# Patient Record
Sex: MALE | Race: WHITE | NOT HISPANIC OR LATINO | Employment: OTHER | URBAN - METROPOLITAN AREA
[De-identification: names, ages, dates, MRNs, and addresses within clinical notes are randomized per-mention and may not be internally consistent; named-entity substitution may affect disease eponyms.]

---

## 2017-01-16 RX ORDER — WARFARIN SODIUM 5 MG/1
TABLET ORAL
Qty: 90 TABLET | Refills: 0 | Status: SHIPPED | OUTPATIENT
Start: 2017-01-16 | End: 2017-04-15 | Stop reason: SDUPTHER

## 2017-03-23 RX ORDER — FINASTERIDE 5 MG/1
TABLET, FILM COATED ORAL
Qty: 90 TABLET | Refills: 1 | Status: SHIPPED | OUTPATIENT
Start: 2017-03-23 | End: 2017-09-14 | Stop reason: SDUPTHER

## 2017-04-15 RX ORDER — WARFARIN SODIUM 5 MG/1
TABLET ORAL
Qty: 90 TABLET | Refills: 0 | Status: SHIPPED | OUTPATIENT
Start: 2017-04-15 | End: 2017-08-07 | Stop reason: SDUPTHER

## 2017-05-30 RX ORDER — PROPAFENONE HYDROCHLORIDE 150 MG/1
TABLET, COATED ORAL
Qty: 60 TABLET | Refills: 1 | Status: SHIPPED | OUTPATIENT
Start: 2017-05-30 | End: 2017-08-07 | Stop reason: SDUPTHER

## 2017-06-01 RX ORDER — PROPAFENONE HYDROCHLORIDE 150 MG/1
TABLET, COATED ORAL
Qty: 60 TABLET | Refills: 1 | OUTPATIENT
Start: 2017-06-01

## 2017-06-13 ENCOUNTER — OFFICE VISIT (OUTPATIENT)
Dept: INTERNAL MEDICINE | Facility: CLINIC | Age: 72
End: 2017-06-13

## 2017-06-13 VITALS
WEIGHT: 215 LBS | HEIGHT: 74 IN | SYSTOLIC BLOOD PRESSURE: 162 MMHG | DIASTOLIC BLOOD PRESSURE: 74 MMHG | BODY MASS INDEX: 27.59 KG/M2

## 2017-06-13 DIAGNOSIS — G45.9 TRANSIENT CEREBRAL ISCHEMIA, UNSPECIFIED TYPE: ICD-10-CM

## 2017-06-13 DIAGNOSIS — G89.29 CHRONIC LOW BACK PAIN WITH LEFT-SIDED SCIATICA, UNSPECIFIED BACK PAIN LATERALITY: ICD-10-CM

## 2017-06-13 DIAGNOSIS — Z82.49 FAMILY HISTORY OF PREMATURE CORONARY ARTERY DISEASE: ICD-10-CM

## 2017-06-13 DIAGNOSIS — I48.0 PAROXYSMAL ATRIAL FIBRILLATION (HCC): ICD-10-CM

## 2017-06-13 DIAGNOSIS — G25.81 RESTLESS LEGS SYNDROME: ICD-10-CM

## 2017-06-13 DIAGNOSIS — Z00.00 HEALTH CARE MAINTENANCE: Primary | ICD-10-CM

## 2017-06-13 DIAGNOSIS — M54.42 CHRONIC LOW BACK PAIN WITH LEFT-SIDED SCIATICA, UNSPECIFIED BACK PAIN LATERALITY: ICD-10-CM

## 2017-06-13 PROBLEM — Z86.73 HISTORY OF TIA (TRANSIENT ISCHEMIC ATTACK): Status: ACTIVE | Noted: 2017-06-13

## 2017-06-13 PROBLEM — J37.0 CHRONIC LARYNGITIS: Status: ACTIVE | Noted: 2017-06-13

## 2017-06-13 PROBLEM — I67.82 TEMPORARY CEREBRAL VASCULAR DYSFUNCTION: Status: ACTIVE | Noted: 2017-06-13

## 2017-06-13 PROBLEM — M51.36 DDD (DEGENERATIVE DISC DISEASE), LUMBAR: Status: ACTIVE | Noted: 2017-06-13

## 2017-06-13 PROBLEM — J30.2 SEASONAL ALLERGIC RHINITIS: Status: ACTIVE | Noted: 2017-06-13

## 2017-06-13 PROCEDURE — G0438 PPPS, INITIAL VISIT: HCPCS | Performed by: INTERNAL MEDICINE

## 2017-06-13 PROCEDURE — 99214 OFFICE O/P EST MOD 30 MIN: CPT | Performed by: INTERNAL MEDICINE

## 2017-06-13 RX ORDER — HYDROCODONE BITARTRATE AND ACETAMINOPHEN 5; 325 MG/1; MG/1
1 TABLET ORAL AS NEEDED
Qty: 30 TABLET | Refills: 0 | Status: SHIPPED | OUTPATIENT
Start: 2017-06-13 | End: 2017-12-05 | Stop reason: SDUPTHER

## 2017-06-13 RX ORDER — CYCLOBENZAPRINE HCL 10 MG
10 TABLET ORAL NIGHTLY
Qty: 30 TABLET | Refills: 11 | Status: SHIPPED | OUTPATIENT
Start: 2017-06-13 | End: 2017-07-12

## 2017-06-13 RX ORDER — ROPINIROLE 0.5 MG/1
0.5 TABLET, FILM COATED ORAL NIGHTLY
Qty: 30 TABLET | Refills: 11 | Status: SHIPPED | OUTPATIENT
Start: 2017-06-13 | End: 2017-07-12

## 2017-06-13 NOTE — PROGRESS NOTES
"Subjective   Demond Menchaca is a 72 y.o. male. Here for AWV, to establish as a new patient.     History of Present Illness   /72  Ht 73.5\" (186.7 cm)  Wt 215 lb (97.5 kg)  BMI 27.98 kg/m2  Patient is being seen for initial wellness visit. Demond Menchaca 72 y.o. male who is here to establish as a new patient. In a past had been to see  and lately by .  Past medical and surgical history, family and social history was obtained by me in the interview and recorded in the EHR.    /72  Ht 73.5\" (186.7 cm)  Wt 215 lb (97.5 kg)  BMI 27.98 kg/m2      Current Outpatient Prescriptions:   •  Cetirizine HCl 10 MG capsule, Take 1 capsule by mouth daily., Disp: , Rfl:   •  finasteride (PROSCAR) 5 MG tablet, TAKE 1 TABLET EVERY DAY, Disp: 90 tablet, Rfl: 1  •  fluticasone (FLONASE) 50 MCG/ACT nasal spray, into each nostril daily., Disp: , Rfl:   •  HYDROcodone-acetaminophen (NORCO) 5-325 MG per tablet, Take 1 tablet by mouth As Needed for Severe Pain (7-10)., Disp: 30 tablet, Rfl: 0  •  metoprolol tartrate (LOPRESSOR) 50 MG tablet, Take 1 tablet by mouth Daily., Disp: 30 tablet, Rfl: 5  •  propafenone (RHTHYMOL) 150 MG tablet, TAKE 1 TABLET BY MOUTH 2 (TWO) TIMES A DAY., Disp: 60 tablet, Rfl: 1  •  warfarin (COUMADIN) 5 MG tablet, TAKE ONE-HALF OF A TAB BY MOUTH ON MONDAY, WEDNESDAY, & FRIDAY & 1 TAB ALL OTHER DAYS AS DIRECTED, Disp: 90 tablet, Rfl: 0  •  rOPINIRole (REQUIP) 0.5 MG tablet, Take 1 tablet by mouth Every Night. Take 1 hour before bedtime., Disp: 30 tablet, Rfl: 11FH, SH, PMH and surgical history had been collected by me in the interview and reflected in  EHR.  Hospitalizations in a past: 8  Once per lifetime Medicare screening tests: ECG - under the care of cardiologist    AAA risk assessment: 1. FH: none. 2.H/o tobacco smoking: in remote past, as per SH. 3. CV or PAD: none.    Tobacco use: in remote past, as per SH   Alcohol use: once a week  Illicit drugs use: none  Diet: low " carb  Exercise: 2 times a week, stationary bike and aerobic exercise    Anxiety screening: How many days in the last 2 weeks you were  1. Feeling anxious, nervous, on edge: none  2. Unable to stop worrying: none  3. Worrying too much about different things: none  4. Having problems relaxing:none  5. Feeling restless or unable to sit still:none  6. Feeling irritable or easely annoyed: none  7. Being afraid that something awful might happen: none    Depression screening PHQ9:  Over the past 2 weeks how often have you been bothered by  1. Lack of interest or pleasuer in usual activities: none  2. Feeling down, depressed, hopeless:none  3. Troubles falling asleep/sleeping too long:none  4. Feeling tired, having little energy: none  5. Poor appetite or overeating: none  6. Feeling bad about yourself:none.  7. Trouble concentrating: at the baseline.  8. Moving or speaking too slow or much faster than usual: none  9. Thoughts about harming yourself:none.    Cognitive impairment screening:   Do you have difficulties with:  1. Language: no  2. Behavior: no  3. Learning/retaining new information: no  4. Handling complex tasks: no  5. Reasoning: no  6. Spacial ability and orientation: no    Hearing: normal.  Driving: unrestricted  ADL: independent  ADL details: Does patient needs help with:  telephone use: no  Transportation: no  Housework: no  Shopping: no  meal preparation: no  laundry: no  managing medication:no  Participate in the social activities: Y    Fall risk factors:   1.Use of alcohol: no    2.Polypharmacy: no  3.H/o of previous fall:  yes  4. Mobility impairment:  yes  5. Visual impairment:  no  6. Deconditioning: no  7. Use of antihypertensive medication:  yes  8. Use of sedatives: no  9. Use of antidepressant: no    Home safety:   1.loose rugs: no   2.unfamiliar environment: no   3. Uneven floors: no   4. No grab bars in the bathroom:  no  5. No banister on stairs: no      Advanced directives: completed and  Living Will is on file in the hospital. Discussed. I agree with patients decision..    Co-managers: ophthalmology , dermatology , cardiology , gastroenterologist , allergist that he forgot the name.    Patient had chronic lower back pain and stiffness. He had lumbar laminectomy several decades ago. He reports that every morning he has significant stiffness, but he is better in 3-4 hours. Play golf and goes to the gym (bike and treadmill), but only in the afternoons. On chronic anticoagulation for PAF. Takes Hydrocodone/APAP 5/325 mg as needed, only if his back hurts or before he has significant exercise. No side effects. No h/o substance abuse.                                      The following portions of the patient's history were reviewed and updated as appropriate: allergies, current medications, past family history, past medical history, past social history, past surgical history and problem list.    Review of Systems   Constitutional: Negative for chills and fever.   HENT: Negative for postnasal drip, sinus pressure and sore throat.    Eyes: Negative for pain and itching.   Respiratory: Negative for cough and chest tightness.    Cardiovascular: Negative for chest pain and leg swelling.   Gastrointestinal: Negative for abdominal pain and blood in stool.   Endocrine: Negative for cold intolerance and heat intolerance.   Genitourinary: Negative for difficulty urinating and flank pain.   Musculoskeletal: Positive for back pain. Negative for neck pain.   Skin: Negative for color change and rash.   Neurological: Negative for dizziness, weakness and headaches.   Hematological: Negative for adenopathy. Does not bruise/bleed easily.   Psychiatric/Behavioral: Negative for sleep disturbance. The patient is not nervous/anxious.        Objective   Physical Exam   Constitutional: He is oriented to person, place, and time. Vital signs are normal. He appears well-developed and  well-nourished. No distress.   HENT:   Head: Normocephalic and atraumatic.   Right Ear: External ear normal.   Left Ear: External ear normal.   Nose: Nose normal. No mucosal edema. Right sinus exhibits no maxillary sinus tenderness and no frontal sinus tenderness. Left sinus exhibits no maxillary sinus tenderness and no frontal sinus tenderness.   Mouth/Throat: Oropharynx is clear and moist. No oropharyngeal exudate.   Eyes: Conjunctivae, EOM and lids are normal. Pupils are equal, round, and reactive to light. Right eye exhibits no discharge. Left eye exhibits no discharge. Right conjunctiva is not injected. Left conjunctiva is not injected. No scleral icterus. Right eye exhibits normal extraocular motion. Left eye exhibits normal extraocular motion.   Neck: Normal range of motion and full passive range of motion without pain. Neck supple. No JVD present. Carotid bruit is not present. No thyromegaly present.   Cardiovascular: Regular rhythm, S1 normal, S2 normal, normal heart sounds and intact distal pulses.  Bradycardia present.  PMI is not displaced.  Exam reveals no gallop and no friction rub.    No murmur heard.  Pulmonary/Chest: Effort normal and breath sounds normal. No accessory muscle usage. No respiratory distress. He has no decreased breath sounds. He has no wheezes. He has no rhonchi. He has no rales.   Abdominal: Soft. Bowel sounds are normal. He exhibits no distension, no pulsatile liver, no fluid wave, no abdominal bruit, no ascites and no mass. There is no tenderness. There is no rebound and no guarding.   Well healed RLQ scar, umbilical hernia   Musculoskeletal: He exhibits deformity (bow legs). He exhibits no edema.   Lymphadenopathy:        Head (right side): No submental, no submandibular, no preauricular, no posterior auricular and no occipital adenopathy present.        Head (left side): No submental, no submandibular, no tonsillar, no preauricular, no posterior auricular and no occipital  adenopathy present.     He has no cervical adenopathy.        Right cervical: No superficial cervical, no deep cervical and no posterior cervical adenopathy present.       Left cervical: No superficial cervical, no deep cervical and no posterior cervical adenopathy present.        Right: No supraclavicular adenopathy present.        Left: No supraclavicular adenopathy present.   Neurological: He is alert and oriented to person, place, and time. He has normal strength and normal reflexes. He displays normal reflexes. No cranial nerve deficit. He exhibits normal muscle tone. Coordination normal.   Reflex Scores:       Bicep reflexes are 2+ on the right side and 2+ on the left side.       Patellar reflexes are 2+ on the right side and 2+ on the left side.  Skin: Skin is warm and dry. No rash noted. He is not diaphoretic. No erythema.   Few cherry hemangiomas, livedo reticularis lower legs   Psychiatric: He has a normal mood and affect. His speech is normal and behavior is normal. Thought content normal.   Vitals reviewed.      Assessment/Plan   Diagnoses and all orders for this visit:    Health care maintenance  -     Comprehensive Metabolic Panel; Future  -     Lipid Panel; Future  -     TSH; Future    Chronic low back pain with left-sided sciatica, unspecified back pain laterality  -     HYDROcodone-acetaminophen (NORCO) 5-325 MG per tablet; Take 1 tablet by mouth As Needed for Severe Pain (7-10).  -     cyclobenzaprine (FLEXERIL) 10 MG tablet; Take 1 tablet by mouth Every Night.    Restless legs syndrome  -     rOPINIRole (REQUIP) 0.5 MG tablet; Take 1 tablet by mouth Every Night. Take 1 hour before bedtime.  -     CBC & Differential; Future    Paroxysmal atrial fibrillation  -     Comprehensive Metabolic Panel; Future  -     Lipid Panel; Future  -     TSH; Future    Transient cerebral ischemia, unspecified type  -     Lipid Panel; Future    Family history of premature coronary artery disease  -     Lipid Panel;  Future    Annual wellness visit with preventive exam as well as age and risk appropriate councelling completed.    Cardiovascular disease councelling: will check cholesterol. Continue low fat diet and regular exrcise.   Diabetes screening and councelling: will check blood sugar.  Glaucoma screening: up to date.  AAA screening: not needed..  Hep C screening (born between 0[birth year]) not needed, patient is not in the age group, and has no risk factors..  Colorectal cancer screening: up to date. Recommended every 5 years. Next screening is recommended in 2021..  Prostate cancer screening: Recent changes in guidelines, available evidence and controvercies discussed with patient. Current position and recommendations from USPTF, ACP and Urological association explained. Patient requests PSA . Will check PSA.    Immunizations:   1. Influenza vaccine  is up to date and recommended yearly.   2. Shingles vaccines: completed.   3. Pneumonia vaccine: had Pneumovaccine 23, needs Prevnar 13, but declines vaccine today as he has scheduled golf game..      Advanced directives planning:completed and Living Will is on file in the hospital. Discussed. I agree with patients decision..    Medications reviewed and medication list updated.   Potential harmful drug-disease interactions in the elderly: none.  High risk medication in elderly: opiate.  ASA use: anticoagulated.    Restless leg s-m - will check CBC to r/o iron deficiency and try Ropinirole 0.5 mg at bedtime, if needed mat take 2 pills at a time.  Chronic lower back pain - due to L spine DDD documented on MRI, bulging disks, s/p laminectomy. Will continue Hydrocodone as needed and try muscle relaxant at bedtime. Potential side effects of medication, including risk of habit formation and tolerance, as well as alternative treatments, discussed with patient. Patient voices understanding and wishes to try medication. Timmy report reviewed. [facility] controlled substance  agreement signed. I will continue to monitor clinical progress with follow up visits, random pill counts and urine tox screens. Urine drug screen ordered. Given script. Patient notified that maximum prescribed amount of medication will not exceed 30 days supply, that medication will not be ordered from the mail order company, and that misuse of medication or providing false information will result in dismissal from the practice. Lost pills or lost scripts will not be replaced.

## 2017-06-13 NOTE — PATIENT INSTRUCTIONS
Annual wellness visit with preventive exam as well as age and risk appropriate councelling completed.    Cardiovascular disease councelling: will check cholesterol. Continue low fat diet and regular exrcise.   Diabetes screening and councelling: will check blood sugar.  Glaucoma screening: up to date.  AAA screening: not needed..  Hep C screening (born between [birth year]) not needed, patient is not in the age group, and has no risk factors..  Colorectal cancer screening: up to date. Recommended every 5 years. Next screening is recommended in 2021..  Prostate cancer screening: Recent changes in guidelines, available evidence and controvercies discussed with patient. Current position and recommendations from USPTF, ACP and Urological association explained. Patient requests PSA . Will check PSA.    Immunizations:   1. Influenza vaccine  is up to date and recommended yearly.   2. Shingles vaccines: completed.   3. Pneumonia vaccine: had Pneumovaccine 23, needs Prevnar 13, but declines vaccine today as he has scheduled golf game..      Advanced directives planning:completed and Living Will is on file in the hospital. Discussed. I agree with patients decision..    Medications reviewed and medication list updated.   Potential harmful drug-disease interactions in the elderly: none.  High risk medication in elderly: opiate.  ASA use: anticoagulated.    Restless leg s-m - will check CBC to r/o iron deficiency and try Ropinirole 0.5 mg at bedtime, if needed mat take 2 pills at a time.  Chronic lower back pain - due to L spine DDD documented on MRI, bulging disks, s/p laminectomy. Will continue Hydrocodone as needed and try muscle relaxant at bedtime. Potential side effects of medication, including risk of habit formation and tolerance, as well as alternative treatments, discussed with patient. Patient voices understanding and wishes to try medication. Timmy report reviewed. [facility] controlled substance agreement signed.  I will continue to monitor clinical progress with follow up visits, random pill counts and urine tox screens. Urine drug screen ordered. Given script. Patient notified that maximum prescribed amount of medication will not exceed 30 days supply, that medication will not be ordered from the mail order company, and that misuse of medication or providing false information will result in dismissal from the practice. Lost pills or lost scripts will not be replaced.     Return in about 4 weeks (around 7/11/2017) for RLS.

## 2017-06-27 ENCOUNTER — LAB (OUTPATIENT)
Dept: INTERNAL MEDICINE | Facility: CLINIC | Age: 72
End: 2017-06-27

## 2017-06-27 ENCOUNTER — CLINICAL SUPPORT (OUTPATIENT)
Dept: INTERNAL MEDICINE | Facility: CLINIC | Age: 72
End: 2017-06-27

## 2017-06-27 DIAGNOSIS — G89.29 CHRONIC LOW BACK PAIN WITH LEFT-SIDED SCIATICA, UNSPECIFIED BACK PAIN LATERALITY: ICD-10-CM

## 2017-06-27 DIAGNOSIS — M54.42 CHRONIC LOW BACK PAIN WITH LEFT-SIDED SCIATICA, UNSPECIFIED BACK PAIN LATERALITY: ICD-10-CM

## 2017-06-27 DIAGNOSIS — G45.9 TRANSIENT CEREBRAL ISCHEMIA, UNSPECIFIED TYPE: ICD-10-CM

## 2017-06-27 DIAGNOSIS — Z82.49 FAMILY HISTORY OF PREMATURE CORONARY ARTERY DISEASE: ICD-10-CM

## 2017-06-27 DIAGNOSIS — Z23 NEED FOR PNEUMOCOCCAL VACCINATION: Primary | ICD-10-CM

## 2017-06-27 DIAGNOSIS — Z00.00 HEALTH CARE MAINTENANCE: ICD-10-CM

## 2017-06-27 DIAGNOSIS — G25.81 RESTLESS LEGS SYNDROME: ICD-10-CM

## 2017-06-27 DIAGNOSIS — I48.0 PAROXYSMAL ATRIAL FIBRILLATION (HCC): ICD-10-CM

## 2017-06-27 DIAGNOSIS — Z79.899 ENCOUNTER FOR LONG-TERM (CURRENT) USE OF MEDICATIONS: Primary | ICD-10-CM

## 2017-06-27 LAB
ALBUMIN SERPL-MCNC: 4.17 G/DL (ref 3.4–4.6)
ALBUMIN/GLOB SERPL: 1.6 G/DL
ALP SERPL-CCNC: 69 U/L (ref 46–116)
ALT SERPL W P-5'-P-CCNC: 24 U/L (ref 16–63)
ANION GAP SERPL CALCULATED.3IONS-SCNC: 10 MMOL/L
AST SERPL-CCNC: 22 U/L (ref 7–37)
BILIRUB SERPL-MCNC: 0.8 MG/DL (ref 0.2–1)
BUN BLD-MCNC: 16 MG/DL (ref 6–22)
BUN/CREAT SERPL: 16.2 (ref 7–25)
CALCIUM SPEC-SCNC: 8.7 MG/DL (ref 8.6–10.5)
CHLORIDE SERPL-SCNC: 104 MMOL/L (ref 95–107)
CHOLEST SERPL-MCNC: 115 MG/DL (ref 0–200)
CO2 SERPL-SCNC: 27 MMOL/L (ref 23–32)
CREAT BLD-MCNC: 0.99 MG/DL (ref 0.7–1.3)
GFR SERPL CREATININE-BSD FRML MDRD: 74 ML/MIN/1.73
GLOBULIN UR ELPH-MCNC: 2.6 GM/DL
GLUCOSE BLD-MCNC: 92 MG/DL (ref 70–100)
HDLC SERPL-MCNC: 56 MG/DL (ref 40–81)
LDLC SERPL CALC-MCNC: 51 MG/DL (ref 0–100)
LDLC/HDLC SERPL: 0.92 {RATIO}
POTASSIUM BLD-SCNC: 4.6 MMOL/L (ref 3.3–5.3)
PROT SERPL-MCNC: 6.8 G/DL (ref 6.3–8.4)
SODIUM BLD-SCNC: 141 MMOL/L (ref 136–145)
TRIGL SERPL-MCNC: 38 MG/DL (ref 0–150)
TSH SERPL DL<=0.05 MIU/L-ACNC: 0.95 MIU/ML (ref 0.4–4.2)
VLDLC SERPL-MCNC: 7.6 MG/DL

## 2017-06-27 PROCEDURE — 85025 COMPLETE CBC W/AUTO DIFF WBC: CPT | Performed by: INTERNAL MEDICINE

## 2017-06-27 PROCEDURE — 90670 PCV13 VACCINE IM: CPT | Performed by: INTERNAL MEDICINE

## 2017-06-27 PROCEDURE — 80053 COMPREHEN METABOLIC PANEL: CPT | Performed by: INTERNAL MEDICINE

## 2017-06-27 PROCEDURE — 84443 ASSAY THYROID STIM HORMONE: CPT | Performed by: INTERNAL MEDICINE

## 2017-06-27 PROCEDURE — 80061 LIPID PANEL: CPT | Performed by: INTERNAL MEDICINE

## 2017-06-27 PROCEDURE — 36415 COLL VENOUS BLD VENIPUNCTURE: CPT | Performed by: INTERNAL MEDICINE

## 2017-06-27 PROCEDURE — G0009 ADMIN PNEUMOCOCCAL VACCINE: HCPCS | Performed by: INTERNAL MEDICINE

## 2017-06-27 NOTE — PROGRESS NOTES
prevnar vaccine was given in right delt. Patient was unsure what pneumo vac was needed but it has been 5 years since regular dose, I informed patient prevnar then in 6 months to a yr he would get pneumo 23 vaccine.

## 2017-06-28 LAB
BASOPHILS # BLD AUTO: 0.01 10*3/MM3 (ref 0–0.2)
BASOPHILS NFR BLD AUTO: 0.4 % (ref 0–2)
DEPRECATED RDW RBC AUTO: 45 FL (ref 37–54)
EOSINOPHIL # BLD AUTO: 0.13 10*3/MM3 (ref 0–0.7)
EOSINOPHIL NFR BLD AUTO: 4.8 % (ref 0–5)
ERYTHROCYTE [DISTWIDTH] IN BLOOD BY AUTOMATED COUNT: 12.6 % (ref 11.5–15)
HCT VFR BLD AUTO: 40.2 % (ref 40.1–51)
HGB BLD-MCNC: 13.4 G/DL (ref 13.7–17.5)
LYMPHOCYTES # BLD AUTO: 0.74 10*3/MM3 (ref 0.8–7)
LYMPHOCYTES NFR BLD AUTO: 27.4 % (ref 10–60)
MCH RBC QN AUTO: 33.3 PG (ref 26–34)
MCHC RBC AUTO-ENTMCNC: 33.3 G/DL (ref 31–37)
MCV RBC AUTO: 100 FL (ref 80–100)
MONOCYTES # BLD AUTO: 0.23 10*3/MM3 (ref 0–1)
MONOCYTES NFR BLD AUTO: 8.5 % (ref 0–13)
NEUTROPHILS # BLD AUTO: 1.59 10*3/MM3 (ref 1–11)
NEUTROPHILS NFR BLD AUTO: 58.9 % (ref 30–85)
PLATELET # BLD AUTO: 94 10*3/MM3 (ref 150–450)
PMV BLD AUTO: 10 FL (ref 6–12)
RBC # BLD AUTO: 4.02 10*6/MM3 (ref 4.63–6.08)
WBC NRBC COR # BLD: 2.7 10*3/MM3 (ref 5–10)

## 2017-07-07 LAB — CONV REPORT SUMMARY: NORMAL

## 2017-07-12 ENCOUNTER — OFFICE VISIT (OUTPATIENT)
Dept: INTERNAL MEDICINE | Facility: CLINIC | Age: 72
End: 2017-07-12

## 2017-07-12 VITALS
WEIGHT: 217 LBS | BODY MASS INDEX: 27.85 KG/M2 | SYSTOLIC BLOOD PRESSURE: 142 MMHG | DIASTOLIC BLOOD PRESSURE: 72 MMHG | RESPIRATION RATE: 14 BRPM | HEIGHT: 74 IN

## 2017-07-12 DIAGNOSIS — D61.818 PANCYTOPENIA (HCC): Primary | ICD-10-CM

## 2017-07-12 DIAGNOSIS — G25.81 RESTLESS LEGS SYNDROME: ICD-10-CM

## 2017-07-12 PROCEDURE — 99213 OFFICE O/P EST LOW 20 MIN: CPT | Performed by: INTERNAL MEDICINE

## 2017-07-12 NOTE — PROGRESS NOTES
Subjective   Demond Menchaca is a 72 y.o. male. Here for RLS f/u    History of Present Illness   Demond Menchaca 72 y.o. male presents today for RLS f/u. Last was seen on 06/13/2017 and on that visit medication was changed due to inadequate control.We had started Ropinirole 0.5 mg at bedtime..  Patient is not compliant with treatment and he had chosen not to take medication after reading ab out potential side effgects side effects.  The following portions of the patient's history were reviewed and updated as appropriate: allergies, current medications, past family history, past medical history, past social history, past surgical history and problem list.    Review of Systems   Constitutional: Negative for chills and fever.   Eyes: Negative for pain and redness.   Respiratory: Negative for cough and shortness of breath.    Cardiovascular: Negative for chest pain and leg swelling.   Neurological: Negative for dizziness and headaches.       Objective   Physical Exam   Constitutional: He is oriented to person, place, and time. He appears well-developed and well-nourished.   HENT:   Head: Normocephalic and atraumatic.   Right Ear: Tympanic membrane, external ear and ear canal normal.   Left Ear: Tympanic membrane, external ear and ear canal normal.   Nose: Nose normal. Right sinus exhibits no maxillary sinus tenderness and no frontal sinus tenderness. Left sinus exhibits no maxillary sinus tenderness and no frontal sinus tenderness.   Mouth/Throat: Uvula is midline, oropharynx is clear and moist and mucous membranes are normal.   Eyes: Conjunctivae and EOM are normal. Pupils are equal, round, and reactive to light. Right eye exhibits no discharge. Left eye exhibits no discharge. No scleral icterus.   Neck: Neck supple. No JVD present.   Cardiovascular: Normal rate and normal heart sounds.  An irregularly irregular rhythm present. Exam reveals no gallop and no friction rub.    No murmur heard.  Pulmonary/Chest: Effort  normal and breath sounds normal. He has no wheezes. He has no rales.   Musculoskeletal: He exhibits no edema.   Lymphadenopathy:     He has no cervical adenopathy.   Neurological: He is alert and oriented to person, place, and time. No cranial nerve deficit.   Skin: Skin is warm and dry. No rash noted.   Senile purpura   Psychiatric: He has a normal mood and affect. His behavior is normal.   Vitals reviewed.      Assessment/Plan   Demond was seen today for restless legs syndrome and back pain.    Diagnoses and all orders for this visit:    Pancytopenia    Restless legs syndrome    Pancytopenia - there is progressive lowering og the all 3 lines of blood cells. Patient recalls that he had been to see hematologist and had unremarcable bone marrow biopsy 20+ years ago. Will refer to CBC group.  RLS - low hemoglobin possibly is contributing. Diet discussed. Patient had decided against taking medication. Will monitor.

## 2017-08-02 ENCOUNTER — OFFICE VISIT (OUTPATIENT)
Dept: CARDIOLOGY | Facility: CLINIC | Age: 72
End: 2017-08-02

## 2017-08-02 VITALS
DIASTOLIC BLOOD PRESSURE: 78 MMHG | BODY MASS INDEX: 27.67 KG/M2 | WEIGHT: 215.6 LBS | HEART RATE: 44 BPM | HEIGHT: 74 IN | SYSTOLIC BLOOD PRESSURE: 144 MMHG

## 2017-08-02 DIAGNOSIS — I48.0 PAROXYSMAL ATRIAL FIBRILLATION (HCC): Primary | ICD-10-CM

## 2017-08-02 PROCEDURE — 93000 ELECTROCARDIOGRAM COMPLETE: CPT | Performed by: NURSE PRACTITIONER

## 2017-08-02 PROCEDURE — 99213 OFFICE O/P EST LOW 20 MIN: CPT | Performed by: NURSE PRACTITIONER

## 2017-08-02 NOTE — PROGRESS NOTES
Date of Office Visit: 2017  Encounter Provider: ERIC Giles  Place of Service: [facility] CARDIOLOGY  Patient Name: Demond Menchaca  :1945    Chief Complaint   Patient presents with   • Atrial Fibrillation     PAF   :     HPI: Demond Menchaca is a 72 y.o. male who is a patient of Dr. Hernandez, new to me today. Old records have been reviewed.  He has a past medical history of paroxysmal atrial fib, status post pulmonary vein isolation ×2.  The first one in 2013 (cryoablation of all 4 pulmonary veins) and a redo in 2014 (redo of RUPV and CTI ablation).  He also has a past medical history of BPH, RLS, thrombocytopenia, TIA and chronic back pain. He has been maintained on propafenone and warfarin for stroke prophylaxis. He was last seen by Dr. Hernandez in 2016. He was noted to have a low heart rate but was asymptomatic. He presents today for routine follow up. He has recently been noted to have pancytopenia and has appointment with Dr. Acosta (hematology) in September. He denies any problems with active bleeding.     He still has episodes of PAF about every couple weeks and they last about 1-2 hours. The frequency and duration are unchanged from his last visit with Dr. Hernandez. He is not really symptomatic except that he does feel the irregular heartbeat. He did stop his metoprolol because he just felt like it was not doing anything for him. He denies chest pain, shortness of breath, PND, orthopnea, edema or syncope.       Past Medical History:   Diagnosis Date   • Atrial fibrillation    • COPD (chronic obstructive pulmonary disease)    • Diarrhea    • Diverticulosis    • ED (erectile dysfunction)    • Elbow fracture, left    • History of blood transfusion    • Hypogonadism in male    • Kidney cysts    • Left femoral shaft fracture    • Lower back pain    • Palpitations    • Prostatic hypertrophy, benign    • Skin cancer    • Transient cerebral  ischemia        Past Surgical History:   Procedure Laterality Date   • APPENDECTOMY     • BACK SURGERY     • COLONOSCOPY  11/21/2014       • FEMUR FRACTURE SURGERY Left 2007    post fall from the ladder   • LUMBAR LAMINECTOMY  1974    Dr.Lester Lino   • NECK SURGERY     • OTHER SURGICAL HISTORY      elbow surg.   • THYROID SURGERY  1986    benign tumor removal,    • TOTAL ELBOW ARTHROPLASTY Left 2007    L, post fall and fracture, hardware in       Social History     Social History   • Marital status:      Spouse name: N/A   • Number of children: N/A   • Years of education: N/A     Occupational History   • Not on file.     Social History Main Topics   • Smoking status: Former Smoker     Packs/day: 0.50     Years: 20.00     Quit date: 1974   • Smokeless tobacco: Not on file   • Alcohol use No   • Drug use: Not on file   • Sexual activity: Not on file     Other Topics Concern   • Not on file     Social History Narrative       Family History   Problem Relation Age of Onset   • Hypertension Mother    • Osteoporosis Mother    • Thyroid disease Sister    • Diabetes Sister    • Hypertension Sister    • Colon cancer Maternal Grandmother 60   • Hypertension Father        Review of Systems   Constitution: Negative for chills, fever, malaise/fatigue, weight gain and weight loss.   HENT: Negative for ear pain, headaches, hearing loss, nosebleeds and sore throat.    Eyes: Negative for double vision, pain and visual disturbance.   Cardiovascular: Positive for irregular heartbeat and palpitations. Negative for chest pain, dyspnea on exertion, leg swelling, near-syncope, orthopnea, paroxysmal nocturnal dyspnea and syncope.   Respiratory: Negative for cough, shortness of breath, sleep disturbances due to breathing, snoring and wheezing.    Endocrine: Negative for cold intolerance, heat intolerance and polyuria.   Hematologic/Lymphatic: Bruises/bleeds easily.   Skin: Negative for itching and rash.  "  Musculoskeletal: Positive for back pain (chronic). Negative for joint pain, joint swelling and myalgias.   Gastrointestinal: Negative for abdominal pain, diarrhea, melena, nausea and vomiting.   Genitourinary: Negative for frequency, hematuria and hesitancy.   Neurological: Negative for excessive daytime sleepiness, light-headedness, numbness, paresthesias and seizures.   Psychiatric/Behavioral: Negative for altered mental status and depression.   Allergic/Immunologic: Negative.    All other systems reviewed and are negative.      Allergies   Allergen Reactions   • Cardizem [Diltiazem Hcl] Itching   • Diltiazem          Current Outpatient Prescriptions:   •  Cetirizine HCl 10 MG capsule, Take 1 capsule by mouth daily., Disp: , Rfl:   •  finasteride (PROSCAR) 5 MG tablet, TAKE 1 TABLET EVERY DAY, Disp: 90 tablet, Rfl: 1  •  fluticasone (FLONASE) 50 MCG/ACT nasal spray, into each nostril daily., Disp: , Rfl:   •  HYDROcodone-acetaminophen (NORCO) 5-325 MG per tablet, Take 1 tablet by mouth As Needed for Severe Pain (7-10)., Disp: 30 tablet, Rfl: 0  •  propafenone (RHTHYMOL) 150 MG tablet, TAKE 1 TABLET BY MOUTH 2 (TWO) TIMES A DAY., Disp: 60 tablet, Rfl: 1  •  warfarin (COUMADIN) 5 MG tablet, TAKE ONE-HALF OF A TAB BY MOUTH ON MONDAY, WEDNESDAY, & FRIDAY & 1 TAB ALL OTHER DAYS AS DIRECTED, Disp: 90 tablet, Rfl: 0     Objective:     Vitals:    08/02/17 1005   BP: 144/78   Pulse: (!) 44   Weight: 215 lb 9.6 oz (97.8 kg)   Height: 73.5\" (186.7 cm)     Body mass index is 28.06 kg/(m^2).    PHYSICAL EXAM:    Vitals Reviewed.   General Appearance: No acute distress, well developed and well nourished.   Eyes: Conjunctiva and lids: No erythema, swelling, or discharge. Sclera non-icteric.   HENT: Atraumatic, normocephalic. External eyes, ears, and nose normal. No hearing loss noted. Mucous membranes normal. Lips not cyanotic. Neck supple with no tenderness.  Respiratory: No signs of respiratory distress. Respiration rhythm " and depth normal.   Clear to auscultation. No rales, crackles, rhonchi, or wheezing auscultated.   Cardiovascular:  Jugular Venous Pressure: Normal  Heart Rate and Rhythm: Normal but bradycardiac. Heart Sounds: Normal S1 and S2. No S3 or S4 noted.  Murmurs: No murmurs noted. No rubs, thrills, or gallops.   Arterial Pulses:  Posterior tibialis and dorsalis pedis pulses normal.   Lower Extremities: No edema noted.  Gastrointestinal:  Abdomen soft, non-distended, non-tender.   Musculoskeletal: Normal movement of extremities  Skin and Nails: General appearance normal. No pallor, cyanosis, diaphoresis. Skin temperature normal. No clubbing of fingernails.   Psychiatric: Patient alert and oriented to person, place, and time. Speech and behavior appropriate. Normal mood and affect.       ECG 12 Lead  Date/Time: 8/2/2017 10:09 AM  Performed by: ELIZ LUCAS  Authorized by: ELIZ LUCAS   Comparison: compared with previous ECG from 6/24/2016  Similar to previous ECG  Rhythm: sinus bradycardia  BPM: 44  Conduction: 1st degree  Clinical impression: normal ECG  Comments: Clinical indication: PAF              Assessment:       Diagnosis Plan   1. Paroxysmal atrial fibrillation            Plan:       1. Atrial Fibrillation and Atrial Flutter  Assessment  • The patient has paroxysmal atrial fibrillation  • The patient's CHADS2-VASc score is 3  • A [id number] score of 2 or more is considered a high risk for a thromboembolic event  • Warfarin prescribed  • PAF, s/p PVI x 2 (2013 & 2014). SB today. Still has episodes of PAF but unchanged in frequency or duration since last visit and minimally symptomatic (palpitations). He is bradycardiac but denies any dizziness or near syncope. He has stopped his metoprolol since his last office visit. Continue propafenone and warfarin. He has pancytopenia or his most recent CBC and has been referred to hematology. No active bleeding at this time. Return to see Dr. Hernandez in 1 year or sooner  should the need arise.     Plan  • Attempt to maintain sinus rhythm  • Continue warfarin for antithrombotic therapy, bleeding issues discussed  • Continue propafenone for rhythm control    Subjective - Objective  • The patient had atrial fibrillation ablation on 4/15/2014           As always, it has been a pleasure to participate in your patient's care.      Sincerely,         ERIC Delvalle

## 2017-08-07 RX ORDER — WARFARIN SODIUM 5 MG/1
TABLET ORAL
Qty: 90 TABLET | Refills: 0 | Status: SHIPPED | OUTPATIENT
Start: 2017-08-07 | End: 2017-12-26 | Stop reason: SDUPTHER

## 2017-08-07 RX ORDER — PROPAFENONE HYDROCHLORIDE 150 MG/1
TABLET, COATED ORAL
Qty: 60 TABLET | Refills: 1 | Status: SHIPPED | OUTPATIENT
Start: 2017-08-07 | End: 2017-10-10 | Stop reason: SDUPTHER

## 2017-09-11 RX ORDER — FINASTERIDE 5 MG/1
TABLET, FILM COATED ORAL
Qty: 90 TABLET | Refills: 1 | OUTPATIENT
Start: 2017-09-11

## 2017-09-14 RX ORDER — FINASTERIDE 5 MG/1
TABLET, FILM COATED ORAL
Qty: 90 TABLET | Refills: 1 | Status: SHIPPED | OUTPATIENT
Start: 2017-09-14 | End: 2018-03-05 | Stop reason: SDUPTHER

## 2017-09-19 ENCOUNTER — TELEPHONE (OUTPATIENT)
Dept: ONCOLOGY | Facility: CLINIC | Age: 72
End: 2017-09-19

## 2017-09-21 ENCOUNTER — APPOINTMENT (OUTPATIENT)
Dept: LAB | Facility: HOSPITAL | Age: 72
End: 2017-09-21

## 2017-09-21 ENCOUNTER — CONSULT (OUTPATIENT)
Dept: ONCOLOGY | Facility: CLINIC | Age: 72
End: 2017-09-21

## 2017-09-21 ENCOUNTER — LAB (OUTPATIENT)
Dept: LAB | Facility: HOSPITAL | Age: 72
End: 2017-09-21

## 2017-09-21 VITALS
WEIGHT: 212.6 LBS | RESPIRATION RATE: 16 BRPM | SYSTOLIC BLOOD PRESSURE: 152 MMHG | OXYGEN SATURATION: 98 % | HEART RATE: 43 BPM | HEIGHT: 75 IN | BODY MASS INDEX: 26.43 KG/M2 | DIASTOLIC BLOOD PRESSURE: 84 MMHG | TEMPERATURE: 98.2 F

## 2017-09-21 DIAGNOSIS — D61.818 PANCYTOPENIA (HCC): Primary | ICD-10-CM

## 2017-09-21 LAB
BASOPHILS # BLD AUTO: 0.02 10*3/MM3 (ref 0–0.1)
BASOPHILS NFR BLD AUTO: 0.5 % (ref 0–1.1)
DEPRECATED RDW RBC AUTO: 46.5 FL (ref 37–49)
EOSINOPHIL # BLD AUTO: 0.11 10*3/MM3 (ref 0–0.36)
EOSINOPHIL NFR BLD AUTO: 2.5 % (ref 1–5)
ERYTHROCYTE [DISTWIDTH] IN BLOOD BY AUTOMATED COUNT: 13 % (ref 11.7–14.5)
HCT VFR BLD AUTO: 41.8 % (ref 40–49)
HGB BLD-MCNC: 14.4 G/DL (ref 13.5–16.5)
IMM GRANULOCYTES # BLD: 0.02 10*3/MM3 (ref 0–0.03)
IMM GRANULOCYTES NFR BLD: 0.5 % (ref 0–0.5)
LYMPHOCYTES # BLD AUTO: 0.82 10*3/MM3 (ref 1–3.5)
LYMPHOCYTES NFR BLD AUTO: 18.8 % (ref 20–49)
MCH RBC QN AUTO: 33.6 PG (ref 27–33)
MCHC RBC AUTO-ENTMCNC: 34.4 G/DL (ref 32–35)
MCV RBC AUTO: 97.7 FL (ref 83–97)
MONOCYTES # BLD AUTO: 0.32 10*3/MM3 (ref 0.25–0.8)
MONOCYTES NFR BLD AUTO: 7.3 % (ref 4–12)
NEUTROPHILS # BLD AUTO: 3.07 10*3/MM3 (ref 1.5–7)
NEUTROPHILS NFR BLD AUTO: 70.4 % (ref 39–75)
NRBC BLD MANUAL-RTO: 0 /100 WBC (ref 0–0)
PLATELET # BLD AUTO: 116 10*3/MM3 (ref 150–375)
PLATELETS.RETICULATED NFR BLD AUTO: 1.6 % (ref 1.1–6.1)
PMV BLD AUTO: 9.5 FL (ref 8.9–12.1)
RBC # BLD AUTO: 4.28 10*6/MM3 (ref 4.3–5.5)
VIT B12 BLD-MCNC: 482 PG/ML (ref 211–946)
WBC NRBC COR # BLD: 4.36 10*3/MM3 (ref 4–10)

## 2017-09-21 PROCEDURE — 99204 OFFICE O/P NEW MOD 45 MIN: CPT | Performed by: INTERNAL MEDICINE

## 2017-09-21 PROCEDURE — 82607 VITAMIN B-12: CPT | Performed by: INTERNAL MEDICINE

## 2017-09-21 PROCEDURE — 36415 COLL VENOUS BLD VENIPUNCTURE: CPT | Performed by: INTERNAL MEDICINE

## 2017-09-21 PROCEDURE — 85025 COMPLETE CBC W/AUTO DIFF WBC: CPT | Performed by: INTERNAL MEDICINE

## 2017-09-21 PROCEDURE — 36416 COLLJ CAPILLARY BLOOD SPEC: CPT | Performed by: INTERNAL MEDICINE

## 2017-09-21 PROCEDURE — 85055 RETICULATED PLATELET ASSAY: CPT | Performed by: INTERNAL MEDICINE

## 2017-09-21 NOTE — PROGRESS NOTES
Subjective     REASON FOR CONSULTATION:  Pancytopenia  Provide an opinion on any further workup or treatment                             REQUESTING PHYSICIAN:  Katharine Stephenson M.D.  RECORDS OBTAINED:  Records of the patients history including those obtained from the referring provider were reviewed and summarized in detail.    HISTORY OF PRESENT ILLNESS:  The patient is a 72 y.o. year old male who is here for an opinion about the above issue.    History of Present Illness patient is a 72-year-old male with a history of atrial fibrillation degenerative arthritis who is referred to us because of mild chronic thrombocytopenia and leukopenia and new onset of anemia when seen in Dr. Curry's office this June.  At that time his hemoglobin was 13.4 white count was 2700 and platelets 95,000.  In the past his white count has been in the 3000 range with a platelet count of about 120,000.  He was referred to us for evaluation of these changes.    On reviewing his history he was seen in our office in 1998 with the same findings that at that time his hemoglobin was normal and his white count was low and his platelet count was 130,000.  A bone marrow was done which was morphologically normal except for mild increase in plasma cells ,with normal flow cytometry but I do not see any chromosome analysis.  A mass was felt in the left upper abdomen which led to an ultrasound which showed multiple cysts in both kidneys.  At the time he was drinking a fair amount of alcohol and he was asked to abstain from alcohol for 1 month and there is no change in the blood counts and this was not felt to be an important contributed to this finding.  There is no family history of leukopenia and thrombocytopenia    He currently drinks 6 glasses of wine some beer every weekend but not on a daily basis.  His had no fever sweats weight loss or systemic complaints and is feeling fairly well overall.  He is on Coumadin for atrial fibrillation is had 2  ablations done his had no overt bleeding and is on no new medications except for Rythmol which does not cause thrombocytopenia or anemia but can leukopenia.    His hemoglobin today is back to normal platelet count is up to 116,000 and his white count is in the normal range and this may have just been an aberration in June as he tells me he was not ill at the time  He is up-to-date with colonoscopies and this had a normal PSA    Past Medical History:   Diagnosis Date   • Arthritis    • Atrial fibrillation    • COPD (chronic obstructive pulmonary disease)    • Diarrhea    • Diverticulosis    • ED (erectile dysfunction)    • Elbow fracture, left    • History of blood transfusion    • Hypogonadism in male    • Kidney cysts    • Left femoral shaft fracture    • Lower back pain    • Palpitations    • Prostatic hypertrophy, benign    • Skin cancer    • Transient cerebral ischemia     H/O TIAs        Past Surgical History:   Procedure Laterality Date   • ADENOIDECTOMY  1973   • APPENDECTOMY     • BACK SURGERY     • COLONOSCOPY  11/21/2014       • FEMUR FRACTURE SURGERY Left 2007    post fall from the ladder   • LUMBAR LAMINECTOMY  1974    Dr.Lester Lino   • NECK SURGERY     • OTHER SURGICAL HISTORY      elbow surgery   • THYROID SURGERY  1986    benign tumor removal,    • TOTAL ELBOW ARTHROPLASTY Left 2007    L, post fall and fracture, hardware in        Current Outpatient Prescriptions on File Prior to Visit   Medication Sig Dispense Refill   • Cetirizine HCl 10 MG capsule Take 1 capsule by mouth daily.     • finasteride (PROSCAR) 5 MG tablet TAKE 1 TABLET EVERY DAY 90 tablet 1   • fluticasone (FLONASE) 50 MCG/ACT nasal spray into each nostril daily.     • HYDROcodone-acetaminophen (NORCO) 5-325 MG per tablet Take 1 tablet by mouth As Needed for Severe Pain (7-10). 30 tablet 0   • propafenone (RHTHYMOL) 150 MG tablet TAKE 1 TABLET BY MOUTH 2 (TWO) TIMES A DAY. 60 tablet 1   • warfarin (COUMADIN) 5 MG  "tablet TAKE ONE-HALF OF A TAB BY MOUTH ON MONDAY, WEDNESDAY, & FRIDAY & 1 TAB ALL OTHER DAYS AS DIRECTED 90 tablet 0     No current facility-administered medications on file prior to visit.         ALLERGIES:    Allergies   Allergen Reactions   • Cardizem [Diltiazem Hcl] Itching        Social History     Social History   • Marital status:      Spouse name: Latisha   • Number of children: N/A   • Years of education: N/A     Occupational History   •  Retired     Social History Main Topics   • Smoking status: Former Smoker     Packs/day: 0.50     Years: 20.00     Quit date: 1974   • Smokeless tobacco: Never Used   • Alcohol use 0.6 oz/week     1 Glasses of wine per week   • Drug use: None   • Sexual activity: Not Asked     Other Topics Concern   • None     Social History Narrative        Family History   Problem Relation Age of Onset   • Hypertension Mother    • Osteoporosis Mother    • Thyroid disease Sister    • Diabetes Sister    • Hypertension Sister    • Colon cancer Maternal Grandmother 60   • Hypertension Father    • Heart disease Other    • Atrial fibrillation Other    • Thyroid disease Other         Review of Systems   Genitourinary: Positive for difficulty urinating and frequency.   Musculoskeletal: Positive for gait problem (Left foot drop after spine surgery).   Hematological: Bruises/bleeds easily.        Objective     Vitals:    09/21/17 1347   BP: 152/84   Pulse: (!) 43   Resp: 16   Temp: 98.2 °F (36.8 °C)   TempSrc: Oral   SpO2: 98%   Weight: 212 lb 9.6 oz (96.4 kg)   Height: 74.53\" (189.3 cm)  Comment: new ht w/shoes.   PainSc: 0-No pain     Current Status 9/21/2017   ECOG score 0       Physical Exam    GENERAL:  Well-developed, well-nourished in no acute distress.   SKIN:  Warm, dry without rashes, purpura or petechiae.  EYES:  Pupils equal, round and reactive to light.  EOMs intact.  Conjunctivae normal.  EARS:  Hearing intact.  NOSE:  Septum midline.  No excoriations or nasal discharge.  MOUTH:  " Tongue is well-papillated; no stomatitis or ulcers.  Lips normal.  THROAT:  Oropharynx without lesions or exudates.  NECK:  Supple with good range of motion; no thyromegaly or masses, no JVD.  LYMPHATICS:  No cervical, supraclavicular, axillary or inguinal adenopathy.  CHEST:  Lungs clear to auscultation. Good airflow.  CARDIAC:  Regular rate and rhythm without murmurs, rubs or gallops. Normal S1,S2.  ABDOMEN:  Soft, nontender with no hepatosplenomegaly or masses.  Mildly prominent  EXTREMITIES:  No clubbing, cyanosis or edema.  Chronic venous stasis changes with varicosities bilaterally  NEUROLOGICAL:  Cranial Nerves II-XII grossly intact.  No focal neurological deficits.  PSYCHIATRIC:  Normal affect and mood.          RECENT LABS:  Hematology WBC   Date Value Ref Range Status   09/21/2017 4.36 4.00 - 10.00 10*3/mm3 Final     RBC   Date Value Ref Range Status   09/21/2017 4.28 (L) 4.30 - 5.50 10*6/mm3 Final     Hemoglobin   Date Value Ref Range Status   09/21/2017 14.4 13.5 - 16.5 g/dL Final     Hematocrit   Date Value Ref Range Status   09/21/2017 41.8 40.0 - 49.0 % Final     Platelets   Date Value Ref Range Status   09/21/2017 116 (L) 150 - 375 10*3/mm3 Final      Peripheral blood smear shows normochromic normocytic red cells without polychromasia white cells show no immaturity or hypersegmentation and platelets are slightly decreased but normal morphologically'    Assessment/Plan   1, mild chronic thrombocytopenia with no significant change in almost 20 years and likely constitutional  2.  Intermittent leukopenia probably constitutional  3.  Anemia resolved  4.  History of multiple cysts in both kidneys    Plan  1.  Check B12 and methylmalonic acid immunofixation LASHONDA and ultrasound of the abdomen and IPF    We will review these results in a month and no specific treatment is needed at this time.I did tell however that alcohol especially over 20 year time may be an issue at this point especially for some  underlying liver disease or splenomegaly but the ultrasound will give us more information regarding this

## 2017-09-22 LAB
ALBUMIN SERPL-MCNC: 4.2 G/DL (ref 2.9–4.4)
ALBUMIN/GLOB SERPL: 1.7 {RATIO} (ref 0.7–1.7)
ALPHA1 GLOB FLD ELPH-MCNC: 0.2 G/DL (ref 0–0.4)
ALPHA2 GLOB SERPL ELPH-MCNC: 0.6 G/DL (ref 0.4–1)
ANA SER QL: POSITIVE
B-GLOBULIN SERPL ELPH-MCNC: 0.9 G/DL (ref 0.7–1.3)
GAMMA GLOB SERPL ELPH-MCNC: 0.8 G/DL (ref 0.4–1.8)
GLOBULIN SER CALC-MCNC: 2.6 G/DL (ref 2.2–3.9)
IGA SERPL-MCNC: 276 MG/DL (ref 61–437)
IGG SERPL-MCNC: 787 MG/DL (ref 700–1600)
IGM SERPL-MCNC: 61 MG/DL (ref 15–143)
INTERPRETATION SERPL IEP-IMP: ABNORMAL
KAPPA LC SERPL-MCNC: 24.3 MG/L (ref 3.3–19.4)
KAPPA LC/LAMBDA SER: 1.53 {RATIO} (ref 0.26–1.65)
LAMBDA LC FREE SERPL-MCNC: 15.9 MG/L (ref 5.7–26.3)
Lab: ABNORMAL
M-SPIKE: ABNORMAL G/DL
PROT SERPL-MCNC: 6.8 G/DL (ref 6–8.5)

## 2017-09-26 LAB — METHYLMALONATE SERPL-SCNC: 230 NMOL/L (ref 0–378)

## 2017-10-02 ENCOUNTER — HOSPITAL ENCOUNTER (OUTPATIENT)
Dept: ULTRASOUND IMAGING | Facility: HOSPITAL | Age: 72
Discharge: HOME | End: 2017-10-02
Attending: INTERNAL MEDICINE | Admitting: INTERNAL MEDICINE

## 2017-10-02 DIAGNOSIS — D61.818 PANCYTOPENIA (HCC): ICD-10-CM

## 2017-10-02 PROCEDURE — 76700 US EXAM ABDOM COMPLETE: CPT

## 2017-10-05 ENCOUNTER — TELEPHONE (OUTPATIENT)
Dept: CARDIOLOGY | Facility: HOSPITAL | Age: 72
End: 2017-10-05

## 2017-10-10 RX ORDER — PROPAFENONE HYDROCHLORIDE 150 MG/1
TABLET, COATED ORAL
Qty: 60 TABLET | Refills: 1 | Status: SHIPPED | OUTPATIENT
Start: 2017-10-10 | End: 2017-12-01 | Stop reason: SDUPTHER

## 2017-10-20 ENCOUNTER — LAB (OUTPATIENT)
Dept: LAB | Facility: HOSPITAL | Age: 72
End: 2017-10-20

## 2017-10-20 ENCOUNTER — OFFICE VISIT (OUTPATIENT)
Dept: ONCOLOGY | Facility: CLINIC | Age: 72
End: 2017-10-20

## 2017-10-20 VITALS
DIASTOLIC BLOOD PRESSURE: 74 MMHG | WEIGHT: 210 LBS | HEIGHT: 75 IN | OXYGEN SATURATION: 98 % | SYSTOLIC BLOOD PRESSURE: 136 MMHG | TEMPERATURE: 98.7 F | RESPIRATION RATE: 14 BRPM | BODY MASS INDEX: 26.11 KG/M2 | HEART RATE: 48 BPM

## 2017-10-20 DIAGNOSIS — D69.6 THROMBOCYTOPENIA (HCC): Primary | ICD-10-CM

## 2017-10-20 DIAGNOSIS — D61.818 PANCYTOPENIA (HCC): Primary | ICD-10-CM

## 2017-10-20 LAB
BASOPHILS # BLD AUTO: 0.02 10*3/MM3 (ref 0–0.1)
BASOPHILS NFR BLD AUTO: 0.5 % (ref 0–1.1)
DEPRECATED RDW RBC AUTO: 45.5 FL (ref 37–49)
EOSINOPHIL # BLD AUTO: 0.27 10*3/MM3 (ref 0–0.36)
EOSINOPHIL NFR BLD AUTO: 6.2 % (ref 1–5)
ERYTHROCYTE [DISTWIDTH] IN BLOOD BY AUTOMATED COUNT: 12.7 % (ref 11.7–14.5)
HCT VFR BLD AUTO: 42.5 % (ref 40–49)
HGB BLD-MCNC: 14.7 G/DL (ref 13.5–16.5)
IMM GRANULOCYTES # BLD: 0.01 10*3/MM3 (ref 0–0.03)
IMM GRANULOCYTES NFR BLD: 0.2 % (ref 0–0.5)
LYMPHOCYTES # BLD AUTO: 0.87 10*3/MM3 (ref 1–3.5)
LYMPHOCYTES NFR BLD AUTO: 19.9 % (ref 20–49)
MCH RBC QN AUTO: 33.9 PG (ref 27–33)
MCHC RBC AUTO-ENTMCNC: 34.6 G/DL (ref 32–35)
MCV RBC AUTO: 97.9 FL (ref 83–97)
MONOCYTES # BLD AUTO: 0.4 10*3/MM3 (ref 0.25–0.8)
MONOCYTES NFR BLD AUTO: 9.1 % (ref 4–12)
NEUTROPHILS # BLD AUTO: 2.81 10*3/MM3 (ref 1.5–7)
NEUTROPHILS NFR BLD AUTO: 64.1 % (ref 39–75)
NRBC BLD MANUAL-RTO: 0 /100 WBC (ref 0–0)
PLATELET # BLD AUTO: 116 10*3/MM3 (ref 150–375)
PMV BLD AUTO: 9.7 FL (ref 8.9–12.1)
RBC # BLD AUTO: 4.34 10*6/MM3 (ref 4.3–5.5)
WBC NRBC COR # BLD: 4.38 10*3/MM3 (ref 4–10)

## 2017-10-20 PROCEDURE — 99214 OFFICE O/P EST MOD 30 MIN: CPT | Performed by: INTERNAL MEDICINE

## 2017-10-20 PROCEDURE — 85025 COMPLETE CBC W/AUTO DIFF WBC: CPT | Performed by: INTERNAL MEDICINE

## 2017-10-20 PROCEDURE — 36416 COLLJ CAPILLARY BLOOD SPEC: CPT | Performed by: INTERNAL MEDICINE

## 2017-10-20 PROCEDURE — 36415 COLL VENOUS BLD VENIPUNCTURE: CPT | Performed by: INTERNAL MEDICINE

## 2017-10-20 NOTE — PROGRESS NOTES
Subjective     REASON FOR CONSULTATION:   1. Pancytopenia of 20 years in duration with predominant thrombocytopenia  2.  Positive LASHONDA?  Significance   3.  Large bilateral kidney cysts                            REQUESTING PHYSICIAN:  Katharine Stephenson M.D.    History of Present Illness patient is a 72-year-old male with chronic thrombocytopenia and intermittent leukopenia was seen on office in 1998 and had a bone marrow and CAT scan done to evaluate this.  He is back today and overall doing well.  His platelet count stable blood work including immunofixation B12 folate were normal.  LASHONDA was positive and a repeat LASHONDA with titer was less than anti-cardio lipid antibodies were drawn and pending.  His IPF is low suggesting this is not consumptive thrombocytopenia.  Ultrasound of the abdomen shows mild splenomegaly which was not noted in 1998 when he had a CAT scan of the abdomen done.    At this point I have asked for repeat LASHONDA and anticardiolipin antibody but I don't think there is been any significant change in his blood counts over 20 years and this suggests that this is not a primary bone marrow problem .  The etiology of the mild splenomegaly is not clear and is not drinking anywhere near as much as he used to.      Past Medical History:   Diagnosis Date   • Anemia    • Arthritis    • Atrial fibrillation    • COPD (chronic obstructive pulmonary disease)    • Diarrhea    • Diverticulosis    • ED (erectile dysfunction)    • Elbow fracture, left    • History of blood transfusion    • Hypogonadism in male    • Kidney cysts    • Left femoral shaft fracture    • Lower back pain    • Palpitations    • Peptic ulceration    • Prostatic hypertrophy, benign    • Skin cancer    • Transient cerebral ischemia     H/O TIAs        Past Surgical History:   Procedure Laterality Date   • ADENOIDECTOMY  1973   • APPENDECTOMY  1973   • BACK SURGERY     • CARDIAC ABLATION  2013, 2014   • COLONOSCOPY  11/21/2014       • FEMUR  FRACTURE SURGERY Left 2007    post fall from the ladder   • LUMBAR LAMINECTOMY  1974    Dr.Lester Lino   • NECK SURGERY     • OTHER SURGICAL HISTORY      elbow surgery   • THYROID SURGERY  1986    benign tumor removal,    • TOTAL ELBOW ARTHROPLASTY Left 2007    L, post fall and fracture, hardware in      HEME HISTORY:   patient is a 72-year-old male with a history of atrial fibrillation degenerative arthritis who is referred to us because of mild chronic thrombocytopenia and leukopenia and new onset of anemia when seen in Dr. Curry's office this June.  At that time his hemoglobin was 13.4 white count was 2700 and platelets 95,000.  In the past his white count has been in the 3000 range with a platelet count of about 120,000.  He was referred to us for evaluation of these changes.    On reviewing his history he was seen in our office in 1998 with the same findings that at that time his hemoglobin was normal and his white count was low and his platelet count was 130,000.  A bone marrow was done which was morphologically normal except for mild increase in plasma cells ,with normal flow cytometry but I do not see any chromosome analysis.  A mass was felt in the left upper abdomen which led to a CT scan which showed multiple cysts in both kidneys.  No splenomegaly.  At the time he was drinking a fair amount of alcohol and he was asked to abstain from alcohol for 1 month and there is no change in the blood counts and this was not felt to be an important contributed to this finding.  There is no family history of leukopenia and thrombocytopenia    He currently drinks 6 glasses of wine some beer every weekend but not on a daily basis.  His had no fever sweats weight loss or systemic complaints and is feeling fairly well overall.  He is on Coumadin for atrial fibrillation is had 2 ablations done his had no overt bleeding and is on no new medications except for Rythmol which does not cause thrombocytopenia or  anemia but can leukopenia.    His hemoglobin today is back to normal platelet count is up to 116,000 and his white count is in the normal range and this may have just been an aberration in June as he tells me he was not ill at the time  He is up-to-date with colonoscopies and this had a normal PSA      Current Outpatient Prescriptions on File Prior to Visit   Medication Sig Dispense Refill   • Cetirizine HCl 10 MG capsule Take 1 capsule by mouth daily.     • finasteride (PROSCAR) 5 MG tablet TAKE 1 TABLET EVERY DAY 90 tablet 1   • fluticasone (FLONASE) 50 MCG/ACT nasal spray into each nostril daily.     • HYDROcodone-acetaminophen (NORCO) 5-325 MG per tablet Take 1 tablet by mouth As Needed for Severe Pain (7-10). 30 tablet 0   • propafenone (RYTHMOL) 150 MG tablet TAKE 1 TABLET BY MOUTH 2 (TWO) TIMES A DAY. 60 tablet 1   • Psyllium (METAMUCIL PO) Take  by mouth Daily.     • warfarin (COUMADIN) 5 MG tablet TAKE ONE-HALF OF A TAB BY MOUTH ON MONDAY, WEDNESDAY, & FRIDAY & 1 TAB ALL OTHER DAYS AS DIRECTED 90 tablet 0     No current facility-administered medications on file prior to visit.         ALLERGIES:    Allergies   Allergen Reactions   • Cardizem [Diltiazem Hcl] Itching        Social History     Social History   • Marital status:      Spouse name: Latisha   • Number of children: N/A   • Years of education: College     Occupational History   • [employer] manager Retired     Social History Main Topics   • Smoking status: Former Smoker     Packs/day: 0.50     Years: 20.00     Types: Cigarettes     Quit date: 1974   • Smokeless tobacco: Never Used   • Alcohol use 0.6 oz/week     1 Glasses of wine per week   • Drug use: No   • Sexual activity: Not Asked     Other Topics Concern   • None     Social History Narrative        Family History   Problem Relation Age of Onset   • Hypertension Mother    • Osteoporosis Mother    • Thyroid disease Sister    • Diabetes Sister    • Hypertension Sister    • Colon cancer Maternal  "Grandmother 60   • Hypertension Father    • Heart disease Other    • Atrial fibrillation Other    • Thyroid disease Other    • Pulmonary fibrosis Sister    • Diabetes Other    • Heart disease Other    • Hypertension Other         Review of Systems   Genitourinary: Positive for difficulty urinating and frequency.   Musculoskeletal: Positive for gait problem (Left foot drop after spine surgery).   Hematological: Bruises/bleeds easily.        Objective     Vitals:    10/20/17 1019   BP: 136/74   Pulse: (!) 48   Resp: 14   Temp: 98.7 °F (37.1 °C)   TempSrc: Oral   SpO2: 98%   Weight: 210 lb (95.3 kg)   Height: 74.53\" (189.3 cm)   PainSc: 2  Comment: left side discomfort     Current Status 10/20/2017   ECOG score 0       Physical Exam    GENERAL:  Well-developed, well-nourished in no acute distress.   SKIN:  Warm, dry without rashes, purpura or petechiae.  EYES:  Pupils equal, round and reactive to light.  EOMs intact.  Conjunctivae normal.  EARS:  Hearing intact.  NOSE:  Septum midline.  No excoriations or nasal discharge.  MOUTH:  Tongue is well-papillated; no stomatitis or ulcers.  Lips normal.  THROAT:  Oropharynx without lesions or exudates.  NECK:  Supple with good range of motion; no thyromegaly or masses, no JVD.  LYMPHATICS:  No cervical, supraclavicular, axillary or inguinal adenopathy.  CHEST:  Lungs clear to auscultation. Good airflow.  CARDIAC:  Regular rate and rhythm without murmurs, rubs or gallops. Normal S1,S2.  ABDOMEN:  Soft, nontender with no hepatosplenomegaly or masses.  Mildly prominent  EXTREMITIES:  No clubbing, cyanosis or edema.  Chronic venous stasis changes with varicosities bilaterally  NEUROLOGICAL:  Cranial Nerves II-XII grossly intact.  No focal neurological deficits.  PSYCHIATRIC:  Normal affect and mood.          RECENT LABS:  Hematology WBC   Date Value Ref Range Status   10/20/2017 4.38 4.00 - 10.00 10*3/mm3 Final     RBC   Date Value Ref Range Status   10/20/2017 4.34 4.30 - 5.50 " 10*6/mm3 Final     Hemoglobin   Date Value Ref Range Status   10/20/2017 14.7 13.5 - 16.5 g/dL Final     Hematocrit   Date Value Ref Range Status   10/20/2017 42.5 40.0 - 49.0 % Final     Platelets   Date Value Ref Range Status   10/20/2017 116 (L) 150 - 375 10*3/mm3 Final      Peripheral blood smear shows normochromic normocytic red cells without polychromasia white cells show no immaturity or hypersegmentation and platelets are slightly decreased but normal morphologically'    Ultrasound abdomen   abdominal aorta and IVC appear normal.  IMPRESSION:  1.  Splenomegaly.  2.  Multiple bilateral renal cysts as described.      This report was finalized on 10/3/2017   Assessment/Plan   1, mild chronic thrombocytopenia with no significant change in almost 20 years and likely constitutional  2.  Intermittent leukopenia probably constitutional  3.  Anemia resolved  4.  History of multiple cysts in both kidneys    Plan  1. Repeat LASHONDA and anticardiolipin antibody   2.possible referral to rheumatology if the LASHONDA is high positive   3.  Return in 6 months for follow-up with CBC

## 2017-10-21 LAB
CARDIOLIPIN IGG SER IA-ACNC: <9 GPL U/ML (ref 0–14)
CARDIOLIPIN IGM SER IA-ACNC: <9 MPL U/ML (ref 0–12)

## 2017-10-23 LAB
ANA SER QL: POSITIVE
CENTROMERE B AB SER-ACNC: <0.2 AI (ref 0–0.9)
CHROMATIN AB SERPL-ACNC: <0.2 AI (ref 0–0.9)
DSDNA AB SER-ACNC: 20 IU/ML (ref 0–9)
ENA JO1 AB SER-ACNC: <0.2 AI (ref 0–0.9)
ENA RNP AB SER-ACNC: <0.2 AI (ref 0–0.9)
ENA SCL70 AB SER-ACNC: <0.2 AI (ref 0–0.9)
ENA SM AB SER-ACNC: <0.2 AI (ref 0–0.9)
ENA SS-A AB SER-ACNC: <0.2 AI (ref 0–0.9)
ENA SS-B AB SER-ACNC: <0.2 AI (ref 0–0.9)
Lab: ABNORMAL

## 2017-10-25 NOTE — PROGRESS NOTES
Please call patient: please bring him for OV with me -  is concerned regarding his labs, I needs to see and examine him and decide whom to refer if needed

## 2017-11-02 ENCOUNTER — OFFICE VISIT (OUTPATIENT)
Dept: INTERNAL MEDICINE | Facility: CLINIC | Age: 72
End: 2017-11-02

## 2017-11-02 VITALS
HEART RATE: 45 BPM | SYSTOLIC BLOOD PRESSURE: 140 MMHG | HEIGHT: 75 IN | OXYGEN SATURATION: 97 % | WEIGHT: 212 LBS | DIASTOLIC BLOOD PRESSURE: 82 MMHG | BODY MASS INDEX: 26.36 KG/M2

## 2017-11-02 DIAGNOSIS — R10.32 LEFT LOWER QUADRANT PAIN: Primary | ICD-10-CM

## 2017-11-02 PROCEDURE — 99213 OFFICE O/P EST LOW 20 MIN: CPT | Performed by: INTERNAL MEDICINE

## 2017-11-02 RX ORDER — WARFARIN SODIUM 5 MG/1
5 TABLET ORAL
COMMUNITY
End: 2017-11-02 | Stop reason: DRUGHIGH

## 2017-11-02 NOTE — PROGRESS NOTES
Subjective   Demond Menchaca is a 72 y.o. male.     Abdominal Pain   This is a new problem. The current episode started more than 1 month ago. Pertinent negatives include no fever, frequency or hematuria.        The following portions of the patient's history were reviewed and updated as appropriate: allergies, current medications, past family history, past medical history, past social history, past surgical history and problem list.    Review of Systems   Constitutional: Negative for chills and fever.   HENT: Negative.    Eyes: Negative.    Respiratory: Negative.  Negative for cough and shortness of breath.    Cardiovascular: Negative for chest pain and palpitations.   Gastrointestinal: Positive for abdominal pain ( Has been having pain in L sidfe of abdomen for several weeks but seems to getting worse  Also dull ache I n L low back).        No change in BMs   Genitourinary: Positive for flank pain. Negative for frequency and hematuria.       Objective   Physical Exam   Constitutional: He is oriented to person, place, and time. He appears well-developed.   HENT:   Head: Normocephalic.   Eyes: EOM are normal.   Neck: Neck supple.   Cardiovascular: Normal rate.    Repeat 130/70   Pulmonary/Chest: Effort normal and breath sounds normal.   Abdominal: Soft. Bowel sounds are normal. There is tenderness ( Minimal tendernees L side of abdomen).   Musculoskeletal: Normal range of motion.   Neurological: He is alert and oriented to person, place, and time.       Assessment/Plan   Demond was seen today for abdominal pain.    Diagnoses and all orders for this visit:    Left lower quadrant pain  -     CT Abdomen Pelvis With & Without Contrast; Future

## 2017-11-08 ENCOUNTER — HOSPITAL ENCOUNTER (OUTPATIENT)
Dept: CT IMAGING | Facility: HOSPITAL | Age: 72
Discharge: HOME | End: 2017-11-08
Attending: INTERNAL MEDICINE | Admitting: INTERNAL MEDICINE

## 2017-11-08 DIAGNOSIS — R10.32 LEFT LOWER QUADRANT PAIN: ICD-10-CM

## 2017-11-08 LAB — CREAT BLDA-MCNC: 1.2 MG/DL (ref 0.6–1.3)

## 2017-11-08 PROCEDURE — 82565 ASSAY OF CREATININE: CPT

## 2017-11-08 PROCEDURE — 0 IOPAMIDOL 61 % SOLUTION: Performed by: INTERNAL MEDICINE

## 2017-11-08 PROCEDURE — 74177 CT ABD & PELVIS W/CONTRAST: CPT

## 2017-11-08 RX ADMIN — IOPAMIDOL 85 ML: 612 INJECTION, SOLUTION INTRAVENOUS at 16:45

## 2017-11-17 ENCOUNTER — OFFICE VISIT (OUTPATIENT)
Dept: INTERNAL MEDICINE | Facility: CLINIC | Age: 72
End: 2017-11-17

## 2017-11-17 VITALS
DIASTOLIC BLOOD PRESSURE: 64 MMHG | WEIGHT: 209 LBS | RESPIRATION RATE: 14 BRPM | BODY MASS INDEX: 26.82 KG/M2 | SYSTOLIC BLOOD PRESSURE: 138 MMHG | HEIGHT: 74 IN

## 2017-11-17 DIAGNOSIS — K57.32 DIVERTICULITIS OF LARGE INTESTINE WITHOUT PERFORATION OR ABSCESS WITHOUT BLEEDING: Primary | ICD-10-CM

## 2017-11-17 PROBLEM — D61.818 PANCYTOPENIA: Status: RESOLVED | Noted: 2017-09-21 | Resolved: 2017-11-17

## 2017-11-17 PROBLEM — G45.9 TIA (TRANSIENT ISCHEMIC ATTACK): Status: RESOLVED | Noted: 2017-06-13 | Resolved: 2017-11-17

## 2017-11-17 PROBLEM — D61.818 PANCYTOPENIA: Status: RESOLVED | Noted: 2017-07-12 | Resolved: 2017-11-17

## 2017-11-17 PROCEDURE — 99213 OFFICE O/P EST LOW 20 MIN: CPT | Performed by: INTERNAL MEDICINE

## 2017-11-17 RX ORDER — CIPROFLOXACIN 250 MG/1
250 TABLET, FILM COATED ORAL 2 TIMES DAILY
Qty: 14 TABLET | Refills: 0 | Status: SHIPPED | OUTPATIENT
Start: 2017-11-17 | End: 2017-11-24

## 2017-11-17 NOTE — PROGRESS NOTES
"Roni Menchaca is a 72 y.o. male.     History of Present Illness   Patient had been seen by  on 11/02/2017 c/o left -sided abdominal pain. He was referred for CT scan, that was unremarkable with exception of the renal cysts. Now his pain is dull and \"not bad\", usually worse after prolonged sitting, and better when he walks of stretches his back. No back pain. No constipation or diarrhea. No nausea. CT reviewed and discussed with patient. Last C-scope was in 2014. Known to have diverticulosis.  The following portions of the patient's history were reviewed and updated as appropriate: allergies, current medications, past family history, past medical history, past social history, past surgical history and problem list.    Review of Systems   Constitutional: Negative for chills and fever.   Eyes: Negative for pain and redness.   Respiratory: Negative for cough and shortness of breath.    Cardiovascular: Negative for chest pain and leg swelling.   Gastrointestinal: Positive for abdominal pain. Negative for constipation, diarrhea, nausea and vomiting.   Genitourinary: Positive for frequency. Negative for dysuria and hematuria.   Musculoskeletal: Negative for arthralgias and myalgias.   Neurological: Negative for dizziness and headaches.       Objective   Physical Exam   Constitutional: He is oriented to person, place, and time. He appears well-developed and well-nourished.   HENT:   Head: Normocephalic and atraumatic.   Right Ear: Tympanic membrane, external ear and ear canal normal.   Left Ear: Tympanic membrane, external ear and ear canal normal.   Nose: Nose normal. Right sinus exhibits no maxillary sinus tenderness and no frontal sinus tenderness. Left sinus exhibits no maxillary sinus tenderness and no frontal sinus tenderness.   Mouth/Throat: Uvula is midline, oropharynx is clear and moist and mucous membranes are normal.   Eyes: Conjunctivae and EOM are normal. Pupils are equal, round, and " reactive to light. Right eye exhibits no discharge. Left eye exhibits no discharge. No scleral icterus.   Neck: Neck supple. No JVD present.   Cardiovascular: Normal rate, regular rhythm and normal heart sounds.  Exam reveals no gallop and no friction rub.    No murmur heard.  Pulmonary/Chest: Effort normal and breath sounds normal. He has no wheezes. He has no rales.   Musculoskeletal: He exhibits tenderness (left l;ower quadrant mils tenderness, rather loacalized). He exhibits no edema.   Lymphadenopathy:     He has no cervical adenopathy.   Neurological: He is alert and oriented to person, place, and time. No cranial nerve deficit.   Skin: Skin is warm and dry. No rash noted.   Psychiatric: He has a normal mood and affect. His behavior is normal.   Vitals reviewed.      Assessment/Plan   Demond was seen today for abdominal pain.    Diagnoses and all orders for this visit:    Diverticulitis of large intestine without perforation or abscess without bleeding  -     ciprofloxacin (CIPRO) 250 MG tablet; Take 1 tablet by mouth 2 (Two) Times a Day for 7 days.    Left sided dull abdominal pain - most likely due to diverticulitis. Will treat with 7 days of low dose Cipro.If any concerns - call.us. May affect PT/INR.

## 2017-11-17 NOTE — PATIENT INSTRUCTIONS
Left sided dull abdominal pain - most likely due to diverticulitis. Will treat with 7 days of low dose Cipro.If any concerns - call.us. May affect PT/INR.

## 2017-11-21 DIAGNOSIS — K57.32 DIVERTICULITIS OF LARGE INTESTINE WITHOUT PERFORATION OR ABSCESS WITHOUT BLEEDING: Primary | ICD-10-CM

## 2017-11-22 ENCOUNTER — TELEPHONE (OUTPATIENT)
Dept: INTERNAL MEDICINE | Facility: CLINIC | Age: 72
End: 2017-11-22

## 2017-11-22 NOTE — TELEPHONE ENCOUNTER
----- Message from Kia Snowden MA sent at 11/22/2017 10:11 AM EST -----  Pharmacist needs a returned call authorizing Cipro as it has a drug interaction to patients [facility] [phone]

## 2017-11-22 NOTE — TELEPHONE ENCOUNTER
Notified pharmacy to please hold cipro script in patients profile, patient has received alternative therapy but if he requires cipro at a later time he will check with cardiologist before taking. Pharmacy verbalized understanding

## 2017-12-01 RX ORDER — PROPAFENONE HYDROCHLORIDE 150 MG/1
150 TABLET, COATED ORAL 2 TIMES DAILY
Qty: 60 TABLET | Refills: 1 | Status: SHIPPED | OUTPATIENT
Start: 2017-12-01 | End: 2017-12-27 | Stop reason: SDUPTHER

## 2017-12-04 RX ORDER — PROPAFENONE HYDROCHLORIDE 150 MG/1
TABLET, COATED ORAL
Qty: 60 TABLET | Refills: 1 | Status: SHIPPED | OUTPATIENT
Start: 2017-12-04 | End: 2018-06-18 | Stop reason: SDUPTHER

## 2017-12-05 ENCOUNTER — OFFICE VISIT (OUTPATIENT)
Dept: INTERNAL MEDICINE | Facility: CLINIC | Age: 72
End: 2017-12-05

## 2017-12-05 VITALS
DIASTOLIC BLOOD PRESSURE: 52 MMHG | WEIGHT: 207.23 LBS | BODY MASS INDEX: 26.6 KG/M2 | HEIGHT: 74 IN | RESPIRATION RATE: 14 BRPM | SYSTOLIC BLOOD PRESSURE: 98 MMHG

## 2017-12-05 DIAGNOSIS — G89.29 CHRONIC LOW BACK PAIN WITH LEFT-SIDED SCIATICA, UNSPECIFIED BACK PAIN LATERALITY: ICD-10-CM

## 2017-12-05 DIAGNOSIS — M72.2 PLANTAR FASCIITIS: Primary | ICD-10-CM

## 2017-12-05 DIAGNOSIS — M54.42 CHRONIC LOW BACK PAIN WITH LEFT-SIDED SCIATICA, UNSPECIFIED BACK PAIN LATERALITY: ICD-10-CM

## 2017-12-05 PROCEDURE — 99213 OFFICE O/P EST LOW 20 MIN: CPT | Performed by: INTERNAL MEDICINE

## 2017-12-05 RX ORDER — HYDROCODONE BITARTRATE AND ACETAMINOPHEN 5; 325 MG/1; MG/1
1 TABLET ORAL AS NEEDED
Qty: 30 TABLET | Refills: 0 | Status: SHIPPED | OUTPATIENT
Start: 2017-12-05 | End: 2018-09-06 | Stop reason: SDUPTHER

## 2017-12-05 NOTE — PROGRESS NOTES
"Subjective   Demond Menchaca is a 72 y.o. male.     History of Present Illness   He complains of localized right heel pain for 3 days. The pain started abruptly when he got up at night to get to the restroom.  \"Feels like stepping on a stone\". This hurts first thing in the morning immediately upon weight bearing, less so throughout the day.  It may hurt again after prolonged resting and then weight bearing. Pain Scale is 3 and rest and 10 with attempt to walk..  Patient states: can hardly walk.  Treatments tried: ice and use of crutches.   The following portions of the patient's history were reviewed and updated as appropriate: allergies, current medications, past family history, past medical history, past social history, past surgical history and problem list.    Review of Systems   Constitutional: Negative for chills and fever.   Eyes: Negative for pain and redness.   Respiratory: Negative for cough and shortness of breath.    Cardiovascular: Negative for chest pain and leg swelling.   Neurological: Negative for dizziness and headaches.       Objective   Physical Exam   Constitutional: He is oriented to person, place, and time. He appears well-developed.   HENT:   Head: Normocephalic and atraumatic.   Right Ear: Tympanic membrane, external ear and ear canal normal.   Left Ear: Tympanic membrane, external ear and ear canal normal.   Nose: Nose normal. Right sinus exhibits no maxillary sinus tenderness and no frontal sinus tenderness. Left sinus exhibits no maxillary sinus tenderness and no frontal sinus tenderness.   Mouth/Throat: Uvula is midline, oropharynx is clear and moist and mucous membranes are normal.   Eyes: Conjunctivae and EOM are normal. Pupils are equal, round, and reactive to light. Right eye exhibits no discharge. Left eye exhibits no discharge. No scleral icterus.   Neck: Neck supple. No JVD present.   Cardiovascular: Normal rate, regular rhythm and normal heart sounds.  Exam reveals no gallop and " no friction rub.    No murmur heard.  Pulmonary/Chest: Effort normal and breath sounds normal. He has no wheezes. He has no rales.   Musculoskeletal: He exhibits tenderness (on palpation miod sole of the right foot). He exhibits no edema.   Lymphadenopathy:     He has no cervical adenopathy.   Neurological: He is alert and oriented to person, place, and time. No cranial nerve deficit.   Skin: Skin is warm and dry. No rash noted.   Psychiatric: He has a normal mood and affect. His behavior is normal.   Vitals reviewed.      Assessment/Plan   Demond was seen today for foot pain and abdominal pain.    Diagnoses and all orders for this visit:    Plantar fasciitis    1. R foot plantar fasciitis - ice, stretching exercise and PT referral for dry needling. As patient is leaving for [place], will give Hydrocodone prescription. Timmy report reviewed. [facility] Controlled substance agreement was signed and is in EHR. Patient is compliant with medication and takes it according to the directions. Timmy reports are being reviewed at least every 3 months., I will continue to monitor clinical progress with regualar office visits, random pill counts and urine drug screens..

## 2017-12-05 NOTE — PATIENT INSTRUCTIONS
. R foot plantar fasciitis - ice, stretching exercise and PT referral for dry needling.As patient is leaving for [place], will give Hydrocodone prescription. Timmy report reviewed. [facility] Controlled substance agreement was signed and is in EHR. Patient is compliant with medication and takes it according to the directions. Timmy reports are being reviewed at least every 3 months., I will continue to monitor clinical progress with regualar office visits, random pill counts and urine drug screens..

## 2017-12-26 RX ORDER — WARFARIN SODIUM 5 MG/1
TABLET ORAL
Qty: 90 TABLET | Refills: 0 | Status: SHIPPED | OUTPATIENT
Start: 2017-12-26 | End: 2018-04-22 | Stop reason: SDUPTHER

## 2017-12-27 RX ORDER — PROPAFENONE HYDROCHLORIDE 150 MG/1
150 TABLET, COATED ORAL 2 TIMES DAILY
Qty: 60 TABLET | Refills: 1 | Status: SHIPPED | OUTPATIENT
Start: 2017-12-27 | End: 2017-12-28 | Stop reason: SDUPTHER

## 2017-12-28 RX ORDER — PROPAFENONE HYDROCHLORIDE 150 MG/1
150 TABLET, COATED ORAL 2 TIMES DAILY
Qty: 60 TABLET | Refills: 1 | Status: SHIPPED | OUTPATIENT
Start: 2017-12-28 | End: 2017-12-29 | Stop reason: SDUPTHER

## 2017-12-29 RX ORDER — PROPAFENONE HYDROCHLORIDE 150 MG/1
150 TABLET, COATED ORAL 2 TIMES DAILY
Qty: 60 TABLET | Refills: 1 | Status: SHIPPED | OUTPATIENT
Start: 2017-12-29 | End: 2018-03-09 | Stop reason: SDUPTHER

## 2018-03-05 RX ORDER — FINASTERIDE 5 MG/1
TABLET, FILM COATED ORAL
Qty: 90 TABLET | Refills: 1 | Status: SHIPPED | OUTPATIENT
Start: 2018-03-05 | End: 2018-08-20 | Stop reason: SDUPTHER

## 2018-03-09 ENCOUNTER — OFFICE VISIT (OUTPATIENT)
Dept: INTERNAL MEDICINE | Facility: CLINIC | Age: 73
End: 2018-03-09

## 2018-03-09 VITALS
HEART RATE: 40 BPM | DIASTOLIC BLOOD PRESSURE: 72 MMHG | OXYGEN SATURATION: 98 % | BODY MASS INDEX: 27.72 KG/M2 | WEIGHT: 216 LBS | SYSTOLIC BLOOD PRESSURE: 110 MMHG | HEIGHT: 74 IN

## 2018-03-09 DIAGNOSIS — G25.81 RESTLESS LEGS SYNDROME: Primary | ICD-10-CM

## 2018-03-09 DIAGNOSIS — M72.2 PLANTAR FASCIITIS: ICD-10-CM

## 2018-03-09 PROCEDURE — 99213 OFFICE O/P EST LOW 20 MIN: CPT | Performed by: INTERNAL MEDICINE

## 2018-03-09 NOTE — PROGRESS NOTES
"Subjective   Demond Menchaca is a 73 y.o. male.     History of Present Illness   Demond Menchaca 73 y.o. male presents today for plantar fasciitis f/u. Last was seen on 12/05/2017 and on that visit we had referred patient to [facility]. He had 3 sessions of dry needling, that had resolved his pains..  Patient Is here also for RLS - he had never started medication, that I had presctibed due to the fear of side effects from Requip. States it is not that bad.    /72 (BP Location: Left arm, Patient Position: Sitting, Cuff Size: Adult)  Pulse (!) 40  Ht 188 cm (74.02\")  Wt 98 kg (216 lb)  SpO2 98%  BMI 27.72 kg/m2    Current Outpatient Prescriptions:   •  Cetirizine HCl 10 MG capsule, Take 1 capsule by mouth daily., Disp: , Rfl:   •  finasteride (PROSCAR) 5 MG tablet, TAKE 1 TABLET EVERY DAY, Disp: 90 tablet, Rfl: 1  •  fluticasone (FLONASE) 50 MCG/ACT nasal spray, into each nostril daily., Disp: , Rfl:   •  HYDROcodone-acetaminophen (NORCO) 5-325 MG per tablet, Take 1 tablet by mouth As Needed for Severe Pain ., Disp: 30 tablet, Rfl: 0  •  propafenone (RYTHMOL) 150 MG tablet, TAKE 1 TABLET BY MOUTH 2 (TWO) TIMES A DAY., Disp: 60 tablet, Rfl: 1  •  Psyllium (METAMUCIL PO), Take  by mouth Daily., Disp: , Rfl:   •  warfarin (COUMADIN) 5 MG tablet, TAKE ONE-HALF OF A TAB BY MOUTH ON MONDAY, WEDNESDAY, & FRIDAY & 1 TAB ALL OTHER DAYS AS DIRECTED, Disp: 90 tablet, Rfl: 0  The following portions of the patient's history were reviewed and updated as appropriate: allergies, current medications, past family history, past medical history, past social history, past surgical history and problem list.    Review of Systems   Constitutional: Negative for chills and fever.   Respiratory: Negative for cough and shortness of breath.    Cardiovascular: Negative for chest pain and leg swelling.   Neurological: Negative for dizziness and headaches.       Objective   Physical Exam   Constitutional: He is oriented to person, place, and " time. He appears well-developed and well-nourished.   HENT:   Head: Normocephalic and atraumatic.   Right Ear: Tympanic membrane, external ear and ear canal normal.   Left Ear: Tympanic membrane, external ear and ear canal normal.   Nose: Nose normal. Right sinus exhibits no maxillary sinus tenderness and no frontal sinus tenderness. Left sinus exhibits no maxillary sinus tenderness and no frontal sinus tenderness.   Mouth/Throat: Uvula is midline, oropharynx is clear and moist and mucous membranes are normal.   Eyes: Conjunctivae and EOM are normal. Pupils are equal, round, and reactive to light. Right eye exhibits no discharge. Left eye exhibits no discharge. No scleral icterus.   Neck: Neck supple. No JVD present.   Cardiovascular: Normal rate, regular rhythm and normal heart sounds.  Exam reveals no gallop and no friction rub.    No murmur heard.  Pulmonary/Chest: Effort normal and breath sounds normal. He has no wheezes. He has no rales.   Musculoskeletal: He exhibits no edema.   Lymphadenopathy:     He has no cervical adenopathy.   Neurological: He is alert and oriented to person, place, and time. No cranial nerve deficit.   Skin: Skin is warm and dry. No rash noted.   Psychiatric: He has a normal mood and affect. His behavior is normal.   Vitals reviewed.      Assessment/Plan   Demond was seen today for restless legs syndrome and plantar fasciitis.    Diagnoses and all orders for this visit:    Restless legs syndrome    Plantar fasciitis      1. RLS - per patient no need for medical treatment.  2. Plantar fasciitis - resolved with PT.

## 2018-04-04 RX ORDER — PROPAFENONE HYDROCHLORIDE 150 MG/1
150 TABLET, COATED ORAL 2 TIMES DAILY
Qty: 60 TABLET | Refills: 0 | Status: SHIPPED | OUTPATIENT
Start: 2018-04-04 | End: 2018-10-02 | Stop reason: SDUPTHER

## 2018-04-06 ENCOUNTER — LAB (OUTPATIENT)
Dept: LAB | Facility: HOSPITAL | Age: 73
End: 2018-04-06

## 2018-04-06 ENCOUNTER — OFFICE VISIT (OUTPATIENT)
Dept: ONCOLOGY | Facility: CLINIC | Age: 73
End: 2018-04-06

## 2018-04-06 VITALS
OXYGEN SATURATION: 100 % | HEART RATE: 40 BPM | BODY MASS INDEX: 27.62 KG/M2 | HEIGHT: 74 IN | SYSTOLIC BLOOD PRESSURE: 138 MMHG | WEIGHT: 215.2 LBS | DIASTOLIC BLOOD PRESSURE: 76 MMHG | TEMPERATURE: 98.2 F | RESPIRATION RATE: 12 BRPM

## 2018-04-06 DIAGNOSIS — D69.6 THROMBOCYTOPENIA (HCC): ICD-10-CM

## 2018-04-06 DIAGNOSIS — D69.6 THROMBOCYTOPENIA (HCC): Primary | ICD-10-CM

## 2018-04-06 LAB
BASOPHILS # BLD AUTO: 0.01 10*3/MM3 (ref 0–0.1)
BASOPHILS NFR BLD AUTO: 0.3 % (ref 0–1.1)
DEPRECATED RDW RBC AUTO: 46.4 FL (ref 37–49)
EOSINOPHIL # BLD AUTO: 0.15 10*3/MM3 (ref 0–0.36)
EOSINOPHIL NFR BLD AUTO: 4.2 % (ref 1–5)
ERYTHROCYTE [DISTWIDTH] IN BLOOD BY AUTOMATED COUNT: 13 % (ref 11.7–14.5)
HCT VFR BLD AUTO: 43 % (ref 40–49)
HGB BLD-MCNC: 14.7 G/DL (ref 13.5–16.5)
IMM GRANULOCYTES # BLD: 0.01 10*3/MM3 (ref 0–0.03)
IMM GRANULOCYTES NFR BLD: 0.3 % (ref 0–0.5)
LYMPHOCYTES # BLD AUTO: 0.88 10*3/MM3 (ref 1–3.5)
LYMPHOCYTES NFR BLD AUTO: 24.6 % (ref 20–49)
MCH RBC QN AUTO: 33.4 PG (ref 27–33)
MCHC RBC AUTO-ENTMCNC: 34.2 G/DL (ref 32–35)
MCV RBC AUTO: 97.7 FL (ref 83–97)
MONOCYTES # BLD AUTO: 0.38 10*3/MM3 (ref 0.25–0.8)
MONOCYTES NFR BLD AUTO: 10.6 % (ref 4–12)
NEUTROPHILS # BLD AUTO: 2.14 10*3/MM3 (ref 1.5–7)
NEUTROPHILS NFR BLD AUTO: 60 % (ref 39–75)
NRBC BLD MANUAL-RTO: 0 /100 WBC (ref 0–0)
PLATELET # BLD AUTO: 102 10*3/MM3 (ref 150–375)
PMV BLD AUTO: 9.4 FL (ref 8.9–12.1)
RBC # BLD AUTO: 4.4 10*6/MM3 (ref 4.3–5.5)
WBC NRBC COR # BLD: 3.57 10*3/MM3 (ref 4–10)

## 2018-04-06 PROCEDURE — 85025 COMPLETE CBC W/AUTO DIFF WBC: CPT | Performed by: INTERNAL MEDICINE

## 2018-04-06 PROCEDURE — 99213 OFFICE O/P EST LOW 20 MIN: CPT | Performed by: INTERNAL MEDICINE

## 2018-04-06 PROCEDURE — 36415 COLL VENOUS BLD VENIPUNCTURE: CPT | Performed by: INTERNAL MEDICINE

## 2018-04-06 NOTE — PROGRESS NOTES
Subjective     REASON FOR CONSULTATION:   1. Pancytopenia of 20 years in duration with predominant thrombocytopenia  2.  Positive LASHONDA?  Significance-persistent positive anti-double stranded DNA   3.  Large bilateral kidney cysts                            REQUESTING PHYSICIAN:  Katharine Stephenson M.D.    History of Present Illness patient is a 72-year-old male with chronic thrombocytopenia and intermittent leukopenia was seen on office in 1998 and had a bone marrow and CAT scan done to evaluate this.  He is back today and overall doing well.  His platelet count stable blood work including immunofixation B12 folate were normal.  LASHONDA was positive and a repeat LASHONDA with anti-double-stranded DNA was also positive.  His IPF is low suggesting this is not consumptive thrombocytopenia.  Ultrasound of the abdomen shows mild splenomegaly which was not noted in 1998 when he had a CAT scan of the abdomen done.  Repeat CAT scan shows no splenomegaly or adenopathy    At this point I have asked for rheumatology evaluation especially since he has trouble with his fingers and has steroid injections periodically for trigger fingers and swelling    Remains on Coumadin for his atrial fibrillation    Past Medical History:   Diagnosis Date   • Anemia    • Arthritis    • Atrial fibrillation    • COPD (chronic obstructive pulmonary disease)    • Diarrhea    • Diverticulosis    • ED (erectile dysfunction)    • Elbow fracture, left    • History of blood transfusion    • Hypogonadism in male    • Kidney cysts    • Left femoral shaft fracture    • Lower back pain    • Palpitations    • Peptic ulceration    • Prostatic hypertrophy, benign    • Skin cancer    • Transient cerebral ischemia     H/O TIAs        Past Surgical History:   Procedure Laterality Date   • ADENOIDECTOMY  1973   • APPENDECTOMY  1973   • BACK SURGERY     • CARDIAC ABLATION  2013, 2014   • COLONOSCOPY  11/21/2014       • FEMUR FRACTURE SURGERY Left 2007    post fall  from the ladder   • LUMBAR LAMINECTOMY  1974    Dr.Lester Lino   • NECK SURGERY     • OTHER SURGICAL HISTORY      elbow surgery   • THYROID SURGERY  1986    benign tumor removal,    • TOTAL ELBOW ARTHROPLASTY Left 2007    L, post fall and fracture, hardware in      HEME HISTORY:   patient is a 72-year-old male with a history of atrial fibrillation degenerative arthritis who is referred to us because of mild chronic thrombocytopenia and leukopenia and new onset of anemia when seen in Dr. Curry's office this June.  At that time his hemoglobin was 13.4 white count was 2700 and platelets 95,000.  In the past his white count has been in the 3000 range with a platelet count of about 120,000.  He was referred to us for evaluation of these changes.    On reviewing his history he was seen in our office in 1998 with the same findings that at that time his hemoglobin was normal and his white count was low and his platelet count was 130,000.  A bone marrow was done which was morphologically normal except for mild increase in plasma cells ,with normal flow cytometry but I do not see any chromosome analysis.  A mass was felt in the left upper abdomen which led to a CT scan which showed multiple cysts in both kidneys.  No splenomegaly.  At the time he was drinking a fair amount of alcohol and he was asked to abstain from alcohol for 1 month and there is no change in the blood counts and this was not felt to be an important contributed to this finding.  There is no family history of leukopenia and thrombocytopenia    He currently drinks 6 glasses of wine some beer every weekend but not on a daily basis.  His had no fever sweats weight loss or systemic complaints and is feeling fairly well overall.  He is on Coumadin for atrial fibrillation is had 2 ablations done his had no overt bleeding and is on no new medications except for Rythmol which does not cause thrombocytopenia or anemia but can leukopenia.    His hemoglobin  today is back to normal platelet count is up to 116,000 and his white count is in the normal range and this may have just been an aberration in June as he tells me he was not ill at the time  He is up-to-date with colonoscopies and this had a normal PSA      Current Outpatient Prescriptions on File Prior to Visit   Medication Sig Dispense Refill   • Cetirizine HCl 10 MG capsule Take 1 capsule by mouth daily.     • finasteride (PROSCAR) 5 MG tablet TAKE 1 TABLET EVERY DAY 90 tablet 1   • fluticasone (FLONASE) 50 MCG/ACT nasal spray into each nostril daily.     • HYDROcodone-acetaminophen (NORCO) 5-325 MG per tablet Take 1 tablet by mouth As Needed for Severe Pain . 30 tablet 0   • propafenone (RYTHMOL) 150 MG tablet TAKE 1 TABLET BY MOUTH 2 (TWO) TIMES A DAY. 60 tablet 1   • propafenone (RYTHMOL) 150 MG tablet TAKE 1 TABLET BY MOUTH 2 (TWO) TIMES A DAY. 60 tablet 0   • Psyllium (METAMUCIL PO) Take  by mouth Daily.     • warfarin (COUMADIN) 5 MG tablet TAKE ONE-HALF OF A TAB BY MOUTH ON MONDAY, WEDNESDAY, & FRIDAY & 1 TAB ALL OTHER DAYS AS DIRECTED 90 tablet 0     No current facility-administered medications on file prior to visit.         ALLERGIES:    Allergies   Allergen Reactions   • Cardizem [Diltiazem Hcl] Itching        Social History     Social History   • Marital status:      Spouse name: Latisha   • Years of education: College     Occupational History   • [employer] manager Retired     Social History Main Topics   • Smoking status: Former Smoker     Packs/day: 0.50     Years: 20.00     Types: Cigarettes     Quit date: 1974   • Smokeless tobacco: Never Used   • Alcohol use 0.6 oz/week     1 Glasses of wine per week   • Drug use: No     Other Topics Concern   • Not on file        Family History   Problem Relation Age of Onset   • Hypertension Mother    • Osteoporosis Mother    • Thyroid disease Sister    • Diabetes Sister    • Hypertension Sister    • Colon cancer Maternal Grandmother 60   • Hypertension Father   "  • Heart disease Other    • Atrial fibrillation Other    • Thyroid disease Other    • Pulmonary fibrosis Sister    • Diabetes Other    • Heart disease Other    • Hypertension Other         Review of Systems   Genitourinary: Positive for difficulty urinating and frequency.   Musculoskeletal: Positive for gait problem (Left foot drop after spine surgery).   Hematological: Bruises/bleeds easily.        Objective     Vitals:    04/06/18 0932   BP: 138/76   Pulse: (!) 40   Resp: 12   Temp: 98.2 °F (36.8 °C)   TempSrc: Oral   SpO2: 100%   Weight: 97.6 kg (215 lb 3.2 oz)   Height: 187.5 cm (73.82\")   PainSc: 0-No pain     Current Status 4/6/2018   ECOG score 0       Physical Exam    GENERAL:  Well-developed, well-nourished in no acute distress.   SKIN:  Warm, dry without rashes, purpura or petechiae.  EYES:  Pupils equal, round and reactive to light.  EOMs intact.  Conjunctivae normal.  EARS:  Hearing intact.  NOSE:  Septum midline.  No excoriations or nasal discharge.  MOUTH:  Tongue is well-papillated; no stomatitis or ulcers.  Lips normal.  THROAT:  Oropharynx without lesions or exudates.  NECK:  Supple with good range of motion; no thyromegaly or masses, no JVD.  LYMPHATICS:  No cervical, supraclavicular, axillary or inguinal adenopathy.  CHEST:  Lungs clear to auscultation. Good airflow.  CARDIAC:  Regular rate and rhythm without murmurs, rubs or gallops. Normal S1,S2.  ABDOMEN:  Soft, nontender with no hepatosplenomegaly or masses.  Mildly prominent  EXTREMITIES:  No clubbing, cyanosis or edema.  Chronic venous stasis changes with varicosities bilaterally  NEUROLOGICAL:  Cranial Nerves II-XII grossly intact.  No focal neurological deficits.  PSYCHIATRIC:  Normal affect and mood.          RECENT LABS:  Hematology WBC   Date Value Ref Range Status   04/06/2018 3.57 (L) 4.00 - 10.00 10*3/mm3 Final     RBC   Date Value Ref Range Status   04/06/2018 4.40 4.30 - 5.50 10*6/mm3 Final     Hemoglobin   Date Value Ref Range " Status   04/06/2018 14.7 13.5 - 16.5 g/dL Final     Hematocrit   Date Value Ref Range Status   04/06/2018 43.0 40.0 - 49.0 % Final     Platelets   Date Value Ref Range Status   04/06/2018 102 (L) 150 - 375 10*3/mm3 Final      Peripheral blood smear shows normochromic normocytic red cells without polychromasia white cells show no immaturity or hypersegmentation and platelets are slightly decreased but normal morphologically'    Ultrasound abdomen   abdominal aorta and IVC appear normal.  IMPRESSION:  1.  Splenomegaly.  2.  Multiple bilateral renal cysts as described.   CT ABD  FINDINGS: The liver, spleen, pancreas, and adrenal glands are  unremarkable. There are multiple fairly large bilateral simple renal  cysts. This includes a large perihilar cyst protruding inferiorly from  the left kidney that measures approximately 10.7 x 7.2 cm in diameter.  An 8.6 cm diameter simple cyst is also present in the midpole of the  left kidney. The kidneys are otherwise unremarkable. The stomach and  small and large bowel appear within normal limits. There are no hernias.     IMPRESSION:  Multiple bilateral simple renal cysts. Otherwise  unremarkable CT scan of the abdomen and pelvis.   This report was finalized on 10/3/2017   Assessment/Plan   1, mild chronic thrombocytopenia with no significant change in almost 20 years and likely constitutional  2.  Intermittent leukopenia probably constitutional  3.  Anemia resolved  4.  History of multiple cysts in both kidneys  5.  Persistent positive LASHONDA and double stranded DNA  Plan  1.  Refer to rheumatology   2.return in 6 months for follow-up

## 2018-04-23 RX ORDER — WARFARIN SODIUM 5 MG/1
TABLET ORAL
Qty: 90 TABLET | Refills: 0 | Status: SHIPPED | OUTPATIENT
Start: 2018-04-23 | End: 2018-08-11 | Stop reason: SDUPTHER

## 2018-06-07 ENCOUNTER — TELEPHONE (OUTPATIENT)
Dept: INTERNAL MEDICINE | Facility: CLINIC | Age: 73
End: 2018-06-07

## 2018-06-07 DIAGNOSIS — D69.6 THROMBOCYTOPENIA (HCC): Primary | ICD-10-CM

## 2018-06-07 DIAGNOSIS — Z12.5 SCREENING FOR PROSTATE CANCER: ICD-10-CM

## 2018-06-07 DIAGNOSIS — Z13.6 SCREENING FOR CARDIOVASCULAR CONDITION: ICD-10-CM

## 2018-06-07 DIAGNOSIS — I48.0 PAROXYSMAL ATRIAL FIBRILLATION (HCC): ICD-10-CM

## 2018-06-07 NOTE — TELEPHONE ENCOUNTER
----- Message from Gretel Cisneros sent at 6/7/2018 10:06 AM EDT -----  Need lab orders for labs scheduled 06/14/18.  Thanks.

## 2018-06-14 ENCOUNTER — LAB (OUTPATIENT)
Dept: INTERNAL MEDICINE | Facility: CLINIC | Age: 73
End: 2018-06-14

## 2018-06-14 DIAGNOSIS — Z12.5 SCREENING FOR PROSTATE CANCER: ICD-10-CM

## 2018-06-14 DIAGNOSIS — I48.0 PAROXYSMAL ATRIAL FIBRILLATION (HCC): ICD-10-CM

## 2018-06-14 DIAGNOSIS — Z13.6 SCREENING FOR CARDIOVASCULAR CONDITION: ICD-10-CM

## 2018-06-14 DIAGNOSIS — D69.6 THROMBOCYTOPENIA (HCC): ICD-10-CM

## 2018-06-14 LAB
ALBUMIN SERPL-MCNC: 4.3 G/DL (ref 3.5–5.2)
ALBUMIN/GLOB SERPL: 1.7 G/DL
ALP SERPL-CCNC: 66 U/L (ref 39–117)
ALT SERPL W P-5'-P-CCNC: 15 U/L (ref 1–41)
ANION GAP SERPL CALCULATED.3IONS-SCNC: 10.7 MMOL/L
AST SERPL-CCNC: 15 U/L (ref 1–40)
BASOPHILS # BLD AUTO: 0.01 10*3/MM3 (ref 0–0.2)
BASOPHILS NFR BLD AUTO: 0.3 % (ref 0–2)
BILIRUB SERPL-MCNC: 0.7 MG/DL (ref 0.1–1.2)
BUN BLD-MCNC: 16 MG/DL (ref 8–23)
BUN/CREAT SERPL: 14.5 (ref 7–25)
CALCIUM SPEC-SCNC: 9.2 MG/DL (ref 8.6–10.5)
CHLORIDE SERPL-SCNC: 104 MMOL/L (ref 98–107)
CHOLEST SERPL-MCNC: 115 MG/DL (ref 0–200)
CO2 SERPL-SCNC: 26.3 MMOL/L (ref 22–29)
CREAT BLD-MCNC: 1.1 MG/DL (ref 0.76–1.27)
DEPRECATED RDW RBC AUTO: 44.1 FL (ref 37–54)
EOSINOPHIL # BLD AUTO: 0.14 10*3/MM3 (ref 0–0.7)
EOSINOPHIL NFR BLD AUTO: 4.4 % (ref 0–5)
ERYTHROCYTE [DISTWIDTH] IN BLOOD BY AUTOMATED COUNT: 12.3 % (ref 11.5–15)
GFR SERPL CREATININE-BSD FRML MDRD: 65 ML/MIN/1.73
GLOBULIN UR ELPH-MCNC: 2.5 GM/DL
GLUCOSE BLD-MCNC: 96 MG/DL (ref 65–99)
HCT VFR BLD AUTO: 40 % (ref 40.1–51)
HDLC SERPL-MCNC: 46 MG/DL (ref 40–60)
HGB BLD-MCNC: 13.5 G/DL (ref 13.7–17.5)
LDLC SERPL CALC-MCNC: 55 MG/DL (ref 0–100)
LDLC/HDLC SERPL: 1.2 {RATIO}
LYMPHOCYTES # BLD AUTO: 0.76 10*3/MM3 (ref 0.8–7)
LYMPHOCYTES NFR BLD AUTO: 23.8 % (ref 10–60)
MCH RBC QN AUTO: 33.7 PG (ref 26–34)
MCHC RBC AUTO-ENTMCNC: 33.8 G/DL (ref 31–37)
MCV RBC AUTO: 99.8 FL (ref 80–100)
MONOCYTES # BLD AUTO: 0.3 10*3/MM3 (ref 0–1)
MONOCYTES NFR BLD AUTO: 9.4 % (ref 0–13)
NEUTROPHILS # BLD AUTO: 1.99 10*3/MM3 (ref 1–11)
NEUTROPHILS NFR BLD AUTO: 62.1 % (ref 30–85)
PLATELET # BLD AUTO: 117 10*3/MM3 (ref 150–450)
PMV BLD AUTO: 10.1 FL (ref 6–12)
POTASSIUM BLD-SCNC: 4.4 MMOL/L (ref 3.5–5.2)
PROT SERPL-MCNC: 6.8 G/DL (ref 6–8.5)
PSA SERPL-MCNC: 0.36 NG/ML (ref 0–4)
RBC # BLD AUTO: 4.01 10*6/MM3 (ref 4.63–6.08)
SODIUM BLD-SCNC: 141 MMOL/L (ref 136–145)
TRIGL SERPL-MCNC: 69 MG/DL (ref 0–150)
TSH SERPL-ACNC: 2.36 MIU/ML (ref 0.27–4.2)
VLDLC SERPL-MCNC: 13.8 MG/DL (ref 5–40)
WBC NRBC COR # BLD: 3.2 10*3/MM3 (ref 5–10)

## 2018-06-14 PROCEDURE — 80061 LIPID PANEL: CPT | Performed by: INTERNAL MEDICINE

## 2018-06-14 PROCEDURE — 85025 COMPLETE CBC W/AUTO DIFF WBC: CPT | Performed by: INTERNAL MEDICINE

## 2018-06-14 PROCEDURE — 80053 COMPREHEN METABOLIC PANEL: CPT | Performed by: INTERNAL MEDICINE

## 2018-06-18 ENCOUNTER — OFFICE VISIT (OUTPATIENT)
Dept: INTERNAL MEDICINE | Facility: CLINIC | Age: 73
End: 2018-06-18

## 2018-06-18 VITALS
WEIGHT: 215 LBS | HEIGHT: 74 IN | BODY MASS INDEX: 27.59 KG/M2 | SYSTOLIC BLOOD PRESSURE: 124 MMHG | DIASTOLIC BLOOD PRESSURE: 72 MMHG

## 2018-06-18 DIAGNOSIS — Z12.5 PROSTATE CANCER SCREENING: ICD-10-CM

## 2018-06-18 DIAGNOSIS — R76.8 ANA POSITIVE: ICD-10-CM

## 2018-06-18 DIAGNOSIS — M51.36 DDD (DEGENERATIVE DISC DISEASE), LUMBAR: ICD-10-CM

## 2018-06-18 DIAGNOSIS — G89.29 CHRONIC LOW BACK PAIN WITH LEFT-SIDED SCIATICA, UNSPECIFIED BACK PAIN LATERALITY: ICD-10-CM

## 2018-06-18 DIAGNOSIS — Z00.00 HEALTH CARE MAINTENANCE: Primary | ICD-10-CM

## 2018-06-18 DIAGNOSIS — D61.818 PANCYTOPENIA (HCC): ICD-10-CM

## 2018-06-18 DIAGNOSIS — M54.42 CHRONIC LOW BACK PAIN WITH LEFT-SIDED SCIATICA, UNSPECIFIED BACK PAIN LATERALITY: ICD-10-CM

## 2018-06-18 PROCEDURE — 99213 OFFICE O/P EST LOW 20 MIN: CPT | Performed by: INTERNAL MEDICINE

## 2018-06-18 PROCEDURE — G0439 PPPS, SUBSEQ VISIT: HCPCS | Performed by: INTERNAL MEDICINE

## 2018-06-18 RX ORDER — AMOXICILLIN 500 MG/1
CAPSULE ORAL
Refills: 0 | COMMUNITY
Start: 2018-06-04 | End: 2018-06-18

## 2018-06-18 NOTE — PATIENT INSTRUCTIONS
Annual wellness visit with preventive exam as well as age and risk appropriate councelling completed.    Cardiovascular disease councelling: current. Excellent cholesterol.  Diabetes screening and councelling: current. Not at risk for diabetes.  Glaucoma screening: up to date.  AAA screening: completed, no AAA.  Hep C screening (born between [birth year]83-[birth year]179) deferred, no risk factors.  Colorectal cancer screening: up to date. Recommended every 10 years. Next screening is due in 2024..  Prostate cancer screening: Recent changes in guidelines, available evidence and controvercies discussed with patient. Current position and recommendations from USPTF, ACP and Urological association explained. Patient requests PSA .    Immunizations:   1. Influenza vaccine  is up to date and recommended yearly.   2. Pneumococcal vaccines: completed.   3. Zostavax: completed, recommended to get new shingles vaccine Shindrix at [facility], benefits explained.      Advanced directives planning:completed and Living Will is on file in the hospital. Discussed. I agree with patients decision..    Medications reviewed and medication list updated.   Potential harmful drug-disease interactions in the elderly: none.  High risk medication in elderly: opiate and anticoagulant.  ASA use: anticoagulated.    AFib - under the care of cardiologist, anticoagulated.   Chronic lower back pain - under decent control with lifestyle modifications. I had suggested use of over the counter Salonpas patches and heat on the lower back. No need for Hydrocodone at that time. I had suggsted PT - patient declines.  Pancytopenoia - under the care of hematologist.  Positive LASHONDA - has upcoming appointment in rheumatology.    Return in about 1 year (around 6/18/2019) for Medicare Wellness.

## 2018-06-18 NOTE — PROGRESS NOTES
"Roni Menchaca is a 73 y.o. male. Patient is here for subsequent wellness visit and for the chronic lower back pain f/u    History of Present Illness   /72 (BP Location: Left arm, Patient Position: Sitting, Cuff Size: Adult)   Ht 187.5 cm (73.82\")   Wt 97.5 kg (215 lb)   BMI 27.74 kg/m²   Patient is being seen for subsequent wellness visit.  Hospitalizations in a past: 8, none in the last year  Once per lifetime Medicare screening tests: ECG - under the care of cardiologist    AAA risk assessment: 1. FH: none. 2.H/o tobacco smoking: in remote past, as per . 3. CV or PAD: as per medical problems list.    Tobacco use: in remote past, as per    Alcohol use: 2-3 days a week  Illicit drugs use: none  Diet: well balanced  Exercise: 2 times a week, resistance training and aerobic exercise    Anxiety screening: How many days in the last 2 weeks you were  1. Feeling anxious, nervous, on edge: none  2. Unable to stop worrying: none  3. Worrying too much about different things: none  4. Having problems relaxing:none  5. Feeling restless or unable to sit still:none  6. Feeling irritable or easely annoyed: none  7. Being afraid that something awful might happen: none    Depression screening PHQ9:  Over the past 2 weeks how often have you been bothered by  1. Lack of interest or pleasuer in usual activities: none  2. Feeling down, depressed, hopeless:none  3. Troubles falling asleep/sleeping too long:none  4. Feeling tired, having little energy: none  5. Poor appetite or overeating: none  6. Feeling bad about yourself:none.  7. Trouble concentrating: at the baseline.  8. Moving or speaking too slow or much faster than usual: none  9. Thoughts about harming yourself:none.    Cognitive impairment screening:   Do you have difficulties with:  1. Language: no  2. Behavior: no  3. Learning/retaining new information: no  4. Handling complex tasks: no  5. Reasoning: no  6. Spacial ability and orientation: " "no    Hearing: normal.  Driving: unrestricted  ADL: independent  ADL details: Does patient needs help with:  telephone use: no  Transportation: no  Housework: no  Shopping: no  meal preparation: no  laundry: no  managing medication:no  Participate in the social activities: Y    Fall risk factors:   1.Use of alcohol: no    2.Polypharmacy: no  3.H/o of previous fall:  no  4. Mobility impairment:  no  5. Visual impairment:  no  6. Deconditioning: no  7. Use of antihypertensive medication:  no  8. Use of sedatives: yes  9. Use of antidepressant: no    Home safety:   1.loose rugs: no   2.unfamiliar environment: no   3. Uneven floors: no   4. No grab bars in the bathroom: no  5. No banister on stairs: no      Advanced directives: completed and Living Will is on file in the hospital. Discussed. I agree with patients decision..    Co-managers: ophthalmology , dermatology , cardiology , gastroenterologist     /72 (BP Location: Left arm, Patient Position: Sitting, Cuff Size: Adult)   Ht 187.5 cm (73.82\")   Wt 97.5 kg (215 lb)   BMI 27.74 kg/m²   Demond CROSS [id number] y.o.male presents today c/o occasional pain in the mid lower back.  Pain started years ago, he used to see  in a past. Patient describes pain as dull ache. Intensity of the pain: moderate. Patient denies  numbness/tingling in the foot/leg. States that the pain is off and on.  Usually the pain happens. If it does, in the morning.   Current treatments: opiatewithrelief. Compliance with treatment is good. He uses Hydrocodone only as needed, seldom.  Imaging studies: x-ray that had revealed L spine DDD. Done in 2015.  PMH is significant for known L spine DDD and use of anticoagulant.      Current Outpatient Prescriptions:   •  Cetirizine HCl 10 MG capsule, Take 1 capsule by mouth daily., Disp: , Rfl:   •  finasteride (PROSCAR) 5 MG tablet, TAKE 1 TABLET EVERY DAY, Disp: 90 tablet, Rfl: 1  •  fluticasone " (FLONASE) 50 MCG/ACT nasal spray, into each nostril daily., Disp: , Rfl:   •  HYDROcodone-acetaminophen (NORCO) 5-325 MG per tablet, Take 1 tablet by mouth As Needed for Severe Pain ., Disp: 30 tablet, Rfl: 0  •  propafenone (RYTHMOL) 150 MG tablet, TAKE 1 TABLET BY MOUTH 2 (TWO) TIMES A DAY., Disp: 60 tablet, Rfl: 0  •  Psyllium (METAMUCIL PO), Take  by mouth Daily., Disp: , Rfl:   •  warfarin (COUMADIN) 5 MG tablet, TAKE ONE-HALF OF A TAB BY MOUTH ON MONDAY, WEDNESDAY, & FRIDAY & 1 TAB ALL OTHER DAYS AS DIRECTED, Disp: 90 tablet, Rfl: 0                                      The following portions of the patient's history were reviewed and updated as appropriate: allergies, current medications, past family history, past medical history, past social history, past surgical history and problem list.    Review of Systems   Constitutional: Negative for chills and fever.   HENT: Negative for postnasal drip, sinus pressure and sore throat.    Eyes: Negative for pain, redness and itching.   Respiratory: Negative for cough, chest tightness and shortness of breath.    Cardiovascular: Negative for chest pain and leg swelling.   Gastrointestinal: Negative for abdominal pain and blood in stool.   Endocrine: Negative for cold intolerance and heat intolerance.   Genitourinary: Negative for difficulty urinating and flank pain.   Musculoskeletal: Positive for back pain. Negative for neck pain.   Skin: Negative for color change and rash.   Neurological: Negative for dizziness, weakness and headaches.   Hematological: Negative for adenopathy. Does not bruise/bleed easily.   Psychiatric/Behavioral: Negative for sleep disturbance. The patient is not nervous/anxious.        Objective   Physical Exam   Constitutional: He is oriented to person, place, and time. Vital signs are normal. He appears well-developed and well-nourished. No distress.   HENT:   Head: Normocephalic and atraumatic.   Right Ear: External ear normal.   Left Ear:  External ear normal.   Nose: Nose normal. No mucosal edema. Right sinus exhibits no maxillary sinus tenderness and no frontal sinus tenderness. Left sinus exhibits no maxillary sinus tenderness and no frontal sinus tenderness.   Mouth/Throat: Oropharynx is clear and moist. No oropharyngeal exudate.   Eyes: Conjunctivae, EOM and lids are normal. Pupils are equal, round, and reactive to light. Right eye exhibits no discharge. Left eye exhibits no discharge. Right conjunctiva is not injected. Left conjunctiva is not injected. No scleral icterus. Right eye exhibits normal extraocular motion. Left eye exhibits normal extraocular motion.   Neck: Normal range of motion and full passive range of motion without pain. Neck supple. No JVD present. Carotid bruit is not present. No thyromegaly present.   Lower neck scar   Cardiovascular: Normal rate, regular rhythm, S1 normal, S2 normal, normal heart sounds and intact distal pulses.  PMI is not displaced.  Exam reveals no gallop and no friction rub.    No murmur heard.  Pulmonary/Chest: Effort normal and breath sounds normal. No accessory muscle usage. No respiratory distress. He has no decreased breath sounds. He has no wheezes. He has no rhonchi. He has no rales.   Abdominal: Soft. Bowel sounds are normal. He exhibits no distension, no pulsatile liver, no fluid wave, no abdominal bruit, no ascites and no mass. There is no tenderness. There is no rebound and no guarding.   Obese, umbilical hernia, well healed RLQ scar   Musculoskeletal: He exhibits no edema or deformity.   Lymphadenopathy:        Head (right side): No submental, no submandibular, no preauricular, no posterior auricular and no occipital adenopathy present.        Head (left side): No submental, no submandibular, no tonsillar, no preauricular, no posterior auricular and no occipital adenopathy present.     He has no cervical adenopathy.        Right cervical: No superficial cervical, no deep cervical and no  posterior cervical adenopathy present.       Left cervical: No superficial cervical, no deep cervical and no posterior cervical adenopathy present.        Right: No supraclavicular adenopathy present.        Left: No supraclavicular adenopathy present.   Neurological: He is alert and oriented to person, place, and time. He has normal strength and normal reflexes. He displays normal reflexes. No cranial nerve deficit. He exhibits normal muscle tone. Coordination normal.   Reflex Scores:       Bicep reflexes are 2+ on the right side and 2+ on the left side.       Patellar reflexes are 2+ on the right side and 2+ on the left side.  Skin: Skin is warm and dry. No rash noted. He is not diaphoretic. No erythema.   Senile purpura, cherry hemangiomas   Psychiatric: He has a normal mood and affect. His speech is normal and behavior is normal. Thought content normal.   Vitals reviewed.      Assessment/Plan   Diagnoses and all orders for this visit:    Health care maintenance  -     Comprehensive Metabolic Panel; Future  -     Lipid Panel; Future  -     TSH; Future    DDD (degenerative disc disease), lumbar    Chronic low back pain with left-sided sciatica, unspecified back pain laterality    Pancytopenia  -     CBC & Differential; Future    LASHONDA positive    Prostate cancer screening  -     PSA Screen; Future        Annual wellness visit with preventive exam as well as age and risk appropriate councelling completed.    Cardiovascular disease councelling: current. Excellent cholesterol.  Diabetes screening and councelling: current. Not at risk for diabetes.  Glaucoma screening: up to date.  AAA screening: completed, no AAA.  Hep C screening (born between 4630-1307) deferred, no risk factors.  Colorectal cancer screening: up to date. Recommended every 10 years. Next screening is due in 2024..  Prostate cancer screening: Recent changes in guidelines, available evidence and controvercies discussed with patient. Current position and  recommendations from USPTF, ACP and Urological association explained. Patient requests PSA .    Immunizations:   1. Influenza vaccine  is up to date and recommended yearly.   2. Pneumococcal vaccines: completed.   3. Zostavax: completed, recommended to get new shingles vaccine Shindrix at [facility], benefits explained.      Advanced directives planning:completed and Living Will is on file in the hospital. Discussed. I agree with patients decision..    Medications reviewed and medication list updated.   Potential harmful drug-disease interactions in the elderly: none.  High risk medication in elderly: opiate and anticoagulant.  ASA use: anticoagulated.    AFib - under the care of cardiologist, anticoagulated.   Chronic lower back pain - under decent control with lifestyle modifications. I had suggested use of over the counter Salonpas patches and heat on the lower back. No need for Hydrocodone at that time. I had suggsted PT - patient declines.  Pancytopenoia - under the care of hematologist.  Positive LASHONDA - has upcoming appointment in rheumatology.

## 2018-07-02 RX ORDER — PROPAFENONE HYDROCHLORIDE 150 MG/1
150 TABLET, COATED ORAL 2 TIMES DAILY
Qty: 60 TABLET | Refills: 1 | Status: SHIPPED | OUTPATIENT
Start: 2018-07-02 | End: 2018-09-06 | Stop reason: SDUPTHER

## 2018-07-03 RX ORDER — PROPAFENONE HYDROCHLORIDE 150 MG/1
150 TABLET, COATED ORAL 2 TIMES DAILY
Qty: 60 TABLET | Refills: 0 | Status: SHIPPED | OUTPATIENT
Start: 2018-07-03 | End: 2018-08-05 | Stop reason: SDUPTHER

## 2018-08-03 ENCOUNTER — OFFICE VISIT (OUTPATIENT)
Dept: CARDIOLOGY | Facility: CLINIC | Age: 73
End: 2018-08-03

## 2018-08-03 VITALS
DIASTOLIC BLOOD PRESSURE: 72 MMHG | SYSTOLIC BLOOD PRESSURE: 124 MMHG | HEART RATE: 42 BPM | HEIGHT: 74 IN | WEIGHT: 213 LBS | BODY MASS INDEX: 27.34 KG/M2

## 2018-08-03 DIAGNOSIS — I48.0 PAROXYSMAL ATRIAL FIBRILLATION (HCC): Primary | ICD-10-CM

## 2018-08-03 PROCEDURE — 93000 ELECTROCARDIOGRAM COMPLETE: CPT | Performed by: INTERNAL MEDICINE

## 2018-08-03 PROCEDURE — 99213 OFFICE O/P EST LOW 20 MIN: CPT | Performed by: INTERNAL MEDICINE

## 2018-08-03 NOTE — PROGRESS NOTES
Date of Office Visit: 2018  Encounter Provider: David Hernandez MD  Place of Service: [facility] CARDIOLOGY  Patient Name: Demond Menchaca  : 1945    Subjective:     Encounter Date:2018      Patient ID: Demond Menchaca is a 73 y.o. male who has a cc of PAF I did two ablations last one in . Freq of AF is decreased and episodes last 1-2 hours. They don't really bother him that much.     The patient had a good year.   No anginal chest pain,   No sig briseno,   No soa,   No fainting,  No orthostasis.   No edema.   Exercise tolerance: plays golf. He does more formal reg exercise in the winter     There have been no hospital admission since the last visit.     There have been no bleeding events.       Past Medical History:   Diagnosis Date   • Anemia    • Arthritis    • Atrial fibrillation (CMS/HCC)    • COPD (chronic obstructive pulmonary disease) (CMS/HCC)    • Diarrhea    • Diverticulitis of large intestine without perforation or abscess without bleeding 3/7/2016   • Diverticulosis    • ED (erectile dysfunction)    • Elbow fracture, left    • History of blood transfusion    • Hypogonadism in male    • Kidney cysts    • Left femoral shaft fracture (CMS/HCC)    • Lower back pain    • Palpitations    • Peptic ulceration    • Prostatic hypertrophy, benign    • Skin cancer    • Transient cerebral ischemia     H/O TIAs       Social History     Social History   • Marital status:      Spouse name: Latisha   • Number of children: N/A   • Years of education: College     Occupational History   • [employer] manager Retired     Social History Main Topics   • Smoking status: Former Smoker     Packs/day: 0.50     Years: 20.00     Types: Cigarettes     Quit date:    • Smokeless tobacco: Never Used   • Alcohol use 0.6 oz/week     1 Glasses of wine per week   • Drug use: No   • Sexual activity: Not on file     Other Topics Concern   • Not on file     Social History Narrative   • No  "narrative on file       Review of Systems   Constitution: Negative for fever and night sweats.   HENT: Negative for ear pain and stridor.    Eyes: Negative for discharge and visual halos.   Cardiovascular: Negative for cyanosis.   Respiratory: Negative for hemoptysis and sputum production.    Hematologic/Lymphatic: Negative for adenopathy.   Skin: Negative for nail changes and unusual hair distribution.   Musculoskeletal: Positive for arthritis and back pain. Negative for gout and joint swelling.   Gastrointestinal: Negative for bowel incontinence and flatus.   Genitourinary: Negative for dysuria and flank pain.   Neurological: Negative for seizures and tremors.   Psychiatric/Behavioral: Negative for altered mental status. The patient is not nervous/anxious.             Objective:     Vitals:    08/03/18 0923   BP: 124/72   Pulse: (!) 42   Weight: 96.6 kg (213 lb)   Height: 188 cm (74\")         Physical Exam   Constitutional: He is oriented to person, place, and time.   HENT:   Head: Normocephalic and atraumatic.   Eyes: Right eye exhibits no discharge. Left eye exhibits no discharge.   Neck: No JVD present. No thyromegaly present.   Cardiovascular: Normal rate and regular rhythm.  Exam reveals no gallop and no friction rub.    No murmur heard.  Pulmonary/Chest: Effort normal and breath sounds normal. He has no rales.   Abdominal: Soft. Bowel sounds are normal. There is no tenderness.   Musculoskeletal: Normal range of motion. He exhibits no edema or deformity.   Neurological: He is alert and oriented to person, place, and time. He exhibits normal muscle tone.   Skin: Skin is warm and dry. No erythema.   Psychiatric: He has a normal mood and affect. His behavior is normal. Thought content normal.         ECG 12 Lead  Date/Time: 8/3/2018 9:49 AM  Performed by: GIOVANA ANTON  Authorized by: GIOVANA ANTON   Comparison: compared with previous ECG   Similar to previous ECG  Rhythm: sinus bradycardia  Clinical " impression: abnormal ECG            Lab Review:       Assessment:          Diagnosis Plan   1. Paroxysmal atrial fibrillation (CMS/HCC)            Plan:         Atrial Fibrillation and Atrial Flutter  Assessment  • The patient has paroxysmal atrial fibrillation  • This is non-valvular in etiology    Plan  • Attempt to maintain sinus rhythm  • Continue warfarin for antithrombotic therapy, bleeding issues discussed  • Continue propafenone for rhythm control    He is ok -- the AF is there but under control  Exam ok   No bleeding.

## 2018-08-06 RX ORDER — PROPAFENONE HYDROCHLORIDE 150 MG/1
TABLET, COATED ORAL
Qty: 60 TABLET | Refills: 0 | Status: SHIPPED | OUTPATIENT
Start: 2018-08-06 | End: 2018-09-02 | Stop reason: SDUPTHER

## 2018-08-13 RX ORDER — WARFARIN SODIUM 5 MG/1
TABLET ORAL
Qty: 90 TABLET | Refills: 0 | Status: SHIPPED | OUTPATIENT
Start: 2018-08-13 | End: 2018-11-09 | Stop reason: SDUPTHER

## 2018-08-20 RX ORDER — FINASTERIDE 5 MG/1
TABLET, FILM COATED ORAL
Qty: 90 TABLET | Refills: 1 | Status: SHIPPED | OUTPATIENT
Start: 2018-08-20 | End: 2019-02-18 | Stop reason: SDUPTHER

## 2018-09-04 RX ORDER — PROPAFENONE HYDROCHLORIDE 150 MG/1
TABLET, COATED ORAL
Qty: 60 TABLET | Refills: 0 | Status: SHIPPED | OUTPATIENT
Start: 2018-09-04 | End: 2018-09-06 | Stop reason: SDUPTHER

## 2018-09-06 ENCOUNTER — OFFICE VISIT (OUTPATIENT)
Dept: INTERNAL MEDICINE | Facility: CLINIC | Age: 73
End: 2018-09-06

## 2018-09-06 VITALS
SYSTOLIC BLOOD PRESSURE: 118 MMHG | WEIGHT: 212 LBS | HEIGHT: 74 IN | RESPIRATION RATE: 14 BRPM | BODY MASS INDEX: 27.21 KG/M2 | DIASTOLIC BLOOD PRESSURE: 72 MMHG

## 2018-09-06 DIAGNOSIS — M54.42 CHRONIC LOW BACK PAIN WITH LEFT-SIDED SCIATICA, UNSPECIFIED BACK PAIN LATERALITY: ICD-10-CM

## 2018-09-06 DIAGNOSIS — M54.16 LUMBAR RADICULOPATHY, ACUTE: Primary | ICD-10-CM

## 2018-09-06 DIAGNOSIS — G89.29 CHRONIC LOW BACK PAIN WITH LEFT-SIDED SCIATICA, UNSPECIFIED BACK PAIN LATERALITY: ICD-10-CM

## 2018-09-06 PROCEDURE — 99213 OFFICE O/P EST LOW 20 MIN: CPT | Performed by: INTERNAL MEDICINE

## 2018-09-06 RX ORDER — HYDROCODONE BITARTRATE AND ACETAMINOPHEN 5; 325 MG/1; MG/1
1 TABLET ORAL EVERY 8 HOURS PRN
Qty: 30 TABLET | Refills: 0 | OUTPATIENT
Start: 2018-09-06 | End: 2019-05-08

## 2018-09-06 RX ORDER — PREDNISONE 10 MG/1
TABLET ORAL
Qty: 20 TABLET | Refills: 0 | Status: SHIPPED | OUTPATIENT
Start: 2018-09-06 | End: 2018-10-02

## 2018-09-06 NOTE — PATIENT INSTRUCTIONS
Acute radicular pain in a patient with known L spine DDD and chronic lower back pain - will treat with steroids and use of over the counter Salonpas patches locally topically. Use Hydrocodone as needed.

## 2018-09-06 NOTE — PROGRESS NOTES
"Subjective   Demond Menchaca is a 73 y.o. male.     History of Present Illness   /72 (BP Location: Left arm, Patient Position: Sitting, Cuff Size: Adult)   Resp 14   Ht 188 cm (74\")   Wt 96.2 kg (212 lb)   BMI 27.22 kg/m²      Demond Menchaca73 y.o.male presents today c/o acute onset of the pain in the right lower back.  Pain started few days ago. Precipitating event: he weas tying his shoe laces. Patient describes pain as sharp and radiating across the back. Intensity of the pain: severe. Patient denies  numbness/tingling in the foot/leg. States that the pain is constant. Worse in am when he first gets out of bed.  Patient  has had similar episodes in a past.   Treatments tried: Hydrocodone, that he had at home from previous episodes.withrelief.  PMH is significant for known L spine DDD.      Current Outpatient Prescriptions:   •  Cetirizine HCl 10 MG capsule, Take 1 capsule by mouth daily., Disp: , Rfl:   •  finasteride (PROSCAR) 5 MG tablet, TAKE 1 TABLET EVERY DAY, Disp: 90 tablet, Rfl: 1  •  fluticasone (FLONASE) 50 MCG/ACT nasal spray, into each nostril daily., Disp: , Rfl:   •  HYDROcodone-acetaminophen (NORCO) 5-325 MG per tablet, Take 1 tablet by mouth As Needed for Severe Pain ., Disp: 30 tablet, Rfl: 0  •  propafenone (RYTHMOL) 150 MG tablet, TAKE 1 TABLET BY MOUTH 2 (TWO) TIMES A DAY., Disp: 60 tablet, Rfl: 0  •  Psyllium (METAMUCIL PO), Take  by mouth Daily., Disp: , Rfl:   •  warfarin (COUMADIN) 5 MG tablet, TAKE ONE-HALF OF A TAB BY MOUTH ON MONDAY, WEDNESDAY, & FRIDAY & 1 TAB ALL OTHER DAYS AS DIRECTED, Disp: 90 tablet, Rfl: 0  The following portions of the patient's history were reviewed and updated as appropriate: allergies, current medications, past family history, past medical history, past social history, past surgical history and problem list.    Review of Systems   Constitutional: Negative for chills and fever.   Eyes: Negative for pain and redness.   Respiratory: Negative for cough " and shortness of breath.    Cardiovascular: Negative for chest pain and leg swelling.   Musculoskeletal: Positive for back pain.   Neurological: Positive for light-headedness. Negative for dizziness and headaches.       Objective   Physical Exam   Constitutional: He is oriented to person, place, and time. He appears well-developed.   HENT:   Head: Normocephalic and atraumatic.   Right Ear: Tympanic membrane, external ear and ear canal normal.   Left Ear: Tympanic membrane, external ear and ear canal normal.   Nose: Nose normal. Right sinus exhibits no maxillary sinus tenderness and no frontal sinus tenderness. Left sinus exhibits no maxillary sinus tenderness and no frontal sinus tenderness.   Mouth/Throat: Uvula is midline, oropharynx is clear and moist and mucous membranes are normal.   Eyes: Pupils are equal, round, and reactive to light. Conjunctivae and EOM are normal. Right eye exhibits no discharge. Left eye exhibits no discharge. No scleral icterus.   Neck: Neck supple. No JVD present.   Cardiovascular: Normal rate, regular rhythm and normal heart sounds.  Exam reveals no gallop and no friction rub.    No murmur heard.  Pulmonary/Chest: Effort normal and breath sounds normal. He has no wheezes. He has no rales.   Musculoskeletal: He exhibits tenderness (on paravertebral palpation along the L2-3). He exhibits no edema.   Lymphadenopathy:     He has no cervical adenopathy.   Neurological: He is alert and oriented to person, place, and time. No cranial nerve deficit.   Skin: Skin is warm and dry. No rash noted.   Psychiatric: He has a normal mood and affect. His behavior is normal.   Vitals reviewed.      Assessment/Plan   Demond was seen today for back pain.    Diagnoses and all orders for this visit:    Lumbar radiculopathy, acute  -     predniSONE (DELTASONE) 10 MG tablet; TID x 3d, then BID x 3d, then qd x 5d    Chronic low back pain with left-sided sciatica, unspecified back pain laterality  -      HYDROcodone-acetaminophen (NORCO) 5-325 MG per tablet; Take 1 tablet by mouth Every 8 (Eight) Hours As Needed for Severe Pain .      1. Acute radicular pain in a patient with known L spine DDD and chronic lower back pain - will treat with steroids and use of over the counter Salonpas patches locally topically. Use Hydrocodone as needed.

## 2018-09-21 ENCOUNTER — LAB (OUTPATIENT)
Dept: LAB | Facility: HOSPITAL | Age: 73
End: 2018-09-21

## 2018-09-21 ENCOUNTER — OFFICE VISIT (OUTPATIENT)
Dept: ONCOLOGY | Facility: CLINIC | Age: 73
End: 2018-09-21

## 2018-09-21 VITALS
BODY MASS INDEX: 27.26 KG/M2 | OXYGEN SATURATION: 97 % | WEIGHT: 212.4 LBS | TEMPERATURE: 97.7 F | DIASTOLIC BLOOD PRESSURE: 74 MMHG | HEIGHT: 74 IN | SYSTOLIC BLOOD PRESSURE: 122 MMHG | HEART RATE: 41 BPM | RESPIRATION RATE: 16 BRPM

## 2018-09-21 DIAGNOSIS — D69.6 THROMBOCYTOPENIA (HCC): ICD-10-CM

## 2018-09-21 DIAGNOSIS — D61.818 PANCYTOPENIA (HCC): Primary | ICD-10-CM

## 2018-09-21 DIAGNOSIS — R76.8 ANA POSITIVE: ICD-10-CM

## 2018-09-21 LAB
ALBUMIN SERPL-MCNC: 4.2 G/DL (ref 3.5–5.2)
ALBUMIN/GLOB SERPL: 1.8 G/DL (ref 1.1–2.4)
ALP SERPL-CCNC: 54 U/L (ref 38–116)
ALT SERPL W P-5'-P-CCNC: 15 U/L (ref 0–41)
ANION GAP SERPL CALCULATED.3IONS-SCNC: 8.1 MMOL/L
AST SERPL-CCNC: 16 U/L (ref 0–40)
BASOPHILS # BLD AUTO: 0.02 10*3/MM3 (ref 0–0.1)
BASOPHILS NFR BLD AUTO: 0.5 % (ref 0–1.1)
BILIRUB SERPL-MCNC: 0.7 MG/DL (ref 0.1–1.2)
BUN BLD-MCNC: 16 MG/DL (ref 6–20)
BUN/CREAT SERPL: 14.7 (ref 7.3–30)
CALCIUM SPEC-SCNC: 9 MG/DL (ref 8.5–10.2)
CHLORIDE SERPL-SCNC: 104 MMOL/L (ref 98–107)
CO2 SERPL-SCNC: 27.9 MMOL/L (ref 22–29)
CREAT BLD-MCNC: 1.09 MG/DL (ref 0.7–1.3)
DEPRECATED RDW RBC AUTO: 48.4 FL (ref 37–49)
EOSINOPHIL # BLD AUTO: 0.08 10*3/MM3 (ref 0–0.36)
EOSINOPHIL NFR BLD AUTO: 2 % (ref 1–5)
ERYTHROCYTE [DISTWIDTH] IN BLOOD BY AUTOMATED COUNT: 13 % (ref 11.7–14.5)
GFR SERPL CREATININE-BSD FRML MDRD: 66 ML/MIN/1.73
GLOBULIN UR ELPH-MCNC: 2.3 GM/DL (ref 1.8–3.5)
GLUCOSE BLD-MCNC: 77 MG/DL (ref 74–124)
HCT VFR BLD AUTO: 41 % (ref 40–49)
HGB BLD-MCNC: 13.7 G/DL (ref 13.5–16.5)
IMM GRANULOCYTES # BLD: 0.03 10*3/MM3 (ref 0–0.03)
IMM GRANULOCYTES NFR BLD: 0.8 % (ref 0–0.5)
LYMPHOCYTES # BLD AUTO: 0.75 10*3/MM3 (ref 1–3.5)
LYMPHOCYTES NFR BLD AUTO: 19.1 % (ref 20–49)
MCH RBC QN AUTO: 33.7 PG (ref 27–33)
MCHC RBC AUTO-ENTMCNC: 33.4 G/DL (ref 32–35)
MCV RBC AUTO: 101 FL (ref 83–97)
MONOCYTES # BLD AUTO: 0.27 10*3/MM3 (ref 0.25–0.8)
MONOCYTES NFR BLD AUTO: 6.9 % (ref 4–12)
NEUTROPHILS # BLD AUTO: 2.78 10*3/MM3 (ref 1.5–7)
NEUTROPHILS NFR BLD AUTO: 70.7 % (ref 39–75)
NRBC BLD MANUAL-RTO: 0 /100 WBC (ref 0–0)
PLATELET # BLD AUTO: 103 10*3/MM3 (ref 150–375)
PMV BLD AUTO: 9.5 FL (ref 8.9–12.1)
POTASSIUM BLD-SCNC: 4.7 MMOL/L (ref 3.5–4.7)
PROT SERPL-MCNC: 6.5 G/DL (ref 6.3–8)
RBC # BLD AUTO: 4.06 10*6/MM3 (ref 4.3–5.5)
SODIUM BLD-SCNC: 140 MMOL/L (ref 134–145)
WBC NRBC COR # BLD: 3.93 10*3/MM3 (ref 4–10)

## 2018-09-21 PROCEDURE — 99214 OFFICE O/P EST MOD 30 MIN: CPT | Performed by: NURSE PRACTITIONER

## 2018-09-21 PROCEDURE — 36415 COLL VENOUS BLD VENIPUNCTURE: CPT

## 2018-09-21 PROCEDURE — 80053 COMPREHEN METABOLIC PANEL: CPT

## 2018-09-21 PROCEDURE — 85025 COMPLETE CBC W/AUTO DIFF WBC: CPT

## 2018-09-21 RX ORDER — HYDROXYCHLOROQUINE SULFATE 200 MG/1
200 TABLET, FILM COATED ORAL 2 TIMES DAILY
COMMUNITY

## 2018-09-21 NOTE — PROGRESS NOTES
"  Subjective     REASON FOR CONSULTATION:   1. Pancytopenia of 20 years in duration with predominant thrombocytopenia  2.  Positive LASHONDA?  Significance-persistent positive anti-double stranded DNA   3.  Large bilateral kidney cysts                            REQUESTING PHYSICIAN:  Katharine Stephenson M.D.    History of Present Illness patient is a 72-year-old male with chronic thrombocytopenia here today for scheduled lab review.  Dr. Acosta did send him to rheumatology with an elevated LASHONDA.  He did see rheumatologist in July and was ultimately diagnosed with lupus.  He does states today in the office that he was told he has the \"good\" kind of lupus.  They started the patient on Platinol and he reports no side effects from this drug.    He actually reports feeling quite well today with no bleeding, bruising, fever, chills, weight loss, swelling.  New questions or concerns today.  His platelet count remains low but in line with what he has been in prior visits at  103,000.    Remains on Coumadin for his atrial fibrillation    Past Medical History:   Diagnosis Date   • Anemia    • Arthritis    • Atrial fibrillation (CMS/HCC)    • COPD (chronic obstructive pulmonary disease) (CMS/HCC)    • Diarrhea    • Diverticulitis of large intestine without perforation or abscess without bleeding 3/7/2016   • Diverticulosis    • ED (erectile dysfunction)    • Elbow fracture, left    • History of blood transfusion    • Hypogonadism in male    • Kidney cysts    • Left femoral shaft fracture (CMS/HCC)    • Lower back pain    • Palpitations    • Peptic ulceration    • Prostatic hypertrophy, benign    • Skin cancer    • Transient cerebral ischemia     H/O TIAs        Past Surgical History:   Procedure Laterality Date   • ADENOIDECTOMY  1973   • APPENDECTOMY  1973   • BACK SURGERY     • CARDIAC ABLATION  2013, 2014   • COLONOSCOPY  11/21/2014       • FEMUR FRACTURE SURGERY Left 2007    post fall from the ladder   • LUMBAR LAMINECTOMY "  1974    Dr.Lester Lino   • NECK SURGERY      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   • OTHER SURGICAL HISTORY      elbow surgery   • THYROID SURGERY  1986    benign tumor removal,    • TOTAL ELBOW ARTHROPLASTY Left 2007    L, post fall and fracture, hardware in      HEME HISTORY:   patient is a 72-year-old male with a history of atrial fibrillation degenerative arthritis who is referred to us because of mild chronic thrombocytopenia and leukopenia and new onset of anemia when seen in Dr. Curry's office this June.  At that time his hemoglobin was 13.4 white count was 2700 and platelets 95,000.  In the past his white count has been in the 3000 range with a platelet count of about 120,000.  He was referred to us for evaluation of these changes.    On reviewing his history he was seen in our office in 1998 with the same findings that at that time his hemoglobin was normal and his white count was low and his platelet count was 130,000.  A bone marrow was done which was morphologically normal except for mild increase in plasma cells ,with normal flow cytometry but I do not see any chromosome analysis.  A mass was felt in the left upper abdomen which led to a CT scan which showed multiple cysts in both kidneys.  No splenomegaly.  At the time he was drinking a fair amount of alcohol and he was asked to abstain from alcohol for 1 month and there is no change in the blood counts and this was not felt to be an important contributed to this finding.  There is no family history of leukopenia and thrombocytopenia    He currently drinks 6 glasses of wine some beer every weekend but not on a daily basis.  His had no fever sweats weight loss or systemic complaints and is feeling fairly well overall.  He is on Coumadin for atrial fibrillation is had 2 ablations done his had no overt bleeding and is on no new medications except for Rythmol which does not cause thrombocytopenia or anemia but can leukopenia.    His hemoglobin  today is back to normal platelet count is up to 116,000 and his white count is in the normal range and this may have just been an aberration in June as he tells me he was not ill at the time  He is up-to-date with colonoscopies and this had a normal PSA      Current Outpatient Prescriptions on File Prior to Visit   Medication Sig Dispense Refill   • Cetirizine HCl 10 MG capsule Take 1 capsule by mouth daily.     • finasteride (PROSCAR) 5 MG tablet TAKE 1 TABLET EVERY DAY 90 tablet 1   • fluticasone (FLONASE) 50 MCG/ACT nasal spray into each nostril daily.     • HYDROcodone-acetaminophen (NORCO) 5-325 MG per tablet Take 1 tablet by mouth Every 8 (Eight) Hours As Needed for Severe Pain . 30 tablet 0   • propafenone (RYTHMOL) 150 MG tablet TAKE 1 TABLET BY MOUTH 2 (TWO) TIMES A DAY. 60 tablet 0   • Psyllium (METAMUCIL PO) Take  by mouth Daily.     • warfarin (COUMADIN) 5 MG tablet TAKE ONE-HALF OF A TAB BY MOUTH ON MONDAY, WEDNESDAY, & FRIDAY & 1 TAB ALL OTHER DAYS AS DIRECTED 90 tablet 0   • predniSONE (DELTASONE) 10 MG tablet TID x 3d, then BID x 3d, then qd x 5d 20 tablet 0     No current facility-administered medications on file prior to visit.         ALLERGIES:    Allergies   Allergen Reactions   • Cardizem [Diltiazem Hcl] Itching   • Grass Other (See Comments)        Social History     Social History   • Marital status:      Spouse name: Latisha   • Years of education: College     Occupational History   • [employer] manager Retired     Social History Main Topics   • Smoking status: Former Smoker     Packs/day: 0.50     Years: 20.00     Types: Cigarettes     Quit date: 1974   • Smokeless tobacco: Never Used   • Alcohol use 0.6 oz/week     1 Glasses of wine per week   • Drug use: No     Other Topics Concern   • Not on file        Family History   Problem Relation Age of Onset   • Hypertension Mother    • Osteoporosis Mother    • Thyroid disease Sister    • Diabetes Sister    • Hypertension Sister    • Colon cancer  "Maternal Grandmother 60   • Hypertension Father    • Heart disease Other    • Atrial fibrillation Other    • Thyroid disease Other    • Pulmonary fibrosis Sister    • Diabetes Other    • Heart disease Other    • Hypertension Other         Review of Systems   Cardiovascular:        See HPI   Endocrine: Negative.    Musculoskeletal: Positive for gait problem (Left foot drop after spine surgery).   Hematological:        See HPI        Objective     Vitals:    09/21/18 1013   BP: 122/74   Pulse: (!) 41   Resp: 16   Temp: 97.7 °F (36.5 °C)   TempSrc: Oral   SpO2: 97%   Weight: 96.3 kg (212 lb 6.4 oz)   Height: 188 cm (74.02\")   PainSc:   2   PainLoc: Back     Current Status 9/21/2018   ECOG score 0       Physical Exam    GENERAL:  Well-developed, well-nourished in no acute distress.   SKIN:  Warm, dry without rashes, purpura or petechiae.  EYES:  Pupils equal, round and reactive to light.  EOMs intact.  Conjunctivae normal.  EARS:  Hearing intact.  NOSE:  Septum midline.  No excoriations or nasal discharge.  MOUTH:  Tongue is well-papillated; no stomatitis or ulcers.  Lips normal.  THROAT:  Oropharynx without lesions or exudates.  NECK:  Supple with good range of motion; no thyromegaly or masses, no JVD.  LYMPHATICS:  No cervical, supraclavicular, axillary or inguinal adenopathy.  CHEST:  Lungs clear to auscultation. Good airflow.  CARDIAC:  Regular rate and rhythm without murmurs, rubs or gallops. Normal S1,S2.  ABDOMEN:  Soft, nontender with no hepatosplenomegaly or masses.  Mildly prominent  EXTREMITIES:  No clubbing, cyanosis or edema.  Chronic venous stasis changes with varicosities bilaterally  NEUROLOGICAL:  Cranial Nerves II-XII grossly intact.  No focal neurological deficits.  PSYCHIATRIC:  Normal affect and mood.          RECENT LABS:  Hematology WBC   Date Value Ref Range Status   09/21/2018 3.93 (L) 4.00 - 10.00 10*3/mm3 Final     RBC   Date Value Ref Range Status   09/21/2018 4.06 (L) 4.30 - 5.50 10*6/mm3 " Final     Hemoglobin   Date Value Ref Range Status   09/21/2018 13.7 13.5 - 16.5 g/dL Final     Hematocrit   Date Value Ref Range Status   09/21/2018 41.0 40.0 - 49.0 % Final     Platelets   Date Value Ref Range Status   09/21/2018 103 (L) 150 - 375 10*3/mm3 Final      Peripheral blood smear shows normochromic normocytic red cells without polychromasia white cells show no immaturity or hypersegmentation and platelets are slightly decreased but normal morphologically'    Ultrasound abdomen   abdominal aorta and IVC appear normal.  IMPRESSION:  1.  Splenomegaly.  2.  Multiple bilateral renal cysts as described.   CT ABD  FINDINGS: The liver, spleen, pancreas, and adrenal glands are  unremarkable. There are multiple fairly large bilateral simple renal  cysts. This includes a large perihilar cyst protruding inferiorly from  the left kidney that measures approximately 10.7 x 7.2 cm in diameter.  An 8.6 cm diameter simple cyst is also present in the midpole of the  left kidney. The kidneys are otherwise unremarkable. The stomach and  small and large bowel appear within normal limits. There are no hernias.     IMPRESSION:  Multiple bilateral simple renal cysts. Otherwise  unremarkable CT scan of the abdomen and pelvis.   This report was finalized on 10/3/2017   Assessment/Plan   1, mild chronic thrombocytopenia with no significant change in almost 20 years and likely constitutional. His platelet count today is 103,000.  2.  Intermittent leukopenia probably constitutional  3.  Anemia resolved  4.  History of multiple cysts in both kidneys  5.  Persistent positive LASHONDA and double stranded DNA.  He met with rheumatology in July and was ultimately diagnosed with lupus.  He started plaque when Daisy is tolerated this quite well.  Plan  1.  I have reviewed his lab work with Dr. Acosta today.  He will return in 6 months for CBC and M.D. review.  Started the patient to call the office with any new symptoms or questions in the  interim.  2. He will follow-up with cardiology as already scheduled.  3.  He will follow-up with rheumatology as already scheduled.

## 2018-10-02 ENCOUNTER — OFFICE VISIT (OUTPATIENT)
Dept: INTERNAL MEDICINE | Facility: CLINIC | Age: 73
End: 2018-10-02

## 2018-10-02 VITALS
BODY MASS INDEX: 27.21 KG/M2 | DIASTOLIC BLOOD PRESSURE: 80 MMHG | OXYGEN SATURATION: 100 % | HEART RATE: 44 BPM | SYSTOLIC BLOOD PRESSURE: 140 MMHG | WEIGHT: 212 LBS | HEIGHT: 74 IN

## 2018-10-02 DIAGNOSIS — M54.9 UPPER BACK PAIN ON LEFT SIDE: Primary | ICD-10-CM

## 2018-10-02 DIAGNOSIS — Z23 NEED FOR VACCINATION: ICD-10-CM

## 2018-10-02 PROCEDURE — 93000 ELECTROCARDIOGRAM COMPLETE: CPT | Performed by: INTERNAL MEDICINE

## 2018-10-02 PROCEDURE — 90662 IIV NO PRSV INCREASED AG IM: CPT | Performed by: INTERNAL MEDICINE

## 2018-10-02 PROCEDURE — 99214 OFFICE O/P EST MOD 30 MIN: CPT | Performed by: INTERNAL MEDICINE

## 2018-10-02 PROCEDURE — G0008 ADMIN INFLUENZA VIRUS VAC: HCPCS | Performed by: INTERNAL MEDICINE

## 2018-10-02 RX ORDER — PROPAFENONE HYDROCHLORIDE 150 MG/1
TABLET, COATED ORAL
Qty: 60 TABLET | Refills: 5 | Status: SHIPPED | OUTPATIENT
Start: 2018-10-02 | End: 2019-05-31 | Stop reason: SDUPTHER

## 2018-10-02 NOTE — PROGRESS NOTES
"Subjective   Demond Menchaca is a 73 y.o. male.     History of Present Illness   Patient complains of the pain in left upper back. Pain started 2 weeks ago. Patient describes having back discomfort all day long, states it feels like dull ache. A bit better as he lays down..  Patient describes pain as dull. Pain radiates to the Pain has no radiation.   Associated symptoms: none. Patient is known to have PAF - had an episode of fast heart beats last week, that lasted for 3 hours or so and resolved spontaneously. No chest pain or pressure. He is also known to have L spine DDD. Had been very sedentary over the last few months due to lower back pain. No use of meds for the pain. Today it seems like the pain is lower in the left side of the abdomen, but when he woke up in am, it was in the left torso. Usually the pain gets worse by mid-morning, Diet has no effect.    Cardiac risk factors were discussed and are as follows: male gender, sedentary life style, age.   Patient prone to constipation, he is not able to tell me when did he have last bowel movement. Last C-scope was 2014, . Has FH of colon cancer.He is known to have bilateral kidneys cysts and large protruding cyst in the left kidney - seen on the CT scan done in 2017.      /80 (BP Location: Left arm, Patient Position: Sitting)   Pulse (!) 44   Ht 188 cm (74.02\")   Wt 96.2 kg (212 lb)   SpO2 100%   BMI 27.21 kg/m²     Current Outpatient Prescriptions:   •  Cetirizine HCl 10 MG capsule, Take 1 capsule by mouth daily., Disp: , Rfl:   •  finasteride (PROSCAR) 5 MG tablet, TAKE 1 TABLET EVERY DAY, Disp: 90 tablet, Rfl: 1  •  fluticasone (FLONASE) 50 MCG/ACT nasal spray, into each nostril daily., Disp: , Rfl:   •  HYDROcodone-acetaminophen (NORCO) 5-325 MG per tablet, Take 1 tablet by mouth Every 8 (Eight) Hours As Needed for Severe Pain ., Disp: 30 tablet, Rfl: 0  •  hydroxychloroquine (PLAQUENIL) 200 MG tablet, Take 200 mg by mouth 2 (Two) Times a " Day., Disp: , Rfl:   •  propafenone (RYTHMOL) 150 MG tablet, TAKE 1 TABLET BY MOUTH TWICE A DAY, Disp: 60 tablet, Rfl: 5  •  Psyllium (METAMUCIL PO), Take  by mouth Daily., Disp: , Rfl:   •  warfarin (COUMADIN) 5 MG tablet, TAKE ONE-HALF OF A TAB BY MOUTH ON MONDAY, WEDNESDAY, & FRIDAY & 1 TAB ALL OTHER DAYS AS DIRECTED, Disp: 90 tablet, Rfl: 0  The following portions of the patient's history were reviewed and updated as appropriate: allergies, current medications, past family history, past medical history, past social history, past surgical history and problem list.    Review of Systems   Constitutional: Negative for fever.   Cardiovascular: Negative for chest pain.   Gastrointestinal: Negative for abdominal pain.   Genitourinary: Negative for dysuria.   Musculoskeletal: Positive for back pain.   Neurological: Negative for weakness, numbness and headaches.       Objective   Physical Exam   Constitutional: He is oriented to person, place, and time. He appears well-developed and well-nourished.   HENT:   Head: Normocephalic and atraumatic.   Right Ear: Tympanic membrane, external ear and ear canal normal.   Left Ear: Tympanic membrane, external ear and ear canal normal.   Nose: Nose normal. Right sinus exhibits no maxillary sinus tenderness and no frontal sinus tenderness. Left sinus exhibits no maxillary sinus tenderness and no frontal sinus tenderness.   Mouth/Throat: Uvula is midline, oropharynx is clear and moist and mucous membranes are normal.   Eyes: Pupils are equal, round, and reactive to light. Conjunctivae and EOM are normal. Right eye exhibits no discharge. Left eye exhibits no discharge. No scleral icterus.   Neck: Neck supple. No JVD present.   Cardiovascular: Normal rate and normal heart sounds.  An irregularly irregular rhythm present. Exam reveals no gallop and no friction rub.    No murmur heard.  Pulmonary/Chest: Effort normal and breath sounds normal. He has no wheezes. He has no rales.    Abdominal: Bowel sounds are normal. He exhibits no distension. There is tenderness (over the LUQ, tghere is firm palpable elongated ridge).   obese   Musculoskeletal: He exhibits no edema.   No paravertebral tenderness on palpation along the left side of T spine, no tenderness on palpation along the left rib cage   Lymphadenopathy:     He has no cervical adenopathy.   Neurological: He is alert and oriented to person, place, and time. No cranial nerve deficit.   Skin: Skin is warm and dry. No rash noted.   Psychiatric: He has a normal mood and affect. His behavior is normal.   Vitals reviewed.    Reason for ECG: upper left back pain, episode of AFib  Rhythm: sinus bradycardia  Arterial rate:43  PA interval: 1 degree AV block  P waves:nl  QRS: nl  ST: non-specific ST changes   Comparison: no change compare to ECG taken 08/03/18   Impression: sinus mell, 1 degree AV block  Assessment/Plan   Diagnoses and all orders for this visit:    Upper back pain on left side  -     ECG 12 Lead    1. Left sided new onset back and torso pain - possibly due to T spine DDD, but no pain on palpation today, possibly due to constipation or due to the known large left kidney cyst.No ECG changes.Possibly due to the large left kidney cyst. If pain persists - may need another CT for the left kidney cyst evaluation. Use some over the counter Salonpas patches over the left upper back when it hurts.  2. RUQ abdominal pain and tenderness - due to constipation. Will ask patient to take over the counter Colace 100 mg daily and call us in a week with report.

## 2018-10-02 NOTE — PATIENT INSTRUCTIONS
1. Left sided new onset back and torso pain - possibly due to T spine DDD, but no pain on palpation today, possibly due to constipation or due to the known large left kidney cyst.No ECG changes.Possibly due to the large left kidney cyst. If pain persists - may need another CT for the left kidney cyst evaluation. Use some over the counter Salonpas patches over the left upper back when it hurts.  2. RUQ abdominal pain and tenderness - due to constipation. Will ask patient to take over the counter Colace 100 mg daily and call us in a week with report.

## 2018-10-02 NOTE — PROGRESS NOTES
Subjective   Demond Menchaca is a 73 y.o. male.     History of Present Illness     {Common H&P Review Areas:70722}    Review of Systems    Objective   Physical Exam    Assessment/Plan   {Assess/PlanSmartLinks:32015}

## 2018-10-05 ENCOUNTER — TELEPHONE (OUTPATIENT)
Dept: INTERNAL MEDICINE | Facility: CLINIC | Age: 73
End: 2018-10-05

## 2018-10-05 NOTE — TELEPHONE ENCOUNTER
----- Message from Melissa Iniguez sent at 10/5/2018  9:41 AM EDT -----  Contact: Pt  Calling to let MD know that the discomfort in his left side has subsided.   He has had several small BM.      Pt#[phone]

## 2018-10-09 RX ORDER — PROPAFENONE HYDROCHLORIDE 150 MG/1
TABLET, COATED ORAL
Qty: 60 TABLET | Refills: 0 | Status: SHIPPED | OUTPATIENT
Start: 2018-10-09 | End: 2018-11-08 | Stop reason: SDUPTHER

## 2018-10-16 ENCOUNTER — OFFICE VISIT (OUTPATIENT)
Dept: INTERNAL MEDICINE | Facility: CLINIC | Age: 73
End: 2018-10-16

## 2018-10-16 VITALS
DIASTOLIC BLOOD PRESSURE: 72 MMHG | RESPIRATION RATE: 14 BRPM | HEIGHT: 74 IN | BODY MASS INDEX: 27.21 KG/M2 | WEIGHT: 212 LBS | SYSTOLIC BLOOD PRESSURE: 132 MMHG

## 2018-10-16 DIAGNOSIS — K59.01 SLOW TRANSIT CONSTIPATION: Primary | ICD-10-CM

## 2018-10-16 PROBLEM — M54.9 UPPER BACK PAIN ON LEFT SIDE: Status: RESOLVED | Noted: 2018-10-02 | Resolved: 2018-10-16

## 2018-10-16 PROBLEM — M54.16 LUMBAR RADICULOPATHY, ACUTE: Status: RESOLVED | Noted: 2018-09-06 | Resolved: 2018-10-16

## 2018-10-16 PROCEDURE — 99213 OFFICE O/P EST LOW 20 MIN: CPT | Performed by: INTERNAL MEDICINE

## 2018-10-16 NOTE — PROGRESS NOTES
"Subjective   Demond Menchaca is a 73 y.o. male.     History of Present Illness   Demond Menchaca 73 y.o. male presents today for constipation and back pain f/u. Last was seen on 10/02/2018 and on that visit medication was changed due to inadequate control.We had started OTC Colace.  Patient is compliant with treatment and denies  side effects. Patient states that the back pain  had resolved, and he has daily bowel movements, but still is on a side of constipation. Does not drink enough water, but drinks few cups of coffe a day. Take Metamucil  every day.    /72 (BP Location: Right arm, Patient Position: Sitting, Cuff Size: Adult)   Resp 14   Ht 188 cm (74\")   Wt 96.2 kg (212 lb)   BMI 27.22 kg/m²     Current Outpatient Prescriptions:   •  Cetirizine HCl 10 MG capsule, Take 1 capsule by mouth daily., Disp: , Rfl:   •  finasteride (PROSCAR) 5 MG tablet, TAKE 1 TABLET EVERY DAY, Disp: 90 tablet, Rfl: 1  •  fluticasone (FLONASE) 50 MCG/ACT nasal spray, into each nostril daily., Disp: , Rfl:   •  HYDROcodone-acetaminophen (NORCO) 5-325 MG per tablet, Take 1 tablet by mouth Every 8 (Eight) Hours As Needed for Severe Pain ., Disp: 30 tablet, Rfl: 0  •  hydroxychloroquine (PLAQUENIL) 200 MG tablet, Take 200 mg by mouth 2 (Two) Times a Day., Disp: , Rfl:   •  propafenone (RYTHMOL) 150 MG tablet, TAKE 1 TABLET BY MOUTH TWICE A DAY, Disp: 60 tablet, Rfl: 5  •  propafenone (RYTHMOL) 150 MG tablet, TAKE 1 TABLET BY MOUTH TWICE A DAY, Disp: 60 tablet, Rfl: 0  •  Psyllium (METAMUCIL PO), Take  by mouth Daily., Disp: , Rfl:   •  warfarin (COUMADIN) 5 MG tablet, TAKE ONE-HALF OF A TAB BY MOUTH ON MONDAY, WEDNESDAY, & FRIDAY & 1 TAB ALL OTHER DAYS AS DIRECTED, Disp: 90 tablet, Rfl: 0  The following portions of the patient's history were reviewed and updated as appropriate: allergies, current medications, past family history, past medical history, past social history, past surgical history and problem list.    Review of " Systems   Constitutional: Negative for chills and fever.   Eyes: Negative for pain and redness.   Respiratory: Negative for cough and shortness of breath.    Cardiovascular: Negative for chest pain and leg swelling.   Neurological: Negative for dizziness and headaches.       Objective   Physical Exam   Constitutional: He is oriented to person, place, and time. He appears well-developed and well-nourished.   HENT:   Head: Normocephalic and atraumatic.   Right Ear: Tympanic membrane, external ear and ear canal normal.   Left Ear: Tympanic membrane, external ear and ear canal normal.   Nose: Nose normal. Right sinus exhibits no maxillary sinus tenderness and no frontal sinus tenderness. Left sinus exhibits no maxillary sinus tenderness and no frontal sinus tenderness.   Mouth/Throat: Uvula is midline, oropharynx is clear and moist and mucous membranes are normal.   Eyes: Pupils are equal, round, and reactive to light. Conjunctivae and EOM are normal. Right eye exhibits no discharge. Left eye exhibits no discharge. No scleral icterus.   Neck: Neck supple. No JVD present.   Cardiovascular: Normal rate, regular rhythm and normal heart sounds.  Exam reveals no gallop and no friction rub.    No murmur heard.  Pulmonary/Chest: Effort normal and breath sounds normal. He has no wheezes. He has no rales.   Musculoskeletal: He exhibits no edema.   Lymphadenopathy:     He has no cervical adenopathy.   Neurological: He is alert and oriented to person, place, and time. No cranial nerve deficit.   Skin: Skin is warm and dry. No rash noted.   Psychiatric: He has a normal mood and affect. His behavior is normal.   Vitals reviewed.      Assessment/Plan   Demond was seen today for back pain.    Diagnoses and all orders for this visit:    Slow transit constipation    Constipation - doing better with daily Metamucil. Needs to increase fluid intake and to start every morning with full glass of water. Tole of Hydrocodone, that he takes  infrequently,explained.

## 2018-11-08 ENCOUNTER — APPOINTMENT (OUTPATIENT)
Dept: LAB | Facility: HOSPITAL | Age: 73
End: 2018-11-08

## 2018-11-08 DIAGNOSIS — I48.91 ATRIAL FIBRILLATION, UNSPECIFIED TYPE (HCC): Primary | ICD-10-CM

## 2018-11-08 LAB
INR PPP: 1.97 (ref 0.9–1.1)
PROTHROMBIN TIME: 22.1 SECONDS (ref 11.7–14.2)

## 2018-11-08 PROCEDURE — 36415 COLL VENOUS BLD VENIPUNCTURE: CPT

## 2018-11-08 PROCEDURE — 85610 PROTHROMBIN TIME: CPT | Performed by: INTERNAL MEDICINE

## 2018-11-09 RX ORDER — WARFARIN SODIUM 5 MG/1
TABLET ORAL
Qty: 90 TABLET | Refills: 0 | Status: SHIPPED | OUTPATIENT
Start: 2018-11-09 | End: 2019-03-15 | Stop reason: SDUPTHER

## 2018-11-09 RX ORDER — PROPAFENONE HYDROCHLORIDE 150 MG/1
TABLET, COATED ORAL
Qty: 60 TABLET | Refills: 0 | Status: SHIPPED | OUTPATIENT
Start: 2018-11-09 | End: 2019-03-15 | Stop reason: SDUPTHER

## 2018-11-27 ENCOUNTER — ANTICOAGULATION VISIT (OUTPATIENT)
Dept: PHARMACY | Facility: HOSPITAL | Age: 73
End: 2018-11-27

## 2018-11-27 DIAGNOSIS — I48.91 ATRIAL FIBRILLATION, UNSPECIFIED TYPE (HCC): ICD-10-CM

## 2018-11-27 LAB — INR PPP: 1.9

## 2018-11-27 NOTE — PROGRESS NOTES
Anticoagulation Clinic Progress Note    Anticoagulation Summary  As of 2018    INR goal:   2.0-3.0   TTR:   --   INR used for dosin.90! (2018)   Warfarin maintenance plan:   2.5 mg on Mon, Fri; 5 mg all other days   Weekly warfarin total:   30 mg   Plan last modified:   Winter Sanabria RPH (2018)   Next INR check:   2018   Target end date:       Indications    Atrial fibrillation (CMS/HCC) [I48.91]             Anticoagulation Episode Summary     INR check location:       Preferred lab:       Send INR reminders to:    [facility]  POOL    Comments:         Anticoagulation Care Providers     Provider Role Specialty Phone number    David Hernandez MD Referring Cardiology [phone]          Drug interactions: no new meds  Diet: consistent    Clinic Interview:  Patient Findings     Negatives:   Signs/symptoms of thrombosis, Signs/symptoms of bleeding,   Laboratory test error suspected, Change in health, Change in alcohol use,   Change in activity, Upcoming invasive procedure, Emergency department   visit, Upcoming dental procedure, Missed doses, Extra doses, Change in   medications, Change in diet/appetite, Hospital admission, Bruising, Other   complaints      Clinical Outcomes     Negatives:   Major bleeding event, Thromboembolic event,   Anticoagulation-related hospital admission, Anticoagulation-related ED   visit, Anticoagulation-related fatality        Education:  Demond Menchaca is a new start in the [facility]. We discussed the followin) Warfarin's indication, mechanism, and dosing  2) Enforced the importance of taking warfarin as instructed and at the same time every day, preferably in the evening so that we can make dose adjustments more easily following subsequent clinic visits  3) What he should do about a missed dose; pts can take missed doses within about 12 hours of their usual scheduled dose, but he was instructed on the importance of not  doubling up on doses unless told to do so by the [facility]  4) Explained possible side effects of warfarin therapy, including increased risk of bleeding, s/sx of bleeding and s/sx of any additional clots/PE/CVA.   5) Discussed monitoring of warfarin, the INR, goal INR range, and the frequency of monitoring  6) Reviewed drug/food/tobacco/EtOH interactions and provided written information covering these topics in more detail, explaining that green, leafy vegetables interact most heavily with warfarin  7) Instructed the pt not to take or discontinue any medications without informing his physician/pharmacist and reminded him to inform us of any dietary changes, as well  8) We discussed available dates to come to the [facility] for face to face meetings.Provided patient with the clinic's contact information for future reference.    He expressed understanding of the information provided and has no additional questions at this time.    Demond Menchaca was offered to be mailed a copy of the Patients Rights and Responsibilities. he expressed verbal consent and agreement to receive care through the [facility] under the current collaborative care agreement with [facility].     Pt has agreed to only be called if INR out of range. They have been instructed to call if any changes in medications, doses, concerns, etc. Patient expresses understanding and has no further questions at this time.    INR History:  Previous INR data from [facility] is recorded in [facility] and scanned into the patient's chart in [facility]. These results have been analyzed and reviewed.      Plan:  1. INR is Subtherapeutic today- see above in Anticoagulation Summary.   Will instruct Demond Menchaca to Increase their warfarin regimen- see above in Anticoagulation Summary.  2. Follow-up in 2 weeks  3. Patient declines warfarin refills.  4. Verbal and any requested printed  information provided. Patient expresses understanding and has no further questions at this time.    Winter Sanabria, [facility]

## 2018-12-13 ENCOUNTER — ANTICOAGULATION VISIT (OUTPATIENT)
Dept: PHARMACY | Facility: HOSPITAL | Age: 73
End: 2018-12-13

## 2018-12-13 LAB — INR PPP: 2.4

## 2018-12-13 RX ORDER — WARFARIN SODIUM 5 MG/1
TABLET ORAL
Qty: 90 TABLET | Refills: 0 | Status: SHIPPED | OUTPATIENT
Start: 2018-12-13 | End: 2019-03-12 | Stop reason: SDUPTHER

## 2018-12-13 NOTE — PROGRESS NOTES
Anticoagulation Clinic Progress Note    Anticoagulation Summary  As of 2018    INR goal:   2.0-3.0   TTR:   100.0 % (6 d)   INR used for dosin.40 (2018)   Warfarin maintenance plan:   2.5 mg on Mon, Fri; 5 mg all other days   Weekly warfarin total:   30 mg   No change documented:   Chicho Leonardo RPH   Plan last modified:   Winter Sanabria RPH (2018)   Next INR check:   2018   Priority:   Maintenance   Target end date:       Indications    Atrial fibrillation (CMS/HCC) [I48.91]             Anticoagulation Episode Summary     INR check location:       Preferred lab:       Send INR reminders to:   [facility]  POOL    Comments:         Anticoagulation Care Providers     Provider Role Specialty Phone number    David Hernandez MD Referring Cardiology [phone]          Drug interactions: has remained unchanged.  Diet: has remained unchanged.    Clinic Interview:  No pertinent clinical findings have been reported.    INR History:  Anticoagulation Monitoring 2018   INR 1.90 2.40   INR Date 2018   INR Goal 2.0-3.0 2.0-3.0   Trend - Same   Last Week Total 0 mg 30 mg   Next Week Total 30 mg 30 mg   Sun 5 mg 5 mg   Mon 2.5 mg 2.5 mg   Tue 5 mg 5 mg   Wed 5 mg 5 mg   Thu 5 mg 5 mg   Fri 2.5 mg 2.5 mg   Sat 5 mg 5 mg   Visit Report - -       Plan:  1. INR is Therapeutic today- see above in Anticoagulation Summary.   Will instruct Demond Menchaca to Continue their warfarin regimen- see above in Anticoagulation Summary.  2. Follow up in 2 weeks  3. Pt has agreed to only be called if INR out of range. They have been instructed to call if any changes in medications, doses, concerns, etc.     Chicho Leonardo RPH

## 2018-12-27 ENCOUNTER — ANTICOAGULATION VISIT (OUTPATIENT)
Dept: PHARMACY | Facility: HOSPITAL | Age: 73
End: 2018-12-27

## 2018-12-27 LAB — INR PPP: 3.1

## 2018-12-27 NOTE — PROGRESS NOTES
Anticoagulation Clinic Progress Note    Anticoagulation Summary  As of 12/27/2018    INR goal:   2.0-3.0   TTR:   90.0 % (2.9 wk)   INR used for dosing:   3.10! (12/27/2018)   Warfarin maintenance plan:   2.5 mg on Mon, Fri; 5 mg all other days   Weekly warfarin total:   30 mg   No change documented:   Chicho Leonardo RPH   Plan last modified:   Winter Sanabria RPH (11/27/2018)   Next INR check:   1/10/2019   Priority:   Maintenance   Target end date:       Indications    Atrial fibrillation (CMS/HCC) [I48.91]             Anticoagulation Episode Summary     INR check location:       Preferred lab:       Send INR reminders to:   [facility]  POOL    Comments:         Anticoagulation Care Providers     Provider Role Specialty Phone number    David Hernandez MD Referring Cardiology [phone]          Drug interactions: has remained unchanged.  Diet: has remained unchanged.    Clinic Interview:  No pertinent clinical findings have been reported.    INR History:  Anticoagulation Monitoring 11/27/2018 12/13/2018 12/27/2018   INR 1.90 2.40 3.10   INR Date 11/27/2018 12/13/2018 12/27/2018   INR Goal 2.0-3.0 2.0-3.0 2.0-3.0   Trend - Same Same   Last Week Total 0 mg 30 mg 30 mg   Next Week Total 30 mg 30 mg 30 mg   Sun 5 mg 5 mg 5 mg   Mon 2.5 mg 2.5 mg 2.5 mg   Tue 5 mg 5 mg 5 mg   Wed 5 mg 5 mg 5 mg   Thu 5 mg 5 mg 5 mg   Fri 2.5 mg 2.5 mg 2.5 mg   Sat 5 mg 5 mg 5 mg   Visit Report - - -   Some recent data might be hidden       Plan:  1. INR is Supratherapeutic today- see above in Anticoagulation Summary.   Will instruct Demond Menchaca to Continue their warfarin regimen- see above in Anticoagulation Summary.  2. Follow up in 2 weeks  3. Pt has agreed to only be called if INR out of range. They have been instructed to call if any changes in medications, doses, concerns, etc.     Chicho Leonardo RPH

## 2019-01-10 ENCOUNTER — ANTICOAGULATION VISIT (OUTPATIENT)
Dept: PHARMACY | Facility: HOSPITAL | Age: 74
End: 2019-01-10

## 2019-01-10 LAB — INR PPP: 2.1

## 2019-01-10 NOTE — PROGRESS NOTES
Anticoagulation Clinic Progress Note    Anticoagulation Summary  As of 1/10/2019    INR goal:   2.0-3.0   TTR:   90.0 % (1.1 mo)   INR used for dosin.10 (1/10/2019)   Warfarin maintenance plan:   2.5 mg on Mon, Fri; 5 mg all other days   Weekly warfarin total:   30 mg   No change documented:   Chicho Leonardo RPH   Plan last modified:   Winter Sanabria RPH (2018)   Next INR check:   2019   Priority:   Maintenance   Target end date:       Indications    Atrial fibrillation (CMS/HCC) [I48.91]             Anticoagulation Episode Summary     INR check location:       Preferred lab:       Send INR reminders to:   [facility]  POOL    Comments:         Anticoagulation Care Providers     Provider Role Specialty Phone number    David Hernandez MD Referring Cardiology [phone]          Drug interactions: has remained unchanged.  Diet: has remained unchanged.    Clinic Interview:  No pertinent clinical findings have been reported.    INR History:  Anticoagulation Monitoring 2018 2018 1/10/2019   INR 2.40 3.10 2.10   INR Date 2018 2018 1/10/2019   INR Goal 2.0-3.0 2.0-3.0 2.0-3.0   Trend Same Same Same   Last Week Total 30 mg 30 mg 30 mg   Next Week Total 30 mg 30 mg 30 mg   Sun 5 mg 5 mg 5 mg   Mon 2.5 mg 2.5 mg 2.5 mg   Tue 5 mg 5 mg 5 mg   Wed 5 mg 5 mg 5 mg   Thu 5 mg 5 mg 5 mg   Fri 2.5 mg 2.5 mg 2.5 mg   Sat 5 mg 5 mg 5 mg   Visit Report - - -   Some recent data might be hidden       Plan:  1. INR is Therapeutic today- see above in Anticoagulation Summary.   Will instruct Demond Menchaca to Continue their warfarin regimen- see above in Anticoagulation Summary.  2. Follow up in 2 weeks  3. Pt has agreed to only be called if INR out of range. They have been instructed to call if any changes in medications, doses, concerns, etc. Patient expresses understanding and has no further questions at this time.    Chicho Leonardo RPH

## 2019-01-24 ENCOUNTER — ANTICOAGULATION VISIT (OUTPATIENT)
Dept: PHARMACY | Facility: HOSPITAL | Age: 74
End: 2019-01-24

## 2019-01-24 LAB — INR PPP: 2.6

## 2019-01-24 NOTE — PROGRESS NOTES
Anticoagulation Clinic Progress Note    Anticoagulation Summary  As of 2019    INR goal:   2.0-3.0   TTR:   92.9 % (1.6 mo)   INR used for dosin.60 (2019)   Warfarin maintenance plan:   2.5 mg on Mon, Fri; 5 mg all other days   Weekly warfarin total:   30 mg   No change documented:   Chicho Leonardo RPH   Plan last modified:   Winter Sanabria RPH (2018)   Next INR check:   2019   Priority:   Maintenance   Target end date:       Indications    Atrial fibrillation (CMS/HCC) [I48.91]             Anticoagulation Episode Summary     INR check location:       Preferred lab:       Send INR reminders to:   [facility]  POOL    Comments:         Anticoagulation Care Providers     Provider Role Specialty Phone number    David Hernandez MD Referring Cardiology [phone]          Drug interactions: has remained unchanged.  Diet: has remained unchanged.    Clinic Interview:  No pertinent clinical findings have been reported.    INR History:  Anticoagulation Monitoring 2018 1/10/2019 2019   INR 3.10 2.10 2.60   INR Date 2018 1/10/2019 2019   INR Goal 2.0-3.0 2.0-3.0 2.0-3.0   Trend Same Same Same   Last Week Total 30 mg 30 mg 30 mg   Next Week Total 30 mg 30 mg 30 mg   Sun 5 mg 5 mg 5 mg   Mon 2.5 mg 2.5 mg 2.5 mg   Tue 5 mg 5 mg 5 mg   Wed 5 mg 5 mg 5 mg   Thu 5 mg 5 mg 5 mg   Fri 2.5 mg 2.5 mg 2.5 mg   Sat 5 mg 5 mg 5 mg   Visit Report - - -   Some recent data might be hidden       Plan:  1. INR is Therapeutic today- see above in Anticoagulation Summary.   Will instruct Demond Menchaca to Continue their warfarin regimen- see above in Anticoagulation Summary.  2. Follow up in 2 weeks  3. Pt has agreed to only be called if INR out of range. They have been instructed to call if any changes in medications, doses, concerns, etc. Patient expresses understanding and has no further questions at this time.    Chicho Leonardo RPH

## 2019-02-07 ENCOUNTER — ANTICOAGULATION VISIT (OUTPATIENT)
Dept: PHARMACY | Facility: HOSPITAL | Age: 74
End: 2019-02-07

## 2019-02-07 LAB — INR PPP: 3.2

## 2019-02-07 NOTE — PROGRESS NOTES
Anticoagulation Clinic Progress Note    Anticoagulation Summary  As of 2/7/2019    INR goal:   2.0-3.0   TTR:   87.0 % (2.1 mo)   INR used for dosing:   3.20! (2/7/2019)   Warfarin maintenance plan:   2.5 mg on Mon, Fri; 5 mg all other days   Weekly warfarin total:   30 mg   Plan last modified:   Winter Sanabria RP (11/27/2018)   Next INR check:   2/14/2019   Priority:   Maintenance   Target end date:       Indications    Atrial fibrillation (CMS/HCC) [I48.91]             Anticoagulation Episode Summary     INR check location:       Preferred lab:       Send INR reminders to:    [facility]  POOL    Comments:         Anticoagulation Care Providers     Provider Role Specialty Phone number    David Hernandez MD Referring Cardiology [phone]          Clinic Interview:      INR History:  Anticoagulation Monitoring 1/10/2019 1/24/2019 2/7/2019   INR 2.10 2.60 3.20   INR Date 1/10/2019 1/24/2019 2/7/2019   INR Goal 2.0-3.0 2.0-3.0 2.0-3.0   Trend Same Same Same   Last Week Total 30 mg 30 mg 30 mg   Next Week Total 30 mg 30 mg 30 mg   Sun 5 mg 5 mg 5 mg   Mon 2.5 mg 2.5 mg 2.5 mg   Tue 5 mg 5 mg 5 mg   Wed 5 mg 5 mg 5 mg   Thu 5 mg 5 mg 5 mg   Fri 2.5 mg 2.5 mg 2.5 mg   Sat 5 mg 5 mg 5 mg   Visit Report - - -   Some recent data might be hidden       Plan:  1. INR is Supratherapeutic today- see above in Anticoagulation Summary.   Will instruct Demond Menchaca to Continue their warfarin regimen- see above in Anticoagulation Summary.  2. Follow up in 1 weeks  3.  Pt has agreed to only be called if INR out of range. They have been instructed to call if any changes in medications, doses, concerns, etc. Patient expresses understanding and has no further questions at this time.    Toña Hooker [facility]

## 2019-02-14 ENCOUNTER — ANTICOAGULATION VISIT (OUTPATIENT)
Dept: PHARMACY | Facility: HOSPITAL | Age: 74
End: 2019-02-14

## 2019-02-14 LAB — INR PPP: 2.3

## 2019-02-14 NOTE — PROGRESS NOTES
Anticoagulation Clinic Progress Note    Anticoagulation Summary  As of 2019    INR goal:   2.0-3.0   TTR:   86.1 % (2.3 mo)   INR used for dosin.30 (2019)   Warfarin maintenance plan:   2.5 mg on Mon, Fri; 5 mg all other days   Weekly warfarin total:   30 mg   No change documented:   Jackelyn Glover RPH   Plan last modified:   Winter Sanabria RPH (2018)   Next INR check:   2019   Priority:   Maintenance   Target end date:       Indications    Atrial fibrillation (CMS/HCC) [I48.91]             Anticoagulation Episode Summary     INR check location:       Preferred lab:       Send INR reminders to:   [facility]  POOL    Comments:         Anticoagulation Care Providers     Provider Role Specialty Phone number    David Hernandez MD Referring Cardiology [phone]          Clinic Interview:  No pertinent clinical findings have been reported.    INR History:  Anticoagulation Monitoring 2019   INR 2.60 3.20 2.30   INR Date 2019   INR Goal 2.0-3.0 2.0-3.0 2.0-3.0   Trend Same Same Same   Last Week Total 30 mg 30 mg 30 mg   Next Week Total 30 mg 30 mg 30 mg   Sun 5 mg 5 mg 5 mg   Mon 2.5 mg 2.5 mg 2.5 mg   Tue 5 mg 5 mg 5 mg   Wed 5 mg 5 mg 5 mg   Thu 5 mg 5 mg 5 mg   Fri 2.5 mg 2.5 mg 2.5 mg   Sat 5 mg 5 mg 5 mg   Visit Report - - -   Some recent data might be hidden       Plan:  1. INR is therapeutic today- see above in Anticoagulation Summary.    Demond CROSS Bernarda to continue their warfarin regimen- see above in Anticoagulation Summary.  2. Follow up in 2 weeks  3. Pt has agreed to only be called if INR out of range. They have been instructed to call if any changes in medications, doses, concerns, etc. Patient expresses understanding and has no further questions at this time.    Jackelyn Glover RPH

## 2019-02-18 RX ORDER — FINASTERIDE 5 MG/1
5 TABLET, FILM COATED ORAL DAILY
Qty: 90 TABLET | Refills: 3 | Status: SHIPPED | OUTPATIENT
Start: 2019-02-18 | End: 2020-02-17

## 2019-02-28 ENCOUNTER — ANTICOAGULATION VISIT (OUTPATIENT)
Dept: PHARMACY | Facility: HOSPITAL | Age: 74
End: 2019-02-28

## 2019-02-28 LAB — INR PPP: 2.2

## 2019-02-28 NOTE — PROGRESS NOTES
Anticoagulation Clinic Progress Note    Anticoagulation Summary  As of 2019    INR goal:   2.0-3.0   TTR:   88.4 % (2.8 mo)   INR used for dosin.20 (2019)   Warfarin maintenance plan:   2.5 mg on Mon, Fri; 5 mg all other days   Weekly warfarin total:   30 mg   No change documented:   Jackelyn Glover RPH   Plan last modified:   Winter Sanabria RPH (2018)   Next INR check:   3/14/2019   Priority:   Maintenance   Target end date:       Indications    Atrial fibrillation (CMS/HCC) [I48.91]             Anticoagulation Episode Summary     INR check location:       Preferred lab:       Send INR reminders to:   [facility]  POOL    Comments:         Anticoagulation Care Providers     Provider Role Specialty Phone number    David Hernandez MD Referring Cardiology [phone]          Clinic Interview:  No pertinent clinical findings have been reported.    INR History:  Anticoagulation Monitoring 2019   INR 3.20 2.30 2.20   INR Date 2019   INR Goal 2.0-3.0 2.0-3.0 2.0-3.0   Trend Same Same Same   Last Week Total 30 mg 30 mg 30 mg   Next Week Total 30 mg 30 mg 30 mg   Sun 5 mg 5 mg 5 mg   Mon 2.5 mg 2.5 mg 2.5 mg   Tue 5 mg 5 mg 5 mg   Wed 5 mg 5 mg 5 mg   Thu 5 mg 5 mg 5 mg   Fri 2.5 mg 2.5 mg 2.5 mg   Sat 5 mg 5 mg 5 mg   Visit Report - - -   Some recent data might be hidden       Plan:  1. INR is therapeutic today- see above in Anticoagulation Summary.    Demond CROSS Bernarda to continue their warfarin regimen- see above in Anticoagulation Summary.  2. Follow up in 2 weeks  3. Pt has agreed to only be called if INR out of range. They have been instructed to call if any changes in medications, doses, concerns, etc. Patient expresses understanding and has no further questions at this time.    Jackelyn Glover RPH

## 2019-03-12 ENCOUNTER — LAB (OUTPATIENT)
Dept: LAB | Facility: HOSPITAL | Age: 74
End: 2019-03-12

## 2019-03-12 ENCOUNTER — TRANSCRIBE ORDERS (OUTPATIENT)
Dept: LAB | Facility: HOSPITAL | Age: 74
End: 2019-03-12

## 2019-03-12 DIAGNOSIS — N08 LUPOID NEPHRITIS (HCC): ICD-10-CM

## 2019-03-12 DIAGNOSIS — N08 LUPOID NEPHRITIS (HCC): Primary | ICD-10-CM

## 2019-03-12 DIAGNOSIS — M32.10 LUPOID NEPHRITIS (HCC): Primary | ICD-10-CM

## 2019-03-12 DIAGNOSIS — M32.10 LUPOID NEPHRITIS (HCC): ICD-10-CM

## 2019-03-12 DIAGNOSIS — Z79.899 NEED FOR PROPHYLACTIC CHEMOTHERAPY: ICD-10-CM

## 2019-03-12 LAB
ALBUMIN SERPL-MCNC: 4.5 G/DL (ref 3.5–5.2)
ALBUMIN/GLOB SERPL: 1.6 G/DL
ALP SERPL-CCNC: 66 U/L (ref 39–117)
ALT SERPL W P-5'-P-CCNC: 21 U/L (ref 1–41)
ANION GAP SERPL CALCULATED.3IONS-SCNC: 9 MMOL/L
AST SERPL-CCNC: 21 U/L (ref 1–40)
BASOPHILS # BLD AUTO: 0.03 10*3/MM3 (ref 0–0.2)
BASOPHILS NFR BLD AUTO: 0.9 % (ref 0–1.5)
BILIRUB SERPL-MCNC: 0.7 MG/DL (ref 0.1–1.2)
BILIRUB UR QL STRIP: NEGATIVE
BUN BLD-MCNC: 14 MG/DL (ref 8–23)
BUN/CREAT SERPL: 14.1 (ref 7–25)
CALCIUM SPEC-SCNC: 9.8 MG/DL (ref 8.6–10.5)
CHLORIDE SERPL-SCNC: 106 MMOL/L (ref 98–107)
CK SERPL-CCNC: 133 U/L (ref 20–200)
CLARITY UR: CLEAR
CO2 SERPL-SCNC: 29 MMOL/L (ref 22–29)
COLOR UR: YELLOW
CREAT BLD-MCNC: 0.99 MG/DL (ref 0.76–1.27)
DEPRECATED RDW RBC AUTO: 48.3 FL (ref 37–54)
EOSINOPHIL # BLD AUTO: 0.13 10*3/MM3 (ref 0–0.4)
EOSINOPHIL NFR BLD AUTO: 4.1 % (ref 0.3–6.2)
ERYTHROCYTE [DISTWIDTH] IN BLOOD BY AUTOMATED COUNT: 12.9 % (ref 12.3–15.4)
GFR SERPL CREATININE-BSD FRML MDRD: 73 ML/MIN/1.73
GLOBULIN UR ELPH-MCNC: 2.8 GM/DL
GLUCOSE BLD-MCNC: 84 MG/DL (ref 65–99)
GLUCOSE UR STRIP-MCNC: NEGATIVE MG/DL
HCT VFR BLD AUTO: 43 % (ref 37.5–51)
HGB BLD-MCNC: 14 G/DL (ref 13–17.7)
HGB UR QL STRIP.AUTO: NEGATIVE
KETONES UR QL STRIP: NEGATIVE
LEUKOCYTE ESTERASE UR QL STRIP.AUTO: NEGATIVE
LYMPHOCYTES # BLD AUTO: 0.7 10*3/MM3 (ref 0.7–3.1)
LYMPHOCYTES NFR BLD AUTO: 22 % (ref 19.6–45.3)
MCH RBC QN AUTO: 33.1 PG (ref 26.6–33)
MCHC RBC AUTO-ENTMCNC: 32.6 G/DL (ref 31.5–35.7)
MCV RBC AUTO: 101.7 FL (ref 79–97)
MONOCYTES # BLD AUTO: 0.28 10*3/MM3 (ref 0.1–0.9)
MONOCYTES NFR BLD AUTO: 8.8 % (ref 5–12)
NEUTROPHILS # BLD AUTO: 2.03 10*3/MM3 (ref 1.4–7)
NEUTROPHILS NFR BLD AUTO: 63.9 % (ref 42.7–76)
NITRITE UR QL STRIP: NEGATIVE
PH UR STRIP.AUTO: 7 [PH] (ref 5–8)
PLATELET # BLD AUTO: 124 10*3/MM3 (ref 140–450)
PMV BLD AUTO: 9.8 FL (ref 6–12)
POTASSIUM BLD-SCNC: 4.6 MMOL/L (ref 3.5–5.2)
PROT SERPL-MCNC: 7.3 G/DL (ref 6–8.5)
PROT UR QL STRIP: NEGATIVE
RBC # BLD AUTO: 4.23 10*6/MM3 (ref 4.14–5.8)
SODIUM BLD-SCNC: 144 MMOL/L (ref 136–145)
SP GR UR STRIP: 1.01 (ref 1–1.03)
UROBILINOGEN UR QL STRIP: NORMAL
WBC NRBC COR # BLD: 3.18 10*3/MM3 (ref 3.4–10.8)

## 2019-03-12 PROCEDURE — 80053 COMPREHEN METABOLIC PANEL: CPT

## 2019-03-12 PROCEDURE — 82550 ASSAY OF CK (CPK): CPT

## 2019-03-12 PROCEDURE — 81003 URINALYSIS AUTO W/O SCOPE: CPT

## 2019-03-12 PROCEDURE — 85025 COMPLETE CBC W/AUTO DIFF WBC: CPT

## 2019-03-12 PROCEDURE — 36415 COLL VENOUS BLD VENIPUNCTURE: CPT

## 2019-03-12 PROCEDURE — 86160 COMPLEMENT ANTIGEN: CPT

## 2019-03-12 RX ORDER — WARFARIN SODIUM 5 MG/1
TABLET ORAL
Qty: 90 TABLET | Refills: 0 | Status: SHIPPED | OUTPATIENT
Start: 2019-03-12 | End: 2019-06-08 | Stop reason: SDUPTHER

## 2019-03-13 LAB
C3 SERPL-MCNC: 101 MG/DL (ref 82–167)
C4 SERPL-MCNC: 22 MG/DL (ref 14–44)

## 2019-03-14 ENCOUNTER — ANTICOAGULATION VISIT (OUTPATIENT)
Dept: PHARMACY | Facility: HOSPITAL | Age: 74
End: 2019-03-14

## 2019-03-14 LAB — INR PPP: 2

## 2019-03-14 NOTE — PROGRESS NOTES
Anticoagulation Clinic Progress Note    Anticoagulation Summary  As of 3/14/2019    INR goal:   2.0-3.0   TTR:   90.1 % (3.2 mo)   INR used for dosin.00 (3/14/2019)   Warfarin maintenance plan:   2.5 mg on Mon, Fri; 5 mg all other days   Weekly warfarin total:   30 mg   No change documented:   Sheri Aviles   Plan last modified:   Winter Sanabria [facility] (2018)   Next INR check:   3/28/2019   Priority:   Maintenance   Target end date:       Indications    Atrial fibrillation (CMS/HCC) [I48.91]             Anticoagulation Episode Summary     INR check location:       Preferred lab:       Send INR reminders to:   [facility]  POOL    Comments:         Anticoagulation Care Providers     Provider Role Specialty Phone number    David Hernandez MD Referring Cardiology [phone]          Clinic Interview:  No pertinent clinical findings have been reported.    INR History:  Anticoagulation Monitoring 2019 2019 3/14/2019   INR 2.30 2.20 2.00   INR Date 2019 2019 3/14/2019   INR Goal 2.0-3.0 2.0-3.0 2.0-3.0   Trend Same Same Same   Last Week Total 30 mg 30 mg 30 mg   Next Week Total 30 mg 30 mg 30 mg   Sun 5 mg 5 mg 5 mg   Mon 2.5 mg 2.5 mg 2.5 mg   Tue 5 mg 5 mg 5 mg   Wed 5 mg 5 mg 5 mg   Thu 5 mg 5 mg 5 mg   Fri 2.5 mg 2.5 mg 2.5 mg   Sat 5 mg 5 mg 5 mg   Visit Report - - -   Some recent data might be hidden       Plan:  1. INR is therapeutic today- see above in Anticoagulation Summary.    Demond CROSS Bernarda to continue their warfarin regimen- see above in Anticoagulation Summary.  2. Follow up in 2 weeks  3. Pt has agreed to only be called if INR out of range. They have been instructed to call if any changes in medications, doses, concerns, etc. Patient expresses understanding and has no further questions at this time.    Sheri Aviles

## 2019-03-15 ENCOUNTER — OFFICE VISIT (OUTPATIENT)
Dept: INTERNAL MEDICINE | Facility: CLINIC | Age: 74
End: 2019-03-15

## 2019-03-15 VITALS
HEIGHT: 74 IN | OXYGEN SATURATION: 99 % | HEART RATE: 48 BPM | BODY MASS INDEX: 27.59 KG/M2 | DIASTOLIC BLOOD PRESSURE: 80 MMHG | WEIGHT: 215 LBS | SYSTOLIC BLOOD PRESSURE: 140 MMHG | TEMPERATURE: 98.7 F

## 2019-03-15 DIAGNOSIS — R31.9 HEMATURIA, UNSPECIFIED TYPE: Primary | ICD-10-CM

## 2019-03-15 LAB
BACTERIA UR QL AUTO: NORMAL /HPF
BILIRUB UR QL STRIP: NEGATIVE
CLARITY UR: CLEAR
COLOR UR: YELLOW
GLUCOSE UR STRIP-MCNC: NEGATIVE MG/DL
HGB UR QL STRIP.AUTO: NEGATIVE
HYALINE CASTS UR QL AUTO: NORMAL /LPF
KETONES UR QL STRIP: NEGATIVE
LEUKOCYTE ESTERASE UR QL STRIP.AUTO: NEGATIVE
NITRITE UR QL STRIP: NEGATIVE
PH UR STRIP.AUTO: 6 [PH] (ref 5–8)
PROT UR QL STRIP: NEGATIVE
RBC # UR: NORMAL /HPF
REF LAB TEST METHOD: NORMAL
SP GR UR STRIP: 1.02 (ref 1–1.03)
SQUAMOUS #/AREA URNS HPF: NORMAL /HPF
UROBILINOGEN UR QL STRIP: NORMAL
WBC UR QL AUTO: NORMAL /HPF

## 2019-03-15 PROCEDURE — 81003 URINALYSIS AUTO W/O SCOPE: CPT | Performed by: NURSE PRACTITIONER

## 2019-03-15 PROCEDURE — 99213 OFFICE O/P EST LOW 20 MIN: CPT | Performed by: NURSE PRACTITIONER

## 2019-03-15 PROCEDURE — 87086 URINE CULTURE/COLONY COUNT: CPT | Performed by: NURSE PRACTITIONER

## 2019-03-15 PROCEDURE — 81015 MICROSCOPIC EXAM OF URINE: CPT | Performed by: NURSE PRACTITIONER

## 2019-03-15 NOTE — PROGRESS NOTES
"Subjective   Demond Menchaca is a 74 y.o. male.     He reports last night he noticed blood in underwear, which persisted throughout the day. He reports one time he noticed a small amount in urine. He is not sexual active and had no recent ejaculation. He denies any urinary symptoms (freqency, burning etc). He does typically have intermittent dribbling of urine       Penile Discharge   The patient's primary symptoms include penile discharge (bright red ). The patient's pertinent negatives include no genital injury, genital itching or genital lesions. This is a new problem. The current episode started yesterday. The problem has been gradually improving. Associated symptoms include hematuria (once \"a drop\" ). Pertinent negatives include no abdominal pain, chest pain, chills, diarrhea, dysuria, fever, frequency, nausea, urgency or urinary retention. Associated symptoms comments: No ejaculation . Genital lesion characteristics: no lesions  He is not sexually active. It is unknown (n/a ) whether or not his partner has an STD. His past medical history is significant for BPH.        The following portions of the patient's history were reviewed and updated as appropriate: allergies, current medications, past social history and problem list.    Review of Systems   Constitutional: Negative for chills and fever.   Cardiovascular: Negative for chest pain.   Gastrointestinal: Negative for abdominal pain, anal bleeding, blood in stool, diarrhea, nausea and rectal pain.   Genitourinary: Positive for discharge (bright red ). Negative for difficulty urinating, dysuria, frequency and urgency.        Weaker stream (no changes)   Musculoskeletal: Negative for back pain.       Objective   Physical Exam   Constitutional: He is oriented to person, place, and time. He appears well-developed and well-nourished.   HENT:   Head: Normocephalic.   Nose: Nose normal.   Cardiovascular: Normal heart sounds. An irregularly irregular rhythm present. " Exam reveals no S3 and no S4.   No murmur heard.  Pulmonary/Chest: Effort normal and breath sounds normal. He has no decreased breath sounds. He has no wheezes. He has no rhonchi. He has no rales.   Abdominal: Normal appearance and bowel sounds are normal. There is no tenderness. There is no CVA tenderness.   Musculoskeletal: He exhibits no edema.   Neurological: He is alert and oriented to person, place, and time. Gait normal.   Skin: Skin is warm and dry.   Psychiatric: He has a normal mood and affect.       Assessment/Plan   Demond was seen today for penile discharge.    Diagnoses and all orders for this visit:    Hematuria, unspecified type  -     Urinalysis With Microscopic If Indicated (No Culture) - Urine, Clean Catch; Future  -     Urinalysis With Microscopic If Indicated (No Culture) - Urine, Clean Catch  -     Ambulatory Referral to Urology  -     Urine Culture - Urine, Urine, Clean Catch; Future  -     Urine Culture - Urine, Urine, Clean Catch  -     Cancel: Urinalysis With Microscopic - Urine, Clean Catch; Future  -     Urinalysis, Microscopic Only - Urine, Clean Catch; Future  -     Cancel: Urinalysis, Microscopic Only - Urine, Clean Catch  -     Urinalysis, Microscopic Only - Urine, Clean Catch; Future  -     Urinalysis, Microscopic Only - Urine, Clean Catch    Urine dipstick normal. Sent for micro/culture to hospital. Last INR 2.0 therapeutic.

## 2019-03-16 LAB — BACTERIA SPEC AEROBE CULT: NO GROWTH

## 2019-03-28 ENCOUNTER — ANTICOAGULATION VISIT (OUTPATIENT)
Dept: PHARMACY | Facility: HOSPITAL | Age: 74
End: 2019-03-28

## 2019-03-28 LAB — INR PPP: 3.2

## 2019-03-28 NOTE — PROGRESS NOTES
Anticoagulation Clinic Progress Note    Anticoagulation Summary  As of 3/28/2019    INR goal:   2.0-3.0   TTR:   89.2 % (3.7 mo)   INR used for dosing:   3.20! (3/28/2019)   Warfarin maintenance plan:   2.5 mg every Mon, Fri; 5 mg all other days   Weekly warfarin total:   30 mg   Plan last modified:   Winter Sanabria [facility] (11/27/2018)   Next INR check:   4/4/2019   Priority:   Maintenance   Target end date:       Indications    Atrial fibrillation (CMS/HCC) [I48.91]             Anticoagulation Episode Summary     INR check location:       Preferred lab:       Send INR reminders to:    [facility]  POOL    Comments:         Anticoagulation Care Providers     Provider Role Specialty Phone number    David Hernandez MD Referring Cardiology [phone]          Clinic Interview:  No pertinent clinical findings have been reported.    INR History:  Anticoagulation Monitoring 2/28/2019 3/14/2019 3/28/2019   INR 2.20 2.00 3.20   INR Date 2/28/2019 3/14/2019 3/28/2019   INR Goal 2.0-3.0 2.0-3.0 2.0-3.0   Trend Same Same Same   Last Week Total 30 mg 30 mg 30 mg   Next Week Total 30 mg 30 mg 27.5 mg   Sun 5 mg 5 mg 5 mg   Mon 2.5 mg 2.5 mg 2.5 mg   Tue 5 mg 5 mg 5 mg   Wed 5 mg 5 mg 5 mg   Thu 5 mg 5 mg 2.5 mg (3/28)   Fri 2.5 mg 2.5 mg 2.5 mg   Sat 5 mg 5 mg 5 mg   Visit Report - - -   Some recent data might be hidden       Plan:  1. INR is supratherapeutic today- see above in Anticoagulation Summary. Left a message for Demond Menchaca to take 2.5mg today, then resume his usual dosage regimen.  2. Follow up in 1 week  3. Pt has agreed to only be called if INR out of range. They have been instructed to call if any changes in medications, doses, concerns, etc.     Lissy Roy [facility]

## 2019-04-02 ENCOUNTER — TELEPHONE (OUTPATIENT)
Dept: INTERNAL MEDICINE | Facility: CLINIC | Age: 74
End: 2019-04-02

## 2019-04-02 ENCOUNTER — OFFICE VISIT (OUTPATIENT)
Dept: ONCOLOGY | Facility: CLINIC | Age: 74
End: 2019-04-02

## 2019-04-02 ENCOUNTER — LAB (OUTPATIENT)
Dept: LAB | Facility: HOSPITAL | Age: 74
End: 2019-04-02

## 2019-04-02 VITALS
TEMPERATURE: 97.8 F | HEIGHT: 74 IN | OXYGEN SATURATION: 100 % | DIASTOLIC BLOOD PRESSURE: 83 MMHG | BODY MASS INDEX: 27.76 KG/M2 | HEART RATE: 46 BPM | RESPIRATION RATE: 16 BRPM | SYSTOLIC BLOOD PRESSURE: 138 MMHG | WEIGHT: 216.3 LBS

## 2019-04-02 DIAGNOSIS — M32.8 OTHER FORMS OF SYSTEMIC LUPUS ERYTHEMATOSUS, UNSPECIFIED ORGAN INVOLVEMENT STATUS (HCC): Primary | ICD-10-CM

## 2019-04-02 DIAGNOSIS — D61.818 PANCYTOPENIA (HCC): Primary | ICD-10-CM

## 2019-04-02 LAB
BASOPHILS # BLD AUTO: 0.01 10*3/MM3 (ref 0–0.2)
BASOPHILS NFR BLD AUTO: 0.3 % (ref 0–1.5)
DEPRECATED RDW RBC AUTO: 45.3 FL (ref 37–54)
EOSINOPHIL # BLD AUTO: 0.13 10*3/MM3 (ref 0–0.4)
EOSINOPHIL NFR BLD AUTO: 3.7 % (ref 0.3–6.2)
ERYTHROCYTE [DISTWIDTH] IN BLOOD BY AUTOMATED COUNT: 12.6 % (ref 12.3–15.4)
HCT VFR BLD AUTO: 42.5 % (ref 37.5–51)
HGB BLD-MCNC: 14.6 G/DL (ref 13–17.7)
IMM GRANULOCYTES # BLD AUTO: 0.02 10*3/MM3 (ref 0–0.05)
IMM GRANULOCYTES NFR BLD AUTO: 0.6 % (ref 0–0.5)
LYMPHOCYTES # BLD AUTO: 0.74 10*3/MM3 (ref 0.7–3.1)
LYMPHOCYTES NFR BLD AUTO: 21 % (ref 19.6–45.3)
MCH RBC QN AUTO: 33.6 PG (ref 26.6–33)
MCHC RBC AUTO-ENTMCNC: 34.4 G/DL (ref 31.5–35.7)
MCV RBC AUTO: 97.7 FL (ref 79–97)
MONOCYTES # BLD AUTO: 0.35 10*3/MM3 (ref 0.1–0.9)
MONOCYTES NFR BLD AUTO: 9.9 % (ref 5–12)
NEUTROPHILS # BLD AUTO: 2.27 10*3/MM3 (ref 1.4–7)
NEUTROPHILS NFR BLD AUTO: 64.5 % (ref 42.7–76)
NRBC BLD AUTO-RTO: 0 /100 WBC (ref 0–0)
PLATELET # BLD AUTO: 105 10*3/MM3 (ref 140–450)
PMV BLD AUTO: 9.7 FL (ref 6–12)
RBC # BLD AUTO: 4.35 10*6/MM3 (ref 4.14–5.8)
WBC NRBC COR # BLD: 3.52 10*3/MM3 (ref 3.4–10.8)

## 2019-04-02 PROCEDURE — 85025 COMPLETE CBC W/AUTO DIFF WBC: CPT | Performed by: INTERNAL MEDICINE

## 2019-04-02 PROCEDURE — 99213 OFFICE O/P EST LOW 20 MIN: CPT | Performed by: INTERNAL MEDICINE

## 2019-04-02 PROCEDURE — 36415 COLL VENOUS BLD VENIPUNCTURE: CPT | Performed by: INTERNAL MEDICINE

## 2019-04-02 NOTE — PROGRESS NOTES
Subjective     REASON FOR CONSULTATION:   1. Pancytopenia of 20 years in duration with predominant thrombocytopenia  2.  Positive LASHONDA?  Significance-persistent positive anti-double stranded DNA   3.  Large bilateral kidney cysts                            REQUESTING PHYSICIAN:  Katharine Stephenson M.D.    History of Present Illness patient is a 72-year-old male with chronic thrombocytopenia here today for routine follow-up .  He has had no significant symptoms from his lupus and remains on Plaquenil with no problems   He remains on Coumadin for his atrial fibrillation and at this point unless his platelet count drops below 50,000 should be no reason to not continue this    Past Medical History:   Diagnosis Date   • A-fib (CMS/HCC) 06/2010   • Anemia    • Arthritis    • Atrial fibrillation (CMS/HCC)    • COPD (chronic obstructive pulmonary disease) (CMS/[facility])    • Diarrhea    • Diverticulitis of large intestine without perforation or abscess without bleeding 3/7/2016   • Diverticulosis    • ED (erectile dysfunction)    • Elbow fracture, left    • History of blood transfusion    • Hypogonadism in male    • Kidney cysts    • Left femoral shaft fracture (CMS/[facility])    • Lower back pain    • Palpitations    • Peptic ulceration    • Prostatic hypertrophy, benign    • 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   • Skin cancer    • Transient cerebral ischemia     H/O TIAs        Past Surgical History:   Procedure Laterality Date   • ADENOIDECTOMY  1973   • APPENDECTOMY  1973   • BACK SURGERY     • CARDIAC ABLATION  2013, 2014   • COLONOSCOPY  11/21/2014       • FEMUR FRACTURE SURGERY Left 2007    post fall from the ladder   • LUMBAR LAMINECTOMY  1974    Dr.Lester Lino   • NECK SURGERY      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   • OTHER SURGICAL HISTORY      elbow surgery   • THYROID SURGERY  1986    benign tumor removal,    • TOTAL ELBOW ARTHROPLASTY Left 2007    L, post fall and fracture, hardware in       HEME HISTORY:   patient is a 72-year-old male with a history of atrial fibrillation degenerative arthritis who is referred to us because of mild chronic thrombocytopenia and leukopenia and new onset of anemia when seen in Dr. Curry's office this June.  At that time his hemoglobin was 13.4 white count was 2700 and platelets 95,000.  In the past his white count has been in the 3000 range with a platelet count of about 120,000.  He was referred to us for evaluation of these changes.    On reviewing his history he was seen in our office in 1998 with the same findings that at that time his hemoglobin was normal and his white count was low and his platelet count was 130,000.  A bone marrow was done which was morphologically normal except for mild increase in plasma cells ,with normal flow cytometry but I do not see any chromosome analysis.  A mass was felt in the left upper abdomen which led to a CT scan which showed multiple cysts in both kidneys.  No splenomegaly.  At the time he was drinking a fair amount of alcohol and he was asked to abstain from alcohol for 1 month and there is no change in the blood counts and this was not felt to be an important contributed to this finding.  There is no family history of leukopenia and thrombocytopenia    He currently drinks 6 glasses of wine some beer every weekend but not on a daily basis.  His had no fever sweats weight loss or systemic complaints and is feeling fairly well overall.  He is on Coumadin for atrial fibrillation is had 2 ablations done his had no overt bleeding and is on no new medications except for Rythmol which does not cause thrombocytopenia or anemia but can leukopenia.    His hemoglobin today is back to normal platelet count is up to 116,000 and his white count is in the normal range and this may have just been an aberration in June as he tells me he was not ill at the time  He is up-to-date with colonoscopies and this had a normal PSA      Current  Outpatient Medications on File Prior to Visit   Medication Sig Dispense Refill   • Cetirizine HCl 10 MG capsule Take 1 capsule by mouth daily.     • finasteride (PROSCAR) 5 MG tablet Take 1 tablet by mouth Daily. 90 tablet 3   • fluticasone (FLONASE) 50 MCG/ACT nasal spray into each nostril daily.     • HYDROcodone-acetaminophen (NORCO) 5-325 MG per tablet Take 1 tablet by mouth Every 8 (Eight) Hours As Needed for Severe Pain . 30 tablet 0   • hydroxychloroquine (PLAQUENIL) 200 MG tablet Take 200 mg by mouth 2 (Two) Times a Day.     • propafenone (RYTHMOL) 150 MG tablet TAKE 1 TABLET BY MOUTH TWICE A DAY 60 tablet 5   • Psyllium (METAMUCIL PO) Take  by mouth Daily.     • warfarin (COUMADIN) 5 MG tablet TAKE ONE-HALF OF A TAB BY MOUTH ON MONDAY, WEDNESDAY, & FRIDAY & 1 TAB ALL OTHER DAYS AS DIRECTED (Patient taking differently: TAKE ONE-HALF OF A TAB BY MOUTH ON MONDAY, FRIDAY & 1 TAB ALL OTHER DAYS AS DIRECTED) 90 tablet 0     No current facility-administered medications on file prior to visit.         ALLERGIES:    Allergies   Allergen Reactions   • Cardizem [Diltiazem Hcl] Itching   • Grass Other (See Comments)        Social History     Socioeconomic History   • Marital status:      Spouse name: Latisha   • Number of children: 2   • Years of education: College   • Highest education level: Not asked   Occupational History   • 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   Social Needs   • Financial resource strain: Patient refused   • Food insecurity:     Worry: Patient refused     Inability: Patient refused   • Transportation needs:     Medical: Patient refused     Non-medical: Patient refused   Tobacco Use   • Smoking status: Former Smoker     Packs/day: 0.50     Years: 20.00     Pack years: 10.00     Types: Cigarettes     Last attempt to quit: 1974     Years since quittin.2   • Smokeless tobacco: Never Used   Substance and Sexual Activity   • Alcohol use: Yes     Alcohol/week: 0.6 oz     Types: 1  "Glasses of wine per week   • Drug use: No   • Sexual activity: Not Currently   Social History Narrative    LIVES WITH SPOUSE        Family History   Problem Relation Age of Onset   • Hypertension Mother    • Osteoporosis Mother    • Thyroid disease Sister    • Diabetes Sister    • Hypertension Sister    • Colon cancer Maternal Grandmother 60   • Hypertension Father    • Heart disease Other    • Atrial fibrillation Other    • Thyroid disease Other    • Pulmonary fibrosis Sister    • Diabetes Other    • Heart disease Other    • Hypertension Other         Review of Systems   Constitutional: Negative for fatigue.   Respiratory: Negative for cough, chest tightness and shortness of breath.    Cardiovascular: Negative for chest pain and leg swelling.        See HPI   Gastrointestinal: Negative for nausea and vomiting.   Endocrine: Negative.    Musculoskeletal: Positive for back pain (occasional 4/2/19) and gait problem (Left foot drop after spine surgery same 4/2/19). Negative for arthralgias and joint swelling.   Neurological: Negative for dizziness and headaches.   Hematological:        See HPI        Objective     Vitals:    04/02/19 0945   BP: 138/83   Pulse: (!) 46   Resp: 16   Temp: 97.8 °F (36.6 °C)   SpO2: 100%   Weight: 98.1 kg (216 lb 4.8 oz)   Height: 188 cm (74.02\")   PainSc: 0-No pain     Current Status 4/2/2019   ECOG score 0       Physical Exam    GENERAL:  Well-developed, well-nourished in no acute distress.   SKIN:  Warm, dry without rashes, purpura or petechiae.  EYES:  Pupils equal, round and reactive to light.  EOMs intact.  Conjunctivae normal.  EARS:  Hearing intact.  NOSE:  Septum midline.  No excoriations or nasal discharge.  MOUTH:  Tongue is well-papillated; no stomatitis or ulcers.  Lips normal.  THROAT:  Oropharynx without lesions or exudates.  NECK:  Supple with good range of motion; no thyromegaly or masses, no JVD.  LYMPHATICS:  No cervical, supraclavicular, axillary or inguinal " adenopathy.  CHEST:  Lungs clear to auscultation. Good airflow.  CARDIAC:  Regular rate and rhythm without murmurs, rubs or gallops. Normal S1,S2.  ABDOMEN:  Soft, nontender with no hepatosplenomegaly or masses.  Mildly prominent  EXTREMITIES:  No clubbing, cyanosis or edema.  Chronic venous stasis changes with varicosities bilaterally  NEUROLOGICAL:  Cranial Nerves II-XII grossly intact.  No focal neurological deficits.  PSYCHIATRIC:  Normal affect and mood.          RECENT LABS:  Hematology WBC   Date Value Ref Range Status   04/02/2019 3.52 3.40 - 10.80 10*3/mm3 Final     RBC   Date Value Ref Range Status   04/02/2019 4.35 4.14 - 5.80 10*6/mm3 Final     Hemoglobin   Date Value Ref Range Status   04/02/2019 14.6 13.0 - 17.7 g/dL Final     Hematocrit   Date Value Ref Range Status   04/02/2019 42.5 37.5 - 51.0 % Final     Platelets   Date Value Ref Range Status   04/02/2019 105 (L) 140 - 450 10*3/mm3 Final      Peripheral blood smear shows normochromic normocytic red cells without polychromasia white cells show no immaturity or hypersegmentation and platelets are slightly decreased but normal morphologically'    Ultrasound abdomen   abdominal aorta and IVC appear normal.  IMPRESSION:  1.  Splenomegaly.  2.  Multiple bilateral renal cysts as described.   CT ABD  FINDINGS: The liver, spleen, pancreas, and adrenal glands are  unremarkable. There are multiple fairly large bilateral simple renal  cysts. This includes a large perihilar cyst protruding inferiorly from  the left kidney that measures approximately 10.7 x 7.2 cm in diameter.  An 8.6 cm diameter simple cyst is also present in the midpole of the  left kidney. The kidneys are otherwise unremarkable. The stomach and  small and large bowel appear within normal limits. There are no hernias.     IMPRESSION:  Multiple bilateral simple renal cysts. Otherwise  unremarkable CT scan of the abdomen and pelvis.   This report was finalized on 10/3/2017                    Assessment/Plan   1, mild chronic thrombocytopenia with no significant change in almost 20 years and likely constitutional. His platelet count today is 103,000.  2.  Intermittent leukopenia probably constitutional  3.  Anemia resolved  4.  History of multiple cysts in both kidneys  5.  Persistent positive LASHONDA and double stranded DNA.  He met with rheumatology in July and was ultimately diagnosed with lupus.  He started plaque when Daisy is tolerated this quite well.  6.   atrial fibrillation on Coumadin with bradycardia asymptomatic    Plan  1.  CBC in 6 months see me in a year and if he becomes thrombocytopenic we will use steroids

## 2019-04-02 NOTE — TELEPHONE ENCOUNTER
"----- Message from Alicia Bridges sent at 4/2/2019 11:39 AM EDT -----  Contact: patient   Mr. Menchaca stopped in the office (11:39) and is requesting a brace, order for a brace for left foot drop.  Dr. Stephenson's first opening is April 25th.  He does not want to wait that long.  He has had this foot drop for 40 years but thinks it is getting worse.  He said it will only take \"five minutes.\"  He wonders is he could be worked in earlier or if he could see someone in the [facility].    Phone number:  [phone]    Thank you,    Alicia    "

## 2019-04-03 ENCOUNTER — TELEPHONE (OUTPATIENT)
Dept: INTERNAL MEDICINE | Facility: CLINIC | Age: 74
End: 2019-04-03

## 2019-04-03 NOTE — TELEPHONE ENCOUNTER
Patient will have to be seen with office notes containing detail in order for his orthotic to be covered by insurance. Or he would have to be referred to a podiatrist.  Schedule is booked until May, patient would like to know if he can be seen sooner due to worsening of condition causing frequent stumbling and falls . Please advise

## 2019-04-03 NOTE — TELEPHONE ENCOUNTER
----- Message from Alicia Bridges sent at 4/3/2019 12:07 PM EDT -----  Contact: patient stopped by  Mr. Menchaca stopped by the office again today.  He said he had not heard back from the message yesterday.  I set him up an appointment with Dr. Stephenson for May 6, 2019.  If possible, he would like to come in sooner.  He has had foot drop for 40 years (after back surgery) but it is getting worse, causing him to trip more.    I would be happy to call him back if there is a time that Dr. Stephenson would like to work him in OR if she thinks it is okay to wait until May for the appointment.    Patient call back number:  [phone]    Thank you,    Alicia

## 2019-04-04 ENCOUNTER — ANTICOAGULATION VISIT (OUTPATIENT)
Dept: PHARMACY | Facility: HOSPITAL | Age: 74
End: 2019-04-04

## 2019-04-04 LAB — INR PPP: 2

## 2019-04-04 NOTE — TELEPHONE ENCOUNTER
Patient notified to please be here at 815 tomorrow Friday morning. Patient states he will be here. Please add to tomorrows schedule for foot drop.

## 2019-04-04 NOTE — PROGRESS NOTES
Anticoagulation Clinic Progress Note    Anticoagulation Summary  As of 2019    INR goal:   2.0-3.0   TTR:   88.9 % (3.9 mo)   INR used for dosin.00 (2019)   Warfarin maintenance plan:   2.5 mg every Mon, Fri; 5 mg all other days   Weekly warfarin total:   30 mg   No change documented:   Kaela Brush RPH   Plan last modified:   Winter Sanabria RPH (2018)   Next INR check:   2019   Priority:   Maintenance   Target end date:       Indications    Atrial fibrillation (CMS/HCC) [I48.91]             Anticoagulation Episode Summary     INR check location:       Preferred lab:       Send INR reminders to:   [facility]  POOL    Comments:         Anticoagulation Care Providers     Provider Role Specialty Phone number    David Hernandez MD Referring Cardiology [phone]          Clinic Interview:  No pertinent clinical findings have been reported.    INR History:  Anticoagulation Monitoring 3/14/2019 3/28/2019 2019   INR 2.00 3.20 2.00   INR Date 3/14/2019 3/28/2019 2019   INR Goal 2.0-3.0 2.0-3.0 2.0-3.0   Trend Same Same Same   Last Week Total 30 mg 30 mg 27.5 mg   Next Week Total 30 mg 27.5 mg 30 mg   Sun 5 mg 5 mg 5 mg   Mon 2.5 mg 2.5 mg 2.5 mg   Tue 5 mg 5 mg 5 mg   Wed 5 mg 5 mg 5 mg   Thu 5 mg 2.5 mg (3/28) 5 mg   Fri 2.5 mg 2.5 mg 2.5 mg   Sat 5 mg 5 mg 5 mg   Visit Report - - -   Some recent data might be hidden       Plan:  1. INR is therapeutic today- see above in Anticoagulation Summary.    Demond CROSS Bernarda to continue their warfarin regimen- see above in Anticoagulation Summary.  2. Follow up in 2 weeks  3. Pt has agreed to only be called if INR out of range. They have been instructed to call if any changes in medications, doses, concerns, etc. Patient expresses understanding and has no further questions at this time.    Kaela Brush RPH

## 2019-04-05 ENCOUNTER — OFFICE VISIT (OUTPATIENT)
Dept: INTERNAL MEDICINE | Facility: CLINIC | Age: 74
End: 2019-04-05

## 2019-04-05 VITALS
OXYGEN SATURATION: 98 % | WEIGHT: 215 LBS | HEART RATE: 55 BPM | RESPIRATION RATE: 14 BRPM | HEIGHT: 74 IN | DIASTOLIC BLOOD PRESSURE: 60 MMHG | BODY MASS INDEX: 27.59 KG/M2 | SYSTOLIC BLOOD PRESSURE: 122 MMHG

## 2019-04-05 DIAGNOSIS — M21.372 FOOT DROP, LEFT: Primary | ICD-10-CM

## 2019-04-05 PROCEDURE — 99213 OFFICE O/P EST LOW 20 MIN: CPT | Performed by: INTERNAL MEDICINE

## 2019-04-05 NOTE — PROGRESS NOTES
Subjective   Demond Menchaca is a 74 y.o. male.     History of Present Illness   Patient with chronic left foot drop (for years) had been noticing recent worsening, and it had been affecting his balance and gait. Walks with walking stick. Has almost fallen whole in [place].  The following portions of the patient's history were reviewed and updated as appropriate: allergies, current medications, past family history, past medical history, past social history, past surgical history and problem list.    Review of Systems   Constitutional: Negative for chills and fever.   Eyes: Negative for pain and redness.   Respiratory: Negative for cough and shortness of breath.    Cardiovascular: Negative for chest pain and leg swelling.   Musculoskeletal: Positive for gait problem.   Neurological: Negative for dizziness and headaches.       Objective   Physical Exam   Constitutional: He is oriented to person, place, and time. He appears well-developed.   HENT:   Head: Normocephalic and atraumatic.   Right Ear: Tympanic membrane, external ear and ear canal normal.   Left Ear: Tympanic membrane, external ear and ear canal normal.   Nose: Nose normal. Right sinus exhibits no maxillary sinus tenderness and no frontal sinus tenderness. Left sinus exhibits no maxillary sinus tenderness and no frontal sinus tenderness.   Mouth/Throat: Uvula is midline, oropharynx is clear and moist and mucous membranes are normal.   Eyes: Conjunctivae and EOM are normal. Pupils are equal, round, and reactive to light. Right eye exhibits no discharge. Left eye exhibits no discharge. No scleral icterus.   Neck: Neck supple. No JVD present.   Cardiovascular: Normal rate, regular rhythm and normal heart sounds. Exam reveals no gallop and no friction rub.   No murmur heard.  Pulmonary/Chest: Effort normal and breath sounds normal. He has no wheezes. He has no rales.   Musculoskeletal: He exhibits no edema.   Lymphadenopathy:     He has no cervical adenopathy.    Neurological: He is alert and oriented to person, place, and time. No cranial nerve deficit.   weakeness on  pronation /supination left foot   Skin: Skin is warm and dry. No rash noted.   Psychiatric: He has a normal mood and affect. His behavior is normal.   Vitals reviewed.      Assessment/Plan   Demond was seen today for foot drop.    Diagnoses and all orders for this visit:    Foot drop, left    Left chronic foot drop with balance and gait impairment - will refer for the orthotics, hopefully those will be helpful, and prevent falls. No other options available.

## 2019-04-18 ENCOUNTER — ANTICOAGULATION VISIT (OUTPATIENT)
Dept: PHARMACY | Facility: HOSPITAL | Age: 74
End: 2019-04-18

## 2019-04-18 LAB — INR PPP: 2.7

## 2019-04-18 NOTE — PROGRESS NOTES
Anticoagulation Clinic Progress Note    Anticoagulation Summary  As of 2019    INR goal:   2.0-3.0   TTR:   90.1 % (4.4 mo)   INR used for dosin.70 (2019)   Warfarin maintenance plan:   2.5 mg every Mon, Fri; 5 mg all other days   Weekly warfarin total:   30 mg   No change documented:   Sheri Aviles   Plan last modified:   Winter Sanabria [facility] (2018)   Next INR check:   2019   Priority:   Maintenance   Target end date:       Indications    Atrial fibrillation (CMS/HCC) [I48.91]             Anticoagulation Episode Summary     INR check location:       Preferred lab:       Send INR reminders to:   [facility]  POOL    Comments:         Anticoagulation Care Providers     Provider Role Specialty Phone number    David Hernandez MD Referring Cardiology [phone]          Clinic Interview:  No pertinent clinical findings have been reported.    INR History:  Anticoagulation Monitoring 3/28/2019 2019 2019   INR 3.20 2.00 2.70   INR Date 3/28/2019 2019 2019   INR Goal 2.0-3.0 2.0-3.0 2.0-3.0   Trend Same Same Same   Last Week Total 30 mg 27.5 mg 30 mg   Next Week Total 27.5 mg 30 mg 30 mg   Sun 5 mg 5 mg 5 mg   Mon 2.5 mg 2.5 mg 2.5 mg   Tue 5 mg 5 mg 5 mg   Wed 5 mg 5 mg 5 mg   Thu 2.5 mg (3/28) 5 mg 5 mg   Fri 2.5 mg 2.5 mg 2.5 mg   Sat 5 mg 5 mg 5 mg   Visit Report - - -   Some recent data might be hidden       Plan:  1. INR is therapeutic today- see above in Anticoagulation Summary.    Demond CROSS Bernarda to continue their warfarin regimen- see above in Anticoagulation Summary.  2. Follow up in 2 weeks  3. Pt has agreed to only be called if INR out of range. They have been instructed to call if any changes in medications, doses, concerns, etc. Patient expresses understanding and has no further questions at this time.    Sheri Aviles

## 2019-05-01 ENCOUNTER — OFFICE VISIT (OUTPATIENT)
Dept: NEUROSURGERY | Facility: CLINIC | Age: 74
End: 2019-05-01

## 2019-05-01 VITALS
HEIGHT: 74 IN | BODY MASS INDEX: 26.69 KG/M2 | SYSTOLIC BLOOD PRESSURE: 118 MMHG | WEIGHT: 208 LBS | HEART RATE: 58 BPM | DIASTOLIC BLOOD PRESSURE: 61 MMHG

## 2019-05-01 DIAGNOSIS — M51.36 DDD (DEGENERATIVE DISC DISEASE), LUMBAR: Primary | ICD-10-CM

## 2019-05-01 DIAGNOSIS — M54.16 LUMBAR RADICULOPATHY, ACUTE: ICD-10-CM

## 2019-05-01 PROCEDURE — 99203 OFFICE O/P NEW LOW 30 MIN: CPT | Performed by: NEUROLOGICAL SURGERY

## 2019-05-02 ENCOUNTER — ANTICOAGULATION VISIT (OUTPATIENT)
Dept: PHARMACY | Facility: HOSPITAL | Age: 74
End: 2019-05-02

## 2019-05-02 LAB — INR PPP: 2.8

## 2019-05-02 NOTE — PROGRESS NOTES
Anticoagulation Clinic Progress Note    Anticoagulation Summary  As of 2019    INR goal:   2.0-3.0   TTR:   91.0 % (4.9 mo)   INR used for dosin.80 (2019)   Warfarin maintenance plan:   2.5 mg every Mon, Fri; 5 mg all other days   Weekly warfarin total:   30 mg   No change documented:   Sheri Aviles   Plan last modified:   Winter Sanabria [facility] (2018)   Next INR check:   2019   Priority:   Maintenance   Target end date:       Indications    Atrial fibrillation (CMS/HCC) [I48.91]             Anticoagulation Episode Summary     INR check location:       Preferred lab:       Send INR reminders to:   [facility]  POOL    Comments:         Anticoagulation Care Providers     Provider Role Specialty Phone number    David Hernandez MD Referring Cardiology [phone]          Clinic Interview:  No pertinent clinical findings have been reported.    INR History:  Anticoagulation Monitoring 2019   INR 2.00 2.70 2.80   INR Date 2019   INR Goal 2.0-3.0 2.0-3.0 2.0-3.0   Trend Same Same Same   Last Week Total 27.5 mg 30 mg 30 mg   Next Week Total 30 mg 30 mg 30 mg   Sun 5 mg 5 mg 5 mg   Mon 2.5 mg 2.5 mg 2.5 mg   Tue 5 mg 5 mg 5 mg   Wed 5 mg 5 mg 5 mg   Thu 5 mg 5 mg 5 mg   Fri 2.5 mg 2.5 mg 2.5 mg   Sat 5 mg 5 mg 5 mg   Visit Report - - -   Some recent data might be hidden       Plan:  1. INR is therapeutic today- see above in Anticoagulation Summary.    Demond CROSS Bernarda to continue their warfarin regimen- see above in Anticoagulation Summary.  2. Follow up in 2 weeks  3. Pt has agreed to only be called if INR out of range. They have been instructed to call if any changes in medications, doses, concerns, etc. Patient expresses understanding and has no further questions at this time.    Sheri Aviles

## 2019-05-16 ENCOUNTER — ANTICOAGULATION VISIT (OUTPATIENT)
Dept: PHARMACY | Facility: HOSPITAL | Age: 74
End: 2019-05-16

## 2019-05-16 LAB — INR PPP: 3

## 2019-05-16 NOTE — PROGRESS NOTES
Anticoagulation Clinic Progress Note    Anticoagulation Summary  As of 5/16/2019    INR goal:   2.0-3.0   TTR:   91.8 % (5.3 mo)   INR used for dosing:   3.00 (5/16/2019)   Warfarin maintenance plan:   2.5 mg every Mon, Fri; 5 mg all other days   Weekly warfarin total:   30 mg   No change documented:   Silvia Joseph   Plan last modified:   Winter Sanabria [facility] (11/27/2018)   Next INR check:   5/30/2019   Priority:   Maintenance   Target end date:       Indications    Atrial fibrillation (CMS/HCC) [I48.91]             Anticoagulation Episode Summary     INR check location:       Preferred lab:       Send INR reminders to:   [facility]  POOL    Comments:   [place]      Anticoagulation Care Providers     Provider Role Specialty Phone number    David Hernandez MD Referring Cardiology [phone]          Clinic Interview:  No pertinent clinical findings have been reported.    INR History:  Anticoagulation Monitoring 4/18/2019 5/2/2019 5/16/2019   INR 2.70 2.80 3.00   INR Date 4/18/2019 5/2/2019 5/16/2019   INR Goal 2.0-3.0 2.0-3.0 2.0-3.0   Trend Same Same Same   Last Week Total 30 mg 30 mg 30 mg   Next Week Total 30 mg 30 mg 30 mg   Sun 5 mg 5 mg 5 mg   Mon 2.5 mg 2.5 mg 2.5 mg   Tue 5 mg 5 mg 5 mg   Wed 5 mg 5 mg 5 mg   Thu 5 mg 5 mg 5 mg   Fri 2.5 mg 2.5 mg 2.5 mg   Sat 5 mg 5 mg 5 mg   Visit Report - - -   Some recent data might be hidden       Plan:  1. INR is therapeutic today- see above in Anticoagulation Summary.    Demond CROSS Bernarda to continue their warfarin regimen- see above in Anticoagulation Summary.  2. Follow up in 2 weeks  3. Pt has agreed to only be called if INR out of range. They have been instructed to call if any changes in medications, doses, concerns, etc. Patient expresses understanding and has no further questions at this time.    Silvia Joseph

## 2019-05-31 ENCOUNTER — ANTICOAGULATION VISIT (OUTPATIENT)
Dept: PHARMACY | Facility: HOSPITAL | Age: 74
End: 2019-05-31

## 2019-05-31 LAB — INR PPP: 3.1

## 2019-05-31 RX ORDER — PROPAFENONE HYDROCHLORIDE 150 MG/1
TABLET, COATED ORAL
Qty: 60 TABLET | Refills: 5 | Status: SHIPPED | OUTPATIENT
Start: 2019-05-31 | End: 2019-11-25 | Stop reason: SDUPTHER

## 2019-05-31 NOTE — PROGRESS NOTES
Anticoagulation Clinic Progress Note    Anticoagulation Summary  As of 5/31/2019    INR goal:   2.0-3.0   TTR:   83.9 % (5.8 mo)   INR used for dosing:   3.10! (5/31/2019)   Warfarin maintenance plan:   2.5 mg every Mon, Fri; 5 mg all other days   Weekly warfarin total:   30 mg   Plan last modified:   Winter Sanabria RP (11/27/2018)   Next INR check:   6/14/2019   Priority:   Maintenance   Target end date:       Indications    Atrial fibrillation (CMS/HCC) [I48.91]             Anticoagulation Episode Summary     INR check location:       Preferred lab:       Send INR reminders to:    [facility]  POOL    Comments:   [place]      Anticoagulation Care Providers     Provider Role Specialty Phone number    David Hernandez MD Referring Cardiology [phone]          Clinic Interview:      INR History:  Anticoagulation Monitoring 5/2/2019 5/16/2019 5/31/2019   INR 2.80 3.00 3.10   INR Date 5/2/2019 5/16/2019 5/31/2019   INR Goal 2.0-3.0 2.0-3.0 2.0-3.0   Trend Same Same Same   Last Week Total 30 mg 30 mg 30 mg   Next Week Total 30 mg 30 mg 30 mg   Sun 5 mg 5 mg 5 mg   Mon 2.5 mg 2.5 mg 2.5 mg   Tue 5 mg 5 mg 5 mg   Wed 5 mg 5 mg 5 mg   Thu 5 mg 5 mg 5 mg   Fri 2.5 mg 2.5 mg 2.5 mg   Sat 5 mg 5 mg 5 mg   Visit Report - - -   Some recent data might be hidden       Plan:  1. INR is out of range- see above in Anticoagulation Summary.   Will instruct Demond Menchaca to Continue  their warfarin regimen- see above in Anticoagulation Summary.  2. Follow up in 2 weeks.   3. Spoke with pt on phone today.Patient expresses understanding and has no further questions at this time.    Jackelyn Glover [facility]

## 2019-06-10 RX ORDER — WARFARIN SODIUM 5 MG/1
TABLET ORAL
Qty: 90 TABLET | Refills: 0 | Status: SHIPPED | OUTPATIENT
Start: 2019-06-10 | End: 2019-06-25 | Stop reason: SDUPTHER

## 2019-06-13 ENCOUNTER — ANTICOAGULATION VISIT (OUTPATIENT)
Dept: PHARMACY | Facility: HOSPITAL | Age: 74
End: 2019-06-13

## 2019-06-13 LAB — INR PPP: 2.8

## 2019-06-13 NOTE — PROGRESS NOTES
Anticoagulation Clinic Progress Note    Anticoagulation Summary  As of 2019    INR goal:   2.0-3.0   TTR:   82.7 % (6.3 mo)   INR used for dosin.80 (2019)   Warfarin maintenance plan:   2.5 mg every Mon, Fri; 5 mg all other days   Weekly warfarin total:   30 mg   No change documented:   Philip Carmona RPH   Plan last modified:   Winter Sanabria RPH (2018)   Next INR check:   2019   Priority:   Maintenance   Target end date:       Indications    Atrial fibrillation (CMS/HCC) [I48.91]             Anticoagulation Episode Summary     INR check location:       Preferred lab:       Send INR reminders to:   [facility]  POOL    Comments:   [place]      Anticoagulation Care Providers     Provider Role Specialty Phone number    David Hernandez MD Referring Cardiology [phone]          Clinic Interview:  No pertinent clinical findings have been reported.    INR History:  Anticoagulation Monitoring 2019   INR 3.00 3.10 2.80   INR Date 2019   INR Goal 2.0-3.0 2.0-3.0 2.0-3.0   Trend Same Same Same   Last Week Total 30 mg 30 mg 30 mg   Next Week Total 30 mg 30 mg 30 mg   Sun 5 mg 5 mg 5 mg   Mon 2.5 mg 2.5 mg 2.5 mg   Tue 5 mg 5 mg 5 mg   Wed 5 mg 5 mg 5 mg   Thu 5 mg 5 mg 5 mg   Fri 2.5 mg 2.5 mg 2.5 mg   Sat 5 mg 5 mg 5 mg   Visit Report - - -   Some recent data might be hidden       Plan:  1. INR is therapeutic today- see above in Anticoagulation Summary.    Demond CROSS Bernarda to continue their warfarin regimen- see above in Anticoagulation Summary.  2. Follow up in 2 weeks  3. Pt has agreed to only be called if INR out of range. They have been instructed to call if any changes in medications, doses, concerns, etc. Patient expresses understanding and has no further questions at this time.    Philip Carmona RPH

## 2019-06-19 DIAGNOSIS — D61.818 PANCYTOPENIA (HCC): Primary | ICD-10-CM

## 2019-06-19 DIAGNOSIS — M32.8 OTHER FORMS OF SYSTEMIC LUPUS ERYTHEMATOSUS, UNSPECIFIED ORGAN INVOLVEMENT STATUS (HCC): ICD-10-CM

## 2019-06-19 DIAGNOSIS — Z13.6 SCREENING FOR CARDIOVASCULAR CONDITION: ICD-10-CM

## 2019-06-20 ENCOUNTER — LAB (OUTPATIENT)
Dept: INTERNAL MEDICINE | Facility: CLINIC | Age: 74
End: 2019-06-20

## 2019-06-20 DIAGNOSIS — Z13.6 SCREENING FOR CARDIOVASCULAR CONDITION: ICD-10-CM

## 2019-06-20 DIAGNOSIS — D61.818 PANCYTOPENIA (HCC): ICD-10-CM

## 2019-06-20 DIAGNOSIS — M32.8 OTHER FORMS OF SYSTEMIC LUPUS ERYTHEMATOSUS, UNSPECIFIED ORGAN INVOLVEMENT STATUS (HCC): ICD-10-CM

## 2019-06-20 LAB
ALBUMIN SERPL-MCNC: 4.3 G/DL (ref 3.5–5.2)
ALBUMIN/GLOB SERPL: 1.9 G/DL
ALP SERPL-CCNC: 71 U/L (ref 39–117)
ALT SERPL W P-5'-P-CCNC: 19 U/L (ref 1–41)
ANION GAP SERPL CALCULATED.3IONS-SCNC: 10.4 MMOL/L
AST SERPL-CCNC: 20 U/L (ref 1–40)
BASOPHILS # BLD AUTO: 0.02 10*3/MM3 (ref 0–0.2)
BASOPHILS NFR BLD AUTO: 0.6 % (ref 0–1.5)
BILIRUB SERPL-MCNC: 0.5 MG/DL (ref 0.2–1.2)
BUN BLD-MCNC: 19 MG/DL (ref 8–23)
BUN/CREAT SERPL: 17.9 (ref 7–25)
CALCIUM SPEC-SCNC: 9 MG/DL (ref 8.6–10.5)
CHLORIDE SERPL-SCNC: 105 MMOL/L (ref 98–107)
CHOLEST SERPL-MCNC: 96 MG/DL (ref 0–200)
CO2 SERPL-SCNC: 26.6 MMOL/L (ref 22–29)
CREAT BLD-MCNC: 1.06 MG/DL (ref 0.76–1.27)
DEPRECATED RDW RBC AUTO: 43.7 FL (ref 37–54)
EOSINOPHIL # BLD AUTO: 0.1 10*3/MM3 (ref 0–0.4)
EOSINOPHIL NFR BLD AUTO: 3.2 % (ref 0.3–6.2)
ERYTHROCYTE [DISTWIDTH] IN BLOOD BY AUTOMATED COUNT: 12 % (ref 12.3–15.4)
GFR SERPL CREATININE-BSD FRML MDRD: 68 ML/MIN/1.73
GLOBULIN UR ELPH-MCNC: 2.3 GM/DL
GLUCOSE BLD-MCNC: 96 MG/DL (ref 65–99)
HCT VFR BLD AUTO: 40.8 % (ref 37.5–51)
HDLC SERPL-MCNC: 51 MG/DL (ref 40–60)
HGB BLD-MCNC: 13.7 G/DL (ref 13–17.7)
LDLC SERPL CALC-MCNC: 35 MG/DL (ref 0–100)
LDLC/HDLC SERPL: 0.69 {RATIO}
LYMPHOCYTES # BLD AUTO: 0.62 10*3/MM3 (ref 0.7–3.1)
LYMPHOCYTES NFR BLD AUTO: 20 % (ref 19.6–45.3)
MCH RBC QN AUTO: 33.7 PG (ref 26.6–33)
MCHC RBC AUTO-ENTMCNC: 33.6 G/DL (ref 31.5–35.7)
MCV RBC AUTO: 100.5 FL (ref 79–97)
MONOCYTES # BLD AUTO: 0.23 10*3/MM3 (ref 0.1–0.9)
MONOCYTES NFR BLD AUTO: 7.4 % (ref 5–12)
NEUTROPHILS # BLD AUTO: 2.13 10*3/MM3 (ref 1.7–7)
NEUTROPHILS NFR BLD AUTO: 68.8 % (ref 42.7–76)
PLATELET # BLD AUTO: 123 10*3/MM3 (ref 140–450)
PMV BLD AUTO: 9.7 FL (ref 6–12)
POTASSIUM BLD-SCNC: 4.7 MMOL/L (ref 3.5–5.2)
PROT SERPL-MCNC: 6.6 G/DL (ref 6–8.5)
RBC # BLD AUTO: 4.06 10*6/MM3 (ref 4.14–5.8)
SODIUM BLD-SCNC: 142 MMOL/L (ref 136–145)
TRIGL SERPL-MCNC: 48 MG/DL (ref 0–150)
TSH SERPL-ACNC: 1.33 MIU/ML (ref 0.27–4.2)
VLDLC SERPL-MCNC: 9.6 MG/DL (ref 5–40)
WBC NRBC COR # BLD: 3.1 10*3/MM3 (ref 3.4–10.8)

## 2019-06-20 PROCEDURE — 80053 COMPREHEN METABOLIC PANEL: CPT | Performed by: INTERNAL MEDICINE

## 2019-06-20 PROCEDURE — 85025 COMPLETE CBC W/AUTO DIFF WBC: CPT | Performed by: INTERNAL MEDICINE

## 2019-06-20 PROCEDURE — 80061 LIPID PANEL: CPT | Performed by: INTERNAL MEDICINE

## 2019-06-25 RX ORDER — WARFARIN SODIUM 5 MG/1
TABLET ORAL
Qty: 90 TABLET | Refills: 0 | Status: SHIPPED | OUTPATIENT
Start: 2019-06-25 | End: 2019-10-03 | Stop reason: SDUPTHER

## 2019-06-27 ENCOUNTER — ANTICOAGULATION VISIT (OUTPATIENT)
Dept: PHARMACY | Facility: HOSPITAL | Age: 74
End: 2019-06-27

## 2019-06-27 ENCOUNTER — OFFICE VISIT (OUTPATIENT)
Dept: INTERNAL MEDICINE | Facility: CLINIC | Age: 74
End: 2019-06-27

## 2019-06-27 VITALS
WEIGHT: 209 LBS | DIASTOLIC BLOOD PRESSURE: 80 MMHG | SYSTOLIC BLOOD PRESSURE: 132 MMHG | RESPIRATION RATE: 14 BRPM | HEIGHT: 74 IN | BODY MASS INDEX: 26.82 KG/M2

## 2019-06-27 DIAGNOSIS — Z00.00 HEALTH CARE MAINTENANCE: Primary | ICD-10-CM

## 2019-06-27 DIAGNOSIS — D61.818 PANCYTOPENIA (HCC): ICD-10-CM

## 2019-06-27 DIAGNOSIS — I48.0 PAF (PAROXYSMAL ATRIAL FIBRILLATION) (HCC): ICD-10-CM

## 2019-06-27 DIAGNOSIS — K59.01 SLOW TRANSIT CONSTIPATION: ICD-10-CM

## 2019-06-27 PROBLEM — M54.16 LUMBAR RADICULOPATHY, ACUTE: Status: RESOLVED | Noted: 2018-09-06 | Resolved: 2019-06-27

## 2019-06-27 PROBLEM — R76.8 ANA POSITIVE: Status: RESOLVED | Noted: 2018-06-18 | Resolved: 2019-06-27

## 2019-06-27 PROBLEM — M72.2 PLANTAR FASCIITIS: Status: RESOLVED | Noted: 2017-12-05 | Resolved: 2019-06-27

## 2019-06-27 LAB — INR PPP: 2.3

## 2019-06-27 PROCEDURE — 99213 OFFICE O/P EST LOW 20 MIN: CPT | Performed by: INTERNAL MEDICINE

## 2019-06-27 PROCEDURE — G0439 PPPS, SUBSEQ VISIT: HCPCS | Performed by: INTERNAL MEDICINE

## 2019-06-27 NOTE — PATIENT INSTRUCTIONS
Annual wellness visit with preventive exam as well as age and risk appropriate councelling completed.    Cardiovascular disease councelling: current. Excellent cholesterol.  Diabetes screening and councelling: current. Not at risk for diabetes.  Glaucoma screening: up to date.  AAA screening: completed, no AAA.  Hep C screening (born between [birth year]-4314) completed..  Colorectal cancer screening: up to date. Recommended every 10 years. Next screening is due in 2024..  Prostate cancer screening: Recent changes in guidelines, available evidence and controvercies discussed with patient. Current position and recommendations from USPTF, ACP and Urological association explained. Patient requests PSA to be done, at least next year.    Immunizations:   1. Influenza vaccine  is up to date and recommended yearly.   2. Pneumococcal vaccines: completed.   3. Shingles prevention: completed.      Advanced directives planning:completed and Living Will is on file in the hospital. Discussed. I agree with patients decision..    Medications reviewed and medication list updated.   Potential harmful drug-disease interactions in the elderly: none.  High risk medication in elderly: none.  ASA use: no indications.    PAF - patient is asymptomatic and adequately anticoagulated with Warfarin. I had explained importance of anticoagulation for the strokes prevention regardless of the symptoms.  Recommended to discuss with his cardiologist use of newer anticoagulants, that have better GI safety data.   Constipation - well controlled with use of OTC Metamucil. Hydrate well. Continue same.  Polycytemia - stable blood cell counts, under the care of hematologist, most likely due to SLE.  SLE - under the care of rheumatologist. Doing well, no s-ms. Needs eye exam due to use of Plaquenil.   Return in about 1 year (around 6/27/2020) for Medicare Wellness.  .

## 2019-06-27 NOTE — PROGRESS NOTES
"Roni Menchaca is a 74 y.o. male.     History of Present Illness   /80 (BP Location: Left arm, Patient Position: Sitting, Cuff Size: Adult)   Resp 14   Ht 188 cm (74\")   Wt 94.8 kg (209 lb)   BMI 26.83 kg/m²   Patient is being seen for subsequent wellness visit.  Hospitalizations in a past: 8, none in the last 2 years+  Once per lifetime Medicare screening tests: ECG - under the care of cardiologist, sees .    AAA risk assessment: 1. FH: none. 2.H/o tobacco smoking: none. 3. CV or PAD: as per medical problems list.    Tobacco use: in remote past, as per    Alcohol use: occasionally  Illicit drugs use: none  Diet: well balanced  Exercise: walking, resistance training, golf and occasionally    Anxiety screening: How many days in the last 2 weeks you were  1. Feeling anxious, nervous, on edge: none  2. Unable to stop worrying: none  3. Worrying too much about different things: none  4. Having problems relaxing:none  5. Feeling restless or unable to sit still:none  6. Feeling irritable or easely annoyed: none  7. Being afraid that something awful might happen: none    Depression screening PHQ9:  Over the past 2 weeks how often have you been bothered by  1. Lack of interest or pleasuer in usual activities: none  2. Feeling down, depressed, hopeless:none  3. Troubles falling asleep/sleeping too long:none  4. Feeling tired, having little energy: none  5. Poor appetite or overeating: none  6. Feeling bad about yourself:none.  7. Trouble concentrating: at the baseline.  8. Moving or speaking too slow or much faster than usual: none  9. Thoughts about harming yourself:none.    Cognitive impairment screening:   Do you have difficulties with:  1. Language: no  2. Behavior: no  3. Learning/retaining new information: no  4. Handling complex tasks: no  5. Reasoning: no  6. Spacial ability and orientation: no    Hearing: normal.  Driving: unrestricted  ADL: independent  ADL details: Does patient " needs help with:  telephone use: no  Transportation: no  Housework: no  Shopping: no  meal preparation: no  laundry: no  managing medication:no  Participate in the social activities: Y    Fall risk factors:   1.Use of alcohol: no    2.Polypharmacy: yes  3.H/o of previous fall:  Yes, had several due to the foot drop.  4. Mobility impairment:  Yes, as above.  5. Visual impairment:  no  6. Deconditioning: no  7. Use of antihypertensive medication:  no  8. Use of sedatives: no  9. Use of antidepressant: no    Home safety:   1.loose rugs: no   2.unfamiliar environment: no   3. Uneven floors: no   4. No grab bars in the bathroom: no  5. No banister on stairs: no      Advanced directives: completed and Living Will is on file in the hospital. Discussed. I agree with patients decision..    Co-managers: ophthalmology , dermatology , cardiology , rheumatology , gastroenterologist , hematologist Dr.Geeta Acosta.                                          The following portions of the patient's history were reviewed and updated as appropriate: allergies, current medications, past family history, past medical history, past social history, past surgical history and problem list.    Review of Systems   Constitutional: Negative for chills and fever.   HENT: Negative for postnasal drip, sinus pressure and sore throat.    Eyes: Negative for pain and itching.   Respiratory: Negative for cough and chest tightness.    Cardiovascular: Negative for chest pain and leg swelling.   Gastrointestinal: Negative for abdominal pain and blood in stool.   Endocrine: Negative for cold intolerance and heat intolerance.   Genitourinary: Negative for difficulty urinating and flank pain.   Musculoskeletal: Negative for back pain and neck pain.   Skin: Negative for color change and rash.   Neurological: Negative for dizziness, weakness and headaches.   Hematological: Negative for adenopathy. Does not  bruise/bleed easily.   Psychiatric/Behavioral: Negative for sleep disturbance. The patient is not nervous/anxious.        Objective   Physical Exam   Constitutional: He is oriented to person, place, and time. Vital signs are normal. He appears well-developed and well-nourished. No distress.   HENT:   Head: Normocephalic and atraumatic.   Right Ear: External ear normal.   Left Ear: External ear normal.   Nose: Nose normal. No mucosal edema. Right sinus exhibits no maxillary sinus tenderness and no frontal sinus tenderness. Left sinus exhibits no maxillary sinus tenderness and no frontal sinus tenderness.   Mouth/Throat: Oropharynx is clear and moist. No oropharyngeal exudate.   Eyes: Conjunctivae, EOM and lids are normal. Pupils are equal, round, and reactive to light. Right eye exhibits no discharge. Left eye exhibits no discharge. Right conjunctiva is not injected. Left conjunctiva is not injected. No scleral icterus. Right eye exhibits normal extraocular motion. Left eye exhibits normal extraocular motion.   Neck: Normal range of motion and full passive range of motion without pain. Neck supple. No JVD present. Carotid bruit is not present. No thyromegaly present.   Cardiovascular: Normal rate, regular rhythm, S1 normal, S2 normal, normal heart sounds and intact distal pulses. PMI is not displaced. Exam reveals no gallop and no friction rub.   No murmur heard.  Pulmonary/Chest: Effort normal and breath sounds normal. No accessory muscle usage. No respiratory distress. He has no decreased breath sounds. He has no wheezes. He has no rhonchi. He has no rales.   Abdominal: Soft. Bowel sounds are normal. He exhibits no distension, no pulsatile liver, no fluid wave, no abdominal bruit, no ascites and no mass. There is no tenderness. There is no rebound and no guarding.   obese   Musculoskeletal: He exhibits no edema or deformity.   Lymphadenopathy:        Head (right side): No submental, no submandibular, no  preauricular, no posterior auricular and no occipital adenopathy present.        Head (left side): No submental, no submandibular, no tonsillar, no preauricular, no posterior auricular and no occipital adenopathy present.     He has no cervical adenopathy.        Right cervical: No superficial cervical, no deep cervical and no posterior cervical adenopathy present.       Left cervical: No superficial cervical, no deep cervical and no posterior cervical adenopathy present.        Right: No supraclavicular adenopathy present.        Left: No supraclavicular adenopathy present.   Neurological: He is alert and oriented to person, place, and time. He has normal strength and normal reflexes. He displays normal reflexes. No cranial nerve deficit. He exhibits normal muscle tone. Coordination normal.   Reflex Scores:       Bicep reflexes are 2+ on the right side and 2+ on the left side.       Patellar reflexes are 2+ on the right side and 2+ on the left side.  Skin: Skin is warm and dry. No rash noted. He is not diaphoretic. No erythema.   Chronic dark discoloration over the lower legs. Senile purpura   Psychiatric: He has a normal mood and affect. His speech is normal and behavior is normal. Thought content normal.   Vitals reviewed.      Assessment/Plan   Demond was seen today for medicare wellness-subsequent.    Diagnoses and all orders for this visit:    Health care maintenance    PAF (paroxysmal atrial fibrillation) (CMS/HCC)    Slow transit constipation        Annual wellness visit with preventive exam as well as age and risk appropriate councelling completed.    Cardiovascular disease councelling: current. Excellent cholesterol.  Diabetes screening and councelling: current. Not at risk for diabetes.  Glaucoma screening: up to date.  AAA screening: completed, no AAA.  Hep C screening (born between [birth year]) completed..  Colorectal cancer screening: up to date. Recommended every 10 years. Next screening is due in  2024..  Prostate cancer screening: Recent changes in guidelines, available evidence and controvercies discussed with patient. Current position and recommendations from USPTF, ACP and Urological association explained. Patient requests PSA to be done, at least next year.    Immunizations:   1. Influenza vaccine  is up to date and recommended yearly.   2. Pneumococcal vaccines: completed.   3. Shingles prevention: completed.      Advanced directives planning:completed and Living Will is on file in the hospital. Discussed. I agree with patients decision..    Medications reviewed and medication list updated.   Potential harmful drug-disease interactions in the elderly: none.  High risk medication in elderly: none.  ASA use: no indications.    PAF - patient is asymptomatic and adequately anticoagulated with Warfarin. I had explained importance of anticoagulation for the strokes prevention regardless of the symptoms.  Recommended to discuss with his cardiologist use of newer anticoagulants, that have better GI safety data.   Constipation - well controlled with use of OTC Metamucil. Hydrate well. Continue same.  Polycytemia - stable blood cell counts, under the care of hematologist, most likely due to SLE.  SLE - under the care of rheumatologist. Doing well, no s-ms. Needs eye exam due to use of Plaquenil.

## 2019-07-11 ENCOUNTER — ANTICOAGULATION VISIT (OUTPATIENT)
Dept: PHARMACY | Facility: HOSPITAL | Age: 74
End: 2019-07-11

## 2019-07-11 DIAGNOSIS — I48.0 PAF (PAROXYSMAL ATRIAL FIBRILLATION) (HCC): ICD-10-CM

## 2019-07-11 LAB — INR PPP: 2.6

## 2019-07-11 NOTE — PROGRESS NOTES
Anticoagulation Clinic Progress Note    Anticoagulation Summary  As of 2019    INR goal:   2.0-3.0   TTR:   85.0 % (7.2 mo)   INR used for dosin.60 (2019)   Warfarin maintenance plan:   2.5 mg every Mon, Fri; 5 mg all other days   Weekly warfarin total:   30 mg   No change documented:   Sheri Aviles   Plan last modified:   Winter Sanabria [facility] (2018)   Next INR check:   2019   Priority:   Maintenance   Target end date:       Indications    PAF (paroxysmal atrial fibrillation) (CMS/HCC) [I48.0]             Anticoagulation Episode Summary     INR check location:       Preferred lab:       Send INR reminders to:   [facility] [place]  POOL    Comments:   [place]      Anticoagulation Care Providers     Provider Role Specialty Phone number    David Hernandez MD Referring Cardiology [phone]          Clinic Interview:  No pertinent clinical findings have been reported.    INR History:  Anticoagulation Monitoring 2019   INR 2.80 2.30 2.60   INR Date 2019   INR Goal 2.0-3.0 2.0-3.0 2.0-3.0   Trend Same Same Same   Last Week Total 30 mg 30 mg 30 mg   Next Week Total 30 mg 30 mg 30 mg   Sun 5 mg 5 mg 5 mg   Mon 2.5 mg 2.5 mg 2.5 mg   Tue 5 mg 5 mg 5 mg   Wed 5 mg 5 mg 5 mg   Thu 5 mg 5 mg 5 mg   Fri 2.5 mg 2.5 mg 2.5 mg   Sat 5 mg 5 mg 5 mg   Visit Report - - -   Some recent data might be hidden       Plan:  1. INR is therapeutic today- see above in Anticoagulation Summary.    Demond CROSS Bernarda to continue their warfarin regimen- see above in Anticoagulation Summary.  2. Follow up in 2 weeks  3. Pt has agreed to only be called if INR out of range. They have been instructed to call if any changes in medications, doses, concerns, etc. Patient expresses understanding and has no further questions at this time.    Sheri Aviles

## 2019-07-25 ENCOUNTER — ANTICOAGULATION VISIT (OUTPATIENT)
Dept: PHARMACY | Facility: HOSPITAL | Age: 74
End: 2019-07-25

## 2019-07-25 DIAGNOSIS — I48.0 PAF (PAROXYSMAL ATRIAL FIBRILLATION) (HCC): ICD-10-CM

## 2019-07-25 LAB — INR PPP: 3.1

## 2019-07-25 NOTE — PROGRESS NOTES
Anticoagulation Clinic Progress Note    Anticoagulation Summary  As of 7/25/2019    INR goal:   2.0-3.0   TTR:   84.7 % (7.7 mo)   INR used for dosing:   3.10! (7/25/2019)   Warfarin maintenance plan:   2.5 mg every Mon, Fri; 5 mg all other days   Weekly warfarin total:   30 mg   No change documented:   Danika Chavez, Pharmacy Intern   Plan last modified:   Winter Sanabria RPH (11/27/2018)   Next INR check:   8/8/2019   Priority:   Maintenance   Target end date:       Indications    PAF (paroxysmal atrial fibrillation) (CMS/[facility]) [I48.0]             Anticoagulation Episode Summary     INR check location:       Preferred lab:       Send INR reminders to:   [facility]  POOL    Comments:   [place]      Anticoagulation Care Providers     Provider Role Specialty Phone number    David Hernandez MD Referring Cardiology [phone]          Drug interactions: has remained unchanged.  Diet: has remained unchanged.    Clinic Interview:  Patient Findings     Positives:   Change in alcohol use    Negatives:   Signs/symptoms of thrombosis, Signs/symptoms of bleeding,   Laboratory test error suspected, Change in health, Change in activity,   Upcoming invasive procedure, Emergency department visit, Upcoming dental   procedure, Missed doses, Extra doses, Change in medications, Change in   diet/appetite, Hospital admission, Bruising, Other complaints    Comments:   Had a glass of wine last night.       Clinical Outcomes     Negatives:   Major bleeding event, Thromboembolic event,   Anticoagulation-related hospital admission, Anticoagulation-related ED   visit, Anticoagulation-related fatality    Comments:   Had a glass of wine last night.         INR History:  Anticoagulation Monitoring 6/27/2019 7/11/2019 7/25/2019   INR 2.30 2.60 3.10   INR Date 6/27/2019 7/11/2019 7/25/2019   INR Goal 2.0-3.0 2.0-3.0 2.0-3.0   Trend Same Same Same   Last Week Total 30 mg 30 mg 30 mg   Next Week Total 30 mg 30 mg 30 mg   Sun 5 mg 5  mg 5 mg   Mon 2.5 mg 2.5 mg 2.5 mg   Tue 5 mg 5 mg 5 mg   Wed 5 mg 5 mg 5 mg   Thu 5 mg 5 mg 5 mg   Fri 2.5 mg 2.5 mg 2.5 mg   Sat 5 mg 5 mg 5 mg   Visit Report - - -   Some recent data might be hidden       Plan:  1. INR is Supratherapeutic today- see above in Anticoagulation Summary.   Will instruct Demond Menchaca to Continue their warfarin regimen- see above in Anticoagulation Summary.  2. Follow up in 2 weeks  3. Pt has agreed to only be called if INR out of range. They have been instructed to call if any changes in medications, doses, concerns, etc. Patient expresses understanding and has no further questions at this time.    Danika Chavez, Pharmacy Intern

## 2019-07-25 NOTE — PROGRESS NOTES
I have supervised and reviewed the notes, assessments, and/or procedures performed by Danika Chavez, PharmD Candidate. I concur with her documentation of this patient.

## 2019-08-05 ENCOUNTER — OFFICE VISIT (OUTPATIENT)
Dept: CARDIOLOGY | Facility: CLINIC | Age: 74
End: 2019-08-05

## 2019-08-05 VITALS
HEIGHT: 74 IN | BODY MASS INDEX: 27.34 KG/M2 | SYSTOLIC BLOOD PRESSURE: 130 MMHG | WEIGHT: 213 LBS | DIASTOLIC BLOOD PRESSURE: 80 MMHG | HEART RATE: 43 BPM

## 2019-08-05 DIAGNOSIS — I48.0 PAF (PAROXYSMAL ATRIAL FIBRILLATION) (HCC): Primary | ICD-10-CM

## 2019-08-05 PROCEDURE — 99213 OFFICE O/P EST LOW 20 MIN: CPT | Performed by: NURSE PRACTITIONER

## 2019-08-05 PROCEDURE — 93000 ELECTROCARDIOGRAM COMPLETE: CPT | Performed by: NURSE PRACTITIONER

## 2019-08-05 NOTE — PROGRESS NOTES
Date of Office Visit: 2019  Encounter Provider: ERIC Giles  Place of Service: [facility] CARDIOLOGY  Patient Name: Demond Menchaca  :1945    Chief Complaint   Patient presents with   • Paroxysmal atrial fibrillation     1 year follow up   :     HPI: Demond Menchaca is a 74 y.o. male who is a patient of Dr. Hernandez's with a history of PAF, status post PVI x2, first 1 in 2013 (cryoablation of all 4 pulmonary veins) and a redo in 2014 which was a redo of the right upper pulmonary vein and a CTI dependent flutter ablation.  He also has a history of thrombocytopenia, TIAs and chronic back pain.  He presents today for annual follow-up.    He reports that he is doing well.  He denies chest pain, dyspnea, PND, orthopnea, dizziness or edema.    He says that he has episodes about every 6 to 8 weeks lasting anywhere from a half an hour to an hour or so.  He reports that they primarily happen at night and the next day he does have some fatigue but otherwise tolerates them very well.    He is on warfarin for anticoagulation with no bleeding issues.  He does follow with Dr. Acosta (hematology) for his history of thrombocytopenia.       Past Medical History:   Diagnosis Date   • LASHONDA positive 2018    2017: elevated ds LASHONDA   • Chronic obstructive pulmonary disease (CMS/HCC)    • Diverticulitis of large intestine without perforation or abscess without bleeding 3/7/2016   • Diverticulosis    • ED (erectile dysfunction)    • Elbow fracture, left    • History of blood transfusion    • Hypogonadism in male    • Kidney cysts    • Left femoral shaft fracture (CMS/HCC)    • Lower back pain    • PAF (paroxysmal atrial fibrillation) (CMS/HCC)    • Peptic ulceration    • Plantar fasciitis 2017   • Prostatic hypertrophy, benign    • 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CMS/HCC) 2012   • Skin cancer    • Stroke (CMS/HCC)     TIA   • Transient cerebral ischemia     H/O TIAs        Past Surgical History:   Procedure Laterality Date   • ABLATION OF DYSRHYTHMIC FOCUS  ,    • ADENOIDECTOMY     • APPENDECTOMY     • BACK SURGERY     • CARDIAC ABLATION  ,    • CARDIAC CATHETERIZATION  ,    • COLONOSCOPY  2014       • FEMUR FRACTURE SURGERY Left     post fall from the ladder   • LUMBAR LAMINECTOMY      Dr.Lester Lino   • NECK SURGERY      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   • OTHER SURGICAL HISTORY      elbow surgery   • THYROID SURGERY      benign tumor removal,    • TOTAL ELBOW ARTHROPLASTY Left     L, post fall and fracture, hardware in       Social History     Socioeconomic History   • Marital status:      Spouse name: Latisha   • Number of children: 2   • Years of education: College   • Highest education level: Not asked   Occupational History   • 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   Social Needs   • Financial resource strain: Patient refused   • Food insecurity:     Worry: Patient refused     Inability: Patient refused   • Transportation needs:     Medical: Patient refused     Non-medical: Patient refused   Tobacco Use   • Smoking status: Former Smoker     Packs/day: 0.50     Years: 15.00     Pack years: 7.50     Types: Cigarettes     Start date: 1959     Last attempt to quit:      Years since quittin.6   • Smokeless tobacco: Never Used   Substance and Sexual Activity   • Alcohol use: Yes     Alcohol/week: 0.6 oz     Types: 6 Glasses of wine per week     Comment: Some beer during the summer months   • Drug use: No   • Sexual activity: Not Currently     Partners: Female     Birth control/protection: None   Social History Narrative    LIVES WITH SPOUSE       Family History   Problem Relation Age of Onset   • Hypertension Mother    • Osteoporosis Mother    • Thyroid disease Sister    • Diabetes Sister    • Hypertension Sister    • Colon cancer Maternal Grandmother 60   • Hypertension Father   "  • Heart disease Other    • Atrial fibrillation Other    • Thyroid disease Other    • Pulmonary fibrosis Sister    • Diabetes Other    • Heart disease Other    • Hypertension Other    • Heart disease Sister        Review of Systems   Constitution: Negative for chills, fever and malaise/fatigue.   Cardiovascular: Negative for chest pain, dyspnea on exertion, leg swelling, near-syncope, orthopnea, palpitations, paroxysmal nocturnal dyspnea and syncope.   Respiratory: Negative for cough and shortness of breath.    Hematologic/Lymphatic: Negative.    Musculoskeletal: Negative for joint pain, joint swelling and myalgias.   Gastrointestinal: Negative for abdominal pain, diarrhea, melena, nausea and vomiting.   Genitourinary: Negative for frequency and hematuria.   Neurological: Negative for light-headedness, numbness, paresthesias and seizures.   Allergic/Immunologic: Negative.    All other systems reviewed and are negative.      Allergies   Allergen Reactions   • Cardizem [Diltiazem Hcl] Itching   • Grass Other (See Comments)         Current Outpatient Medications:   •  Cetirizine HCl 10 MG capsule, Take 1 capsule by mouth daily., Disp: , Rfl:   •  finasteride (PROSCAR) 5 MG tablet, Take 1 tablet by mouth Daily., Disp: 90 tablet, Rfl: 3  •  fluticasone (FLONASE) 50 MCG/ACT nasal spray, into each nostril daily., Disp: , Rfl:   •  hydroxychloroquine (PLAQUENIL) 200 MG tablet, Take 200 mg by mouth 2 (Two) Times a Day., Disp: , Rfl:   •  propafenone (RYTHMOL) 150 MG tablet, TAKE 1 TABLET BY MOUTH TWICE A DAY, Disp: 60 tablet, Rfl: 5  •  Psyllium (METAMUCIL PO), Take  by mouth Daily., Disp: , Rfl:   •  warfarin (COUMADIN) 5 MG tablet, Take 2.5 mg (1/2 Tablet) on Monday and Friday, and take 5mg (1 Tablet) all other days OR as directed by the [facility]., Disp: 90 tablet, Rfl: 0      Objective:     Vitals:    08/05/19 1109   BP: 130/80   Pulse: (!) 43   Weight: 96.6 kg (213 lb)   Height: 188 cm (74.02\") "     Body mass index is 27.34 kg/m².    PHYSICAL EXAM:    Vitals Reviewed.   General Appearance: No acute distress, well developed and well nourished.   Eyes: Conjunctiva and lids: No erythema, swelling, or discharge. Sclera non-icteric.   HENT: Atraumatic, normocephalic. External eyes, ears, and nose normal.   Respiratory: No signs of respiratory distress. Respiration rhythm and depth normal.   Clear to auscultation. No rales, crackles, rhonchi, or wheezing auscultated.   Cardiovascular:  Jugular Venous Pressure: Normal  Heart Rate and Rhythm: Normal, Heart Sounds: Normal S1 and S2. No S3 or S4 noted.  Murmurs: No murmurs noted. No rubs, thrills, or gallops.   Arterial Pulses:  Posterior tibialis and dorsalis pedis pulses normal.   Lower Extremities: No edema noted.  Gastrointestinal:  Abdomen soft, non-distended, non-tender.   Musculoskeletal: Normal movement of extremities  Skin and Nails: General appearance normal. No pallor, cyanosis, diaphoresis. Skin temperature normal. No clubbing of fingernails.   Psychiatric: Patient alert and oriented to person, place, and time. Speech and behavior appropriate. Normal mood and affect.       ECG 12 Lead  Date/Time: 8/5/2019 11:48 AM  Performed by: Damaris Shell APRN  Authorized by: Damaris Shell APRN   Comparison: compared with previous ECG   Similar to previous ECG  Rhythm: sinus bradycardia  BPM: 43                Assessment:       Diagnosis Plan   1. PAF (paroxysmal atrial fibrillation) (CMS/[facility])            Plan:       1. Atrial Fibrillation and Atrial Flutter  Assessment  • The patient has paroxysmal atrial fibrillation  • The patient's CHADS2-VASc score is 4  • A [id number] score of 2 or more is considered a high risk for a thromboembolic event  • Warfarin prescribed  • Sinus bradycardia.  Reports short episodes of PAF about every 6 to 8 weeks primarily at night.  He tolerates them very well.    Fairly well-controlled on propafenone.      Plan  • Attempt to  maintain sinus rhythm  • Continue warfarin for antithrombotic therapy, bleeding issues discussed  • Continue propafenone for rhythm control    Subjective - Objective  • The patient had atrial fibrillation ablation       Follow-up with Dr. Hernandez in 1 year.      As always, it has been a pleasure to participate in your patient's care.      Sincerely,         ERIC Delvalle

## 2019-08-07 ENCOUNTER — ANTICOAGULATION VISIT (OUTPATIENT)
Dept: PHARMACY | Facility: HOSPITAL | Age: 74
End: 2019-08-07

## 2019-08-07 DIAGNOSIS — I48.0 PAF (PAROXYSMAL ATRIAL FIBRILLATION) (HCC): ICD-10-CM

## 2019-08-07 LAB — INR PPP: 2.8

## 2019-08-07 NOTE — PROGRESS NOTES
Anticoagulation Clinic Progress Note    Anticoagulation Summary  As of 2019    INR goal:   2.0-3.0   TTR:   83.7 % (8.1 mo)   INR used for dosin.80 (2019)   Warfarin maintenance plan:   2.5 mg every Mon, Fri; 5 mg all other days   Weekly warfarin total:   30 mg   No change documented:   Kera Scanlon   Plan last modified:   Winter Sanabria [facility] (2018)   Next INR check:   2019   Priority:   Maintenance   Target end date:       Indications    PAF (paroxysmal atrial fibrillation) (CMS/HCC) [I48.0]             Anticoagulation Episode Summary     INR check location:       Preferred lab:       Send INR reminders to:   [facility] [place]  POOL    Comments:   [place]      Anticoagulation Care Providers     Provider Role Specialty Phone number    David Hernandez MD Referring Cardiology [phone]          Clinic Interview:  No pertinent clinical findings have been reported.    INR History:  Anticoagulation Monitoring 2019   INR 2.60 3.10 2.80   INR Date 2019   INR Goal 2.0-3.0 2.0-3.0 2.0-3.0   Trend Same Same Same   Last Week Total 30 mg 30 mg 30 mg   Next Week Total 30 mg 30 mg 30 mg   Sun 5 mg 5 mg 5 mg   Mon 2.5 mg 2.5 mg 2.5 mg   Tue 5 mg 5 mg 5 mg   Wed 5 mg 5 mg 5 mg   Thu 5 mg 5 mg 5 mg   Fri 2.5 mg 2.5 mg 2.5 mg   Sat 5 mg 5 mg 5 mg   Visit Report - - -   Some recent data might be hidden       Plan:  1. INR is therapeutic today- see above in Anticoagulation Summary.    Demond CROSS Bernarda to continue their warfarin regimen- see above in Anticoagulation Summary.  2. Follow up in 2 weeks  3. Pt has agreed to only be called if INR out of range. They have been instructed to call if any changes in medications, doses, concerns, etc. Patient expresses understanding and has no further questions at this time.    Kera Scanlon

## 2019-09-05 ENCOUNTER — ANTICOAGULATION VISIT (OUTPATIENT)
Dept: PHARMACY | Facility: HOSPITAL | Age: 74
End: 2019-09-05

## 2019-09-05 DIAGNOSIS — I48.0 PAF (PAROXYSMAL ATRIAL FIBRILLATION) (HCC): ICD-10-CM

## 2019-09-05 LAB
INR PPP: 2.5
INR PPP: 2.5

## 2019-09-05 NOTE — PROGRESS NOTES
Anticoagulation Clinic Progress Note    Anticoagulation Summary  As of 2019    INR goal:   2.0-3.0   TTR:   85.4 % (9.1 mo)   INR used for dosin.50 (2019)   Warfarin maintenance plan:   2.5 mg every Mon, Fri; 5 mg all other days   Weekly warfarin total:   30 mg   No change documented:   Kera Scanlon   Plan last modified:   Winter Sanabria [facility] (2018)   Next INR check:   2019   Priority:   Maintenance   Target end date:       Indications    PAF (paroxysmal atrial fibrillation) (CMS/HCC) [I48.0]             Anticoagulation Episode Summary     INR check location:       Preferred lab:       Send INR reminders to:   [facility]  POOL    Comments:   [place]      Anticoagulation Care Providers     Provider Role Specialty Phone number    David Hernandez MD Referring Cardiology [phone]          Clinic Interview:  No pertinent clinical findings have been reported.    INR History:  Anticoagulation Monitoring 2019   INR 3.10 2.80 2.50   INR Date 2019   INR Goal 2.0-3.0 2.0-3.0 2.0-3.0   Trend Same Same Same   Last Week Total 30 mg 30 mg 30 mg   Next Week Total 30 mg 30 mg 30 mg   Sun 5 mg 5 mg 5 mg   Mon 2.5 mg 2.5 mg 2.5 mg   Tue 5 mg 5 mg 5 mg   Wed 5 mg 5 mg 5 mg   Thu 5 mg 5 mg 5 mg   Fri 2.5 mg 2.5 mg 2.5 mg   Sat 5 mg 5 mg 5 mg   Visit Report - - -   Some recent data might be hidden       Plan:  1. INR is therapeutic today- see above in Anticoagulation Summary.    Demond CROSS Bernarda to continue their warfarin regimen- see above in Anticoagulation Summary.  2. Follow up in 2 weeks  3. Pt has agreed to only be called if INR out of range. They have been instructed to call if any changes in medications, doses, concerns, etc. Patient expresses understanding and has no further questions at this time.    Kera Scanlon

## 2019-09-12 ENCOUNTER — ANTICOAGULATION VISIT (OUTPATIENT)
Dept: PHARMACY | Facility: HOSPITAL | Age: 74
End: 2019-09-12

## 2019-09-12 DIAGNOSIS — I48.0 PAF (PAROXYSMAL ATRIAL FIBRILLATION) (HCC): ICD-10-CM

## 2019-09-12 NOTE — PROGRESS NOTES
Anticoagulation Clinic Progress Note    Anticoagulation Summary  As of 2019    INR goal:   2.0-3.0   TTR:   85.4 % (9.1 mo)   INR used for dosin.50 (2019)   Warfarin maintenance plan:   2.5 mg every Mon, Fri; 5 mg all other days   Weekly warfarin total:   30 mg   No change documented:   Silvia Joseph   Plan last modified:   Winter Sanabria RP (2018)   Next INR check:   2019   Priority:   Maintenance   Target end date:       Indications    PAF (paroxysmal atrial fibrillation) (CMS/HCC) [I48.0]             Anticoagulation Episode Summary     INR check location:       Preferred lab:       Send INR reminders to:   [facility]  POOL    Comments:   [place]      Anticoagulation Care Providers     Provider Role Specialty Phone number    David Hernandez MD Referring Cardiology [phone]          Clinic Interview:  No pertinent clinical findings have been reported.    INR History:  Anticoagulation Monitoring 2019   INR 2.80 2.50 2.50   INR Date 2019   INR Goal 2.0-3.0 2.0-3.0 2.0-3.0   Trend Same Same Same   Last Week Total 30 mg 30 mg 30 mg   Next Week Total 30 mg 30 mg 30 mg   Sun 5 mg 5 mg 5 mg   Mon 2.5 mg 2.5 mg 2.5 mg   Tue 5 mg 5 mg 5 mg   Wed 5 mg 5 mg 5 mg   Thu 5 mg 5 mg 5 mg   Fri 2.5 mg 2.5 mg 2.5 mg   Sat 5 mg 5 mg 5 mg   Visit Report - - -   Some recent data might be hidden       Plan:  1. INR is therapeutic today- see above in Anticoagulation Summary.    Demond CROSS Bernarda to continue their warfarin regimen- see above in Anticoagulation Summary.  2. Follow up in 2 weeks  3. Pt has agreed to only be called if INR out of range. They have been instructed to call if any changes in medications, doses, concerns, etc. Patient expresses understanding and has no further questions at this time.    Silvia Joseph

## 2019-09-19 ENCOUNTER — ANTICOAGULATION VISIT (OUTPATIENT)
Dept: PHARMACY | Facility: HOSPITAL | Age: 74
End: 2019-09-19

## 2019-09-19 DIAGNOSIS — I48.0 PAF (PAROXYSMAL ATRIAL FIBRILLATION) (HCC): ICD-10-CM

## 2019-09-19 LAB — INR PPP: 2.8

## 2019-09-19 NOTE — PROGRESS NOTES
Anticoagulation Clinic Progress Note    Anticoagulation Summary  As of 2019    INR goal:   2.0-3.0   TTR:   86.2 % (9.5 mo)   INR used for dosin.80 (2019)   Warfarin maintenance plan:   2.5 mg every Mon, Fri; 5 mg all other days   Weekly warfarin total:   30 mg   No change documented:   Sheri Aviles   Plan last modified:   Winter Sanabria [facility] (2018)   Next INR check:   10/3/2019   Priority:   Maintenance   Target end date:       Indications    PAF (paroxysmal atrial fibrillation) (CMS/HCC) [I48.0]             Anticoagulation Episode Summary     INR check location:       Preferred lab:       Send INR reminders to:   [facility]  POOL    Comments:   [place]      Anticoagulation Care Providers     Provider Role Specialty Phone number    David Hernandez MD Referring Cardiology [phone]          Clinic Interview:  No pertinent clinical findings have been reported.    INR History:  Anticoagulation Monitoring 2019   INR 2.50 2.50 2.80   INR Date 2019   INR Goal 2.0-3.0 2.0-3.0 2.0-3.0   Trend Same Same Same   Last Week Total 30 mg 30 mg 30 mg   Next Week Total 30 mg 30 mg 30 mg   Sun 5 mg 5 mg 5 mg   Mon 2.5 mg 2.5 mg 2.5 mg   Tue 5 mg 5 mg 5 mg   Wed 5 mg 5 mg 5 mg   Thu 5 mg 5 mg 5 mg   Fri 2.5 mg 2.5 mg 2.5 mg   Sat 5 mg 5 mg 5 mg   Visit Report - - -   Some recent data might be hidden       Plan:  1. INR is therapeutic today- see above in Anticoagulation Summary.    Demond CROSS Bernarda to continue their warfarin regimen- see above in Anticoagulation Summary.  2. Follow up in 2 weeks  3. Pt has agreed to only be called if INR out of range. They have been instructed to call if any changes in medications, doses, concerns, etc. Patient expresses understanding and has no further questions at this time.    Sheri Aviles

## 2019-09-29 ENCOUNTER — PREP FOR SURGERY (OUTPATIENT)
Dept: OTHER | Facility: HOSPITAL | Age: 74
End: 2019-09-29

## 2019-09-29 DIAGNOSIS — K63.5 COLON POLYP: Primary | ICD-10-CM

## 2019-09-29 DIAGNOSIS — Z83.71 FH: COLON POLYPS: ICD-10-CM

## 2019-10-03 ENCOUNTER — PREP FOR SURGERY (OUTPATIENT)
Dept: OTHER | Facility: HOSPITAL | Age: 74
End: 2019-10-03

## 2019-10-03 ENCOUNTER — ANTICOAGULATION VISIT (OUTPATIENT)
Dept: PHARMACY | Facility: HOSPITAL | Age: 74
End: 2019-10-03

## 2019-10-03 DIAGNOSIS — Z80.0 FAMILY HISTORY OF COLON CANCER: ICD-10-CM

## 2019-10-03 DIAGNOSIS — I48.0 PAF (PAROXYSMAL ATRIAL FIBRILLATION) (HCC): ICD-10-CM

## 2019-10-03 DIAGNOSIS — R47.02 DYSPHASIA: Primary | ICD-10-CM

## 2019-10-03 DIAGNOSIS — K63.5 COLON POLYP: ICD-10-CM

## 2019-10-03 PROBLEM — Z83.719 FH: COLON POLYPS: Status: ACTIVE | Noted: 2019-10-03

## 2019-10-03 PROBLEM — Z83.71 FH: COLON POLYPS: Status: ACTIVE | Noted: 2019-10-03

## 2019-10-03 LAB — INR PPP: 2.6

## 2019-10-03 RX ORDER — WARFARIN SODIUM 5 MG/1
TABLET ORAL
Qty: 80 TABLET | Refills: 0 | Status: SHIPPED | OUTPATIENT
Start: 2019-10-03 | End: 2020-01-20 | Stop reason: SDUPTHER

## 2019-10-03 NOTE — PROGRESS NOTES
Anticoagulation Clinic Progress Note    Anticoagulation Summary  As of 10/3/2019    INR goal:   2.0-3.0   TTR:   86.8 % (10 mo)   INR used for dosin.60 (10/3/2019)   Warfarin maintenance plan:   2.5 mg every Mon, Fri; 5 mg all other days   Weekly warfarin total:   30 mg   No change documented:   Kera Scanlon   Plan last modified:   Winter Sanabria [facility] (2018)   Next INR check:   10/17/2019   Priority:   Maintenance   Target end date:       Indications    PAF (paroxysmal atrial fibrillation) (CMS/HCC) [I48.0]             Anticoagulation Episode Summary     INR check location:       Preferred lab:       Send INR reminders to:   [facility]  POOL    Comments:   [place]      Anticoagulation Care Providers     Provider Role Specialty Phone number    David Hernandez MD Referring Cardiology [phone]          Clinic Interview:  No pertinent clinical findings have been reported.    INR History:  Anticoagulation Monitoring 2019 2019 10/3/2019   INR 2.50 2.80 2.60   INR Date 2019 2019 10/3/2019   INR Goal 2.0-3.0 2.0-3.0 2.0-3.0   Trend Same Same Same   Last Week Total 30 mg 30 mg 30 mg   Next Week Total 30 mg 30 mg 30 mg   Sun 5 mg 5 mg 5 mg   Mon 2.5 mg 2.5 mg 2.5 mg   Tue 5 mg 5 mg 5 mg   Wed 5 mg 5 mg 5 mg   Thu 5 mg 5 mg 5 mg   Fri 2.5 mg 2.5 mg 2.5 mg   Sat 5 mg 5 mg 5 mg   Visit Report - - -   Some recent data might be hidden       Plan:  1. INR is therapeutic today- see above in Anticoagulation Summary.    Demond CROSS Bernarda to continue their warfarin regimen- see above in Anticoagulation Summary.  2. Follow up in 2 weeks  3. Pt has agreed to only be called if INR out of range. They have been instructed to call if any changes in medications, doses, concerns, etc. Patient expresses understanding and has no further questions at this time.    Kera Scanlon

## 2019-10-07 ENCOUNTER — LAB (OUTPATIENT)
Dept: LAB | Facility: HOSPITAL | Age: 74
End: 2019-10-07

## 2019-10-07 ENCOUNTER — HOSPITAL ENCOUNTER (OUTPATIENT)
Facility: HOSPITAL | Age: 74
Setting detail: HOSPITAL OUTPATIENT SURGERY
End: 2019-10-07
Attending: INTERNAL MEDICINE | Admitting: INTERNAL MEDICINE

## 2019-10-07 ENCOUNTER — CLINICAL SUPPORT (OUTPATIENT)
Dept: ONCOLOGY | Facility: HOSPITAL | Age: 74
End: 2019-10-07

## 2019-10-07 ENCOUNTER — OFFICE VISIT (OUTPATIENT)
Dept: INTERNAL MEDICINE | Facility: CLINIC | Age: 74
End: 2019-10-07

## 2019-10-07 VITALS
BODY MASS INDEX: 27.46 KG/M2 | WEIGHT: 214 LBS | SYSTOLIC BLOOD PRESSURE: 120 MMHG | HEIGHT: 74 IN | RESPIRATION RATE: 14 BRPM | DIASTOLIC BLOOD PRESSURE: 80 MMHG

## 2019-10-07 DIAGNOSIS — M32.8 OTHER FORMS OF SYSTEMIC LUPUS ERYTHEMATOSUS, UNSPECIFIED ORGAN INVOLVEMENT STATUS (HCC): ICD-10-CM

## 2019-10-07 DIAGNOSIS — I87.2 VENOUS STASIS DERMATITIS OF BOTH LOWER EXTREMITIES: ICD-10-CM

## 2019-10-07 DIAGNOSIS — M51.36 DDD (DEGENERATIVE DISC DISEASE), LUMBAR: Primary | ICD-10-CM

## 2019-10-07 PROBLEM — Z80.0 FAMILY HISTORY OF COLON CANCER: Status: ACTIVE | Noted: 2019-10-07

## 2019-10-07 PROBLEM — R47.02 DYSPHASIA: Status: ACTIVE | Noted: 2019-10-07

## 2019-10-07 LAB
BASOPHILS # BLD AUTO: 0.03 10*3/MM3 (ref 0–0.2)
BASOPHILS NFR BLD AUTO: 0.8 % (ref 0–1.5)
DEPRECATED RDW RBC AUTO: 48.4 FL (ref 37–54)
EOSINOPHIL # BLD AUTO: 0.12 10*3/MM3 (ref 0–0.4)
EOSINOPHIL NFR BLD AUTO: 3.3 % (ref 0.3–6.2)
ERYTHROCYTE [DISTWIDTH] IN BLOOD BY AUTOMATED COUNT: 13.1 % (ref 12.3–15.4)
HCT VFR BLD AUTO: 43 % (ref 37.5–51)
HGB BLD-MCNC: 14.6 G/DL (ref 13–17.7)
IMM GRANULOCYTES # BLD AUTO: 0.02 10*3/MM3 (ref 0–0.05)
IMM GRANULOCYTES NFR BLD AUTO: 0.5 % (ref 0–0.5)
LYMPHOCYTES # BLD AUTO: 0.65 10*3/MM3 (ref 0.7–3.1)
LYMPHOCYTES NFR BLD AUTO: 17.8 % (ref 19.6–45.3)
MCH RBC QN AUTO: 33.9 PG (ref 26.6–33)
MCHC RBC AUTO-ENTMCNC: 34 G/DL (ref 31.5–35.7)
MCV RBC AUTO: 99.8 FL (ref 79–97)
MONOCYTES # BLD AUTO: 0.31 10*3/MM3 (ref 0.1–0.9)
MONOCYTES NFR BLD AUTO: 8.5 % (ref 5–12)
NEUTROPHILS # BLD AUTO: 2.52 10*3/MM3 (ref 1.7–7)
NEUTROPHILS NFR BLD AUTO: 69.1 % (ref 42.7–76)
NRBC BLD AUTO-RTO: 0 /100 WBC (ref 0–0.2)
PLATELET # BLD AUTO: 104 10*3/MM3 (ref 140–450)
PMV BLD AUTO: 10 FL (ref 6–12)
RBC # BLD AUTO: 4.31 10*6/MM3 (ref 4.14–5.8)
WBC NRBC COR # BLD: 3.65 10*3/MM3 (ref 3.4–10.8)

## 2019-10-07 PROCEDURE — 36415 COLL VENOUS BLD VENIPUNCTURE: CPT

## 2019-10-07 PROCEDURE — 99213 OFFICE O/P EST LOW 20 MIN: CPT | Performed by: INTERNAL MEDICINE

## 2019-10-07 PROCEDURE — 85025 COMPLETE CBC W/AUTO DIFF WBC: CPT

## 2019-10-07 RX ORDER — HYDROCODONE BITARTRATE AND ACETAMINOPHEN 5; 325 MG/1; MG/1
1 TABLET ORAL EVERY 8 HOURS PRN
Qty: 30 TABLET | Refills: 0 | Status: SHIPPED | OUTPATIENT
Start: 2019-10-07 | End: 2020-08-05 | Stop reason: SDUPTHER

## 2019-10-07 NOTE — PROGRESS NOTES
"Subjective   Demond Menchaca is a 74 y.o. male.     History of Present Illness     Demond Menchaca 74 y.o. male complains of the pain in the lower back due to lumbar degenerative disk disease. Pain started several years ago .    Demond Menchaca describes pain as aching. Patient states that intensity of pain is bearable, but at times gets \"bad\" and reaches 7 /10. Pain is aggravated by barometric weather changes and long periods of immobility (long car rides, plane flights or sleep) . Pain is alleviated by Hydrocodone, that he takes very rarely.. Demond Menchaca had been using Hydrocodone for the pain control with relief. Patient takes medication seldom - last time I had written script for 30 pills was in 10/2018..  Patient also is concerned that he has dark discoloration over the lower legs - usually right up the sock line. Minimal edema. Had discoloration for years, recently worse.  The following portions of the patient's history were reviewed and updated as appropriate: allergies, current medications, past family history, past medical history, past social history, past surgical history and problem list.    Review of Systems   Constitutional: Negative for chills and fever.   Eyes: Negative for pain and redness.   Respiratory: Negative for cough and shortness of breath.    Cardiovascular: Negative for chest pain and leg swelling.   Neurological: Negative for dizziness and headaches.       Objective   Physical Exam   Constitutional: He is oriented to person, place, and time. He appears well-developed and well-nourished.   HENT:   Head: Normocephalic and atraumatic.   Right Ear: Tympanic membrane, external ear and ear canal normal.   Left Ear: Tympanic membrane, external ear and ear canal normal.   Nose: Nose normal. Right sinus exhibits no maxillary sinus tenderness and no frontal sinus tenderness. Left sinus exhibits no maxillary sinus tenderness and no frontal sinus tenderness.   Mouth/Throat: Uvula is midline, " oropharynx is clear and moist and mucous membranes are normal.   Eyes: Conjunctivae and EOM are normal. Pupils are equal, round, and reactive to light. Right eye exhibits no discharge. Left eye exhibits no discharge. No scleral icterus.   Neck: Neck supple. No JVD present.   Musculoskeletal: He exhibits edema (ankle edema +1).   Lymphadenopathy:     He has no cervical adenopathy.   Neurological: He is alert and oriented to person, place, and time. No cranial nerve deficit.   Skin: Skin is warm and dry. Rash (chronic wheezes discoloration over both lower legs) noted.   Psychiatric: He has a normal mood and affect. His behavior is normal.   Vitals reviewed.      Assessment/Plan   Demond was seen today for back pain and rash.    Diagnoses and all orders for this visit:    DDD (degenerative disc disease), lumbar  -     HYDROcodone-acetaminophen (NORCO) 5-325 MG per tablet; Take 1 tablet by mouth Every 8 (Eight) Hours As Needed for Severe Pain .    Venous stasis dermatitis of both lower extremities    1. Chronic lower back pain with occasional exacerbation - will treat with Narco as needed, provided that patient uses the pills as a last resort. Timmy report reviewed. [facility] Patient is compliant with medication and takes it according to the directions. Timmy reports are being reviewed at least every 6 months., I will continue to monitor clinical progress with regualar office visits, random pill counts and urine drug screens..  2. Chronic venous stasis dermatitis - reassured. No treatment available.

## 2019-10-07 NOTE — PROGRESS NOTES
Lab Results   Component Value Date    WBC 3.65 10/07/2019    HGB 14.6 10/07/2019    HCT 43.0 10/07/2019    MCV 99.8 (H) 10/07/2019     (L) 10/07/2019     Pt is here for lab with RN review.  CBC reviewed with pt, counts are stable for this pt at this time. Pt has no complaints.  Copy of labs given to pt and f/u appt reviewed. Pt is instructed to call the office with any concerns or new symptoms prior to next visit. Pt vu

## 2019-10-08 ENCOUNTER — PATIENT MESSAGE (OUTPATIENT)
Dept: INTERNAL MEDICINE | Facility: CLINIC | Age: 74
End: 2019-10-08

## 2019-10-08 NOTE — TELEPHONE ENCOUNTER
From: Demond Menchaca  To: Nadeen Stephenson MD  Sent: 10/8/2019 6:44 AM EDT  Subject: Non-Urgent Medical Question    What was the name of the medical term used to diagnose the skin rash on my lower legs? Do you have an opinion as to the cause? Thank you!

## 2019-10-17 ENCOUNTER — ANTICOAGULATION VISIT (OUTPATIENT)
Dept: PHARMACY | Facility: HOSPITAL | Age: 74
End: 2019-10-17

## 2019-10-17 DIAGNOSIS — I48.0 PAF (PAROXYSMAL ATRIAL FIBRILLATION) (HCC): ICD-10-CM

## 2019-10-17 LAB — INR PPP: 2.6

## 2019-10-17 NOTE — PROGRESS NOTES
Anticoagulation Clinic Progress Note    Anticoagulation Summary  As of 10/17/2019    INR goal:   2.0-3.0   TTR:   87.4 % (10.5 mo)   INR used for dosin.60 (10/17/2019)   Warfarin maintenance plan:   2.5 mg every Mon, Fri; 5 mg all other days   Weekly warfarin total:   30 mg   No change documented:   Sheri Aviles   Plan last modified:   Winter Sanabria [facility] (2018)   Next INR check:   10/31/2019   Priority:   Maintenance   Target end date:       Indications    PAF (paroxysmal atrial fibrillation) (CMS/HCC) [I48.0]             Anticoagulation Episode Summary     INR check location:       Preferred lab:       Send INR reminders to:   [facility]  POOL    Comments:   [place]      Anticoagulation Care Providers     Provider Role Specialty Phone number    David Hernandez MD Referring Cardiology [phone]          Clinic Interview:  No pertinent clinical findings have been reported.    INR History:  Anticoagulation Monitoring 2019 10/3/2019 10/17/2019   INR 2.80 2.60 2.60   INR Date 2019 10/3/2019 10/17/2019   INR Goal 2.0-3.0 2.0-3.0 2.0-3.0   Trend Same Same Same   Last Week Total 30 mg 30 mg 30 mg   Next Week Total 30 mg 30 mg 30 mg   Sun 5 mg 5 mg 5 mg   Mon 2.5 mg 2.5 mg 2.5 mg   Tue 5 mg 5 mg 5 mg   Wed 5 mg 5 mg 5 mg   Thu 5 mg 5 mg 5 mg   Fri 2.5 mg 2.5 mg 2.5 mg   Sat 5 mg 5 mg 5 mg   Visit Report - - -   Some recent data might be hidden       Plan:  1. INR is therapeutic today- see above in Anticoagulation Summary.    Demond CROSS Bernarda to continue their warfarin regimen- see above in Anticoagulation Summary.  2. Follow up in 2 weeks  3. Pt has agreed to only be called if INR out of range. They have been instructed to call if any changes in medications, doses, concerns, etc. Patient expresses understanding and has no further questions at this time.    Sheri Aviles

## 2019-10-28 RX ORDER — WARFARIN SODIUM 5 MG/1
TABLET ORAL
Qty: 90 TABLET | Refills: 0 | Status: SHIPPED | OUTPATIENT
Start: 2019-10-28 | End: 2020-01-20

## 2019-10-31 ENCOUNTER — ANTICOAGULATION VISIT (OUTPATIENT)
Dept: PHARMACY | Facility: HOSPITAL | Age: 74
End: 2019-10-31

## 2019-10-31 DIAGNOSIS — I48.0 PAF (PAROXYSMAL ATRIAL FIBRILLATION) (HCC): ICD-10-CM

## 2019-10-31 LAB — INR PPP: 2.8

## 2019-10-31 NOTE — PROGRESS NOTES
Anticoagulation Clinic Progress Note    Anticoagulation Summary  As of 10/31/2019    INR goal:   2.0-3.0   TTR:   87.9 % (10.9 mo)   INR used for dosin.80 (10/31/2019)   Warfarin maintenance plan:   2.5 mg every Mon, Fri; 5 mg all other days   Weekly warfarin total:   30 mg   No change documented:   Silvia Joseph   Plan last modified:   Winter Sanabria RP (2018)   Next INR check:   2019   Priority:   Maintenance   Target end date:       Indications    PAF (paroxysmal atrial fibrillation) (CMS/HCC) [I48.0]             Anticoagulation Episode Summary     INR check location:       Preferred lab:       Send INR reminders to:   [facility]  POOL    Comments:   [place]      Anticoagulation Care Providers     Provider Role Specialty Phone number    David Hernandez MD Referring Cardiology [phone]          Clinic Interview:  No pertinent clinical findings have been reported.    INR History:  Anticoagulation Monitoring 10/3/2019 10/17/2019 10/31/2019   INR 2.60 2.60 2.80   INR Date 10/3/2019 10/17/2019 10/31/2019   INR Goal 2.0-3.0 2.0-3.0 2.0-3.0   Trend Same Same Same   Last Week Total 30 mg 30 mg 30 mg   Next Week Total 30 mg 30 mg 30 mg   Sun 5 mg 5 mg 5 mg   Mon 2.5 mg 2.5 mg 2.5 mg   Tue 5 mg 5 mg 5 mg   Wed 5 mg 5 mg 5 mg   Thu 5 mg 5 mg 5 mg   Fri 2.5 mg 2.5 mg 2.5 mg   Sat 5 mg 5 mg 5 mg   Visit Report - - -   Some recent data might be hidden       Plan:  1. INR is therapeutic today- see above in Anticoagulation Summary.    Demond CROSS Bernarda to continue their warfarin regimen- see above in Anticoagulation Summary.  2. Follow up in 2 weeks  3. Pt has agreed to only be called if INR out of range. They have been instructed to call if any changes in medications, doses, concerns, etc. Patient expresses understanding and has no further questions at this time.    Silvia Joseph

## 2019-11-08 ENCOUNTER — TELEPHONE (OUTPATIENT)
Dept: CARDIOLOGY | Facility: CLINIC | Age: 74
End: 2019-11-08

## 2019-11-08 NOTE — TELEPHONE ENCOUNTER
Pt is scheduled for a colonoscopy 12/3. They are asking that he hold his warfarin 5 days prior to procedure. Pt DOES have a history of stroke (TIA's)...[place]

## 2019-11-12 ENCOUNTER — TELEPHONE (OUTPATIENT)
Dept: GASTROENTEROLOGY | Facility: CLINIC | Age: 74
End: 2019-11-12

## 2019-11-12 NOTE — TELEPHONE ENCOUNTER
I was called by Dr. David Hernandez about potentially stopping the patient's Coumadin prior to doing a colonoscopy on the patient.         The patient last had a colonoscopy on 11/21/2014 because his grandmother had colon cancer and sister had colon polyps.  I found sigmoid diverticulosis and one small cecal polyp that was removed as well as internal hemorrhoids.  The cecal polyp was not cancerous and not precancerous.  I had recommended a repeat colonoscopy in 5 years.       Dr. Hernandez was concerned because the patient has had TIAs, multiple cardiac procedures for atrial fibrillation, and has COPD, etc.  The patient is on Coumadin.  Dr. Hernandez would prefer that the patient not be taken off the Coumadin and that some other screening colonoscopy procedures such as Cologuard be done.       I called the patient to discuss but could only leave a message.        SCHEDULING - please schedule him to come see me in the office so that we can discuss the pros and cons of colonsocopy in him. Tell him that Dr. David Hernandez does not want him to come off of coumadin. He can come see me in the office and we could discuss other options.    Dr. Yaya SANTOS. [email or web address]

## 2019-11-13 PROBLEM — Z86.010 HISTORY OF COLON POLYPS: Status: ACTIVE | Noted: 2019-10-03

## 2019-11-13 PROBLEM — K63.5 COLON POLYP: Status: ACTIVE | Noted: 2019-10-07

## 2019-11-14 ENCOUNTER — ANTICOAGULATION VISIT (OUTPATIENT)
Dept: PHARMACY | Facility: HOSPITAL | Age: 74
End: 2019-11-14

## 2019-11-14 ENCOUNTER — OFFICE VISIT (OUTPATIENT)
Dept: GASTROENTEROLOGY | Facility: CLINIC | Age: 74
End: 2019-11-14

## 2019-11-14 VITALS
DIASTOLIC BLOOD PRESSURE: 70 MMHG | WEIGHT: 213.2 LBS | BODY MASS INDEX: 27.36 KG/M2 | SYSTOLIC BLOOD PRESSURE: 112 MMHG | HEIGHT: 74 IN | TEMPERATURE: 97.7 F

## 2019-11-14 DIAGNOSIS — Z80.0 FH: COLON CANCER: Primary | ICD-10-CM

## 2019-11-14 DIAGNOSIS — Z83.71 FH: COLON POLYPS: ICD-10-CM

## 2019-11-14 DIAGNOSIS — I48.0 PAF (PAROXYSMAL ATRIAL FIBRILLATION) (HCC): ICD-10-CM

## 2019-11-14 LAB — INR PPP: 2.8

## 2019-11-14 PROCEDURE — 99213 OFFICE O/P EST LOW 20 MIN: CPT | Performed by: INTERNAL MEDICINE

## 2019-11-14 NOTE — PROGRESS NOTES
Chief Complaint   Patient presents with   • Colonoscopy       History of Present Illness:   74 y.o. male who sent in paperwork to have an outpatient scheduled colonoscopy.  I had wanted to make sure that it was okay to have him come off Coumadin for 4 days prior to his colonoscopy. I was then called by Dr. David Hernandez about potentially stopping the patient's Coumadin prior to doing a colonoscopy on the patient.  Dr. Hernandez is concerned because he has a fib and has had TIA's       The patient last had a colonoscopy on 11/21/2014 because his grandmother had colon cancer and sister had colon polyps.  I found sigmoid diverticulosis and one small cecal polyp that was removed as well as internal hemorrhoids.  The cecal polyp was not cancerous and not precancerous.  I had recommended a repeat colonoscopy in 5 years.       Dr. Hernandez was concerned because the patient has had TIAs, multiple cardiac procedures for atrial fibrillation, and has COPD, etc.  The patient is on Coumadin.  Dr. Hernanedz would prefer that the patient not be taken off the Coumadin and that some other screening colonoscopy procedures such as Cologuard be done.       I called the patient to discuss but could only leave a message.        SCHEDULING - please schedule him to come see me in the office so that we can discuss the pros and cons of colonsocopy in him. Tell him that Dr. David Hernandez does not want him to come off of coumadin. He can come see me in the office and we could discuss other options.       He feels, NO rectal bleeding or melena. No diarrhea. +constipated. No nauseea or vomting. No fevers, chills, weight stable. NO abdominal or chest pain.     Past Medical History:   Diagnosis Date   • LASHONDA positive 6/18/2018    2017: elevated ds LASHONDA   • Chronic obstructive pulmonary disease (CMS/HCC)    • Diverticulitis of large intestine without perforation or abscess without bleeding 3/7/2016   • Diverticulosis    • ED (erectile dysfunction)    •  Elbow fracture, left    • History of blood transfusion    • Hypogonadism in male    • Kidney cysts    • Left femoral shaft fracture (CMS/HCC)    • Lower back pain    • PAF (paroxysmal atrial fibrillation) (CMS/HCC)    • Peptic ulceration    • Plantar fasciitis 12/5/2017   • Prostatic hypertrophy, benign    • 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   • Transient cerebral ischemia     H/O TIAs       Past Surgical History:   Procedure Laterality Date   • ABLATION OF DYSRHYTHMIC FOCUS  2012, 2013   • ADENOIDECTOMY  1973   • APPENDECTOMY  1973   • BACK SURGERY     • CARDIAC ABLATION  2013, 2014   • CARDIAC CATHETERIZATION  2012, 2013   • COLONOSCOPY  approx 2014    normal per pt    • COLONOSCOPY W/ POLYPECTOMY  11/21/2014    : 1 polyp - not precancerous   • FEMUR FRACTURE SURGERY Left 2007    post fall from the ladder   • LUMBAR LAMINECTOMY  1974    Dr.Lester Lino   • NECK SURGERY      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   • OTHER SURGICAL HISTORY      elbow surgery   • THYROID SURGERY  1986    benign tumor removal,    • TOTAL ELBOW ARTHROPLASTY Left 2007    L, post fall and fracture, hardware in         Current Outpatient Medications:   •  Cetirizine HCl 10 MG capsule, Take 1 capsule by mouth daily., Disp: , Rfl:   •  finasteride (PROSCAR) 5 MG tablet, Take 1 tablet by mouth Daily., Disp: 90 tablet, Rfl: 3  •  fluticasone (FLONASE) 50 MCG/ACT nasal spray, into each nostril daily., Disp: , Rfl:   •  HYDROcodone-acetaminophen (NORCO) 5-325 MG per tablet, Take 1 tablet by mouth Every 8 (Eight) Hours As Needed for Severe Pain ., Disp: 30 tablet, Rfl: 0  •  hydroxychloroquine (PLAQUENIL) 200 MG tablet, Take 200 mg by mouth 2 (Two) Times a Day., Disp: , Rfl:   •  propafenone (RYTHMOL) 150 MG tablet, TAKE 1 TABLET BY MOUTH TWICE A DAY, Disp: 60 tablet, Rfl: 5  •  Psyllium (METAMUCIL PO), Take  by mouth Daily., Disp: , Rfl:   •  warfarin (COUMADIN) 5 MG tablet, Take 1/2 tablet (2.5mg) by mouth on Monday  and Friday and take 1 tablet (5mg) on all other days or as directed, Disp: 80 tablet, Rfl: 0  •  warfarin (COUMADIN) 5 MG tablet, Take one-half tab (2.5mg) on Mon & Fri and take one tab (5mg) all other days or as directed by [facility], Disp: 90 tablet, Rfl: 0    Allergies   Allergen Reactions   • Cardizem [Diltiazem Hcl] Itching   • Grass Other (See Comments)       Family History   Problem Relation Age of Onset   • Hypertension Mother    • Osteoporosis Mother    • Thyroid disease Sister    • Diabetes Sister    • Hypertension Sister    • Colon cancer Maternal Grandmother 60   • Hypertension Father    • Heart disease Other    • Atrial fibrillation Other    • Thyroid disease Other    • Pulmonary fibrosis Sister    • Diabetes Other    • Heart disease Other    • Hypertension Other    • Heart disease Sister        Social History     Socioeconomic History   • Marital status:      Spouse name: Latisha   • Number of children: 2   • Years of education: College   • Highest education level: Not asked   Occupational History   • 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   Social Needs   • Financial resource strain: Patient refused   • Food insecurity:     Worry: Patient refused     Inability: Patient refused   • Transportation needs:     Medical: Patient refused     Non-medical: Patient refused   Tobacco Use   • Smoking status: Former Smoker     Packs/day: 0.50     Years: 15.00     Pack years: 7.50     Types: Cigarettes     Start date: 1959     Last attempt to quit: 1974     Years since quittin.8   • Smokeless tobacco: Never Used   Substance and Sexual Activity   • Alcohol use: Yes     Alcohol/week: 0.6 oz     Types: 6 Glasses of wine per week     Comment: Some beer during the summer months   • Drug use: No   • Sexual activity: Not Currently     Partners: Female     Birth control/protection: None   Social History Narrative    LIVES WITH SPOUSE       Review of Systems   All other systems reviewed and  are negative.      Vitals:    11/14/19 1119   BP: 112/70   Temp: 97.7 °F (36.5 °C)       Physical Exam   Constitutional: He is oriented to person, place, and time. He appears well-developed and well-nourished. No distress.   HENT:   Head: Normocephalic and atraumatic. Hair is normal.   Right Ear: Hearing, tympanic membrane, external ear and ear canal normal.   Left Ear: Hearing, tympanic membrane, external ear and ear canal normal.   Nose: No sinus tenderness or nasal deformity.   Mouth/Throat: Uvula is midline, oropharynx is clear and moist and mucous membranes are normal. No oral lesions. No uvula swelling.   Eyes: Conjunctivae, EOM and lids are normal. Pupils are equal, round, and reactive to light. Right eye exhibits no discharge. Left eye exhibits no discharge. No scleral icterus. Right eye exhibits normal extraocular motion and no nystagmus. Left eye exhibits normal extraocular motion and no nystagmus.   Neck: Normal range of motion. Neck supple. No JVD present. No thyromegaly present.   Cardiovascular: Normal rate, regular rhythm, normal heart sounds, intact distal pulses and normal pulses. Exam reveals no gallop.   No murmur heard.  Pulmonary/Chest: Effort normal and breath sounds normal. No respiratory distress. He has no wheezes. He has no rales. He exhibits no tenderness.   Abdominal: Soft. Bowel sounds are normal. He exhibits no distension and no mass. There is no tenderness. There is no guarding. No hernia.   Genitourinary: Rectal exam shows guaiac negative stool.   Musculoskeletal: Normal range of motion. He exhibits no edema, tenderness or deformity.   Lymphadenopathy:     He has no cervical adenopathy.   Neurological: He is alert and oriented to person, place, and time. He has normal reflexes. He displays normal reflexes. No cranial nerve deficit. He exhibits normal muscle tone. Coordination normal.   Skin: Skin is warm and dry. No rash noted. He is not diaphoretic.   Psychiatric: He has a normal  mood and affect. His behavior is normal. Judgment and thought content normal.   Vitals reviewed.      Demond was seen today for colonoscopy.    Diagnoses and all orders for this visit:    FH: colon cancer  -     FL Barium Enema Air Contrast; Future    FH: colon polyps  -     FL Barium Enema Air Contrast; Future      Assessment:  1.  Family history (grandmother) of colon cancer.  2.  Family history (sister) colon polyps.  3.  Patient is on Coumadin area Dr. David Hernandez does not want him taken off of Coumadin prior to colonoscopy.  4.    Recommendations:  1. ACBE. We discussed tthe pros and cons of doing a colonoscopy on coumadin, doing a BE, ColoGuard, etc?    Return Patient to call for results of BE. .    Henrique Rust MD  11/14/2019

## 2019-11-14 NOTE — PROGRESS NOTES
Anticoagulation Clinic Progress Note    Anticoagulation Summary  As of 2019    INR goal:   2.0-3.0   TTR:   88.4 % (11.4 mo)   INR used for dosin.80 (2019)   Warfarin maintenance plan:   2.5 mg every Mon, Fri; 5 mg all other days   Weekly warfarin total:   30 mg   No change documented:   Sheri Aviles   Plan last modified:   [facility] (2018)   Next INR check:   2019   Priority:   Maintenance   Target end date:       Indications    PAF (paroxysmal atrial fibrillation) (CMS/HCC) [I48.0]             Anticoagulation Episode Summary     INR check location:       Preferred lab:       Send INR reminders to:   [facility]  POOL    Comments:   [place]      Anticoagulation Care Providers     Provider Role Specialty Phone number    David Hernandez MD Referring Cardiology [phone]          Clinic Interview:  No pertinent clinical findings have been reported.    INR History:  Anticoagulation Monitoring 10/17/2019 10/31/2019 2019   INR 2.60 2.80 2.80   INR Date 10/17/2019 10/31/2019 2019   INR Goal 2.0-3.0 2.0-3.0 2.0-3.0   Trend Same Same Same   Last Week Total 30 mg 30 mg 30 mg   Next Week Total 30 mg 30 mg 30 mg   Sun 5 mg 5 mg 5 mg   Mon 2.5 mg 2.5 mg 2.5 mg   Tue 5 mg 5 mg 5 mg   Wed 5 mg 5 mg 5 mg   Thu 5 mg 5 mg 5 mg   Fri 2.5 mg 2.5 mg 2.5 mg   Sat 5 mg 5 mg 5 mg   Visit Report - - -   Some recent data might be hidden       Plan:  1. INR is therapeutic today- see above in Anticoagulation Summary.    Demond CROSS Bernarda to continue their warfarin regimen- see above in Anticoagulation Summary.  2. Follow up in 2 weeks  3. Pt has agreed to only be called if INR out of range. They have been instructed to call if any changes in medications, doses, concerns, etc. Patient expresses understanding and has no further questions at this time.    Sheri Aviles

## 2019-11-25 RX ORDER — PROPAFENONE HYDROCHLORIDE 150 MG/1
TABLET, COATED ORAL
Qty: 180 TABLET | Refills: 1 | Status: SHIPPED | OUTPATIENT
Start: 2019-11-25 | End: 2020-05-26

## 2019-12-02 ENCOUNTER — ANTICOAGULATION VISIT (OUTPATIENT)
Dept: PHARMACY | Facility: HOSPITAL | Age: 74
End: 2019-12-02

## 2019-12-02 DIAGNOSIS — I48.0 PAF (PAROXYSMAL ATRIAL FIBRILLATION) (HCC): ICD-10-CM

## 2019-12-02 LAB — INR PPP: 3

## 2019-12-02 NOTE — PROGRESS NOTES
Anticoagulation Clinic Progress Note    Anticoagulation Summary  As of 12/2/2019    INR goal:   2.0-3.0   TTR:   89.0 % (1 y)   INR used for dosing:   3.00 (12/2/2019)   Warfarin maintenance plan:   2.5 mg every Mon, Fri; 5 mg all other days   Weekly warfarin total:   30 mg   No change documented:   Kera Scanlon   Plan last modified:   [facility] (11/27/2018)   Next INR check:   12/16/2019   Priority:   Maintenance   Target end date:       Indications    PAF (paroxysmal atrial fibrillation) (CMS/HCC) [I48.0]             Anticoagulation Episode Summary     INR check location:       Preferred lab:       Send INR reminders to:   [facility]  POOL    Comments:   [place]      Anticoagulation Care Providers     Provider Role Specialty Phone number    David Hernandez MD Referring Cardiology [phone]          Clinic Interview:  No pertinent clinical findings have been reported.    INR History:  Anticoagulation Monitoring 10/31/2019 11/14/2019 12/2/2019   INR 2.80 2.80 3.00   INR Date 10/31/2019 11/14/2019 12/2/2019   INR Goal 2.0-3.0 2.0-3.0 2.0-3.0   Trend Same Same Same   Last Week Total 30 mg 30 mg 30 mg   Next Week Total 30 mg 30 mg 30 mg   Sun 5 mg 5 mg 5 mg   Mon 2.5 mg 2.5 mg 2.5 mg   Tue 5 mg 5 mg 5 mg   Wed 5 mg 5 mg 5 mg   Thu 5 mg 5 mg 5 mg   Fri 2.5 mg 2.5 mg 2.5 mg   Sat 5 mg 5 mg 5 mg   Visit Report - - -   Some recent data might be hidden       Plan:  1. INR is therapeutic today- see above in Anticoagulation Summary.    Demond CROSS Bernarda to continue their warfarin regimen- see above in Anticoagulation Summary.  2. Follow up in 2 weeks  3. Pt has agreed to only be called if INR out of range. They have been instructed to call if any changes in medications, doses, concerns, etc. Patient expresses understanding and has no further questions at this time.    Kera Scanlon

## 2019-12-03 ENCOUNTER — HOSPITAL ENCOUNTER (OUTPATIENT)
Dept: GENERAL RADIOLOGY | Facility: HOSPITAL | Age: 74
Discharge: HOME | End: 2019-12-03
Admitting: INTERNAL MEDICINE

## 2019-12-03 DIAGNOSIS — Z83.71 FH: COLON POLYPS: ICD-10-CM

## 2019-12-03 DIAGNOSIS — Z80.0 FH: COLON CANCER: ICD-10-CM

## 2019-12-03 PROCEDURE — 74270 X-RAY XM COLON 1CNTRST STD: CPT

## 2019-12-04 ENCOUNTER — PREP FOR SURGERY (OUTPATIENT)
Dept: OTHER | Facility: HOSPITAL | Age: 74
End: 2019-12-04

## 2019-12-04 ENCOUNTER — TELEPHONE (OUTPATIENT)
Dept: CARDIOLOGY | Facility: CLINIC | Age: 74
End: 2019-12-04

## 2019-12-04 DIAGNOSIS — R93.3 ABNORMAL BARIUM ENEMA: Primary | ICD-10-CM

## 2019-12-04 NOTE — PROGRESS NOTES
12/04/19  I talked to the patient and to Dr. Hernandez about this barium enema. Dr. Gould agrees that the benefit of doing a colonoscopy (after stopping coumadin for 4 days prior to the colonoscopy) outweighs the risk. We will schedule him for a colonoscopy. He will need to be off coumadin for 4 days prior to the colonoscopy.     Dr. ODESSA Prieto. [facility]

## 2019-12-04 NOTE — TELEPHONE ENCOUNTER
Pt missed your call yesterday and wants you to call back when you get a chance....[place]    Ph. [phone]

## 2019-12-10 PROBLEM — R93.3 ABNORMAL BARIUM ENEMA: Status: ACTIVE | Noted: 2019-12-10

## 2019-12-12 ENCOUNTER — ANTICOAGULATION VISIT (OUTPATIENT)
Dept: PHARMACY | Facility: HOSPITAL | Age: 74
End: 2019-12-12

## 2019-12-12 DIAGNOSIS — I48.0 PAF (PAROXYSMAL ATRIAL FIBRILLATION) (HCC): ICD-10-CM

## 2019-12-12 LAB — INR PPP: 2.8

## 2019-12-12 NOTE — PROGRESS NOTES
Anticoagulation Clinic Progress Note    Anticoagulation Summary  As of 2019    INR goal:   2.0-3.0   TTR:   89.3 % (1 y)   INR used for dosin.80 (2019)   Warfarin maintenance plan:   2.5 mg every Mon, Fri; 5 mg all other days   Weekly warfarin total:   30 mg   No change documented:   Kera Scanlon   Plan last modified:   [facility] (2018)   Next INR check:   2019   Priority:   Maintenance   Target end date:       Indications    PAF (paroxysmal atrial fibrillation) (CMS/HCC) [I48.0]             Anticoagulation Episode Summary     INR check location:       Preferred lab:       Send INR reminders to:   [facility]  POOL    Comments:   [place]      Anticoagulation Care Providers     Provider Role Specialty Phone number    David Hernandez MD Referring Cardiology [phone]          Clinic Interview:  No pertinent clinical findings have been reported.    INR History:  Anticoagulation Monitoring 2019   INR 2.80 3.00 2.80   INR Date 2019   INR Goal 2.0-3.0 2.0-3.0 2.0-3.0   Trend Same Same Same   Last Week Total 30 mg 30 mg 30 mg   Next Week Total 30 mg 30 mg 30 mg   Sun 5 mg 5 mg 5 mg   Mon 2.5 mg 2.5 mg 2.5 mg   Tue 5 mg 5 mg 5 mg   Wed 5 mg 5 mg 5 mg   Thu 5 mg 5 mg 5 mg   Fri 2.5 mg 2.5 mg 2.5 mg   Sat 5 mg 5 mg 5 mg   Visit Report - - -   Some recent data might be hidden       Plan:  1. INR is therapeutic today- see above in Anticoagulation Summary.    Demond CROSS Bernarda to continue their warfarin regimen- see above in Anticoagulation Summary.  2. Follow up in 2 weeks  3. Pt has agreed to only be called if INR out of range. They have been instructed to call if any changes in medications, doses, concerns, etc. Patient expresses understanding and has no further questions at this time.    Kera Scanlon

## 2019-12-24 LAB — INR PPP: 2.9

## 2019-12-26 ENCOUNTER — ANTICOAGULATION VISIT (OUTPATIENT)
Dept: PHARMACY | Facility: HOSPITAL | Age: 74
End: 2019-12-26

## 2019-12-26 DIAGNOSIS — I48.0 PAF (PAROXYSMAL ATRIAL FIBRILLATION) (HCC): ICD-10-CM

## 2019-12-26 NOTE — PROGRESS NOTES
Anticoagulation Clinic Progress Note    Anticoagulation Summary  As of 2019    INR goal:   2.0-3.0   TTR:   89.6 % (1 y)   INR used for dosin.90 (2019)   Warfarin maintenance plan:   2.5 mg every Mon, Fri; 5 mg all other days   Weekly warfarin total:   30 mg   No change documented:   Sheri Aviles   Plan last modified:   [facility] (2018)   Next INR check:   2020   Priority:   Maintenance   Target end date:       Indications    PAF (paroxysmal atrial fibrillation) (CMS/HCC) [I48.0]             Anticoagulation Episode Summary     INR check location:       Preferred lab:       Send INR reminders to:   [facility]  POOL    Comments:   [place]      Anticoagulation Care Providers     Provider Role Specialty Phone number    David Hernandez MD Referring Cardiology [phone]          Clinic Interview:  No pertinent clinical findings have been reported.    INR History:  Anticoagulation Monitoring 2019   INR 3.00 2.80 2.90   INR Date 2019   INR Goal 2.0-3.0 2.0-3.0 2.0-3.0   Trend Same Same Same   Last Week Total 30 mg 30 mg 30 mg   Next Week Total 30 mg 30 mg 30 mg   Sun 5 mg 5 mg 5 mg   Mon 2.5 mg 2.5 mg 2.5 mg   Tue 5 mg 5 mg 5 mg   Wed 5 mg 5 mg 5 mg   Thu 5 mg 5 mg 5 mg   Fri 2.5 mg 2.5 mg 2.5 mg   Sat 5 mg 5 mg 5 mg   Visit Report - - -   Some recent data might be hidden       Plan:  1. INR is therapeutic today- see above in Anticoagulation Summary.    Demond CROSS Bernarda to continue their warfarin regimen- see above in Anticoagulation Summary.  2. Follow up in 2 weeks  3. Pt has agreed to only be called if INR out of range. They have been instructed to call if any changes in medications, doses, concerns, etc. Patient expresses understanding and has no further questions at this time.    Sheri Aviles

## 2020-01-07 ENCOUNTER — PREP FOR SURGERY (OUTPATIENT)
Dept: OTHER | Facility: HOSPITAL | Age: 75
End: 2020-01-07

## 2020-01-07 ENCOUNTER — TELEPHONE (OUTPATIENT)
Dept: GASTROENTEROLOGY | Facility: CLINIC | Age: 75
End: 2020-01-07

## 2020-01-07 NOTE — TELEPHONE ENCOUNTER
Confer with Dr Rangel.  Case request modified to include EGD .  Message to Dr Rust and Scheduling.

## 2020-01-07 NOTE — TELEPHONE ENCOUNTER
----- Message from Danny Escalante sent at 1/7/2020  4:58 PM EST -----  Regarding: add egd   Contact: [phone]  Pt has a scope tomorrow & is having trouble swallowing wants to add egd with scope for tomorrow

## 2020-01-08 ENCOUNTER — ANESTHESIA (OUTPATIENT)
Dept: GASTROENTEROLOGY | Facility: HOSPITAL | Age: 75
End: 2020-01-08

## 2020-01-08 ENCOUNTER — ANESTHESIA EVENT (OUTPATIENT)
Dept: GASTROENTEROLOGY | Facility: HOSPITAL | Age: 75
End: 2020-01-08

## 2020-01-08 ENCOUNTER — HOSPITAL ENCOUNTER (OUTPATIENT)
Facility: HOSPITAL | Age: 75
Setting detail: HOSPITAL OUTPATIENT SURGERY
Discharge: HOME | End: 2020-01-08
Attending: INTERNAL MEDICINE | Admitting: INTERNAL MEDICINE

## 2020-01-08 VITALS
HEIGHT: 74 IN | HEART RATE: 45 BPM | DIASTOLIC BLOOD PRESSURE: 72 MMHG | RESPIRATION RATE: 16 BRPM | WEIGHT: 208.8 LBS | OXYGEN SATURATION: 98 % | TEMPERATURE: 98 F | SYSTOLIC BLOOD PRESSURE: 152 MMHG | BODY MASS INDEX: 26.8 KG/M2

## 2020-01-08 DIAGNOSIS — R93.3 ABNORMAL BARIUM ENEMA: ICD-10-CM

## 2020-01-08 LAB
INR PPP: 1.51 (ref 0.9–1.1)
PROTHROMBIN TIME: 17.9 SECONDS (ref 11.7–14.2)

## 2020-01-08 PROCEDURE — 87081 CULTURE SCREEN ONLY: CPT | Performed by: INTERNAL MEDICINE

## 2020-01-08 PROCEDURE — 25010000002 PROPOFOL 10 MG/ML EMULSION: Performed by: ANESTHESIOLOGY

## 2020-01-08 PROCEDURE — 43239 EGD BIOPSY SINGLE/MULTIPLE: CPT | Performed by: INTERNAL MEDICINE

## 2020-01-08 PROCEDURE — S0260 H&P FOR SURGERY: HCPCS | Performed by: INTERNAL MEDICINE

## 2020-01-08 PROCEDURE — 85610 PROTHROMBIN TIME: CPT | Performed by: INTERNAL MEDICINE

## 2020-01-08 PROCEDURE — 45385 COLONOSCOPY W/LESION REMOVAL: CPT | Performed by: INTERNAL MEDICINE

## 2020-01-08 PROCEDURE — 88305 TISSUE EXAM BY PATHOLOGIST: CPT | Performed by: INTERNAL MEDICINE

## 2020-01-08 RX ORDER — LIDOCAINE HYDROCHLORIDE 20 MG/ML
INJECTION, SOLUTION INFILTRATION; PERINEURAL AS NEEDED
Status: DISCONTINUED | OUTPATIENT
Start: 2020-01-08 | End: 2020-01-08 | Stop reason: SURG

## 2020-01-08 RX ORDER — SODIUM CHLORIDE, SODIUM LACTATE, POTASSIUM CHLORIDE, CALCIUM CHLORIDE 600; 310; 30; 20 MG/100ML; MG/100ML; MG/100ML; MG/100ML
1000 INJECTION, SOLUTION INTRAVENOUS CONTINUOUS
Status: DISCONTINUED | OUTPATIENT
Start: 2020-01-08 | End: 2020-01-08 | Stop reason: HOSPADM

## 2020-01-08 RX ORDER — PROPOFOL 10 MG/ML
VIAL (ML) INTRAVENOUS AS NEEDED
Status: DISCONTINUED | OUTPATIENT
Start: 2020-01-08 | End: 2020-01-08 | Stop reason: SURG

## 2020-01-08 RX ORDER — SODIUM CHLORIDE 0.9 % (FLUSH) 0.9 %
10 SYRINGE (ML) INJECTION AS NEEDED
Status: DISCONTINUED | OUTPATIENT
Start: 2020-01-08 | End: 2020-01-08 | Stop reason: HOSPADM

## 2020-01-08 RX ORDER — PROPOFOL 10 MG/ML
VIAL (ML) INTRAVENOUS CONTINUOUS PRN
Status: DISCONTINUED | OUTPATIENT
Start: 2020-01-08 | End: 2020-01-08 | Stop reason: SURG

## 2020-01-08 RX ORDER — LIDOCAINE HYDROCHLORIDE 10 MG/ML
0.5 INJECTION, SOLUTION INFILTRATION; PERINEURAL ONCE AS NEEDED
Status: DISCONTINUED | OUTPATIENT
Start: 2020-01-08 | End: 2020-01-08 | Stop reason: HOSPADM

## 2020-01-08 RX ADMIN — SODIUM CHLORIDE, POTASSIUM CHLORIDE, SODIUM LACTATE AND CALCIUM CHLORIDE 1000 ML: 600; 310; 30; 20 INJECTION, SOLUTION INTRAVENOUS at 09:55

## 2020-01-08 RX ADMIN — PROPOFOL 100 MG: 10 INJECTION, EMULSION INTRAVENOUS at 10:34

## 2020-01-08 RX ADMIN — PROPOFOL 140 MCG/KG/MIN: 10 INJECTION, EMULSION INTRAVENOUS at 10:34

## 2020-01-08 RX ADMIN — LIDOCAINE HYDROCHLORIDE 100 MG: 20 INJECTION, SOLUTION INFILTRATION; PERINEURAL at 10:30

## 2020-01-08 NOTE — ANESTHESIA PREPROCEDURE EVALUATION
Anesthesia Evaluation     Patient summary reviewed and Nursing notes reviewed   NPO Solid Status: > 8 hours  NPO Liquid Status: > 2 hours           Airway   Mallampati: II  TM distance: >3 FB  Neck ROM: full  Dental      Pulmonary    (+) a smoker Former, COPD,   Cardiovascular     (+) dysrhythmias Paroxysmal Atrial Fib,       Neuro/Psych  GI/Hepatic/Renal/Endo    (+)  PUD,      Musculoskeletal     Abdominal    Substance History      OB/GYN          Other   arthritis,                      Anesthesia Plan    ASA 2     MAC     intravenous induction     Anesthetic plan, all risks, benefits, and alternatives have been provided, discussed and informed consent has been obtained with: patient.

## 2020-01-08 NOTE — DISCHARGE INSTRUCTIONS
For the next 24 hours patient needs to be with a responsible adult.    For 24 hours DO NOT drive, operate machinery, appliances, drink alcohol, make important decisions or sign legal documents.    Start with a light or bland diet if you are feeling sick to your stomach otherwise advance to regular diet as tolerated.    Follow recommendations on procedure report if provided by your doctor.    Call Dr Rust for problems [phone]    Problems may include but not limited to: large amounts of bleeding, trouble breathing, repeated vomiting, severe unrelieved pain, fever or chills.

## 2020-01-08 NOTE — ANESTHESIA POSTPROCEDURE EVALUATION
Patient: Demond Menchaca    Procedure Summary     Date:  01/08/20 Room / Location:  [facility] / [facility] ENDOSCOPY    Anesthesia Start:  1030 Anesthesia Stop:  1114    Procedures:       COLONOSCOPY to cecum with cold snare biopsies (N/A )      ESOPHAGOGASTRODUODENOSCOPY (N/A Esophagus) Diagnosis:       Abnormal barium enema      (Abnormal barium enema [R93.3])    Surgeon:  Henrique Ruts MD Provider:  Marge Bowie MD    Anesthesia Type:  MAC ASA Status:  2          Anesthesia Type: MAC    Vitals  Vitals Value Taken Time   /72 1/8/2020 11:34 AM   Temp 36.7 °C (98 °F) 1/8/2020 11:14 AM   Pulse 45 1/8/2020 11:34 AM   Resp 16 1/8/2020 11:34 AM   SpO2 98 % 1/8/2020 11:34 AM           Post Anesthesia Care and Evaluation      Comments: Pt discharged without being seen by an Anesthesiologist.  THIS CASE IS NOT MEDICALLY DIRECTED.

## 2020-01-09 PROBLEM — K63.5 COLON POLYP: Status: RESOLVED | Noted: 2019-10-07 | Resolved: 2020-01-09

## 2020-01-09 PROBLEM — B37.81 CANDIDA ESOPHAGITIS (HCC): Status: ACTIVE | Noted: 2020-01-09

## 2020-01-09 LAB
CYTO UR: NORMAL
LAB AP CASE REPORT: NORMAL
PATH REPORT.FINAL DX SPEC: NORMAL
PATH REPORT.GROSS SPEC: NORMAL
UREASE TISS QL: NEGATIVE

## 2020-01-09 NOTE — H&P
"[facility]  Pre Procedure History & Physical    Chief Complaint:   75 yo male with dysphagia. He takes Flonase. He also has a personal history of colonic polyps, his grandmother had colon cancer, his sisster had  colon polyps and he had a barium enema that showed \"filling defects\".    Subjective     HPI:   74 y.o. male with dysphagia. He takes Flonase. He also has a personal history of colonic polyps, his grandmother had colon cancer, his sisster had  colon polyps and he had a barium enema that showed \"filling defects\".        Past Medical History:   Past Medical History:   Diagnosis Date   • LASHONDA positive 6/18/2018    2017: elevated ds LASHONDA   • Chronic obstructive pulmonary disease (CMS/HCC)    • Colon polyp 10/7/2019    Added automatically from request for surgery [id number]   • Diverticulitis of large intestine without perforation or abscess without bleeding 3/7/2016   • Diverticulosis    • ED (erectile dysfunction)    • Elbow fracture, left    • History of blood transfusion    • Hypogonadism in male    • Kidney cysts    • Left femoral shaft fracture (CMS/HCC)    • Lower back pain    • PAF (paroxysmal atrial fibrillation) (CMS/HCC)    • Peptic ulceration    • Plantar fasciitis 12/5/2017   • Prostatic hypertrophy, benign    • 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 His smoking use included cigarettes. He started " "smoking about 61 years ago. He has a 7.50 pack-year smoking history. He has never used smokeless tobacco. He reports that he drinks about 1.0 standard drinks of alcohol per week. He reports that he does not use drugs.    Medications:   No medications prior to admission.       Allergies:  Cardizem [diltiazem hcl] and Grass    ROS:    Pertinent items are noted in HPI     Objective     Blood pressure 152/72, pulse (!) 45, temperature 98 °F (36.7 °C), temperature source Oral, resp. rate 16, height 188 cm (74\"), weight 94.7 kg (208 lb 12.8 oz), SpO2 98 %.    Physical Exam   Constitutional: Pt is oriented to person, place, and time and well-developed, well-nourished, and in no distress.   HENT:   Mouth/Throat: Oropharynx is clear and moist.   Neck: Normal range of motion. Neck supple.   Cardiovascular: Normal rate, regular rhythm and normal heart sounds.    Pulmonary/Chest: Effort normal and breath sounds normal. No respiratory distress. No  wheezes.   Abdominal: Soft. Bowel sounds are normal.   Skin: Skin is warm and dry.   Psychiatric: Mood, memory, affect and judgment normal.     Assessment/Plan     Diagnosis:  74 y.o. male with dysphagia. He takes Flonase. He also has a personal history of colonic polyps, his grandmother had colon cancer, his sisster had  colon polyps and he had a barium enema that showed \"filling defects\".    Anticipated Surgical Procedure:  EGD and Colonoscopy    The risks, benefits, and alternatives of this procedure have been discussed with the patient or the responsible party- the patient understands and agrees to proceed.                                                                "

## 2020-01-09 NOTE — PROGRESS NOTES
Please call patient: Test for the stomach bacteria, that may cause cancer and ulcers, is negative.  Good news!

## 2020-01-13 NOTE — PROGRESS NOTES
01/13/20  Tell him that pathology from the EGD did show fungal esophagitis (that I think is caused by Flonase use).  When he is finished with the nystatin swish and swallow I would have him take Diflucan 100 mg 1 p.o. daily for 2 weeks I would have him talk to whichever doctor has him on Flonase 2 make sure they know that he has been diagnosed with Candida esophagitis.       The colon polyps that were removed were not cancerous but one was precancerous.  I hope he tolerated the colonoscopy well.  In a perfect world I would repeat colonoscopy in 2 years because of his history of precancerous polyps and because he only had a fair colonic preparation during this last colonoscopy.  In 2 years we could at least talk about the pros and cons of a colonoscopy.       Please fax a copy of this report to Dr. Stephenson and to Dr. David Hernandez.  [facility]. Formerly [facility], [facility]

## 2020-01-15 ENCOUNTER — TELEPHONE (OUTPATIENT)
Dept: GASTROENTEROLOGY | Facility: CLINIC | Age: 75
End: 2020-01-15

## 2020-01-15 NOTE — TELEPHONE ENCOUNTER
----- Message from Henrique Rust MD sent at 1/13/2020  7:12 AM EST -----  01/13/20  Tell him that pathology from the EGD did show fungal esophagitis (that I think is caused by Flonase use).  When he is finished with the nystatin swish and swallow I would have him take Diflucan 100 mg 1 p.o. daily for 2 weeks I would have him talk to whichever doctor has him on Flonase 2 make sure they know that he has been diagnosed with Candida esophagitis.       The colon polyps that were removed were not cancerous but one was precancerous.  I hope he tolerated the colonoscopy well.  In a perfect world I would repeat colonoscopy in 2 years because of his history of precancerous polyps and because he only had a fair colonic preparation during this last colonoscopy.  In 2 years we could at least talk about the pros and cons of a colonoscopy.       Please fax a copy of this report to Dr. Stephenson and to Dr. David Hernandez.  Thx. kjh      **Call to pt.  Advise of above.  Verb understanding - states has stopped flonase.  O/v for 1/8/22 placed in recall.  Message to Dr Nadeen Stephenson and Dr David Hernandez.  Florence Davenport RN.

## 2020-01-15 NOTE — TELEPHONE ENCOUNTER
Attempt escribe for diflucan 100 mg per order DR Rust, #14, R0.   Interaction noted with Warfarin - can increase risk of bleeding.      Call to pt - confirms is still on Warfarin  Message to Dr Rust.

## 2020-01-15 NOTE — TELEPHONE ENCOUNTER
Do not prescribe the Diflucan. Have him finish the Nystatin swish and swallow. That should be enough to cure the fungal esophagitis. Have him f/u with me in 3 mos and we can see how he is doing then. thx.kjh

## 2020-01-16 ENCOUNTER — ANTICOAGULATION VISIT (OUTPATIENT)
Dept: PHARMACY | Facility: HOSPITAL | Age: 75
End: 2020-01-16

## 2020-01-16 DIAGNOSIS — I48.0 PAF (PAROXYSMAL ATRIAL FIBRILLATION) (HCC): ICD-10-CM

## 2020-01-16 LAB — INR PPP: 1.7

## 2020-01-16 NOTE — TELEPHONE ENCOUNTER
Call to pt.  Advise per Dr Rust that will not prescribe diflucan. Finish the nystatin swish and swallow.  That should be enough to cure the fungal esophagitis.     Verb understanding.  F/u appt scheduled with Dr Rust for 4/6 @ 9:30 am.

## 2020-01-16 NOTE — PROGRESS NOTES
Anticoagulation Clinic Progress Note    Anticoagulation Summary  As of 2020    INR goal:   2.0-3.0   TTR:   87.0 % (1.1 y)   INR used for dosin.70! (2020)   Warfarin maintenance plan:   2.5 mg every Mon, Fri; 5 mg all other days   Weekly warfarin total:   30 mg   Plan last modified:   Winter Sanabria RPH (2018)   Next INR check:   2020   Priority:   Maintenance   Target end date:       Indications    PAF (paroxysmal atrial fibrillation) (CMS/HCC) [I48.0]             Anticoagulation Episode Summary     INR check location:       Preferred lab:       Send INR reminders to:    [facility]  POOL    Comments:   [place]      Anticoagulation Care Providers     Provider Role Specialty Phone number    David Hernandez MD Referring Cardiology [phone]            Clinic Interview:  Patient Findings     Positives:   Missed doses, Other complaints    Negatives:   Signs/symptoms of thrombosis, Signs/symptoms of bleeding,   Laboratory test error suspected, Change in health, Change in alcohol use,   Change in activity, Upcoming invasive procedure, Emergency department   visit, Upcoming dental procedure, Extra doses, Change in medications,   Change in diet/appetite, Hospital admission, Bruising    Comments:   Colonscopy ; held warfarin x 4 days; resumed usual dose   later that evening.      Clinical Outcomes     Negatives:   Major bleeding event, Thromboembolic event,   Anticoagulation-related hospital admission, Anticoagulation-related ED   visit, Anticoagulation-related fatality    Comments:   Colonscopy ; held warfarin x 4 days; resumed usual dose   later that evening.        INR History:  Anticoagulation Monitoring 2019   INR 2.80 2.90 1.70   INR Date 2019   INR Goal 2.0-3.0 2.0-3.0 2.0-3.0   Trend Same Same Same   Last Week Total 30 mg 30 mg 30 mg   Next Week Total 30 mg 30 mg 32.5 mg   Sun 5 mg 5 mg 5 mg   Mon 2.5 mg 2.5 mg 2.5  mg   Tue 5 mg 5 mg 5 mg   Wed 5 mg 5 mg 5 mg   Thu 5 mg 5 mg 5 mg   Fri 2.5 mg 2.5 mg 5 mg (1/17); Otherwise 2.5 mg   Sat 5 mg 5 mg 5 mg   Visit Report - - -   Some recent data might be hidden       Plan:  1. INR is Subtherapeutic today- see above in Anticoagulation Summary.   Will instruct Demond Menchaca to Change their warfarin regimen- see above in Anticoagulation Summary.  2. Follow up in 2 weeks  3. They have been instructed to call if any changes in medications, doses, concerns, etc. Patient expresses understanding and has no further questions at this time.    Philip Carmona [facility]

## 2020-01-16 NOTE — TELEPHONE ENCOUNTER
Please call patient.  He has irritation in the esophagus caused by the fungus, yeast, and this is most likely due to the use of steroid spray.  Dr. Rust originally wanted to treat him with oral Diflucan, but backed off as Diflucan that may interfere with warfarin.  We could still try to use Diflucan, but he will have to have more often INR checks and he will have to tell anticoagulation clinic people that his medication had changed.  If he is interested I could give him Diflucan to take daily for a week.  That would be more effective for the esophagus irritation.  Please let me know what patient would prefer

## 2020-01-17 NOTE — TELEPHONE ENCOUNTER
Patient states he would like to hold off on diflucan as the nystatin has seemed to help. He will let us know if he would like diflucan in the future

## 2020-01-20 RX ORDER — WARFARIN SODIUM 5 MG/1
TABLET ORAL
Qty: 90 TABLET | Refills: 0 | Status: SHIPPED | OUTPATIENT
Start: 2020-01-20 | End: 2020-05-11

## 2020-01-24 ENCOUNTER — OFFICE VISIT (OUTPATIENT)
Dept: INTERNAL MEDICINE | Facility: CLINIC | Age: 75
End: 2020-01-24

## 2020-01-24 VITALS
DIASTOLIC BLOOD PRESSURE: 80 MMHG | BODY MASS INDEX: 27.48 KG/M2 | WEIGHT: 214 LBS | SYSTOLIC BLOOD PRESSURE: 122 MMHG | TEMPERATURE: 98.1 F

## 2020-01-24 DIAGNOSIS — R35.0 URINARY FREQUENCY: ICD-10-CM

## 2020-01-24 DIAGNOSIS — N41.9 PROSTATITIS, UNSPECIFIED PROSTATITIS TYPE: Primary | ICD-10-CM

## 2020-01-24 DIAGNOSIS — N53.12 PAINFUL EJACULATION: ICD-10-CM

## 2020-01-24 LAB
BACTERIA UR QL AUTO: ABNORMAL /HPF
BILIRUB UR QL STRIP: NEGATIVE
CLARITY UR: CLEAR
COLOR UR: YELLOW
GLUCOSE UR STRIP-MCNC: NEGATIVE MG/DL
HGB UR QL STRIP.AUTO: ABNORMAL
HYALINE CASTS UR QL AUTO: ABNORMAL /LPF
KETONES UR QL STRIP: NEGATIVE
LEUKOCYTE ESTERASE UR QL STRIP.AUTO: NEGATIVE
MUCOUS THREADS URNS QL MICRO: ABNORMAL /HPF
NITRITE UR QL STRIP: NEGATIVE
PH UR STRIP.AUTO: 5.5 [PH] (ref 5–8)
PROT UR QL STRIP: NEGATIVE
RBC # UR: ABNORMAL /HPF
REF LAB TEST METHOD: ABNORMAL
SP GR UR STRIP: 1.02 (ref 1–1.03)
SQUAMOUS #/AREA URNS HPF: ABNORMAL /HPF
UROBILINOGEN UR QL STRIP: ABNORMAL
WBC UR QL AUTO: ABNORMAL /HPF

## 2020-01-24 PROCEDURE — 81001 URINALYSIS AUTO W/SCOPE: CPT | Performed by: NURSE PRACTITIONER

## 2020-01-24 PROCEDURE — 99213 OFFICE O/P EST LOW 20 MIN: CPT | Performed by: NURSE PRACTITIONER

## 2020-01-24 RX ORDER — SULFAMETHOXAZOLE AND TRIMETHOPRIM 800; 160 MG/1; MG/1
1 TABLET ORAL 2 TIMES DAILY
Qty: 20 TABLET | Refills: 0 | Status: SHIPPED | OUTPATIENT
Start: 2020-01-24 | End: 2020-02-03

## 2020-01-24 NOTE — PROGRESS NOTES
Subjective     Demond Menchaca is a 75 y.o. male.         Patient presents with:  Urinary Frequency  Painful ejaculation for many years but has worsened lately        Urinary Frequency    This is a new problem. The current episode started more than 1 month ago. There has been no fever. Associated symptoms include a discharge (yellow), frequency, hesitancy and urgency. Pertinent negatives include no chills, flank pain, hematuria, nausea or vomiting. He has tried increased fluids for the symptoms. The treatment provided no relief.        The following portions of the patient's history were reviewed and updated as appropriate: allergies, current medications, past social history and problem list.    Review of Systems   Constitutional: Negative for chills.   Respiratory: Negative for shortness of breath.    Cardiovascular: Negative for chest pain.   Gastrointestinal: Negative for nausea and vomiting.   Genitourinary: Positive for discharge, dysuria, frequency, hesitancy, penile pain (with ejaculation) and urgency. Negative for flank pain, hematuria and testicular pain.     Current Outpatient Medications on File Prior to Visit   Medication Sig Dispense Refill   • Cetirizine HCl 10 MG capsule Take 1 capsule by mouth daily.     • finasteride (PROSCAR) 5 MG tablet Take 1 tablet by mouth Daily. 90 tablet 3   • HYDROcodone-acetaminophen (NORCO) 5-325 MG per tablet Take 1 tablet by mouth Every 8 (Eight) Hours As Needed for Severe Pain . 30 tablet 0   • hydroxychloroquine (PLAQUENIL) 200 MG tablet Take 200 mg by mouth 2 (Two) Times a Day.     • propafenone (RYTHMOL) 150 MG tablet TAKE 1 TABLET BY MOUTH TWICE A  tablet 1   • Psyllium (METAMUCIL PO) Take  by mouth Daily.     • warfarin (COUMADIN) 5 MG tablet Take one-half tablet (2.5mg) by mouth on Mon & Fri and take one tablet (5mg) on all other days or as directed by [facility] 90 tablet 0   • [DISCONTINUED] fluticasone (FLONASE) 50 MCG/ACT nasal spray into  each nostril daily.       No current facility-administered medications on file prior to visit.          Objective     /80 (BP Location: Left arm, Patient Position: Sitting, Cuff Size: Adult)   Temp 98.1 °F (36.7 °C)   Wt 97.1 kg (214 lb)   BMI 27.48 kg/m²     Physical Exam   Constitutional: He appears well-developed and well-nourished.   HENT:   Head: Normocephalic and atraumatic.   Cardiovascular: Normal rate, regular rhythm and normal heart sounds.   No murmur heard.  Pulmonary/Chest: Effort normal and breath sounds normal. No respiratory distress.   Musculoskeletal: Normal range of motion.   Neurological: He is alert.   Skin: Skin is warm and dry.   Psychiatric: He has a normal mood and affect. His behavior is normal. Judgment and thought content normal.   Vitals reviewed.      Assessment/Plan     Demond was seen today for urinary frequency and painful ejaculation.    Diagnoses and all orders for this visit:    Prostatitis, unspecified prostatitis type  -     sulfamethoxazole-trimethoprim (BACTRIM DS,SEPTRA DS) 800-160 MG per tablet; Take 1 tablet by mouth 2 (Two) Times a Day for 10 days.  -     Ambulatory Referral to Urology    Urinary frequency  -     Urinalysis With Culture If Indicated -; Future  -     Urinalysis With Culture If Indicated - Urine, Clean Catch  -     Urinalysis, Microscopic Only - Urine, Clean Catch; Future  -     Urinalysis, Microscopic Only - Urine, Clean Catch  -     Ambulatory Referral to Urology    Painful ejaculation  -     Ambulatory Referral to Urology    UA-only trace of blood, discussed with pt.    Will treat as prostatitis but I would like him to see urology since his painful ejaculation has been occurring for years.    He will call and let his cardiologst know that he has started Bactrim. Pt counseled on Bactrim increasing warfarin levels. He monitors INRs at home and reports to his cardiologist.    Return for worsening of sx.

## 2020-01-28 ENCOUNTER — TELEPHONE (OUTPATIENT)
Dept: INTERNAL MEDICINE | Facility: CLINIC | Age: 75
End: 2020-01-28

## 2020-01-28 NOTE — TELEPHONE ENCOUNTER
Please call and let pharmacy know to discontinue Bactrim. I do not want him to have another abx. He is being sent to urology. I have called pt lucretiaf M to return call but I did mention that I did not want him to take the abx. If he calls back you can relay this message to him.

## 2020-01-28 NOTE — TELEPHONE ENCOUNTER
Pharmacy called because there was and order placed for bactrim but the pt is on Warfarin,  The two drugs cannot interact so pharmacy  Rep shelene [phone] would like to know if another antibiotic can be called in

## 2020-01-29 NOTE — TELEPHONE ENCOUNTER
Pt returning phone call. Did advise him to not take antibiotic however he was still a bit confused. Will be reachable at   [phone]

## 2020-01-30 LAB — INR PPP: 3

## 2020-01-31 ENCOUNTER — ANTICOAGULATION VISIT (OUTPATIENT)
Dept: PHARMACY | Facility: HOSPITAL | Age: 75
End: 2020-01-31

## 2020-01-31 DIAGNOSIS — I48.0 PAF (PAROXYSMAL ATRIAL FIBRILLATION) (HCC): ICD-10-CM

## 2020-01-31 NOTE — PROGRESS NOTES
Anticoagulation Clinic Progress Note    Anticoagulation Summary  As of 1/31/2020    INR goal:   2.0-3.0   TTR:   86.7 % (1.1 y)   INR used for dosing:   3.00 (1/30/2020)   Warfarin maintenance plan:   2.5 mg every Mon, Fri; 5 mg all other days   Weekly warfarin total:   30 mg   No change documented:   Crystal Pineda RP   Plan last modified:   Winter Sanabria RPH (11/27/2018)   Next INR check:   2/14/2020   Priority:   Maintenance   Target end date:       Indications    PAF (paroxysmal atrial fibrillation) (CMS/HCC) [I48.0]             Anticoagulation Episode Summary     INR check location:       Preferred lab:       Send INR reminders to:   [facility]  POOL    Comments:   [place]      Anticoagulation Care Providers     Provider Role Specialty Phone number    David Hernandez MD Referring Cardiology [phone]          Clinic Interview:  No pertinent clinical findings have been reported.    INR History:  Anticoagulation Monitoring 12/26/2019 1/16/2020 1/31/2020   INR 2.90 1.70 3.00   INR Date 12/24/2019 1/16/2020 1/30/2020   INR Goal 2.0-3.0 2.0-3.0 2.0-3.0   Trend Same Same Same   Last Week Total 30 mg 30 mg 30 mg   Next Week Total 30 mg 32.5 mg 30 mg   Sun 5 mg 5 mg 5 mg   Mon 2.5 mg 2.5 mg 2.5 mg   Tue 5 mg 5 mg 5 mg   Wed 5 mg 5 mg 5 mg   Thu 5 mg 5 mg 5 mg   Fri 2.5 mg 5 mg (1/17); Otherwise 2.5 mg 2.5 mg   Sat 5 mg 5 mg 5 mg   Visit Report - - -   Some recent data might be hidden       Plan:  1. INR is therapeutic today- see above in Anticoagulation Summary.    Demond CROSS Bernarda to continue their warfarin regimen- see above in Anticoagulation Summary.  2. Follow up in 2 weeks  3. Pt has agreed to only be called if INR out of range. They have been instructed to call if any changes in medications, doses, concerns, etc. Patient expresses understanding and has no further questions at this time.    Crystal Pineda RP

## 2020-02-13 ENCOUNTER — ANTICOAGULATION VISIT (OUTPATIENT)
Dept: PHARMACY | Facility: HOSPITAL | Age: 75
End: 2020-02-13

## 2020-02-13 DIAGNOSIS — I48.0 PAF (PAROXYSMAL ATRIAL FIBRILLATION) (HCC): ICD-10-CM

## 2020-02-13 LAB — INR PPP: 3

## 2020-02-13 NOTE — PROGRESS NOTES
Anticoagulation Clinic Progress Note    Anticoagulation Summary  As of 2/13/2020    INR goal:   2.0-3.0   TTR:   87.1 % (1.2 y)   INR used for dosing:   3.00 (2/13/2020)   Warfarin maintenance plan:   2.5 mg every Mon, Fri; 5 mg all other days   Weekly warfarin total:   30 mg   No change documented:   Kera Scanlon   Plan last modified:   Winter Sanabria [facility] (11/27/2018)   Next INR check:   2/27/2020   Priority:   Maintenance   Target end date:       Indications    PAF (paroxysmal atrial fibrillation) (CMS/HCC) [I48.0]             Anticoagulation Episode Summary     INR check location:       Preferred lab:       [facility]    Comments:   [place]      Anticoagulation Care Providers     Provider Role Specialty Phone number    David Hernandez MD Referring Cardiology [phone]          Clinic Interview:  No pertinent clinical findings have been reported.    INR History:  Anticoagulation Monitoring 1/16/2020 1/31/2020 2/13/2020   INR 1.70 3.00 3.00   INR Date 1/16/2020 1/30/2020 2/13/2020   INR Goal 2.0-3.0 2.0-3.0 2.0-3.0   Trend Same Same Same   Last Week Total 30 mg 30 mg 30 mg   Next Week Total 32.5 mg 30 mg 30 mg   Sun 5 mg 5 mg 5 mg   Mon 2.5 mg 2.5 mg 2.5 mg   Tue 5 mg 5 mg 5 mg   Wed 5 mg 5 mg 5 mg   Thu 5 mg 5 mg 5 mg   Fri 5 mg (1/17); Otherwise 2.5 mg 2.5 mg 2.5 mg   Sat 5 mg 5 mg 5 mg   Visit Report - - -   Some recent data might be hidden       Plan:  1. INR is therapeutic today- see above in Anticoagulation Summary.    Demond CROSS Bernarda to continue their warfarin regimen- see above in Anticoagulation Summary.  2. Follow up in 2 weeks  3. Pt has agreed to only be called if INR out of range. They have been instructed to call if any changes in medications, doses, concerns, etc. Patient expresses understanding and has no further questions at this time.    Kera Scanlon

## 2020-02-17 RX ORDER — FINASTERIDE 5 MG/1
TABLET, FILM COATED ORAL
Qty: 90 TABLET | Refills: 2 | Status: SHIPPED | OUTPATIENT
Start: 2020-02-17 | End: 2023-01-30 | Stop reason: ALTCHOICE

## 2020-02-27 ENCOUNTER — ANTICOAGULATION VISIT (OUTPATIENT)
Dept: PHARMACY | Facility: HOSPITAL | Age: 75
End: 2020-02-27

## 2020-02-27 DIAGNOSIS — I48.0 PAF (PAROXYSMAL ATRIAL FIBRILLATION) (HCC): ICD-10-CM

## 2020-02-27 LAB — INR PPP: 2.8

## 2020-03-12 ENCOUNTER — ANTICOAGULATION VISIT (OUTPATIENT)
Dept: PHARMACY | Facility: HOSPITAL | Age: 75
End: 2020-03-12

## 2020-03-12 DIAGNOSIS — I48.0 PAF (PAROXYSMAL ATRIAL FIBRILLATION) (HCC): ICD-10-CM

## 2020-03-12 LAB — INR PPP: 2.8

## 2020-03-12 NOTE — PROGRESS NOTES
Anticoagulation Clinic Progress Note    Anticoagulation Summary  As of 3/12/2020    INR goal:   2.0-3.0   TTR:   87.9 % (1.3 y)   INR used for dosin.80 (3/12/2020)   Warfarin maintenance plan:   2.5 mg every Mon, Fri; 5 mg all other days   Weekly warfarin total:   30 mg   No change documented:   Kera Scanlon   Plan last modified:   Winter Sanabria [facility] (2018)   Next INR check:   3/26/2020   Priority:   Maintenance   Target end date:       Indications    PAF (paroxysmal atrial fibrillation) (CMS/HCC) [I48.0]             Anticoagulation Episode Summary     INR check location:       Preferred lab:       [facility]    Comments:   [place]      Anticoagulation Care Providers     Provider Role Specialty Phone number    David Hernandez MD Referring Cardiology [phone]          Clinic Interview:  No pertinent clinical findings have been reported.    INR History:  Anticoagulation Monitoring 2020 2020 3/12/2020   INR 3.00 2.80 2.80   INR Date 2020 2020 3/12/2020   INR Goal 2.0-3.0 2.0-3.0 2.0-3.0   Trend Same Same Same   Last Week Total 30 mg 30 mg 30 mg   Next Week Total 30 mg 30 mg 30 mg   Sun 5 mg 5 mg 5 mg   Mon 2.5 mg 2.5 mg 2.5 mg   Tue 5 mg 5 mg 5 mg   Wed 5 mg 5 mg 5 mg   Thu 5 mg 5 mg 5 mg   Fri 2.5 mg 2.5 mg 2.5 mg   Sat 5 mg 5 mg 5 mg   Visit Report - - -   Some recent data might be hidden       Plan:  1. INR is therapeutic today- see above in Anticoagulation Summary.    Demond CROSS Bernarda to continue their warfarin regimen- see above in Anticoagulation Summary.  2. Follow up in 2 weeks  3. Pt has agreed to only be called if INR out of range. They have been instructed to call if any changes in medications, doses, concerns, etc. Patient expresses understanding and has no further questions at this time.    Kera Scanlon

## 2020-03-26 ENCOUNTER — ANTICOAGULATION VISIT (OUTPATIENT)
Dept: PHARMACY | Facility: HOSPITAL | Age: 75
End: 2020-03-26

## 2020-03-26 DIAGNOSIS — I48.0 PAF (PAROXYSMAL ATRIAL FIBRILLATION) (HCC): ICD-10-CM

## 2020-03-26 LAB — INR PPP: 3.3

## 2020-03-26 NOTE — PROGRESS NOTES
Anticoagulation Clinic Progress Note    Anticoagulation Summary  As of 3/26/2020    INR goal:   2.0-3.0   TTR:   86.5 % (1.3 y)   INR used for dosing:   3.30! (3/26/2020)   Warfarin maintenance plan:   2.5 mg every Mon, Fri; 5 mg all other days   Weekly warfarin total:   30 mg   Plan last modified:   Witner Sanabria RPH (11/27/2018)   Next INR check:   4/9/2020   Priority:   Maintenance   Target end date:       Indications    PAF (paroxysmal atrial fibrillation) (CMS/HCC) [I48.0]             Anticoagulation Episode Summary     INR check location:       Preferred lab:       Send INR reminders to:    [facility]  POOL    Comments:   [place]      Anticoagulation Care Providers     Provider Role Specialty Phone number    David Hernandez MD Referring Cardiology [phone]          Drug interactions: has remained unchanged.  Diet: has remained unchanged.    Clinic Interview:  Patient Findings     Positives:   Change in diet/appetite    Negatives:   Signs/symptoms of thrombosis, Signs/symptoms of bleeding,   Laboratory test error suspected, Change in health, Change in alcohol use,   Change in activity, Upcoming invasive procedure, Emergency department   visit, Upcoming dental procedure, Missed doses, Extra doses, Change in   medications, Hospital admission, Bruising, Other complaints    Comments:   Notes he had a few glasses of wine last night at dinner       Clinical Outcomes     Negatives:   Major bleeding event, Thromboembolic event,   Anticoagulation-related hospital admission, Anticoagulation-related ED   visit, Anticoagulation-related fatality    Comments:   Notes he had a few glasses of wine last night at dinner         INR History:  Anticoagulation Monitoring 2/27/2020 3/12/2020 3/26/2020   INR 2.80 2.80 3.30   INR Date 2/27/2020 3/12/2020 3/26/2020   INR Goal 2.0-3.0 2.0-3.0 2.0-3.0   Trend Same Same Same   Last Week Total 30 mg 30 mg 30 mg   Next Week Total 30 mg 30 mg 30 mg   Sun 5 mg 5 mg 5 mg    Mon 2.5 mg 2.5 mg 2.5 mg   Tue 5 mg 5 mg 5 mg   Wed 5 mg 5 mg 5 mg   Thu 5 mg 5 mg 5 mg   Fri 2.5 mg 2.5 mg 2.5 mg   Sat 5 mg 5 mg 5 mg   Visit Report - - -   Some recent data might be hidden       Plan:  1. INR is Supratherapeutic today- see above in Anticoagulation Summary.   Will instruct Demond Menchaca to Continue their warfarin regimen- see above in Anticoagulation Summary.  2. Follow up in 2 weeks  3. Pt has agreed to only be called if INR out of range. They have been instructed to call if any changes in medications, doses, concerns, etc. Patient expresses understanding and has no further questions at this time.    Giuliano Anthony, PharmD

## 2020-04-09 ENCOUNTER — ANTICOAGULATION VISIT (OUTPATIENT)
Dept: PHARMACY | Facility: HOSPITAL | Age: 75
End: 2020-04-09

## 2020-04-09 DIAGNOSIS — I48.0 PAF (PAROXYSMAL ATRIAL FIBRILLATION) (HCC): ICD-10-CM

## 2020-04-09 LAB — INR PPP: 2.4

## 2020-04-09 NOTE — PROGRESS NOTES
Anticoagulation Clinic Progress Note    Anticoagulation Summary  As of 2020    INR goal:   2.0-3.0   TTR:   85.9 % (1.3 y)   INR used for dosin.40 (2020)   Warfarin maintenance plan:   2.5 mg every Mon, Fri; 5 mg all other days   Weekly warfarin total:   30 mg   Plan last modified:   Winter Sanabria RP (2018)   Next INR check:   2020   Priority:   Maintenance   Target end date:       Indications    PAF (paroxysmal atrial fibrillation) (CMS/HCC) [I48.0]             Anticoagulation Episode Summary     INR check location:       Preferred lab:       Send INR reminders to:    [facility]  POOL    Comments:   [place]      Anticoagulation Care Providers     Provider Role Specialty Phone number    David Hernandez MD Referring Cardiology [phone]          Clinic Interview:  No pertinent clinical findings have been reported.    INR History:  Anticoagulation Monitoring 3/12/2020 3/26/2020 2020   INR 2.80 3.30 2.40   INR Date 3/12/2020 3/26/2020 2020   INR Goal 2.0-3.0 2.0-3.0 2.0-3.0   Trend Same Same Same   Last Week Total 30 mg 30 mg 30 mg   Next Week Total 30 mg 30 mg 30 mg   Sun 5 mg 5 mg 5 mg   Mon 2.5 mg 2.5 mg 2.5 mg   Tue 5 mg 5 mg 5 mg   Wed 5 mg 5 mg 5 mg   Thu 5 mg 5 mg 5 mg   Fri 2.5 mg 2.5 mg 2.5 mg   Sat 5 mg 5 mg 5 mg   Visit Report - - -   Some recent data might be hidden       Plan:  1. INR is therapeutic today- see above in Anticoagulation Summary.    Demond CROSS Bernarda to continue their warfarin regimen- see above in Anticoagulation Summary.  2. Follow up in 2 weeks  3. Pt has agreed to only be called if INR out of range. They have been instructed to call if any changes in medications, doses, concerns, etc. Patient expresses understanding and has no further questions at this time.    Danika Sarkar [facility]

## 2020-04-28 ENCOUNTER — APPOINTMENT (OUTPATIENT)
Dept: LAB | Facility: HOSPITAL | Age: 75
End: 2020-04-28

## 2020-04-30 ENCOUNTER — ANTICOAGULATION VISIT (OUTPATIENT)
Dept: PHARMACY | Facility: HOSPITAL | Age: 75
End: 2020-04-30

## 2020-04-30 DIAGNOSIS — I48.0 PAF (PAROXYSMAL ATRIAL FIBRILLATION) (HCC): ICD-10-CM

## 2020-04-30 LAB — INR PPP: 2.8

## 2020-04-30 NOTE — PROGRESS NOTES
Anticoagulation Clinic Progress Note    Anticoagulation Summary  As of 2020    INR goal:   2.0-3.0   TTR:   86.5 % (1.4 y)   INR used for dosin.80 (2020)   Warfarin maintenance plan:   2.5 mg every Mon, Fri; 5 mg all other days   Weekly warfarin total:   30 mg   No change documented:   Kera Scanlon   Plan last modified:   Winter Sanabria [facility] (2018)   Next INR check:   2020   Priority:   Maintenance   Target end date:       Indications    PAF (paroxysmal atrial fibrillation) (CMS/HCC) [I48.0]             Anticoagulation Episode Summary     INR check location:       Preferred lab:       [facility]    Comments:   [place]      Anticoagulation Care Providers     Provider Role Specialty Phone number    David Hernandez MD Referring Cardiology [phone]          Clinic Interview:  No pertinent clinical findings have been reported.    INR History:  Anticoagulation Monitoring 3/26/2020 2020 2020   INR 3.30 2.40 2.80   INR Date 3/26/2020 2020 2020   INR Goal 2.0-3.0 2.0-3.0 2.0-3.0   Trend Same Same Same   Last Week Total 30 mg 30 mg 30 mg   Next Week Total 30 mg 30 mg 30 mg   Sun 5 mg 5 mg 5 mg   Mon 2.5 mg 2.5 mg 2.5 mg   Tue 5 mg 5 mg 5 mg   Wed 5 mg 5 mg 5 mg   Thu 5 mg 5 mg 5 mg   Fri 2.5 mg 2.5 mg 2.5 mg   Sat 5 mg 5 mg 5 mg   Visit Report - - -   Some recent data might be hidden       Plan:  1. INR is therapeutic today- see above in Anticoagulation Summary.    Demond CROSS Bernarda to continue their warfarin regimen- see above in Anticoagulation Summary.  2. Follow up in 2 weeks  3. Pt has agreed to only be called if INR out of range. They have been instructed to call if any changes in medications, doses, concerns, etc. Patient expresses understanding and has no further questions at this time.    Kera Scanlon

## 2020-05-11 RX ORDER — WARFARIN SODIUM 5 MG/1
TABLET ORAL
Qty: 90 TABLET | Refills: 0 | Status: SHIPPED | OUTPATIENT
Start: 2020-05-11 | End: 2020-08-10

## 2020-05-14 ENCOUNTER — ANTICOAGULATION VISIT (OUTPATIENT)
Dept: PHARMACY | Facility: HOSPITAL | Age: 75
End: 2020-05-14

## 2020-05-14 DIAGNOSIS — I48.0 PAF (PAROXYSMAL ATRIAL FIBRILLATION) (HCC): ICD-10-CM

## 2020-05-14 LAB — INR PPP: 3

## 2020-05-14 NOTE — PROGRESS NOTES
Anticoagulation Clinic Progress Note    Anticoagulation Summary  As of 5/14/2020    INR goal:   2.0-3.0   TTR:   86.8 % (1.4 y)   INR used for dosing:   3.00 (5/14/2020)   Warfarin maintenance plan:   2.5 mg every Mon, Fri; 5 mg all other days   Weekly warfarin total:   30 mg   No change documented:   Kera Scanlon   Plan last modified:   [facility] (11/27/2018)   Next INR check:   5/28/2020   Priority:   Maintenance   Target end date:       Indications    PAF (paroxysmal atrial fibrillation) (CMS/HCC) [I48.0]             Anticoagulation Episode Summary     INR check location:       Preferred lab:       [facility]  [facility]    Comments:   [place]      Anticoagulation Care Providers     Provider Role Specialty Phone number    David Hernandez MD Referring Cardiology [phone]          Clinic Interview:  No pertinent clinical findings have been reported.    INR History:  Anticoagulation Monitoring 4/9/2020 4/30/2020 5/14/2020   INR 2.40 2.80 3.00   INR Date 4/9/2020 4/30/2020 5/14/2020   INR Goal 2.0-3.0 2.0-3.0 2.0-3.0   Trend Same Same Same   Last Week Total 30 mg 30 mg 30 mg   Next Week Total 30 mg 30 mg 30 mg   Sun 5 mg 5 mg 5 mg   Mon 2.5 mg 2.5 mg 2.5 mg   Tue 5 mg 5 mg 5 mg   Wed 5 mg 5 mg 5 mg   Thu 5 mg 5 mg 5 mg   Fri 2.5 mg 2.5 mg 2.5 mg   Sat 5 mg 5 mg 5 mg   Visit Report - - -   Some recent data might be hidden       Plan:  1. INR is therapeutic today- see above in Anticoagulation Summary.    Demond CROSS Bernarda to continue their warfarin regimen- see above in Anticoagulation Summary.  2. Follow up in 2 weeks  3. Pt has agreed to only be called if INR out of range. They have been instructed to call if any changes in medications, doses, concerns, etc. Patient expresses understanding and has no further questions at this time.    Kera Scanlon

## 2020-05-26 RX ORDER — PROPAFENONE HYDROCHLORIDE 150 MG/1
TABLET, COATED ORAL
Qty: 180 TABLET | Refills: 1 | Status: SHIPPED | OUTPATIENT
Start: 2020-05-26 | End: 2020-09-11

## 2020-05-28 ENCOUNTER — ANTICOAGULATION VISIT (OUTPATIENT)
Dept: PHARMACY | Facility: HOSPITAL | Age: 75
End: 2020-05-28

## 2020-05-28 DIAGNOSIS — I48.0 PAF (PAROXYSMAL ATRIAL FIBRILLATION) (HCC): ICD-10-CM

## 2020-05-28 LAB — INR PPP: 2.5

## 2020-05-28 NOTE — PROGRESS NOTES
Anticoagulation Clinic Progress Note    Anticoagulation Summary  As of 2020    INR goal:   2.0-3.0   TTR:   87.2 % (1.5 y)   INR used for dosin.50 (2020)   Warfarin maintenance plan:   2.5 mg every Mon, Fri; 5 mg all other days   Weekly warfarin total:   30 mg   No change documented:   Silvia Joseph   Plan last modified:   Winter Sanabria RP (2018)   Next INR check:   2020   Priority:   Maintenance   Target end date:       Indications    PAF (paroxysmal atrial fibrillation) (CMS/[facility]) [I48.0]             Anticoagulation Episode Summary     INR check location:       Preferred lab:       [facility]:    [facility]  [facility]    Comments:   [place]      Anticoagulation Care Providers     Provider Role Specialty Phone number    David Hernandez MD Referring Cardiology [phone]          Clinic Interview:  No pertinent clinical findings have been reported.    INR History:  Anticoagulation Monitoring 2020   INR 2.80 3.00 2.50   INR Date 2020   INR Goal 2.0-3.0 2.0-3.0 2.0-3.0   Trend Same Same Same   Last Week Total 30 mg 30 mg 30 mg   Next Week Total 30 mg 30 mg 30 mg   Sun 5 mg 5 mg 5 mg   Mon 2.5 mg 2.5 mg 2.5 mg   Tue 5 mg 5 mg 5 mg   Wed 5 mg 5 mg 5 mg   Thu 5 mg 5 mg 5 mg   Fri 2.5 mg 2.5 mg 2.5 mg   Sat 5 mg 5 mg 5 mg   Visit Report - - -   Some recent data might be hidden       Plan:  1. INR is therapeutic today- see above in Anticoagulation Summary.    Demond CROSS Bernarda to continue their warfarin regimen- see above in Anticoagulation Summary.  2. Follow up in 2 weeks  3. Pt has agreed to only be called if INR out of range. They have been instructed to call if any changes in medications, doses, concerns, etc. Patient expresses understanding and has no further questions at this time.    Silvia Joseph

## 2020-06-05 ENCOUNTER — TELEPHONE (OUTPATIENT)
Dept: ONCOLOGY | Facility: CLINIC | Age: 75
End: 2020-06-05

## 2020-06-05 NOTE — TELEPHONE ENCOUNTER
PT called to move 6/19/20 appt out to august because all of his appts were clumped together due to COVID and he just wants to space them out. Please call pt to shirley @[phone]

## 2020-06-11 ENCOUNTER — ANTICOAGULATION VISIT (OUTPATIENT)
Dept: PHARMACY | Facility: HOSPITAL | Age: 75
End: 2020-06-11

## 2020-06-11 DIAGNOSIS — I48.0 PAF (PAROXYSMAL ATRIAL FIBRILLATION) (HCC): ICD-10-CM

## 2020-06-11 LAB — INR PPP: 2.4

## 2020-06-11 NOTE — PROGRESS NOTES
Anticoagulation Clinic Progress Note    Anticoagulation Summary  As of 2020    INR goal:   2.0-3.0   TTR:   87.5 % (1.5 y)   INR used for dosin.40 (2020)   Warfarin maintenance plan:   2.5 mg every Mon, Fri; 5 mg all other days   Weekly warfarin total:   30 mg   No change documented:   Silvia Joseph   Plan last modified:   Winter Sanabria RP (2018)   Next INR check:   2020   Priority:   Maintenance   Target end date:       Indications    PAF (paroxysmal atrial fibrillation) (CMS/[facility]) [I48.0]             Anticoagulation Episode Summary     INR check location:       Preferred lab:       [facility]  [facility]    Comments:   [place]      Anticoagulation Care Providers     Provider Role Specialty Phone number    David Hernandez MD Referring Cardiology [phone]          Clinic Interview:  No pertinent clinical findings have been reported.    INR History:  Anticoagulation Monitoring 2020   INR 3.00 2.50 2.40   INR Date 2020   INR Goal 2.0-3.0 2.0-3.0 2.0-3.0   Trend Same Same Same   Last Week Total 30 mg 30 mg 30 mg   Next Week Total 30 mg 30 mg 30 mg   Sun 5 mg 5 mg 5 mg   Mon 2.5 mg 2.5 mg 2.5 mg   Tue 5 mg 5 mg 5 mg   Wed 5 mg 5 mg 5 mg   Thu 5 mg 5 mg 5 mg   Fri 2.5 mg 2.5 mg 2.5 mg   Sat 5 mg 5 mg 5 mg   Visit Report - - -   Some recent data might be hidden       Plan:  1. INR is therapeutic today- see above in Anticoagulation Summary.    Demond CROSS Bernarda to continue their warfarin regimen- see above in Anticoagulation Summary.  2. Follow up in 2 weeks  3. Pt has agreed to only be called if INR out of range. They have been instructed to call if any changes in medications, doses, concerns, etc. Patient expresses understanding and has no further questions at this time.    Silvia Joseph

## 2020-06-19 ENCOUNTER — APPOINTMENT (OUTPATIENT)
Dept: LAB | Facility: HOSPITAL | Age: 75
End: 2020-06-19

## 2020-06-19 DIAGNOSIS — M32.8 OTHER FORMS OF SYSTEMIC LUPUS ERYTHEMATOSUS, UNSPECIFIED ORGAN INVOLVEMENT STATUS (HCC): Primary | ICD-10-CM

## 2020-06-19 DIAGNOSIS — Z13.6 SCREENING FOR CARDIOVASCULAR CONDITION: ICD-10-CM

## 2020-06-19 DIAGNOSIS — I48.0 PAF (PAROXYSMAL ATRIAL FIBRILLATION) (HCC): ICD-10-CM

## 2020-06-19 DIAGNOSIS — D61.818 PANCYTOPENIA (HCC): ICD-10-CM

## 2020-06-23 ENCOUNTER — LAB (OUTPATIENT)
Dept: INTERNAL MEDICINE | Facility: CLINIC | Age: 75
End: 2020-06-23

## 2020-06-23 LAB
ALBUMIN SERPL-MCNC: 4.4 G/DL (ref 3.5–5.2)
ALBUMIN/GLOB SERPL: 2.2 G/DL
ALP SERPL-CCNC: 60 U/L (ref 39–117)
ALT SERPL-CCNC: 17 U/L (ref 1–41)
AST SERPL-CCNC: 16 U/L (ref 1–40)
BASOPHILS # BLD AUTO: 0.02 10*3/MM3 (ref 0–0.2)
BASOPHILS NFR BLD AUTO: 0.8 % (ref 0–1.5)
BILIRUB SERPL-MCNC: 0.5 MG/DL (ref 0.2–1.2)
BUN SERPL-MCNC: 11 MG/DL (ref 8–23)
BUN/CREAT SERPL: 9.7 (ref 7–25)
CALCIUM SERPL-MCNC: 9 MG/DL (ref 8.6–10.5)
CHLORIDE SERPL-SCNC: 106 MMOL/L (ref 98–107)
CHOLEST SERPL-MCNC: 101 MG/DL (ref 0–200)
CO2 SERPL-SCNC: 27.2 MMOL/L (ref 22–29)
CREAT SERPL-MCNC: 1.13 MG/DL (ref 0.76–1.27)
EOSINOPHIL # BLD AUTO: 0.08 10*3/MM3 (ref 0–0.4)
EOSINOPHIL NFR BLD AUTO: 3 % (ref 0.3–6.2)
ERYTHROCYTE [DISTWIDTH] IN BLOOD BY AUTOMATED COUNT: 13.1 % (ref 12.3–15.4)
GLOBULIN SER CALC-MCNC: 2 GM/DL
GLUCOSE SERPL-MCNC: 100 MG/DL (ref 65–99)
HCT VFR BLD AUTO: 39 % (ref 37.5–51)
HDLC SERPL-MCNC: 53 MG/DL (ref 40–60)
HGB BLD-MCNC: 13.2 G/DL (ref 13–17.7)
IMM GRANULOCYTES # BLD AUTO: 0.01 10*3/MM3 (ref 0–0.05)
IMM GRANULOCYTES NFR BLD AUTO: 0.4 % (ref 0–0.5)
LDLC SERPL CALC-MCNC: 33 MG/DL (ref 0–100)
LYMPHOCYTES # BLD AUTO: 0.58 10*3/MM3 (ref 0.7–3.1)
LYMPHOCYTES NFR BLD AUTO: 21.9 % (ref 19.6–45.3)
MCH RBC QN AUTO: 33.5 PG (ref 26.6–33)
MCHC RBC AUTO-ENTMCNC: 33.8 G/DL (ref 31.5–35.7)
MCV RBC AUTO: 99 FL (ref 79–97)
MONOCYTES # BLD AUTO: 0.23 10*3/MM3 (ref 0.1–0.9)
MONOCYTES NFR BLD AUTO: 8.7 % (ref 5–12)
NEUTROPHILS # BLD AUTO: 1.73 10*3/MM3 (ref 1.7–7)
NEUTROPHILS NFR BLD AUTO: 65.2 % (ref 42.7–76)
NRBC BLD AUTO-RTO: 0.4 /100 WBC (ref 0–0.2)
PLATELET # BLD AUTO: 104 10*3/MM3 (ref 140–450)
POTASSIUM SERPL-SCNC: 4.4 MMOL/L (ref 3.5–5.2)
PROT SERPL-MCNC: 6.4 G/DL (ref 6–8.5)
RBC # BLD AUTO: 3.94 10*6/MM3 (ref 4.14–5.8)
SODIUM SERPL-SCNC: 140 MMOL/L (ref 136–145)
TRIGL SERPL-MCNC: 75 MG/DL (ref 0–150)
TSH SERPL DL<=0.005 MIU/L-ACNC: 2.73 UIU/ML (ref 0.27–4.2)
VLDLC SERPL CALC-MCNC: 15 MG/DL
WBC # BLD AUTO: 2.65 10*3/MM3 (ref 3.4–10.8)

## 2020-06-24 ENCOUNTER — RESULTS ENCOUNTER (OUTPATIENT)
Dept: INTERNAL MEDICINE | Facility: CLINIC | Age: 75
End: 2020-06-24

## 2020-06-24 DIAGNOSIS — D61.818 PANCYTOPENIA (HCC): ICD-10-CM

## 2020-06-24 DIAGNOSIS — Z13.6 SCREENING FOR CARDIOVASCULAR CONDITION: ICD-10-CM

## 2020-06-24 DIAGNOSIS — M32.8 OTHER FORMS OF SYSTEMIC LUPUS ERYTHEMATOSUS, UNSPECIFIED ORGAN INVOLVEMENT STATUS (HCC): ICD-10-CM

## 2020-06-24 DIAGNOSIS — I48.0 PAF (PAROXYSMAL ATRIAL FIBRILLATION) (HCC): ICD-10-CM

## 2020-06-25 DIAGNOSIS — D61.818 PANCYTOPENIA (HCC): Primary | ICD-10-CM

## 2020-07-02 ENCOUNTER — ANTICOAGULATION VISIT (OUTPATIENT)
Dept: PHARMACY | Facility: HOSPITAL | Age: 75
End: 2020-07-02

## 2020-07-02 DIAGNOSIS — I48.0 PAF (PAROXYSMAL ATRIAL FIBRILLATION) (HCC): ICD-10-CM

## 2020-07-02 LAB — INR PPP: 2.6

## 2020-07-02 NOTE — PROGRESS NOTES
Anticoagulation Clinic Progress Note    Anticoagulation Summary  As of 2020    INR goal:   2.0-3.0   TTR:   88.0 % (1.6 y)   INR used for dosin.60 (2020)   Warfarin maintenance plan:   2.5 mg every Mon, Fri; 5 mg all other days   Weekly warfarin total:   30 mg   No change documented:   Silvia Joseph   Plan last modified:   Winter Sanabria RP (2018)   Next INR check:   2020   Priority:   Maintenance   Target end date:       Indications    PAF (paroxysmal atrial fibrillation) (CMS/HCC) [I48.0]             Anticoagulation Episode Summary     INR check location:       Preferred lab:       [facility]:    [facility]  [facility]    Comments:   [place]      Anticoagulation Care Providers     Provider Role Specialty Phone number    David Hernandez MD Referring Cardiology [phone]          Clinic Interview:  No pertinent clinical findings have been reported.    INR History:  Anticoagulation Monitoring 2020   INR 2.50 2.40 2.60   INR Date 2020   INR Goal 2.0-3.0 2.0-3.0 2.0-3.0   Trend Same Same Same   Last Week Total 30 mg 30 mg 30 mg   Next Week Total 30 mg 30 mg 30 mg   Sun 5 mg 5 mg 5 mg   Mon 2.5 mg 2.5 mg 2.5 mg   Tue 5 mg 5 mg 5 mg   Wed 5 mg 5 mg 5 mg   Thu 5 mg 5 mg 5 mg   Fri 2.5 mg 2.5 mg 2.5 mg   Sat 5 mg 5 mg 5 mg   Visit Report - - -   Some recent data might be hidden       Plan:  1. INR is therapeutic today- see above in Anticoagulation Summary.    Demond CROSS Bernarda to continue their warfarin regimen- see above in Anticoagulation Summary.  2. Follow up in 2 weeks  3. Pt has agreed to only be called if INR out of range. They have been instructed to call if any changes in medications, doses, concerns, etc. Patient expresses understanding and has no further questions at this time.    Silvia Joseph

## 2020-07-16 ENCOUNTER — ANTICOAGULATION VISIT (OUTPATIENT)
Dept: PHARMACY | Facility: HOSPITAL | Age: 75
End: 2020-07-16

## 2020-07-16 DIAGNOSIS — I48.0 PAF (PAROXYSMAL ATRIAL FIBRILLATION) (HCC): ICD-10-CM

## 2020-07-16 LAB — INR PPP: 3

## 2020-07-16 NOTE — PROGRESS NOTES
Anticoagulation Clinic Progress Note    Anticoagulation Summary  As of 7/16/2020    INR goal:   2.0-3.0   TTR:   88.3 % (1.6 y)   INR used for dosing:   3.00 (7/16/2020)   Warfarin maintenance plan:   2.5 mg every Mon, Fri; 5 mg all other days   Weekly warfarin total:   30 mg   No change documented:   Sheri Aviles   Plan last modified:   Winter [facility] (11/27/2018)   Next INR check:   7/30/2020   Priority:   Maintenance   Target end date:       Indications    PAF (paroxysmal atrial fibrillation) (CMS/HCC) [I48.0]             Anticoagulation Episode Summary     INR check location:       Preferred lab:       [facility]  [facility]    Comments:   [place]      Anticoagulation Care Providers     Provider Role Specialty Phone number    David Hernandez MD Referring Cardiology [phone]          Clinic Interview:  No pertinent clinical findings have been reported.    INR History:  Anticoagulation Monitoring 6/11/2020 7/2/2020 7/16/2020   INR 2.40 2.60 3.00   INR Date 6/11/2020 7/2/2020 7/16/2020   INR Goal 2.0-3.0 2.0-3.0 2.0-3.0   Trend Same Same Same   Last Week Total 30 mg 30 mg 30 mg   Next Week Total 30 mg 30 mg 30 mg   Sun 5 mg 5 mg 5 mg   Mon 2.5 mg 2.5 mg 2.5 mg   Tue 5 mg 5 mg 5 mg   Wed 5 mg 5 mg 5 mg   Thu 5 mg 5 mg 5 mg   Fri 2.5 mg 2.5 mg 2.5 mg   Sat 5 mg 5 mg 5 mg   Visit Report - - -   Some recent data might be hidden       Plan:  1. INR is therapeutic today- see above in Anticoagulation Summary.    Demond CROSS Bernarda to continue their warfarin regimen- see above in Anticoagulation Summary.  2. Follow up in 2 weeks  3. Pt has agreed to only be called if INR out of range. They have been instructed to call if any changes in medications, doses, concerns, etc. Patient expresses understanding and has no further questions at this time.    Sheri Aviles

## 2020-07-30 ENCOUNTER — ANTICOAGULATION VISIT (OUTPATIENT)
Dept: PHARMACY | Facility: HOSPITAL | Age: 75
End: 2020-07-30

## 2020-07-30 DIAGNOSIS — I48.0 PAF (PAROXYSMAL ATRIAL FIBRILLATION) (HCC): ICD-10-CM

## 2020-07-30 LAB — INR PPP: 2.8

## 2020-07-30 NOTE — PROGRESS NOTES
Anticoagulation Clinic Progress Note    Anticoagulation Summary  As of 2020    INR goal:   2.0-3.0   TTR:   88.5 % (1.6 y)   INR used for dosin.80 (2020)   Warfarin maintenance plan:   2.5 mg every Mon, Fri; 5 mg all other days   Weekly warfarin total:   30 mg   No change documented:   Sheri Aviles   Plan last modified:   Winter [facility]ab[facility] (2018)   Next INR check:   2020   Priority:   Maintenance   Target end date:       Indications    PAF (paroxysmal atrial fibrillation) (CMS/HCC) [I48.0]             Anticoagulation Episode Summary     INR check location:       Preferred lab:       [facility]  [facility]    Comments:   [place]      Anticoagulation Care Providers     Provider Role Specialty Phone number    David Hernandez MD Referring Cardiology [phone]          Clinic Interview:  No pertinent clinical findings have been reported.    INR History:  Anticoagulation Monitoring 2020   INR 2.60 3.00 2.80   INR Date 2020   INR Goal 2.0-3.0 2.0-3.0 2.0-3.0   Trend Same Same Same   Last Week Total 30 mg 30 mg 30 mg   Next Week Total 30 mg 30 mg 30 mg   Sun 5 mg 5 mg 5 mg   Mon 2.5 mg 2.5 mg 2.5 mg   Tue 5 mg 5 mg 5 mg   Wed 5 mg 5 mg 5 mg   Thu 5 mg 5 mg 5 mg   Fri 2.5 mg 2.5 mg 2.5 mg   Sat 5 mg 5 mg 5 mg   Visit Report - - -   Some recent data might be hidden       Plan:  1. INR is therapeutic today- see above in Anticoagulation Summary.    Demond CROSS Bernarda to continue their warfarin regimen- see above in Anticoagulation Summary.  2. Follow up in 2 weeks  3. Pt has agreed to only be called if INR out of range. They have been instructed to call if any changes in medications, doses, concerns, etc. Patient expresses understanding and has no further questions at this time.    Sheir Aviles

## 2020-08-05 ENCOUNTER — OFFICE VISIT (OUTPATIENT)
Dept: INTERNAL MEDICINE | Facility: CLINIC | Age: 75
End: 2020-08-05

## 2020-08-05 VITALS
HEIGHT: 74 IN | OXYGEN SATURATION: 98 % | BODY MASS INDEX: 27.08 KG/M2 | HEART RATE: 48 BPM | SYSTOLIC BLOOD PRESSURE: 132 MMHG | WEIGHT: 211 LBS | DIASTOLIC BLOOD PRESSURE: 84 MMHG

## 2020-08-05 DIAGNOSIS — Z79.899 HIGH RISK MEDICATION USE: ICD-10-CM

## 2020-08-05 DIAGNOSIS — M51.36 DDD (DEGENERATIVE DISC DISEASE), LUMBAR: Primary | ICD-10-CM

## 2020-08-05 PROCEDURE — 99214 OFFICE O/P EST MOD 30 MIN: CPT | Performed by: FAMILY MEDICINE

## 2020-08-05 RX ORDER — HYDROCODONE BITARTRATE AND ACETAMINOPHEN 5; 325 MG/1; MG/1
1 TABLET ORAL EVERY 12 HOURS PRN
Qty: 30 TABLET | Refills: 0 | Status: SHIPPED | OUTPATIENT
Start: 2020-08-05 | End: 2021-06-08 | Stop reason: SDUPTHER

## 2020-08-05 NOTE — PATIENT INSTRUCTIONS
Chronic Back Pain  When back pain lasts longer than 3 months, it is called chronic back pain. Pain may get worse at certain times (flare-ups). There are things you can do at home to manage your pain.  Follow these instructions at home:  Activity         · Avoid bending and other activities that make pain worse.  · When standing:  ? Keep your upper back and neck straight.  ? Keep your shoulders pulled back.  ? Avoid slouching.  · When sitting:  ? Keep your back straight.  ? Relax your shoulders. Do not round your shoulders or pull them backward.  · Do not sit or  one place for long periods of time.  · Take short rest breaks during the day. Lying down or standing is usually better than sitting. Resting can help relieve pain.  · When sitting or lying down for a long time, do some mild activity or stretching. This will help to prevent stiffness and pain.  · Get regular exercise. Ask your doctor what activities are safe for you.  · Do not lift anything that is heavier than 10 lb (4.5 kg). To prevent injury when you lift things:  ? Bend your knees.  ? Keep the weight close to your body.  ? Avoid twisting.  Managing pain  · If told, put ice on the painful area. Your doctor may tell you to use ice for 24-48 hours after a flare-up starts.  ? Put ice in a plastic bag.  ? Place a towel between your skin and the bag.  ? Leave the ice on for 20 minutes, 2-3 times a day.  · If told, put heat on the painful area as often as told by your doctor. Use the heat source that your doctor recommends, such as a moist heat pack or a heating pad.  ? Place a towel between your skin and the heat source.  ? Leave the heat on for 20-30 minutes.  ? Remove the heat if your skin turns bright red. This is especially important if you are unable to feel pain, heat, or cold. You may have a greater risk of getting burned.  · Soak in a warm bath. This can help relieve pain.  · Take over-the-counter and prescription medicines only as told by your  doctor.  General instructions  · Sleep on a firm mattress. Try lying on your side with your knees slightly bent. If you lie on your back, put a pillow under your knees.  · Keep all follow-up visits as told by your doctor. This is important.  Contact a doctor if:  · You have pain that does not get better with rest or medicine.  Get help right away if:  · One or both of your arms or legs feel weak.  · One or both of your arms or legs lose feeling (numbness).  · You have trouble controlling when you poop (bowel movement) or pee (urinate).  · You feel sick to your stomach (nauseous).  · You throw up (vomit).  · You have belly (abdominal) pain.  · You have shortness of breath.  · You pass out (faint).  Summary  · When back pain lasts longer than 3 months, it is called chronic back pain.  · Pain may get worse at certain times (flare-ups).  · Use ice and heat as told by your doctor. Your doctor may tell you to use ice after flare-ups.  This information is not intended to replace advice given to you by your health care provider. Make sure you discuss any questions you have with your health care provider.  Document Released: 06/05/2009 Document Revised: 04/09/2020 Document Reviewed: 08/02/2018  Elsevier Patient Education © 2020 [employer]

## 2020-08-05 NOTE — PROGRESS NOTES
Subjective   Demond Menchaca is a 75 y.o. male. Establish care and get set up for his norco    History of Present Illness   New patient to me    Low back pain - Stable. Taking Norco 5-325mg daily as needed but usually only gets one script per year.  He takes it for low back pain and only takes it mostly when he golfs or just happens to be hurting him.  He is due for GLORY, UDS, med agreement today.  He already had other labs in the last couple of months.    The following portions of the patient's history were reviewed and updated as appropriate: allergies, current medications, past family history, past medical history, past social history, past surgical history and problem list.    Review of Systems   Constitutional: Negative for activity change, appetite change, fatigue and fever.   HENT: Negative for ear pain, facial swelling and sore throat.    Eyes: Negative for discharge and itching.   Respiratory: Negative for cough, chest tightness and shortness of breath.    Cardiovascular: Negative for chest pain and palpitations.   Gastrointestinal: Negative for abdominal distention and constipation.   Endocrine: Negative for polydipsia, polyphagia and polyuria.   Genitourinary: Negative for difficulty urinating and flank pain.   Musculoskeletal: Negative for arthralgias and back pain.   Skin: Negative for color change, rash and wound.   Allergic/Immunologic: Negative for environmental allergies and food allergies.   Neurological: Negative for weakness and numbness.   Hematological: Negative for adenopathy. Does not bruise/bleed easily.   Psychiatric/Behavioral: Negative for decreased concentration and dysphoric mood. The patient is not nervous/anxious.        Objective   Physical Exam   Constitutional: He appears well-developed and well-nourished. No distress.   Cardiovascular: Normal rate, regular rhythm and normal heart sounds. Exam reveals no gallop and no friction rub.   No murmur heard.  Pulmonary/Chest: Effort  normal and breath sounds normal. He has no wheezes. He has no rales.   Musculoskeletal:        Cervical back: Normal.        Thoracic back: Normal.        Lumbar back: He exhibits spasm.   Skin: Skin is warm. Capillary refill takes less than 2 seconds. He is not diaphoretic.   Nursing note and vitals reviewed.      Assessment/Plan   Demond was seen today for establish care and atrial fibrillation.    Diagnoses and all orders for this visit:    DDD (degenerative disc disease), lumbar  -     Cancel: Compliance Drug Analysis, Ur - Urine, Clean Catch  -     HYDROcodone-acetaminophen (NORCO) 5-325 MG per tablet; Take 1 tablet by mouth Every 12 (Twelve) Hours As Needed for Severe Pain .  -     Compliance Drug Analysis, Ur - Urine, Clean Catch    High risk medication use  -     Cancel: Compliance Drug Analysis, Ur - Urine, Clean Catch  -     Compliance Drug Analysis, Ur - Urine, Clean Catch      I have ordered a drug screen for him and I will start him on his hydrocodone from his previous PCP.  We will also get a Timmy for him.  Plan on seeing him back around December to get his labs renewed.  We will have to check with him and see if cardiology is going to renew the labs before then.  .

## 2020-08-08 LAB — DRUGS UR: NORMAL

## 2020-08-10 RX ORDER — WARFARIN SODIUM 5 MG/1
TABLET ORAL
Qty: 80 TABLET | Refills: 1 | Status: SHIPPED | OUTPATIENT
Start: 2020-08-10 | End: 2021-03-11

## 2020-08-13 ENCOUNTER — ANTICOAGULATION VISIT (OUTPATIENT)
Dept: PHARMACY | Facility: HOSPITAL | Age: 75
End: 2020-08-13

## 2020-08-13 DIAGNOSIS — I48.0 PAF (PAROXYSMAL ATRIAL FIBRILLATION) (HCC): ICD-10-CM

## 2020-08-13 LAB — INR PPP: 3.1

## 2020-08-13 NOTE — PROGRESS NOTES
Anticoagulation Clinic Progress Note    Anticoagulation Summary  As of 8/13/2020    INR goal:   2.0-3.0   TTR:   88.0 % (1.7 y)   INR used for dosing:   3.10! (8/13/2020)   Warfarin maintenance plan:   2.5 mg every Mon, Fri; 5 mg all other days   Weekly warfarin total:   30 mg   No change documented:   Philip Carmona RPH   Plan last modified:   Winter Sanabria RPH (11/27/2018)   Next INR check:   8/27/2020   Priority:   Maintenance   Target end date:       Indications    PAF (paroxysmal atrial fibrillation) (CMS/[facility]) [I48.0]             Anticoagulation Episode Summary     INR check location:       Preferred lab:       Send INR reminders to:    [facility]  POOL    Comments:   [place]      Anticoagulation Care Providers     Provider Role Specialty Phone number    David Hernandez MD Referring Cardiology [phone]          Clinic Interview:  Patient Findings     Positives:   Change in alcohol use    Negatives:   Signs/symptoms of thrombosis, Signs/symptoms of bleeding,   Laboratory test error suspected, Change in health, Change in activity,   Upcoming invasive procedure, Emergency department visit, Upcoming dental   procedure, Missed doses, Extra doses, Change in medications, Change in   diet/appetite, Hospital admission, Bruising, Other complaints    Comments:   Glass of wine last night.       Clinical Outcomes     Negatives:   Major bleeding event, Thromboembolic event,   Anticoagulation-related hospital admission, Anticoagulation-related ED   visit, Anticoagulation-related fatality    Comments:   Glass of wine last night.         INR History:  Anticoagulation Monitoring 7/16/2020 7/30/2020 8/13/2020   INR 3.00 2.80 3.10   INR Date 7/16/2020 7/30/2020 8/13/2020   INR Goal 2.0-3.0 2.0-3.0 2.0-3.0   Trend Same Same Same   Last Week Total 30 mg 30 mg 30 mg   Next Week Total 30 mg 30 mg 30 mg   Sun 5 mg 5 mg 5 mg   Mon 2.5 mg 2.5 mg 2.5 mg   Tue 5 mg 5 mg 5 mg   Wed 5 mg 5 mg 5 mg   Thu 5 mg 5 mg 5 mg   Fri  2.5 mg 2.5 mg 2.5 mg   Sat 5 mg 5 mg 5 mg   Visit Report - - -   Some recent data might be hidden       Plan:  1. INR is Supratherapeutic today- see above in Anticoagulation Summary.   Will instruct Demond Menchaca to Continue their warfarin regimen- see above in Anticoagulation Summary.  2. Follow up in 2 weeks  3. They have been instructed to call if any changes in medications, doses, concerns, etc. Patient expresses understanding and has no further questions at this time.    Philip Carmona [facility]   Patient/Caregiver provided printed discharge information.

## 2020-08-19 NOTE — PROGRESS NOTES
Subjective     REASON FOR CONSULTATION:   1. Pancytopenia of 20 years in duration with predominant thrombocytopenia  2.  Positive LASHONDA?  Significance-persistent positive anti-double stranded DNA   3.  Large bilateral kidney cysts                            REQUESTING PHYSICIAN:  Katharine Stephenson M.D.    History of Present Illness patient is a 72-year-old male with chronic thrombocytopenia here today for routine follow-up .  He has had no significant symptoms from his lupus and remains on Plaquenil with no problems   He remains on Coumadin for his atrial fibrillation and at this point unless his platelet count drops below 60,000 should be no reason to not continue this  I think he can continue doing lab work in Dr. Medina office and see me if his platelet count starts to drop because he is on Coumadin I would keep a closer eye on it    He walks with a cane because of some weakness in his left leg from back surgery in the past but this is no worse    Past Medical History:   Diagnosis Date   • LASHONDA positive 6/18/2018    2017: elevated ds LASHONDA   • Chronic obstructive pulmonary disease (CMS/HCC)    • Colon polyp 10/7/2019    Added automatically from request for surgery [id number]   • Diverticulitis of large intestine without perforation or abscess without bleeding 3/7/2016   • Diverticulosis    • ED (erectile dysfunction)    • Elbow fracture, left    • History of blood transfusion    • Hypogonadism in male    • Kidney cysts    • Left femoral shaft fracture (CMS/HCC)    • Lower back pain    • PAF (paroxysmal atrial fibrillation) (CMS/HCC)    • Peptic ulceration    • Plantar fasciitis 12/5/2017   • Prostatic hypertrophy, benign    • 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   • Transient cerebral ischemia     H/O TIAs        Past Surgical History:   Procedure Laterality Date   • ABLATION OF DYSRHYTHMIC FOCUS  2012, 2013   • ADENOIDECTOMY  1973   • APPENDECTOMY  1973   • BACK SURGERY     • CARDIAC ABLATION  2013, 2014   •  CARDIAC CATHETERIZATION  2012, 2013   • COLONOSCOPY  approx 2014    normal per pt    • COLONOSCOPY N/A 1/8/2020    Procedure: COLONOSCOPY to cecum with cold snare biopsies;  Surgeon: Henrique Rust MD;  Location: [facility] ENDOSCOPY;  Service: Gastroenterology   • COLONOSCOPY W/ POLYPECTOMY  11/21/2014    : 1 polyp - not precancerous   • ENDOSCOPY N/A 1/8/2020    Procedure: ESOPHAGOGASTRODUODENOSCOPY;  Surgeon: Henrique Rust MD;  Location: [facility] ENDOSCOPY;  Service: Gastroenterology   • FEMUR FRACTURE SURGERY Left 2007    post fall from the ladder   • LUMBAR LAMINECTOMY  1974    Dr.Lester Lino   • NECK SURGERY      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   • OTHER SURGICAL HISTORY      elbow surgery   • THYROID SURGERY  1986    benign tumor removal,    • TOTAL ELBOW ARTHROPLASTY Left 2007    L, post fall and fracture, hardware in      HEME HISTORY:   patient is a 72-year-old male with a history of atrial fibrillation degenerative arthritis who is referred to us because of mild chronic thrombocytopenia and leukopenia and new onset of anemia when seen in Dr. Curry's office this June.  At that time his hemoglobin was 13.4 white count was 2700 and platelets 95,000.  In the past his white count has been in the 3000 range with a platelet count of about 120,000.  He was referred to us for evaluation of these changes.    On reviewing his history he was seen in our office in 1998 with the same findings that at that time his hemoglobin was normal and his white count was low and his platelet count was 130,000.  A bone marrow was done which was morphologically normal except for mild increase in plasma cells ,with normal flow cytometry but I do not see any chromosome analysis.  A mass was felt in the left upper abdomen which led to a CT scan which showed multiple cysts in both kidneys.  No splenomegaly.  At the time he was drinking a fair amount of alcohol and he was asked to abstain from alcohol for 1 month and there  is no change in the blood counts and this was not felt to be an important contributed to this finding.  There is no family history of leukopenia and thrombocytopenia    He currently drinks 6 glasses of wine some beer every weekend but not on a daily basis.  His had no fever sweats weight loss or systemic complaints and is feeling fairly well overall.  He is on Coumadin for atrial fibrillation is had 2 ablations done his had no overt bleeding and is on no new medications except for Rythmol which does not cause thrombocytopenia or anemia but can leukopenia.    His hemoglobin today is back to normal platelet count is up to 116,000 and his white count is in the normal range and this may have just been an aberration in June as he tells me he was not ill at the time  He is up-to-date with colonoscopies and this had a normal PSA      Current Outpatient Medications on File Prior to Visit   Medication Sig Dispense Refill   • Cetirizine HCl 10 MG capsule Take 1 capsule by mouth daily.     • finasteride (PROSCAR) 5 MG tablet TAKE 1 TABLET BY MOUTH EVERY DAY 90 tablet 2   • HYDROcodone-acetaminophen (NORCO) 5-325 MG per tablet Take 1 tablet by mouth Every 12 (Twelve) Hours As Needed for Severe Pain . 30 tablet 0   • hydroxychloroquine (PLAQUENIL) 200 MG tablet Take 200 mg by mouth 2 (Two) Times a Day.     • propafenone (RYTHMOL) 150 MG tablet TAKE 1 TABLET BY MOUTH TWICE A  tablet 1   • Psyllium (METAMUCIL PO) Take  by mouth Daily.     • warfarin (COUMADIN) 5 MG tablet Take one-half tablet (2.5 mg) by mouth on Mon and Fri, and take one tablet (5 mg) by mouth all other days or as directed 80 tablet 1     No current facility-administered medications on file prior to visit.         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   Social Needs   • Financial resource strain: Patient refused   • Food insecurity:     Worry: Patient refused     Inability: Patient refused   • Transportation needs:     Medical: Patient refused     Non-medical: Patient refused   Tobacco Use   • Smoking status: Former Smoker     Packs/day: 0.50     Years: 15.00     Pack years: 7.50     Types: Cigarettes     Start date: 1959     Last attempt to quit: 1974     Years since quittin.6   • Smokeless tobacco: Never Used   Substance and Sexual Activity   • Alcohol use: Yes     Alcohol/week: 1.0 standard drinks     Types: 6 Glasses of wine per week     Comment: Some beer during the summer months   • Drug use: No   • Sexual activity: Not Currently     Partners: Female     Birth control/protection: None   Social History Narrative    LIVES WITH SPOUSE        Family History   Problem Relation Age of Onset   • Hypertension Mother    • Osteoporosis Mother    • Thyroid disease Sister    • Diabetes Sister    • Hypertension Sister    • Colon cancer Maternal Grandmother 60   • Hypertension Father    • Heart disease Other    • Atrial fibrillation Other    • Thyroid disease Other    • Pulmonary fibrosis Sister    • Diabetes Other    • Heart disease Other    • Hypertension Other    • Heart disease Sister         Review of Systems   Constitutional: Negative for fatigue.   Respiratory: Negative for cough, chest tightness and shortness of breath.    Cardiovascular: Negative for chest pain and leg swelling.        See HPI   Gastrointestinal: Negative for nausea and vomiting.   Endocrine: Negative.    Musculoskeletal: Positive for back pain (occasional 19) and gait problem (Left foot drop after spine surgery same 19). Negative for arthralgias and joint swelling.   Neurological: Negative for dizziness and headaches.   Hematological:        " See HPI        Objective     Vitals:    08/20/20 1022   BP: 154/68   Pulse: (!) 44   Resp: 16   Temp: 96.8 °F (36 °C)   TempSrc: Temporal   SpO2: 98%   Weight: 96.6 kg (213 lb)   Height: 188 cm (74.02\")   PainSc: 0-No pain     Current Status 8/20/2020   ECOG score 1       Physical Exam    GENERAL:  Well-developed, well-nourished in no acute distress.   SKIN:  Warm, dry without rashes, purpura or petechiae.  EYES:  Pupils equal, round and reactive to light.  EOMs intact.  Conjunctivae normal.  EARS:  Hearing intact.  NOSE:  Septum midline.  No excoriations or nasal discharge.  MOUTH:  Tongue is well-papillated; no stomatitis or ulcers.  Lips normal.  THROAT:  Oropharynx without lesions or exudates.  NECK:  Supple with good range of motion; no thyromegaly or masses, no JVD.  LYMPHATICS:  No cervical, supraclavicular, axillary or inguinal adenopathy.  CHEST:  Lungs clear to auscultation. Good airflow.  CARDIAC:  Regular rate and rhythm without murmurs, rubs or gallops. Normal S1,S2.  ABDOMEN:  Soft, nontender with no hepatosplenomegaly or masses.  Mildly prominent  EXTREMITIES:  No clubbing, cyanosis or edema.  Chronic venous stasis changes with varicosities bilaterally  NEUROLOGICAL:  Cranial Nerves II-XII grossly intact.  No focal neurological deficits.  PSYCHIATRIC:  Normal affect and mood.          RECENT LABS:  Hematology WBC   Date Value Ref Range Status   08/20/2020 3.39 (L) 3.40 - 10.80 10*3/mm3 Final   06/23/2020 2.65 (L) 3.40 - 10.80 10*3/mm3 Final     RBC   Date Value Ref Range Status   08/20/2020 3.99 (L) 4.14 - 5.80 10*6/mm3 Final   06/23/2020 3.94 (L) 4.14 - 5.80 10*6/mm3 Final     Hemoglobin   Date Value Ref Range Status   08/20/2020 13.5 13.0 - 17.7 g/dL Final     Hematocrit   Date Value Ref Range Status   08/20/2020 40.9 37.5 - 51.0 % Final     Platelets   Date Value Ref Range Status   08/20/2020 103 (L) 140 - 450 10*3/mm3 Final      Peripheral blood smear shows normochromic normocytic red cells " without polychromasia white cells show no immaturity or hypersegmentation and platelets are slightly decreased but normal morphologically'    Ultrasound abdomen   abdominal aorta and IVC appear normal.  IMPRESSION:  1.  Splenomegaly.  2.  Multiple bilateral renal cysts as described.   CT ABD  FINDINGS: The liver, spleen, pancreas, and adrenal glands are  unremarkable. There are multiple fairly large bilateral simple renal  cysts. This includes a large perihilar cyst protruding inferiorly from  the left kidney that measures approximately 10.7 x 7.2 cm in diameter.  An 8.6 cm diameter simple cyst is also present in the midpole of the  left kidney. The kidneys are otherwise unremarkable. The stomach and  small and large bowel appear within normal limits. There are no hernias.     IMPRESSION:  Multiple bilateral simple renal cysts. Otherwise  unremarkable CT scan of the abdomen and pelvis.   This report was finalized on 10/3/2017                   Assessment/Plan   1, mild chronic thrombocytopenia with no significant change in almost 20 years and likely constitutional. His platelet count today is 103,000.  2.  Intermittent leukopenia probably constitutional  3.  Anemia resolved  4.  History of multiple cysts in both kidneys  5.  Persistent positive LASHONDA and double stranded DNA.  He met with rheumatology in July and was ultimately diagnosed with lupus.  He started plaque when Daisy is tolerated this quite well.  6.   atrial fibrillation on Coumadin with bradycardia asymptomatic    Plan  1.  Return as needed if his platelet count drops in  his rheumatologist or family doctor's office

## 2020-08-20 ENCOUNTER — LAB (OUTPATIENT)
Dept: LAB | Facility: HOSPITAL | Age: 75
End: 2020-08-20

## 2020-08-20 ENCOUNTER — OFFICE VISIT (OUTPATIENT)
Dept: ONCOLOGY | Facility: CLINIC | Age: 75
End: 2020-08-20

## 2020-08-20 VITALS
BODY MASS INDEX: 27.34 KG/M2 | DIASTOLIC BLOOD PRESSURE: 68 MMHG | HEART RATE: 44 BPM | RESPIRATION RATE: 16 BRPM | TEMPERATURE: 96.8 F | OXYGEN SATURATION: 98 % | WEIGHT: 213 LBS | HEIGHT: 74 IN | SYSTOLIC BLOOD PRESSURE: 154 MMHG

## 2020-08-20 DIAGNOSIS — M32.8 OTHER FORMS OF SYSTEMIC LUPUS ERYTHEMATOSUS, UNSPECIFIED ORGAN INVOLVEMENT STATUS (HCC): ICD-10-CM

## 2020-08-20 DIAGNOSIS — D69.6 THROMBOCYTOPENIA (HCC): Primary | ICD-10-CM

## 2020-08-20 DIAGNOSIS — D61.818 PANCYTOPENIA (HCC): Primary | ICD-10-CM

## 2020-08-20 LAB
ALBUMIN SERPL-MCNC: 4.3 G/DL (ref 3.5–5.2)
ALBUMIN/GLOB SERPL: 2 G/DL (ref 1.1–2.4)
ALP SERPL-CCNC: 60 U/L (ref 38–116)
ALT SERPL W P-5'-P-CCNC: 12 U/L (ref 0–41)
ANION GAP SERPL CALCULATED.3IONS-SCNC: 9.3 MMOL/L (ref 5–15)
AST SERPL-CCNC: 23 U/L (ref 0–40)
BACTERIA UR QL AUTO: NEGATIVE /HPF
BASOPHILS # BLD AUTO: 0.01 10*3/MM3 (ref 0–0.2)
BASOPHILS NFR BLD AUTO: 0.3 % (ref 0–1.5)
BILIRUB SERPL-MCNC: 0.9 MG/DL (ref 0.2–1.2)
BILIRUB UR QL STRIP: NEGATIVE
BUN SERPL-MCNC: 16 MG/DL (ref 6–20)
BUN/CREAT SERPL: 14.3 (ref 7.3–30)
CALCIUM SPEC-SCNC: 9.1 MG/DL (ref 8.5–10.2)
CHLORIDE SERPL-SCNC: 104 MMOL/L (ref 98–107)
CK SERPL-CCNC: 171 U/L (ref 20–200)
CLARITY UR: CLEAR
CO2 SERPL-SCNC: 25.7 MMOL/L (ref 22–29)
COLOR UR: YELLOW
CREAT SERPL-MCNC: 1.12 MG/DL (ref 0.7–1.3)
DEPRECATED RDW RBC AUTO: 47.7 FL (ref 37–54)
EOSINOPHIL # BLD AUTO: 0.06 10*3/MM3 (ref 0–0.4)
EOSINOPHIL NFR BLD AUTO: 1.8 % (ref 0.3–6.2)
ERYTHROCYTE [DISTWIDTH] IN BLOOD BY AUTOMATED COUNT: 12.6 % (ref 12.3–15.4)
GFR SERPL CREATININE-BSD FRML MDRD: 64 ML/MIN/1.73
GLOBULIN UR ELPH-MCNC: 2.2 GM/DL (ref 1.8–3.5)
GLUCOSE SERPL-MCNC: 92 MG/DL (ref 74–124)
GLUCOSE UR STRIP-MCNC: NEGATIVE MG/DL
HCT VFR BLD AUTO: 40.9 % (ref 37.5–51)
HGB BLD-MCNC: 13.5 G/DL (ref 13–17.7)
HGB UR QL STRIP.AUTO: ABNORMAL
HYALINE CASTS UR QL AUTO: NORMAL /LPF
IMM GRANULOCYTES # BLD AUTO: 0.02 10*3/MM3 (ref 0–0.05)
IMM GRANULOCYTES NFR BLD AUTO: 0.6 % (ref 0–0.5)
KETONES UR QL STRIP: NEGATIVE
LEUKOCYTE ESTERASE UR QL STRIP.AUTO: NEGATIVE
LYMPHOCYTES # BLD AUTO: 0.52 10*3/MM3 (ref 0.7–3.1)
LYMPHOCYTES NFR BLD AUTO: 15.3 % (ref 19.6–45.3)
MCH RBC QN AUTO: 33.8 PG (ref 26.6–33)
MCHC RBC AUTO-ENTMCNC: 33 G/DL (ref 31.5–35.7)
MCV RBC AUTO: 102.5 FL (ref 79–97)
MONOCYTES # BLD AUTO: 0.27 10*3/MM3 (ref 0.1–0.9)
MONOCYTES NFR BLD AUTO: 8 % (ref 5–12)
NEUTROPHILS NFR BLD AUTO: 2.51 10*3/MM3 (ref 1.7–7)
NEUTROPHILS NFR BLD AUTO: 74 % (ref 42.7–76)
NITRITE UR QL STRIP: NEGATIVE
NRBC BLD AUTO-RTO: 0 /100 WBC (ref 0–0.2)
PH UR STRIP.AUTO: 6 [PH] (ref 4.5–8)
PLATELET # BLD AUTO: 103 10*3/MM3 (ref 140–450)
PMV BLD AUTO: 9.5 FL (ref 6–12)
POTASSIUM SERPL-SCNC: 4.8 MMOL/L (ref 3.5–4.7)
PROT SERPL-MCNC: 6.5 G/DL (ref 6.3–8)
PROT UR QL STRIP: NEGATIVE
RBC # BLD AUTO: 3.99 10*6/MM3 (ref 4.14–5.8)
RBC # UR: NORMAL /HPF
REF LAB TEST METHOD: NORMAL
SODIUM SERPL-SCNC: 139 MMOL/L (ref 134–145)
SP GR UR STRIP: 1.02 (ref 1–1.03)
SQUAMOUS #/AREA URNS HPF: NORMAL /HPF
UROBILINOGEN UR QL STRIP: ABNORMAL
WBC # BLD AUTO: 3.39 10*3/MM3 (ref 3.4–10.8)
WBC UR QL AUTO: NORMAL /HPF

## 2020-08-20 PROCEDURE — 81001 URINALYSIS AUTO W/SCOPE: CPT

## 2020-08-20 PROCEDURE — 85025 COMPLETE CBC W/AUTO DIFF WBC: CPT

## 2020-08-20 PROCEDURE — 36415 COLL VENOUS BLD VENIPUNCTURE: CPT

## 2020-08-20 PROCEDURE — 82550 ASSAY OF CK (CPK): CPT | Performed by: INTERNAL MEDICINE

## 2020-08-20 PROCEDURE — 99213 OFFICE O/P EST LOW 20 MIN: CPT | Performed by: INTERNAL MEDICINE

## 2020-08-20 PROCEDURE — 80053 COMPREHEN METABOLIC PANEL: CPT

## 2020-08-21 DIAGNOSIS — Z79.899 HIGH RISK MEDICATION USE: ICD-10-CM

## 2020-08-21 DIAGNOSIS — I48.0 PAF (PAROXYSMAL ATRIAL FIBRILLATION) (HCC): Primary | ICD-10-CM

## 2020-08-21 LAB
ANA SER QL: POSITIVE
C3 SERPL-MCNC: 85 MG/DL (ref 82–167)
C4 SERPL-MCNC: 17 MG/DL (ref 14–44)
RA LATEX TURBID: <10 IU/ML (ref 0–13.9)

## 2020-08-24 ENCOUNTER — OFFICE VISIT (OUTPATIENT)
Dept: GASTROENTEROLOGY | Facility: CLINIC | Age: 75
End: 2020-08-24

## 2020-08-24 VITALS — BODY MASS INDEX: 26.58 KG/M2 | HEIGHT: 75 IN | WEIGHT: 213.8 LBS | TEMPERATURE: 98.2 F

## 2020-08-24 DIAGNOSIS — K63.5 POLYP OF COLON, UNSPECIFIED PART OF COLON, UNSPECIFIED TYPE: ICD-10-CM

## 2020-08-24 DIAGNOSIS — R13.10 DYSPHAGIA, UNSPECIFIED TYPE: ICD-10-CM

## 2020-08-24 DIAGNOSIS — Z80.0 FH: COLON CANCER: Primary | ICD-10-CM

## 2020-08-24 DIAGNOSIS — Z83.71 FH: COLON POLYPS: ICD-10-CM

## 2020-08-24 PROCEDURE — 99213 OFFICE O/P EST LOW 20 MIN: CPT | Performed by: INTERNAL MEDICINE

## 2020-08-24 NOTE — PROGRESS NOTES
Chief Complaint   Patient presents with   • Follow-up       History of Present Illness:   75 y.o. male        He has a history of dysphasia.  EGD in 1 of 2020 showed fungal esophagitis.  He also had an colonoscopy in January 2020.  He had only a fair colonic preparation.  4 small colon polyps were removed.  Pathology had shown:  Result Notes for Tissue Pathology Exam     Notes recorded by Henrique Rust MD on 1/13/2020 at 7:12 AM EST  01/13/20  Tell him that pathology from the EGD did show fungal esophagitis (that I think is caused by Flonase use).  When he is finished with the nystatin swish and swallow I would have him take Diflucan 100 mg 1 p.o. daily for 2 weeks I would have him talk to whichever doctor has him on Flonase 2 make sure they know that he has been diagnosed with Candida esophagitis.       The colon polyps that were removed were not cancerous but one was precancerous.  I hope he tolerated the colonoscopy well.  In a perfect world I would repeat colonoscopy in 2 years because of his history of precancerous polyps and because he only had a fair colonic preparation during this last colonoscopy.  In 2 years we could at least talk about the pros and cons of a colonoscopy.       Please fax a copy of this report to Dr. Stephenson and to Dr. David Hernandez.  Thx. [facility], [facility]–1983   Tissue Pathology Exam: [id number]   Order: [id number]   Status:  Final result   Visible to patient:  Yes (MyChart) Next appt:  10/12/2020 at 11:20 AM in Cardiology (David Hernandez MD) Dx:  Abnormal barium enema   Specimen Information: A: Stomach; Tissue    B: Esophagus; Tissue    C: Large Intestine; Polyp    D: Large Intestine, Cecum; Polyp        Component    Case Report   Surgical Pathology Report                         Case: [id number]                                   Authorizing Provider:  Henrique Rust MD           Collected:           01/08/2020 10:39 AM           Ordering Location:     [facility]  Received:            " 01/08/2020 01:16 PM                                  ENDO SUITES                                                                   Pathologist:           Reji Whitney MD                                                          Specimens:   1) - Stomach, Random Gastric bxs                                                                     2) - Esophagus, Esophageal bx, \"stain for fungus\"                                                    3) - Large Intestine, Polyp at 100                                                                   4) - Large Intestine, Cecum, Cecal Polyp                                                   Final Diagnosis   1. Stomach, Biopsy: Benign gastric mucosa with                A. Mild chronic inflammation. (mild non-specific chronic gastritis).               B. No intestinal metaplasia.               C. No Helicobacter pylori.      2. Esophagus, Biopsy: Benign squamous mucosa with                A. Fungal organisms consistent with Candida.               B. No intestinal metaplasia.               C. No significant inflammation.               D. No Helicobacter pylori.     Comment: A fungus stain was recommended but fungus can clearly be seen on the H&E slide and the fungal stain is not needed.     3. Colon at 100 cm, Biopsy: Benign polypoid colonic mucosa with               A. Features suggestive of developing hyperplastic polyp.     4. Cecum, Biopsy:                A. Tubulovillous adenoma.     anselmo/pkm    Electronically signed by Reji Whitney MD on 1/9/2020 at 1112   Gross             He has rare solids dysphagia. No odynaphagia.  No nausea or vomiting. Some constipation. No diarrhea. No rectal bleeding or melena. NO abdominal or chest pain.     Past Medical History:   Diagnosis Date   • LASHONDA positive 6/18/2018    2017: elevated ds LASHONDA   • Chronic obstructive pulmonary disease (CMS/HCC)    • Colon polyp 10/7/2019    Added automatically from request for surgery [id number]   • " Diverticulitis of large intestine without perforation or abscess without bleeding 3/7/2016   • Diverticulosis    • ED (erectile dysfunction)    • Elbow fracture, left    • History of blood transfusion    • Hypogonadism in male    • Kidney cysts    • Left femoral shaft fracture (CMS/HCC)    • Lower back pain    • PAF (paroxysmal atrial fibrillation) (CMS/HCC)    • Peptic ulceration    • Plantar fasciitis 12/5/2017   • Prostatic hypertrophy, benign    • 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   • Transient cerebral ischemia     H/O TIAs       Past Surgical History:   Procedure Laterality Date   • ABLATION OF DYSRHYTHMIC FOCUS  2012, 2013   • ADENOIDECTOMY  1973   • APPENDECTOMY  1973   • BACK SURGERY     • CARDIAC ABLATION  2013, 2014   • CARDIAC CATHETERIZATION  2012, 2013   • COLONOSCOPY  approx 2014    normal per pt    • COLONOSCOPY N/A 1/8/2020    Procedure: COLONOSCOPY to cecum with cold snare biopsies;  Surgeon: Henrique Rust MD;  Location: [facility] ENDOSCOPY;  Service: Gastroenterology   • COLONOSCOPY W/ POLYPECTOMY  11/21/2014    : 1 polyp - not precancerous   • ENDOSCOPY N/A 1/8/2020    Procedure: ESOPHAGOGASTRODUODENOSCOPY;  Surgeon: Henrique Rust MD;  Location: [facility] ENDOSCOPY;  Service: Gastroenterology   • FEMUR FRACTURE SURGERY Left 2007    post fall from the ladder   • LUMBAR LAMINECTOMY  1974    Dr.Lester Lino   • NECK SURGERY      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   • OTHER SURGICAL HISTORY      elbow surgery   • THYROID SURGERY  1986    benign tumor removal,    • TOTAL ELBOW ARTHROPLASTY Left 2007    L, post fall and fracture, hardware in         Current Outpatient Medications:   •  Cetirizine HCl 10 MG capsule, Take 1 capsule by mouth daily., Disp: , Rfl:   •  finasteride (PROSCAR) 5 MG tablet, TAKE 1 TABLET BY MOUTH EVERY DAY, Disp: 90 tablet, Rfl: 2  •  HYDROcodone-acetaminophen (NORCO) 5-325 MG per tablet, Take 1 tablet by mouth Every 12 (Twelve) Hours As Needed for  Severe Pain ., Disp: 30 tablet, Rfl: 0  •  hydroxychloroquine (PLAQUENIL) 200 MG tablet, Take 200 mg by mouth 2 (Two) Times a Day., Disp: , Rfl:   •  propafenone (RYTHMOL) 150 MG tablet, TAKE 1 TABLET BY MOUTH TWICE A DAY, Disp: 180 tablet, Rfl: 1  •  Psyllium (METAMUCIL PO), Take  by mouth Daily., Disp: , Rfl:   •  warfarin (COUMADIN) 5 MG tablet, Take one-half tablet (2.5 mg) by mouth on Mon and Fri, and take one tablet (5 mg) by mouth all other days or as directed, Disp: 80 tablet, Rfl: 1    Allergies   Allergen Reactions   • Cardizem [Diltiazem Hcl] Itching   • Grass Unknown - Low Severity       Family History   Problem Relation Age of Onset   • Hypertension Mother    • Osteoporosis Mother    • Thyroid disease Sister    • Diabetes Sister    • Hypertension Sister    • Colon cancer Maternal Grandmother 60   • Hypertension Father    • Heart disease Other    • Atrial fibrillation Other    • Thyroid disease Other    • Pulmonary fibrosis Sister    • Diabetes Other    • Heart disease Other    • Hypertension Other    • Heart disease Sister        Social History     Socioeconomic History   • Marital status:      Spouse name: Latisha   • Number of children: 2   • Years of education: College   • Highest education level: Not asked   Occupational History   • 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   Social Needs   • Financial resource strain: Patient refused   • Food insecurity:     Worry: Patient refused     Inability: Patient refused   • Transportation needs:     Medical: Patient refused     Non-medical: Patient refused   Tobacco Use   • Smoking status: Former Smoker     Packs/day: 0.50     Years: 15.00     Pack years: 7.50     Types: Cigarettes     Start date: 1959     Last attempt to quit: 1974     Years since quittin.6   • Smokeless tobacco: Never Used   Substance and Sexual Activity   • Alcohol use: Yes     Alcohol/week: 1.0 standard drinks     Types: 6 Glasses of wine per week     Comment: Some beer  during the summer months   • Drug use: No   • Sexual activity: Not Currently     Partners: Female     Birth control/protection: None   Social History Narrative    LIVES WITH SPOUSE       Review of Systems   Gastrointestinal: Negative for abdominal pain.     Pertinent positives and negatives documented in the HPI and all other systems reviewed and were found to be negative.  Vitals:    08/24/20 1039   Temp: 98.2 °F (36.8 °C)       Physical Exam   Constitutional: He is oriented to person, place, and time. He appears well-developed and well-nourished. No distress.   HENT:   Head: Normocephalic and atraumatic. Hair is normal.   Right Ear: Hearing, tympanic membrane, external ear and ear canal normal.   Left Ear: Hearing, tympanic membrane, external ear and ear canal normal.   Nose: No sinus tenderness or nasal deformity.   Mouth/Throat: Uvula is midline, oropharynx is clear and moist and mucous membranes are normal. No oral lesions. No uvula swelling.   Eyes: Pupils are equal, round, and reactive to light. Conjunctivae, EOM and lids are normal. Right eye exhibits no discharge. Left eye exhibits no discharge. No scleral icterus. Right eye exhibits normal extraocular motion and no nystagmus. Left eye exhibits normal extraocular motion and no nystagmus.   Neck: Normal range of motion. Neck supple. No JVD present. No thyromegaly present.   Cardiovascular: Normal rate, regular rhythm, normal heart sounds, intact distal pulses and normal pulses. Exam reveals no gallop.   No murmur heard.  Pulmonary/Chest: Effort normal and breath sounds normal. No respiratory distress. He has no wheezes. He has no rales. He exhibits no tenderness.   Abdominal: Soft. Bowel sounds are normal. He exhibits no distension and no mass. There is no tenderness. There is no guarding. No hernia.   Musculoskeletal: Normal range of motion. He exhibits no edema, tenderness or deformity.   Lymphadenopathy:     He has no cervical adenopathy.   Neurological:  He is alert and oriented to person, place, and time. He has normal reflexes. He displays normal reflexes. No cranial nerve deficit. He exhibits normal muscle tone. Coordination normal.   Skin: Skin is warm and dry. No rash noted. He is not diaphoretic.   Psychiatric: He has a normal mood and affect. His behavior is normal. Judgment and thought content normal.   Vitals reviewed.      Demond was seen today for follow-up.    Diagnoses and all orders for this visit:    FH: colon cancer    FH: colon polyps    Polyp of colon, unspecified part of colon, unspecified type    Dysphagia, unspecified type      Assessment:  1.  History of colonic polyps.  2.  Family history (grandmother) colon cancer  3.  Family history ( sister) colon polyps  4. Dypshagia.   5. On coumadin for a fib.   6.     Recommendations:  1. We discussed barium swallow. He doesn't want anymore tests now.   2. F/u one year.     No follow-ups on file.    Henrique Rust MD  8/24/2020

## 2020-08-27 ENCOUNTER — ANTICOAGULATION VISIT (OUTPATIENT)
Dept: PHARMACY | Facility: HOSPITAL | Age: 75
End: 2020-08-27

## 2020-08-27 DIAGNOSIS — I48.0 PAF (PAROXYSMAL ATRIAL FIBRILLATION) (HCC): ICD-10-CM

## 2020-08-27 LAB — INR PPP: 2.6

## 2020-08-27 NOTE — PROGRESS NOTES
Anticoagulation Clinic Progress Note    Anticoagulation Summary  As of 2020    INR goal:   2.0-3.0   TTR:   87.9 % (1.7 y)   INR used for dosin.60 (2020)   Warfarin maintenance plan:   2.5 mg every Mon, Fri; 5 mg all other days   Weekly warfarin total:   30 mg   No change documented:   Philip Carmona RPH   Plan last modified:   Winter Sanabria RPH (2018)   Next INR check:   9/10/2020   Priority:   Maintenance   Target end date:       Indications    PAF (paroxysmal atrial fibrillation) (CMS/HCC) [I48.0]             Anticoagulation Episode Summary     INR check location:       Preferred lab:       [facility]    Comments:   [place]      Anticoagulation Care Providers     Provider Role Specialty Phone number    David Hernandez MD Referring Cardiology [phone]          Clinic Interview:  No pertinent clinical findings have been reported.    INR History:  Anticoagulation Monitoring 2020   INR 2.80 3.10 2.60   INR Date 2020   INR Goal 2.0-3.0 2.0-3.0 2.0-3.0   Trend Same Same Same   Last Week Total 30 mg 30 mg 30 mg   Next Week Total 30 mg 30 mg 30 mg   Sun 5 mg 5 mg 5 mg   Mon 2.5 mg 2.5 mg 2.5 mg   Tue 5 mg 5 mg 5 mg   Wed 5 mg 5 mg 5 mg   Thu 5 mg 5 mg 5 mg   Fri 2.5 mg 2.5 mg 2.5 mg   Sat 5 mg 5 mg 5 mg   Visit Report - - -   Some recent data might be hidden       Plan:  1. INR is therapeutic today- see above in Anticoagulation Summary.    Demond CROSS Bernarda to continue their warfarin regimen- see above in Anticoagulation Summary.  2. Follow up in 2 weeks  3. Pt has agreed to only be called if INR out of range. They have been instructed to call if any changes in medications, doses, concerns, etc. Patient expresses understanding and has no further questions at this time.    Philip Carmona RPH

## 2020-09-10 ENCOUNTER — ANTICOAGULATION VISIT (OUTPATIENT)
Dept: PHARMACY | Facility: HOSPITAL | Age: 75
End: 2020-09-10

## 2020-09-10 DIAGNOSIS — I48.0 PAF (PAROXYSMAL ATRIAL FIBRILLATION) (HCC): ICD-10-CM

## 2020-09-10 LAB — INR PPP: 2.7

## 2020-09-10 NOTE — PROGRESS NOTES
Anticoagulation Clinic Progress Note    Anticoagulation Summary  As of 9/10/2020    INR goal:   2.0-3.0   TTR:   88.1 % (1.8 y)   INR used for dosin.70 (9/10/2020)   Warfarin maintenance plan:   2.5 mg every Mon, Fri; 5 mg all other days   Weekly warfarin total:   30 mg   No change documented:   Silvia Joseph   Plan last modified:   Winter Sanabria RPH (2018)   Next INR check:   2020   Priority:   Maintenance   Target end date:       Indications    PAF (paroxysmal atrial fibrillation) (CMS/HCC) [I48.0]             Anticoagulation Episode Summary     INR check location:       Preferred lab:       [facility]    Comments:   [place]      Anticoagulation Care Providers     Provider Role Specialty Phone number    David Hernandez MD Referring Cardiology [phone]          Clinic Interview:  No pertinent clinical findings have been reported.    INR History:  Anticoagulation Monitoring 2020 2020 9/10/2020   INR 3.10 2.60 2.70   INR Date 2020 2020 9/10/2020   INR Goal 2.0-3.0 2.0-3.0 2.0-3.0   Trend Same Same Same   Last Week Total 30 mg 30 mg 30 mg   Next Week Total 30 mg 30 mg 30 mg   Sun 5 mg 5 mg 5 mg   Mon 2.5 mg 2.5 mg 2.5 mg   Tue 5 mg 5 mg 5 mg   Wed 5 mg 5 mg 5 mg   Thu 5 mg 5 mg 5 mg   Fri 2.5 mg 2.5 mg 2.5 mg   Sat 5 mg 5 mg 5 mg   Visit Report - - -   Some recent data might be hidden       Plan:  1. INR is therapeutic today- see above in Anticoagulation Summary.    Demond CROSS Bernarda to continue their warfarin regimen- see above in Anticoagulation Summary.  2. Follow up in 2 weeks  3. Pt has agreed to only be called if INR out of range. They have been instructed to call if any changes in medications, doses, concerns, etc. Patient expresses understanding and has no further questions at this time.    Silvia Joseph

## 2020-09-11 RX ORDER — PROPAFENONE HYDROCHLORIDE 150 MG/1
TABLET, COATED ORAL
Qty: 180 TABLET | Refills: 1 | Status: SHIPPED | OUTPATIENT
Start: 2020-09-11 | End: 2020-10-12

## 2020-09-21 ENCOUNTER — OFFICE VISIT (OUTPATIENT)
Dept: INTERNAL MEDICINE | Facility: CLINIC | Age: 75
End: 2020-09-21

## 2020-09-21 VITALS
OXYGEN SATURATION: 100 % | SYSTOLIC BLOOD PRESSURE: 122 MMHG | WEIGHT: 215 LBS | TEMPERATURE: 98.3 F | HEIGHT: 75 IN | DIASTOLIC BLOOD PRESSURE: 78 MMHG | BODY MASS INDEX: 26.73 KG/M2 | HEART RATE: 50 BPM

## 2020-09-21 DIAGNOSIS — S81.812A LACERATION OF LEFT LOWER EXTREMITY, INITIAL ENCOUNTER: Primary | ICD-10-CM

## 2020-09-21 DIAGNOSIS — Z23 NEED FOR INFLUENZA VACCINATION: ICD-10-CM

## 2020-09-21 PROCEDURE — 90694 VACC AIIV4 NO PRSRV 0.5ML IM: CPT | Performed by: FAMILY MEDICINE

## 2020-09-21 PROCEDURE — 99213 OFFICE O/P EST LOW 20 MIN: CPT | Performed by: FAMILY MEDICINE

## 2020-09-21 PROCEDURE — G0008 ADMIN INFLUENZA VIRUS VAC: HCPCS | Performed by: FAMILY MEDICINE

## 2020-09-21 NOTE — PROGRESS NOTES
Subjective   Demond Menchaca is a 75 y.o. male. F/u from Urgent Care visits on 9/8 and 9/15/2020.    History of Present Illness     Leg wound-new problem to me.  Patient was in the urgent care from 2 different visits on September 8 and September 15, 2020.  Most recently he had been there for a follow-up and they had started him on doxycycline to cover for MRSA.  It asked him to follow-up with his primary care within 5 days after that visit.  Interval history the erythema is improving.  He was treating with Neosporin but has since stopped.  He has not been placing a bandage on or anything but it is not been draining.  All of the wounds have scabbed over.  He denies much of any pain.    The following portions of the patient's history were reviewed and updated as appropriate: allergies, current medications, past family history, past medical history, past social history, past surgical history and problem list.    Review of Systems   Constitutional: Negative for fatigue and fever.   Respiratory: Negative for shortness of breath and wheezing.    Cardiovascular: Negative for chest pain and palpitations.   Gastrointestinal: Negative for constipation and nausea.       Objective   Physical Exam  Vitals signs and nursing note reviewed.   Constitutional:       General: He is not in acute distress.     Appearance: He is well-developed. He is not diaphoretic.   Cardiovascular:      Rate and Rhythm: Normal rate and regular rhythm.      Heart sounds: Normal heart sounds. No murmur. No friction rub. No gallop.    Pulmonary:      Effort: Pulmonary effort is normal.      Breath sounds: Normal breath sounds. No wheezing or rales.   Skin:     General: Skin is warm.      Capillary Refill: Capillary refill takes less than 2 seconds.                Assessment/Plan   Demond was seen today for left leg wound.    Diagnoses and all orders for this visit:    Laceration of left lower extremity, initial encounter    Need for influenza  vaccination  -     Fluad Quad 65+ yrs ([id number])      I recommended that he continue hydration therapy with Vaseline around the scab and over top.  No further need for antibiotics.  He can monitor at home and if the erythema starts to get larger again or becomes more tender he should give me a call.  We will see him as neck scheduled appointment in approximately 3 months for telehealth.

## 2020-09-21 NOTE — PATIENT INSTRUCTIONS
Laceration Care, Adult  A laceration is a cut that may go through all layers of the skin. The cut may also go into the tissue that is right under the skin. Some cuts heal on their own. Others need to be closed with stitches (sutures), staples, skin adhesive strips, or skin glue. Taking care of your injury lowers your risk of infection, helps your injury to heal better, and may prevent scarring.  Supplies needed:  · Soap.  · Water.  · Hand .  · Bandage (dressing).  · Antibiotic ointment.  · Clean towel.  How to take care of your cut  Wash your hands with soap and water before touching your wound or changing your bandage. If soap and water are not available, use hand .  If your doctor used stitches or staples:  · Keep the wound clean and dry.  · If you were given a bandage, change it at least once a day as told by your doctor. You should also change it if it gets wet or dirty.  · Keep the wound completely dry for the first 24 hours, or as told by your doctor. After that, you may take a shower or a bath. Do not get the wound soaked in water until after the stitches or staples have been removed.  · Clean the wound once a day, or as told by your doctor:  ? Wash the wound with soap and water.  ? Rinse the wound with water to remove all soap.  ? Pat the wound dry with a clean towel. Do not rub the wound.  · After you clean the wound, put a thin layer of antibiotic ointment on it as told by your doctor. This ointment:  ? Helps to prevent infection.  ? Keeps the bandage from sticking to the wound.  · Have your stitches or staples removed as told by your doctor.  If your doctor used skin adhesive strips:  · Keep the wound clean and dry.  · If you were given a bandage, you should change it at least once a day as told by your doctor. You should also change it if it gets wet or dirty.  · Do not get the skin adhesive strips wet. You can take a shower or a bath, but keep the wound dry.  · If the wound gets wet,  pat it dry with a clean towel. Do not rub the wound.  · Skin adhesive strips fall off on their own. You can trim the strips as the wound heals. Do not remove any strips that are still stuck to the wound. They will fall off after a while.  If your doctor used skin glue:  · Try to keep your wound dry, but you may briefly wet it in the shower or bath. Do not soak the wound in water, such as by swimming.  · After you take a shower or a bath, gently pat the wound dry with a clean towel. Do not rub the wound.  · Do not do any activities that will make you really sweaty until the skin glue has fallen off on its own.  · Do not apply liquid, cream, or ointment medicine to your wound while the skin glue is still on.  · If you were given a bandage, you should change it at least once a day or as told by your doctor. You should also change it if it gets dirty or wet.  · If a bandage is placed over the wound, do not let the tape touch the skin glue.  · Do not pick at the glue. The skin glue usually stays on for 5-10 days. Then, it falls off the skin.  General instructions    · Take over-the-counter and prescription medicines only as told by your doctor.  · If you were given antibiotic medicine or ointment, take or apply it as told by your doctor. Do not stop using it even if your condition improves.  · Do not scratch or pick at the wound.  · Check your wound every day for signs of infection. Watch for:  ? Redness, swelling, or pain.  ? Fluid, blood, or pus.  · Raise (elevate) the injured area above the level of your heart while you are sitting or lying down.  · If directed, put ice on the affected area:  ? Put ice in a plastic bag.  ? Place a towel between your skin and the bag.  ? Leave the ice on for 20 minutes, 2-3 times a day.  · Prevent scarring by covering your wound with sunscreen of at least 30 SPF whenever you are outside after your wound has healed.  · Keep all follow-up visits as told by your doctor. This is  important.  Get help if:  · You got a tetanus shot and you have any of these problems at the injection site:  ? Swelling.  ? Very bad pain.  ? Redness.  ? Bleeding.  · You have a fever.  · A wound that was closed breaks open.  · You notice a bad smell coming from your wound or your bandage.  · You notice something coming out of the wound, such as wood or glass.  · Medicine does not relieve your pain.  · You have more redness, swelling, or pain at the site of your wound.  · You have fluid, blood, or pus coming from your wound.  · You notice a change in the color of your skin near your wound.  · You need to change the bandage often because fluid, blood, or pus is coming from the wound.  · You start to have a new rash.  · You start to have numbness around the wound.  Get help right away if:  · You have very bad swelling around the wound.  · Your pain suddenly gets worse and is very bad.  · You notice painful lumps near the wound or anywhere on your body.  · You have a red streak going away from your wound.  · The wound is on your hand or foot, and:  ? You cannot move a finger or toe.  ? Your fingers or toes look pale or bluish.  Summary  · A laceration is a cut that may go through all layers of the skin. The cut may also go into the tissue right under the skin.  · Some cuts heal on their own. Others need to be closed with stitches, staples, skin adhesive strips, or skin glue.  · Follow your doctor's instructions for caring for your cut. Proper care of a cut lowers the risk of infection, helps the cut heal better, and prevents scarring.  This information is not intended to replace advice given to you by your health care provider. Make sure you discuss any questions you have with your health care provider.  Document Released: 06/05/2009 Document Revised: 02/15/2019 Document Reviewed: 01/07/2019  Elsevier Patient Education © 2020 [employer]

## 2020-09-24 ENCOUNTER — ANTICOAGULATION VISIT (OUTPATIENT)
Dept: PHARMACY | Facility: HOSPITAL | Age: 75
End: 2020-09-24

## 2020-09-24 DIAGNOSIS — I48.0 PAF (PAROXYSMAL ATRIAL FIBRILLATION) (HCC): ICD-10-CM

## 2020-09-24 LAB — INR PPP: 2.7

## 2020-09-24 NOTE — PROGRESS NOTES
Anticoagulation Clinic Progress Note    Anticoagulation Summary  As of 2020    INR goal:  2.0-3.0   TTR:  88.4 % (1.8 y)   INR used for dosin.70 (2020)   Warfarin maintenance plan:  2.5 mg every Mon, Fri; 5 mg all other days   Weekly warfarin total:  30 mg   No change documented:  Silvia Joseph   Plan last modified:  Winter Sanabria RP (2018)   Next INR check:  10/8/2020   Priority:  Maintenance   Target end date:      Indications    PAF (paroxysmal atrial fibrillation) (CMS/HCC) [I48.0]             Anticoagulation Episode Summary     INR check location:      Preferred lab:      [facility]    Comments:  [place]      Anticoagulation Care Providers     Provider Role Specialty Phone number    David Hernandez MD Referring Cardiology [phone]          Clinic Interview:  No pertinent clinical findings have been reported.    INR History:  Anticoagulation Monitoring 2020 9/10/2020 2020   INR 2.60 2.70 2.70   INR Date 2020 9/10/2020 2020   INR Goal 2.0-3.0 2.0-3.0 2.0-3.0   Trend Same Same Same   Last Week Total 30 mg 30 mg 30 mg   Next Week Total 30 mg 30 mg 30 mg   Sun 5 mg 5 mg 5 mg   Mon 2.5 mg 2.5 mg 2.5 mg   Tue 5 mg 5 mg 5 mg   Wed 5 mg 5 mg 5 mg   Thu 5 mg 5 mg 5 mg   Fri 2.5 mg 2.5 mg 2.5 mg   Sat 5 mg 5 mg 5 mg   Visit Report - - -   Some recent data might be hidden       Plan:  1. INR is therapeutic today- see above in Anticoagulation Summary.    Demond CROSS Bernarda to continue their warfarin regimen- see above in Anticoagulation Summary.  2. Follow up in 2 weeks  3. Pt has agreed to only be called if INR out of range. They have been instructed to call if any changes in medications, doses, concerns, etc. Patient expresses understanding and has no further questions at this time.    Silvia Joseph

## 2020-10-08 ENCOUNTER — ANTICOAGULATION VISIT (OUTPATIENT)
Dept: PHARMACY | Facility: HOSPITAL | Age: 75
End: 2020-10-08

## 2020-10-08 DIAGNOSIS — I48.0 PAF (PAROXYSMAL ATRIAL FIBRILLATION) (HCC): ICD-10-CM

## 2020-10-08 LAB — INR PPP: 3.3

## 2020-10-08 NOTE — PROGRESS NOTES
Anticoagulation Clinic Progress Note    Anticoagulation Summary  As of 10/8/2020    INR goal:  2.0-3.0   TTR:  87.6 % (1.8 y)   INR used for dosing:  3.30 (10/8/2020)   Warfarin maintenance plan:  2.5 mg every Mon, Fri; 5 mg all other days   Weekly warfarin total:  30 mg   Plan last modified:  Winter Sanabria RPH (11/27/2018)   Next INR check:  10/22/2020   Priority:  Maintenance   Target end date:      Indications    PAF (paroxysmal atrial fibrillation) (CMS/HCC) [I48.0]             Anticoagulation Episode Summary     INR check location:      Preferred lab:      Send INR reminders to:   [facility]  POOL    Comments:  [place]      Anticoagulation Care Providers     Provider Role Specialty Phone number    David Hernandez MD Referring Cardiology [phone]          Clinic Interview:  Patient Findings     Positives:  Change in diet/appetite    Negatives:  Signs/symptoms of thrombosis, Signs/symptoms of bleeding,   Laboratory test error suspected, Change in health, Change in alcohol use,   Change in activity, Upcoming invasive procedure, Emergency department   visit, Upcoming dental procedure, Missed doses, Extra doses, Change in   medications, Hospital admission, Bruising, Other complaints    Comments:  Did not have a spinach salad yesterday (usually eats daily).    1 beer on 10/7.      Clinical Outcomes     Negatives:  Major bleeding event, Thromboembolic event,   Anticoagulation-related hospital admission, Anticoagulation-related ED   visit, Anticoagulation-related fatality    Comments:  Did not have a spinach salad yesterday (usually eats daily).    1 beer on 10/7.        INR History:  Anticoagulation Monitoring 9/10/2020 9/24/2020 10/8/2020   INR 2.70 2.70 3.30   INR Date 9/10/2020 9/24/2020 10/8/2020   INR Goal 2.0-3.0 2.0-3.0 2.0-3.0   Trend Same Same Same   Last Week Total 30 mg 30 mg 30 mg   Next Week Total 30 mg 30 mg 27.5 mg   Sun 5 mg 5 mg 5 mg   Mon 2.5 mg 2.5 mg 2.5 mg   Tue 5 mg 5 mg 5 mg    Wed 5 mg 5 mg 5 mg   Thu 5 mg 5 mg 2.5 mg (10/8); Otherwise 5 mg   Fri 2.5 mg 2.5 mg 2.5 mg   Sat 5 mg 5 mg 5 mg   Visit Report - - -   Some recent data might be hidden       Plan:  1. INR is Supratherapeutic today- see above in Anticoagulation Summary.   Will instruct Demond Menchaca to Change their warfarin regimen- see above in Anticoagulation Summary.  2. Follow up in 2 weeks  3. They have been instructed to call if any changes in medications, doses, concerns, etc. Patient expresses understanding and has no further questions at this time.    Brandi Young [facility]

## 2020-10-12 ENCOUNTER — OFFICE VISIT (OUTPATIENT)
Dept: CARDIOLOGY | Facility: CLINIC | Age: 75
End: 2020-10-12

## 2020-10-12 VITALS
SYSTOLIC BLOOD PRESSURE: 140 MMHG | DIASTOLIC BLOOD PRESSURE: 78 MMHG | BODY MASS INDEX: 26.98 KG/M2 | HEIGHT: 75 IN | HEART RATE: 45 BPM | WEIGHT: 217 LBS

## 2020-10-12 DIAGNOSIS — I48.0 PAF (PAROXYSMAL ATRIAL FIBRILLATION) (HCC): Primary | ICD-10-CM

## 2020-10-12 PROCEDURE — 99214 OFFICE O/P EST MOD 30 MIN: CPT | Performed by: INTERNAL MEDICINE

## 2020-10-12 PROCEDURE — 93000 ELECTROCARDIOGRAM COMPLETE: CPT | Performed by: INTERNAL MEDICINE

## 2020-10-12 RX ORDER — PROPAFENONE HYDROCHLORIDE 150 MG/1
150 TABLET, COATED ORAL EVERY 8 HOURS
Qty: 270 TABLET | Refills: 3 | Status: SHIPPED | OUTPATIENT
Start: 2020-10-12 | End: 2021-04-30 | Stop reason: HOSPADM

## 2020-10-12 NOTE — PROGRESS NOTES
Date of Office Visit: 10/12/2020  Encounter Provider: David Hernandez MD  Place of Service: [facility] CARDIOLOGY  Patient Name: Demond Menchaca  : 1945    Subjective:     Encounter Date:10/12/2020      Patient ID: Demond Menchaca is a 75 y.o. male who has a cc of  PAF and I did CB ablation in  then redo PVI in     He still gets AF about every 2-4 weeks --lasting hours.     When not in     No anginal chest pain,   No sig briseno,   No soa,   No fainting,  No orthostasis.   No edema.   Exercise tolerance: good      There have been no hospital admission since the last visit.     There have been no bleeding events.       Past Medical History:   Diagnosis Date   • LASHONDA positive 2018    2017: elevated ds LASHONDA   • Chronic obstructive pulmonary disease (CMS/HCC)    • Colon polyp 10/7/2019    Added automatically from request for surgery [id number]   • Diverticulitis of large intestine without perforation or abscess without bleeding 3/7/2016   • Diverticulosis    • ED (erectile dysfunction)    • Elbow fracture, left    • History of blood transfusion    • Hypogonadism in male    • Kidney cysts    • Left femoral shaft fracture (CMS/HCC)    • Lower back pain    • PAF (paroxysmal atrial fibrillation) (CMS/HCC)    • Peptic ulceration    • Plantar fasciitis 2017   • Prostatic hypertrophy, benign    • 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CMS/HCC) 2012   • Transient cerebral ischemia     H/O TIAs       Social History     Socioeconomic History   • Marital status:      Spouse name: Latisha   • Number of children: 2   • Years of education: College   • Highest education level: Not asked   Occupational History   • 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   Social Needs   • Financial resource strain: Patient refused   • Food insecurity     Worry: Patient refused     Inability: Patient refused   • Transportation needs     Medical: Patient refused     Non-medical: Patient refused   Tobacco  "Use   • Smoking status: Former Smoker     Packs/day: 0.50     Years: 15.00     Pack years: 7.50     Types: Cigarettes     Start date: 1959     Quit date:      Years since quittin.8   • Smokeless tobacco: Never Used   Substance and Sexual Activity   • Alcohol use: Yes     Alcohol/week: 1.0 standard drinks     Types: 6 Glasses of wine per week     Comment: Some beer during the summer months   • Drug use: No   • Sexual activity: Not Currently     Partners: Female     Birth control/protection: None   Social History Narrative    LIVES WITH SPOUSE       Family History   Problem Relation Age of Onset   • Hypertension Mother    • Osteoporosis Mother    • Thyroid disease Sister    • Diabetes Sister    • Hypertension Sister    • Colon cancer Maternal Grandmother 60   • Hypertension Father    • Heart disease Other    • Atrial fibrillation Other    • Thyroid disease Other    • Pulmonary fibrosis Sister    • Diabetes Other    • Heart disease Other    • Hypertension Other    • Heart disease Sister        Review of Systems   Constitution: Negative for fever and night sweats.   HENT: Negative for ear pain and stridor.    Eyes: Negative for discharge and visual halos.   Cardiovascular: Negative for cyanosis.   Respiratory: Negative for hemoptysis and sputum production.    Hematologic/Lymphatic: Negative for adenopathy.   Skin: Negative for nail changes and unusual hair distribution.   Musculoskeletal: Negative for gout and joint swelling.   Gastrointestinal: Negative for bowel incontinence and flatus.   Genitourinary: Negative for dysuria and flank pain.   Neurological: Negative for seizures and tremors.   Psychiatric/Behavioral: Negative for altered mental status. The patient is not nervous/anxious.             Objective:     Vitals:    10/12/20 1125   BP: 140/78   BP Location: Left arm   Patient Position: Sitting   Cuff Size: Adult   Pulse: (!) 45   Weight: 98.4 kg (217 lb)   Height: 190.5 cm (75\")         Eyes:      " General:         Right eye: No discharge.         Left eye: No discharge.   HENT:      Head: Normocephalic and atraumatic.   Neck:      Thyroid: No thyromegaly.      Vascular: No JVD.   Pulmonary:      Effort: Pulmonary effort is normal.      Breath sounds: Normal breath sounds. No rales.   Cardiovascular:      Normal rate. Regular rhythm.      No gallop.   Edema:     Peripheral edema absent.   Abdominal:      General: Bowel sounds are normal.      Palpations: Abdomen is soft.      Tenderness: There is no abdominal tenderness.   Musculoskeletal: Normal range of motion.         General: No deformity.   Skin:     General: Skin is warm and dry.      Findings: No erythema.   Neurological:      Mental Status: Alert and oriented to person, place, and time.      Motor: Normal muscle tone.   Psychiatric:         Behavior: Behavior normal.         Thought Content: Thought content normal.           ECG 12 Lead    Date/Time: 10/12/2020 11:49 AM  Performed by: David Hernandez MD  Authorized by: David Hernandez MD   Comparison: compared with previous ECG   Similar to previous ECG  Rhythm: sinus rhythm  Rate: normal  Conduction: conduction normal  ST Segments: ST segments normal  T Waves: T waves normal  QRS axis: normal    Clinical impression: normal ECG            Lab Review:       Assessment:          Diagnosis Plan   1. PAF (paroxysmal atrial fibrillation) (CMS/[facility])            Plan:     AF -- on warfarin     Still having AF and maybe the episodes are lasting a little longer,     He feels terrible when in AF.     I will try increasing the propafenone. To 150 tid

## 2020-10-22 ENCOUNTER — TELEPHONE (OUTPATIENT)
Dept: CARDIOLOGY | Facility: CLINIC | Age: 75
End: 2020-10-22

## 2020-10-22 ENCOUNTER — ANTICOAGULATION VISIT (OUTPATIENT)
Dept: PHARMACY | Facility: HOSPITAL | Age: 75
End: 2020-10-22

## 2020-10-22 DIAGNOSIS — I48.0 PAF (PAROXYSMAL ATRIAL FIBRILLATION) (HCC): ICD-10-CM

## 2020-10-22 LAB — INR PPP: 2.9

## 2020-10-22 NOTE — TELEPHONE ENCOUNTER
Pt called to let you know that the propafenone increase to 150 mg TID is making him feel dizzy and lightheaded. He is going back to the 150 mg BID dosage...Tatum

## 2020-10-22 NOTE — PROGRESS NOTES
Anticoagulation Clinic Progress Note    Anticoagulation Summary  As of 10/22/2020    INR goal:  2.0-3.0   TTR:  86.3 % (1.9 y)   INR used for dosin.90 (10/22/2020)   Warfarin maintenance plan:  2.5 mg every Mon, Fri; 5 mg all other days   Weekly warfarin total:  30 mg   No change documented:  Philip Carmona RPH   Plan last modified:  Winter Sanabria RPH (2018)   Next INR check:  2020   Priority:  Maintenance   Target end date:      Indications    PAF (paroxysmal atrial fibrillation) (CMS/HCC) [I48.0]             Anticoagulation Episode Summary     INR check location:      Preferred lab:      [facility]  [facility]    Comments:  [place]      Anticoagulation Care Providers     Provider Role Specialty Phone number    David Hernandez MD Referring Cardiology [phone]          Clinic Interview:  No pertinent clinical findings have been reported.    INR History:  Anticoagulation Monitoring 2020 10/8/2020 10/22/2020   INR 2.70 3.30 2.90   INR Date 2020 10/8/2020 10/22/2020   INR Goal 2.0-3.0 2.0-3.0 2.0-3.0   Trend Same Same Same   Last Week Total 30 mg 30 mg 30 mg   Next Week Total 30 mg 27.5 mg 30 mg   Sun 5 mg 5 mg 5 mg   Mon 2.5 mg 2.5 mg 2.5 mg   Tue 5 mg 5 mg 5 mg   Wed 5 mg 5 mg 5 mg   Thu 5 mg 2.5 mg (10/8); Otherwise 5 mg 5 mg   Fri 2.5 mg 2.5 mg 2.5 mg   Sat 5 mg 5 mg 5 mg   Visit Report - - -   Some recent data might be hidden       Plan:  1. INR is therapeutic today- see above in Anticoagulation Summary.    Demond CROSS Bernarda to continue their warfarin regimen- see above in Anticoagulation Summary.  2. Follow up in 2 weeks  3. Pt has agreed to only be called if INR out of range. They have been instructed to call if any changes in medications, doses, concerns, etc. Patient expresses understanding and has no further questions at this time.    Philip Carmona RPH

## 2020-10-27 NOTE — TELEPHONE ENCOUNTER
Pt called and reports that he began the  dosage of Propafenone 150 mg bid, but he missed a couple of doses in the morning but did take his evening dose.  He states that he felt much better taking the evening dose only.  He reports that he hasn't had any afib episodes and his hr has been WNL.  He denies any dizziness.  Pt would like to continue this med regimen it you approve.      Propafenone 150 mg qhs.

## 2020-11-05 ENCOUNTER — ANTICOAGULATION VISIT (OUTPATIENT)
Dept: PHARMACY | Facility: HOSPITAL | Age: 75
End: 2020-11-05

## 2020-11-05 DIAGNOSIS — I48.0 PAF (PAROXYSMAL ATRIAL FIBRILLATION) (HCC): ICD-10-CM

## 2020-11-05 LAB — INR PPP: 2.8

## 2020-11-05 NOTE — PROGRESS NOTES
Anticoagulation Clinic Progress Note    Anticoagulation Summary  As of 2020    INR goal:  2.0-3.0   TTR:  86.6 % (1.9 y)   INR used for dosin.80 (2020)   Warfarin maintenance plan:  2.5 mg every Mon, Fri; 5 mg all other days   Weekly warfarin total:  30 mg   No change documented:  Sheri Aviles   Plan last modified:  Winter Sanabria [facility] (2018)   Next INR check:  2020   Priority:  Maintenance   Target end date:      Indications    PAF (paroxysmal atrial fibrillation) (CMS/HCC) [I48.0]             Anticoagulation Episode Summary     INR check location:      Preferred lab:      Send INR reminders to:  [facility] [place]  POOL    Comments:  [place]      Anticoagulation Care Providers     Provider Role Specialty Phone number    David Hernandez MD Referring Cardiology [phone]          Clinic Interview:  No pertinent clinical findings have been reported.    INR History:  Anticoagulation Monitoring 10/8/2020 10/22/2020 2020   INR 3.30 2.90 2.80   INR Date 10/8/2020 10/22/2020 2020   INR Goal 2.0-3.0 2.0-3.0 2.0-3.0   Trend Same Same Same   Last Week Total 30 mg 30 mg 30 mg   Next Week Total 27.5 mg 30 mg 30 mg   Sun 5 mg 5 mg 5 mg   Mon 2.5 mg 2.5 mg 2.5 mg   Tue 5 mg 5 mg 5 mg   Wed 5 mg 5 mg 5 mg   Thu 2.5 mg (10/8); Otherwise 5 mg 5 mg 5 mg   Fri 2.5 mg 2.5 mg 2.5 mg   Sat 5 mg 5 mg 5 mg   Visit Report - - -   Some recent data might be hidden       Plan:  1. INR is therapeutic today- see above in Anticoagulation Summary.    Demond CROSS Bernarda to continue their warfarin regimen- see above in Anticoagulation Summary.  2. Follow up in 2 weeks  3. Pt has agreed to only be called if INR out of range. They have been instructed to call if any changes in medications, doses, concerns, etc. Patient expresses understanding and has no further questions at this time.    Sheri Aviles

## 2020-11-19 ENCOUNTER — ANTICOAGULATION VISIT (OUTPATIENT)
Dept: PHARMACY | Facility: HOSPITAL | Age: 75
End: 2020-11-19

## 2020-11-19 DIAGNOSIS — I48.0 PAF (PAROXYSMAL ATRIAL FIBRILLATION) (HCC): ICD-10-CM

## 2020-11-19 LAB — INR PPP: 2.7

## 2020-11-19 NOTE — PROGRESS NOTES
Anticoagulation Clinic Progress Note    Anticoagulation Summary  As of 2020    INR goal:  2.0-3.0   TTR:  86.8 % (2 y)   INR used for dosin.70 (2020)   Warfarin maintenance plan:  2.5 mg every Mon, Fri; 5 mg all other days   Weekly warfarin total:  30 mg   No change documented:  Silvia Joseph   Plan last modified:  Winter Sanabria [facility] (2018)   Next INR check:  12/3/2020   Priority:  Maintenance   Target end date:      Indications    PAF (paroxysmal atrial fibrillation) (CMS/HCC) [I48.0]             Anticoagulation Episode Summary     INR check location:      Preferred lab:      [facility]    Comments:  [place]      Anticoagulation Care Providers     Provider Role Specialty Phone number    David Hernandez MD Referring Cardiology [phone]          Clinic Interview:  No pertinent clinical findings have been reported.    INR History:  Anticoagulation Monitoring 10/22/2020 2020 2020   INR 2.90 2.80 2.70   INR Date 10/22/2020 2020 2020   INR Goal 2.0-3.0 2.0-3.0 2.0-3.0   Trend Same Same Same   Last Week Total 30 mg 30 mg 30 mg   Next Week Total 30 mg 30 mg 30 mg   Sun 5 mg 5 mg 5 mg   Mon 2.5 mg 2.5 mg 2.5 mg   Tue 5 mg 5 mg 5 mg   Wed 5 mg 5 mg 5 mg   Thu 5 mg 5 mg 5 mg   Fri 2.5 mg 2.5 mg 2.5 mg   Sat 5 mg 5 mg 5 mg   Visit Report - - -   Some recent data might be hidden       Plan:  1. INR is therapeutic today- see above in Anticoagulation Summary.    Demond CROSS Bernarda to continue their warfarin regimen- see above in Anticoagulation Summary.  2. Follow up in 2 weeks  3. Pt has agreed to only be called if INR out of range. They have been instructed to call if any changes in medications, doses, concerns, etc. Patient expresses understanding and has no further questions at this time.    Silvia Joseph

## 2020-12-01 ENCOUNTER — RESULTS ENCOUNTER (OUTPATIENT)
Dept: FAMILY MEDICINE CLINIC | Facility: CLINIC | Age: 75
End: 2020-12-01

## 2020-12-01 DIAGNOSIS — Z79.899 HIGH RISK MEDICATION USE: ICD-10-CM

## 2020-12-01 DIAGNOSIS — I48.0 PAF (PAROXYSMAL ATRIAL FIBRILLATION) (HCC): ICD-10-CM

## 2020-12-03 ENCOUNTER — ANTICOAGULATION VISIT (OUTPATIENT)
Dept: PHARMACY | Facility: HOSPITAL | Age: 75
End: 2020-12-03

## 2020-12-03 DIAGNOSIS — I48.0 PAF (PAROXYSMAL ATRIAL FIBRILLATION) (HCC): ICD-10-CM

## 2020-12-03 LAB — INR PPP: 3

## 2020-12-03 NOTE — PROGRESS NOTES
Anticoagulation Clinic Progress Note    Anticoagulation Summary  As of 12/3/2020    INR goal:  2.0-3.0   TTR:  87.1 % (2 y)   INR used for dosing:  3.00 (12/3/2020)   Warfarin maintenance plan:  2.5 mg every Mon, Fri; 5 mg all other days   Weekly warfarin total:  30 mg   No change documented:  Silvia Joseph   Plan last modified:  Winter Sanabria [facility] (11/27/2018)   Next INR check:  12/17/2020   Priority:  Maintenance   Target end date:      Indications    PAF (paroxysmal atrial fibrillation) (CMS/HCC) [I48.0]             Anticoagulation Episode Summary     INR check location:      Preferred lab:      [facility]    Comments:  [place]      Anticoagulation Care Providers     Provider Role Specialty Phone number    David Hernandez MD Referring Cardiology [phone]          Clinic Interview:  No pertinent clinical findings have been reported.    INR History:  Anticoagulation Monitoring 11/5/2020 11/19/2020 12/3/2020   INR 2.80 2.70 3.00   INR Date 11/5/2020 11/19/2020 12/3/2020   INR Goal 2.0-3.0 2.0-3.0 2.0-3.0   Trend Same Same Same   Last Week Total 30 mg 30 mg 30 mg   Next Week Total 30 mg 30 mg 30 mg   Sun 5 mg 5 mg 5 mg   Mon 2.5 mg 2.5 mg 2.5 mg   Tue 5 mg 5 mg 5 mg   Wed 5 mg 5 mg 5 mg   Thu 5 mg 5 mg 5 mg   Fri 2.5 mg 2.5 mg 2.5 mg   Sat 5 mg 5 mg 5 mg   Visit Report - - -   Some recent data might be hidden       Plan:  1. INR is therapeutic today- see above in Anticoagulation Summary.    Demond CROSS Bernarda to continue their warfarin regimen- see above in Anticoagulation Summary.  2. Follow up in 2 weeks  3. Pt has agreed to only be called if INR out of range. They have been instructed to call if any changes in medications, doses, concerns, etc. Patient expresses understanding and has no further questions at this time.    Silvia Joseph

## 2020-12-17 ENCOUNTER — ANTICOAGULATION VISIT (OUTPATIENT)
Dept: PHARMACY | Facility: HOSPITAL | Age: 75
End: 2020-12-17

## 2020-12-17 DIAGNOSIS — I48.0 PAF (PAROXYSMAL ATRIAL FIBRILLATION) (HCC): ICD-10-CM

## 2020-12-17 LAB
ERYTHROCYTE [DISTWIDTH] IN BLOOD BY AUTOMATED COUNT: 12.4 % (ref 12.3–15.4)
HCT VFR BLD AUTO: 38.9 % (ref 37.5–51)
HGB BLD-MCNC: 13.6 G/DL (ref 13–17.7)
INR PPP: 3
MCH RBC QN AUTO: 33.9 PG (ref 26.6–33)
MCHC RBC AUTO-ENTMCNC: 35 G/DL (ref 31.5–35.7)
MCV RBC AUTO: 97 FL (ref 79–97)
PLATELET # BLD AUTO: 124 10*3/MM3 (ref 140–450)
RBC # BLD AUTO: 4.01 10*6/MM3 (ref 4.14–5.8)
WBC # BLD AUTO: 3.56 10*3/MM3 (ref 3.4–10.8)

## 2020-12-17 NOTE — PROGRESS NOTES
Anticoagulation Clinic Progress Note    Anticoagulation Summary  As of 12/17/2020    INR goal:  2.0-3.0   TTR:  87.3 % (2 y)   INR used for dosing:  3.00 (12/17/2020)   Warfarin maintenance plan:  2.5 mg every Mon, Fri; 5 mg all other days   Weekly warfarin total:  30 mg   No change documented:  Sheri Aviles   Plan last modified:  Winter Sanabria [facility] (11/27/2018)   Next INR check:  12/31/2020   Priority:  Maintenance   Target end date:      Indications    PAF (paroxysmal atrial fibrillation) (CMS/HCC) [I48.0]             Anticoagulation Episode Summary     INR check location:      Preferred lab:      Send INR reminders to:  [facility]  POOL    Comments:  [place]      Anticoagulation Care Providers     Provider Role Specialty Phone number    David Hernandez MD Referring Cardiology [phone]          Clinic Interview:  No pertinent clinical findings have been reported.    INR History:  Anticoagulation Monitoring 11/19/2020 12/3/2020 12/17/2020   INR 2.70 3.00 3.00   INR Date 11/19/2020 12/3/2020 12/17/2020   INR Goal 2.0-3.0 2.0-3.0 2.0-3.0   Trend Same Same Same   Last Week Total 30 mg 30 mg 30 mg   Next Week Total 30 mg 30 mg 30 mg   Sun 5 mg 5 mg 5 mg   Mon 2.5 mg 2.5 mg 2.5 mg   Tue 5 mg 5 mg 5 mg   Wed 5 mg 5 mg 5 mg   Thu 5 mg 5 mg 5 mg   Fri 2.5 mg 2.5 mg 2.5 mg   Sat 5 mg 5 mg 5 mg   Visit Report - - -   Some recent data might be hidden       Plan:  1. INR is therapeutic today- see above in Anticoagulation Summary.    Demond CROSS Bernarda to continue their warfarin regimen- see above in Anticoagulation Summary.  2. Follow up in 2 weeks  3. Pt has agreed to only be called if INR out of range. They have been instructed to call if any changes in medications, doses, concerns, etc. Patient expresses understanding and has no further questions at this time.    Sheri Aviles

## 2020-12-18 ENCOUNTER — TELEPHONE (OUTPATIENT)
Dept: CARDIOLOGY | Facility: CLINIC | Age: 75
End: 2020-12-18

## 2020-12-18 NOTE — TELEPHONE ENCOUNTER
DANIELLE--Pt called to let you know he is doing fine. He has not had an episode for 7 weeks. He has a little dizziness when standing occasionally not always...[place]

## 2020-12-24 ENCOUNTER — OFFICE VISIT (OUTPATIENT)
Dept: INTERNAL MEDICINE | Facility: CLINIC | Age: 75
End: 2020-12-24

## 2020-12-24 DIAGNOSIS — M51.36 DDD (DEGENERATIVE DISC DISEASE), LUMBAR: Primary | ICD-10-CM

## 2020-12-24 PROCEDURE — 99442 PR PHYS/QHP TELEPHONE EVALUATION 11-20 MIN: CPT | Performed by: FAMILY MEDICINE

## 2020-12-24 NOTE — PATIENT INSTRUCTIONS
Chronic Back Pain  When back pain lasts longer than 3 months, it is called chronic back pain. Pain may get worse at certain times (flare-ups). There are things you can do at home to manage your pain.  Follow these instructions at home:  Activity         · Avoid bending and other activities that make pain worse.  · When standing:  ? Keep your upper back and neck straight.  ? Keep your shoulders pulled back.  ? Avoid slouching.  · When sitting:  ? Keep your back straight.  ? Relax your shoulders. Do not round your shoulders or pull them backward.  · Do not sit or  one place for long periods of time.  · Take short rest breaks during the day. Lying down or standing is usually better than sitting. Resting can help relieve pain.  · When sitting or lying down for a long time, do some mild activity or stretching. This will help to prevent stiffness and pain.  · Get regular exercise. Ask your doctor what activities are safe for you.  · Do not lift anything that is heavier than 10 lb (4.5 kg). To prevent injury when you lift things:  ? Bend your knees.  ? Keep the weight close to your body.  ? Avoid twisting.  Managing pain  · If told, put ice on the painful area. Your doctor may tell you to use ice for 24-48 hours after a flare-up starts.  ? Put ice in a plastic bag.  ? Place a towel between your skin and the bag.  ? Leave the ice on for 20 minutes, 2-3 times a day.  · If told, put heat on the painful area as often as told by your doctor. Use the heat source that your doctor recommends, such as a moist heat pack or a heating pad.  ? Place a towel between your skin and the heat source.  ? Leave the heat on for 20-30 minutes.  ? Remove the heat if your skin turns bright red. This is especially important if you are unable to feel pain, heat, or cold. You may have a greater risk of getting burned.  · Soak in a warm bath. This can help relieve pain.  · Take over-the-counter and prescription medicines only as told by your  doctor.  General instructions  · Sleep on a firm mattress. Try lying on your side with your knees slightly bent. If you lie on your back, put a pillow under your knees.  · Keep all follow-up visits as told by your doctor. This is important.  Contact a doctor if:  · You have pain that does not get better with rest or medicine.  Get help right away if:  · One or both of your arms or legs feel weak.  · One or both of your arms or legs lose feeling (numbness).  · You have trouble controlling when you poop (bowel movement) or pee (urinate).  · You feel sick to your stomach (nauseous).  · You throw up (vomit).  · You have belly (abdominal) pain.  · You have shortness of breath.  · You pass out (faint).  Summary  · When back pain lasts longer than 3 months, it is called chronic back pain.  · Pain may get worse at certain times (flare-ups).  · Use ice and heat as told by your doctor. Your doctor may tell you to use ice after flare-ups.  This information is not intended to replace advice given to you by your health care provider. Make sure you discuss any questions you have with your health care provider.  Document Revised: 04/09/2020 Document Reviewed: 08/02/2018  Elsevier Patient Education © 2020 [employer]

## 2020-12-24 NOTE — PROGRESS NOTES
Subjective   Demond Menchaca is a 75 y.o. male. Low back pain    You have chosen to receive care through a telephone visit. Do you consent to use a telephone visit for your medical care today? Yes      History of Present Illness   New patient to me    Low back pain - Stable. Taking Norco 5-325mg daily as needed but usually only gets one script per year.  He takes it for low back pain and only takes it mostly when he golfs or just happens to be hurting him.      AWV in August 2021.    The following portions of the patient's history were reviewed and updated as appropriate: allergies, current medications, past family history, past medical history, past social history, past surgical history and problem list.    Review of Systems   Constitutional: Negative for fatigue and fever.   Respiratory: Negative for shortness of breath and wheezing.    Cardiovascular: Negative for chest pain and palpitations.   Gastrointestinal: Negative for constipation and nausea.   Musculoskeletal: Positive for back pain.         Objective    Physical exam: N/A      Assessment/Plan   Diagnoses and all orders for this visit:    1. DDD (degenerative disc disease), lumbar (Primary)      GLORY and drug screen appropriate.  Okay to get a refill if needed.  AWV in August 2021.    I spent 7 minutes on the call and another 5 minutes completing the encounter along with research.

## 2020-12-31 LAB — INR PPP: 3.1

## 2021-01-04 ENCOUNTER — ANTICOAGULATION VISIT (OUTPATIENT)
Dept: PHARMACY | Facility: HOSPITAL | Age: 76
End: 2021-01-04

## 2021-01-04 DIAGNOSIS — I48.0 PAF (PAROXYSMAL ATRIAL FIBRILLATION) (HCC): ICD-10-CM

## 2021-01-04 NOTE — PROGRESS NOTES
Anticoagulation Clinic Progress Note    Anticoagulation Summary  As of 1/4/2021    INR goal:  2.0-3.0   TTR:  85.7 % (2.1 y)   INR used for dosing:  3.10 (12/31/2020)   Warfarin maintenance plan:  2.5 mg every Mon, Fri; 5 mg all other days   Weekly warfarin total:  30 mg   No change documented:  Philip Carmona RPH   Plan last modified:  Winter Sanabria RPH (11/27/2018)   Next INR check:  1/14/2021   Priority:  Maintenance   Target end date:      Indications    PAF (paroxysmal atrial fibrillation) (CMS/formerly [facility]) [I48.0]             Anticoagulation Episode Summary     INR check location:      Preferred lab:      Send INR reminders to:   [facility]  POOL    Comments:  [place]      Anticoagulation Care Providers     Provider Role Specialty Phone number    David Hernandez MD Referring Cardiology [phone]          Clinic Interview:  Patient Findings     Positives:  Change in medications    Negatives:  Signs/symptoms of thrombosis, Signs/symptoms of bleeding,   Laboratory test error suspected, Change in health, Change in alcohol use,   Change in activity, Upcoming invasive procedure, Emergency department   visit, Upcoming dental procedure, Missed doses, Extra doses, Change in   diet/appetite, Hospital admission, Bruising, Other complaints    Comments:  Reports propafenone was reduced in late October      Clinical Outcomes     Negatives:  Major bleeding event, Thromboembolic event,   Anticoagulation-related hospital admission, Anticoagulation-related ED   visit, Anticoagulation-related fatality    Comments:  Reports propafenone was reduced in late October        INR History:  Anticoagulation Monitoring 12/3/2020 12/17/2020 1/4/2021   INR 3.00 3.00 3.10   INR Date 12/3/2020 12/17/2020 12/31/2020   INR Goal 2.0-3.0 2.0-3.0 2.0-3.0   Trend Same Same Same   Last Week Total 30 mg 30 mg 30 mg   Next Week Total 30 mg 30 mg 30 mg   Sun 5 mg 5 mg 5 mg   Mon 2.5 mg 2.5 mg 2.5 mg   Tue 5 mg 5 mg 5 mg   Wed 5 mg 5 mg 5 mg    Thu 5 mg 5 mg 5 mg   Fri 2.5 mg 2.5 mg 2.5 mg   Sat 5 mg 5 mg 5 mg   Visit Report - - -   Some recent data might be hidden       Plan:  1. INR was Supratherapeutic 12/31/20 - see above in Anticoagulation Summary.   Will instruct Demond Menchaca to Continue their warfarin regimen- see above in Anticoagulation Summary.  2. Follow up in 2 weeks  3. They have been instructed to call if any changes in medications, doses, concerns, etc. Patient expresses understanding and has no further questions at this time.    Philip Carmona [facility]

## 2021-01-14 ENCOUNTER — ANTICOAGULATION VISIT (OUTPATIENT)
Dept: PHARMACY | Facility: HOSPITAL | Age: 76
End: 2021-01-14

## 2021-01-14 DIAGNOSIS — I48.0 PAF (PAROXYSMAL ATRIAL FIBRILLATION) (HCC): ICD-10-CM

## 2021-01-14 LAB — INR PPP: 2.7

## 2021-01-14 NOTE — PROGRESS NOTES
Anticoagulation Clinic Progress Note    Anticoagulation Summary  As of 2021    INR goal:  2.0-3.0   TTR:  85.5 % (2.1 y)   INR used for dosin.70 (2021)   Warfarin maintenance plan:  2.5 mg every Mon, Fri; 5 mg all other days   Weekly warfarin total:  30 mg   Plan last modified:  Winter Sanabria [facility] (2018)   Next INR check:  2021   Priority:  Maintenance   Target end date:      Indications    PAF (paroxysmal atrial fibrillation) (CMS/HCC) [I48.0]             Anticoagulation Episode Summary     INR check location:      Preferred lab:      Send INR reminders to:   [facility]  POOL    Comments:  [place]      Anticoagulation Care Providers     Provider Role Specialty Phone number    David Hernandez MD Referring Cardiology [phone]          Clinic Interview:  Patient Findings     Negatives:  Signs/symptoms of thrombosis, Signs/symptoms of bleeding,   Laboratory test error suspected, Change in health, Change in alcohol use,   Change in activity, Upcoming invasive procedure, Emergency department   visit, Upcoming dental procedure, Missed doses, Extra doses, Change in   medications, Change in diet/appetite, Hospital admission, Bruising, Other   complaints      Clinical Outcomes     Negatives:  Major bleeding event, Thromboembolic event,   Anticoagulation-related hospital admission, Anticoagulation-related ED   visit, Anticoagulation-related fatality        INR History:  Anticoagulation Monitoring 2020   INR 3.00 3.10 2.70   INR Date 2020   INR Goal 2.0-3.0 2.0-3.0 2.0-3.0   Trend Same Same Same   Last Week Total 30 mg 30 mg 30 mg   Next Week Total 30 mg 30 mg 30 mg   Sun 5 mg 5 mg 5 mg   Mon 2.5 mg 2.5 mg 2.5 mg   Tue 5 mg 5 mg 5 mg   Wed 5 mg 5 mg 5 mg   Thu 5 mg 5 mg 5 mg   Fri 2.5 mg 2.5 mg 2.5 mg   Sat 5 mg 5 mg 5 mg   Visit Report - - -   Some recent data might be hidden       Plan:  1. INR is Therapeutic today- see above in  Anticoagulation Summary.   Will instruct Demond Menchaca to Continue their warfarin regimen- see above in Anticoagulation Summary.  2. Follow up in 2 weeks  3. Pt has agreed to only be called if INR out of range. They have been instructed to call if any changes in medications, doses, concerns, etc. Patient expresses understanding and has no further questions at this time.    Alize Arzola, [facility]

## 2021-01-29 ENCOUNTER — ANTICOAGULATION VISIT (OUTPATIENT)
Dept: PHARMACY | Facility: HOSPITAL | Age: 76
End: 2021-01-29

## 2021-01-29 DIAGNOSIS — I48.0 PAF (PAROXYSMAL ATRIAL FIBRILLATION) (HCC): ICD-10-CM

## 2021-01-29 LAB — INR PPP: 2.5

## 2021-01-29 NOTE — PROGRESS NOTES
Anticoagulation Clinic Progress Note    Anticoagulation Summary  As of 2021    INR goal:  2.0-3.0   TTR:  85.8 % (2.1 y)   INR used for dosin.50 (2021)   Warfarin maintenance plan:  2.5 mg every Mon, Fri; 5 mg all other days   Weekly warfarin total:  30 mg   No change documented:  Kera Scanlon   Plan last modified:  Winter Sanabria [facility] (2018)   Next INR check:  2021   Priority:  Maintenance   Target end date:      Indications    PAF (paroxysmal atrial fibrillation) (CMS/HCC) [I48.0]             Anticoagulation Episode Summary     INR check location:      Preferred lab:      Send INR reminders to:  [facility] [place]  POOL    Comments:  [place]      Anticoagulation Care Providers     Provider Role Specialty Phone number    David Hernandez MD Referring Cardiology [phone]          Clinic Interview:  No pertinent clinical findings have been reported.    INR History:  Anticoagulation Monitoring 2021   INR 3.10 2.70 2.50   INR Date 2020   INR Goal 2.0-3.0 2.0-3.0 2.0-3.0   Trend Same Same Same   Last Week Total 30 mg 30 mg 30 mg   Next Week Total 30 mg 30 mg 30 mg   Sun 5 mg 5 mg 5 mg   Mon 2.5 mg 2.5 mg 2.5 mg   Tue 5 mg 5 mg 5 mg   Wed 5 mg 5 mg 5 mg   Thu 5 mg 5 mg 5 mg   Fri 2.5 mg 2.5 mg 2.5 mg   Sat 5 mg 5 mg 5 mg   Visit Report - - -   Some recent data might be hidden       Plan:  1. INR is therapeutic today- see above in Anticoagulation Summary.    Demond CROSS Bernarda to continue their warfarin regimen- see above in Anticoagulation Summary.  2. Follow up in 2 weeks  3. Pt has agreed to only be called if INR out of range. They have been instructed to call if any changes in medications, doses, concerns, etc. Patient expresses understanding and has no further questions at this time.    Kera Scanlon

## 2021-02-11 ENCOUNTER — ANTICOAGULATION VISIT (OUTPATIENT)
Dept: PHARMACY | Facility: HOSPITAL | Age: 76
End: 2021-02-11

## 2021-02-11 DIAGNOSIS — I48.0 PAF (PAROXYSMAL ATRIAL FIBRILLATION) (HCC): ICD-10-CM

## 2021-02-11 LAB — INR PPP: 2.7

## 2021-02-11 NOTE — PROGRESS NOTES
Anticoagulation Clinic Progress Note    Anticoagulation Summary  As of 2021    INR goal:  2.0-3.0   TTR:  86.0 % (2.2 y)   INR used for dosin.70 (2021)   Warfarin maintenance plan:  2.5 mg every Mon, Fri; 5 mg all other days   Weekly warfarin total:  30 mg   No change documented:  Kera Scanlon   Plan last modified:  Winter Sanabria [facility] (2018)   Next INR check:  2021   Priority:  Maintenance   Target end date:      Indications    PAF (paroxysmal atrial fibrillation) (CMS/HCC) [I48.0]             Anticoagulation Episode Summary     INR check location:      Preferred lab:      Send INR reminders to:  [facility] [place]  POOL    Comments:  [place]      Anticoagulation Care Providers     Provider Role Specialty Phone number    David Hernandez MD Referring Cardiology [phone]          Clinic Interview:  No pertinent clinical findings have been reported.    INR History:  Anticoagulation Monitoring 2021   INR 2.70 2.50 2.70   INR Date 2021   INR Goal 2.0-3.0 2.0-3.0 2.0-3.0   Trend Same Same Same   Last Week Total 30 mg 30 mg 30 mg   Next Week Total 30 mg 30 mg 30 mg   Sun 5 mg 5 mg 5 mg   Mon 2.5 mg 2.5 mg 2.5 mg   Tue 5 mg 5 mg 5 mg   Wed 5 mg 5 mg 5 mg   Thu 5 mg 5 mg 5 mg   Fri 2.5 mg 2.5 mg 2.5 mg   Sat 5 mg 5 mg 5 mg   Visit Report - - -   Some recent data might be hidden       Plan:  1. INR is therapeutic today- see above in Anticoagulation Summary.    Demond CROSS Bernarda to continue their warfarin regimen- see above in Anticoagulation Summary.  2. Follow up in 2 weeks  3. Pt has agreed to only be called if INR out of range. They have been instructed to call if any changes in medications, doses, concerns, etc. Patient expresses understanding and has no further questions at this time.    Kera Scanlon

## 2021-02-25 ENCOUNTER — ANTICOAGULATION VISIT (OUTPATIENT)
Dept: PHARMACY | Facility: HOSPITAL | Age: 76
End: 2021-02-25

## 2021-02-25 DIAGNOSIS — I48.0 PAF (PAROXYSMAL ATRIAL FIBRILLATION) (HCC): ICD-10-CM

## 2021-02-25 LAB — INR PPP: 2.8

## 2021-02-25 NOTE — PROGRESS NOTES
Anticoagulation Clinic Progress Note    Anticoagulation Summary  As of 2021    INR goal:  2.0-3.0   TTR:  86.3 % (2.2 y)   INR used for dosin.80 (2021)   Warfarin maintenance plan:  2.5 mg every Mon, Fri; 5 mg all other days   Weekly warfarin total:  30 mg   Plan last modified:  Winter Sanabria RPH (2018)   Next INR check:  3/11/2021   Priority:  Maintenance   Target end date:      Indications    PAF (paroxysmal atrial fibrillation) (CMS/HCC) [I48.0]             Anticoagulation Episode Summary     INR check location:      Preferred lab:      Send INR reminders to:   [facility]  POOL    Comments:  [place]      Anticoagulation Care Providers     Provider Role Specialty Phone number    David Hernandez MD Referring Cardiology [phone]          Clinic Interview:  No pertinent clinical findings have been reported.    INR History:  Anticoagulation Monitoring 2021   INR 2.50 2.70 2.80   INR Date 2021   INR Goal 2.0-3.0 2.0-3.0 2.0-3.0   Trend Same Same Same   Last Week Total 30 mg 30 mg 30 mg   Next Week Total 30 mg 30 mg 30 mg   Sun 5 mg 5 mg 5 mg   Mon 2.5 mg 2.5 mg 2.5 mg   Tue 5 mg 5 mg 5 mg   Wed 5 mg 5 mg 5 mg   Thu 5 mg 5 mg 5 mg   Fri 2.5 mg 2.5 mg 2.5 mg   Sat 5 mg 5 mg 5 mg   Visit Report - - -   Some recent data might be hidden       Plan:  1. INR is therapeutic today- see above in Anticoagulation Summary.    Demond CROSS Bernarda to continue their warfarin regimen- see above in Anticoagulation Summary.  2. Follow up in 2 weeks  3. Pt has agreed to only be called if INR out of range. They have been instructed to call if any changes in medications, doses, concerns, etc. Patient expresses understanding and has no further questions at this time.    Sheri Aviles

## 2021-03-05 ENCOUNTER — OFFICE VISIT (OUTPATIENT)
Dept: INTERNAL MEDICINE | Facility: CLINIC | Age: 76
End: 2021-03-05

## 2021-03-05 VITALS
TEMPERATURE: 98 F | WEIGHT: 220 LBS | DIASTOLIC BLOOD PRESSURE: 80 MMHG | OXYGEN SATURATION: 99 % | SYSTOLIC BLOOD PRESSURE: 116 MMHG | BODY MASS INDEX: 27.5 KG/M2 | HEART RATE: 51 BPM

## 2021-03-05 DIAGNOSIS — M54.9 MUSCULOSKELETAL BACK PAIN: Primary | ICD-10-CM

## 2021-03-05 PROCEDURE — 99213 OFFICE O/P EST LOW 20 MIN: CPT | Performed by: NURSE PRACTITIONER

## 2021-03-05 RX ORDER — BACLOFEN 10 MG/1
10 TABLET ORAL 3 TIMES DAILY PRN
Qty: 20 TABLET | Refills: 0 | Status: ON HOLD | OUTPATIENT
Start: 2021-03-05 | End: 2021-04-29

## 2021-03-05 NOTE — PROGRESS NOTES
Subjective     Demond Menchaca is a 76 y.o. male.         Patient presents with:  Flank Pain: right side- with movement        Flank Pain  This is a new problem. The current episode started 1 to 4 weeks ago (3 weeks). The problem occurs intermittently. The pain is moderate. The symptoms are aggravated by bending and standing. Pertinent negatives include no abdominal pain, bladder incontinence, bowel incontinence, chest pain, dysuria, fever, numbness, paresthesias, tingling or weakness. He has tried nothing for the symptoms.        The following portions of the patient's history were reviewed and updated as appropriate: allergies, current medications, past social history and problem list.    Past Medical History:   Diagnosis Date   • LASHONDA positive 6/18/2018    2017: elevated ds LASHONDA   • Chronic obstructive pulmonary disease (CMS/HCC)    • Colon polyp 10/7/2019    Added automatically from request for surgery [id number]   • Diverticulitis of large intestine without perforation or abscess without bleeding 3/7/2016   • Diverticulosis    • ED (erectile dysfunction)    • Elbow fracture, left    • History of blood transfusion    • Hypogonadism in male    • Kidney cysts    • Left femoral shaft fracture (CMS/HCC)    • Lower back pain    • PAF (paroxysmal atrial fibrillation) (CMS/HCC)    • Peptic ulceration    • Plantar fasciitis 12/5/2017   • Prostatic hypertrophy, benign    • 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   • Transient cerebral ischemia     H/O TIAs         Current Outpatient Medications:   •  Cetirizine HCl 10 MG capsule, Take 1 capsule by mouth daily., Disp: , Rfl:   •  finasteride (PROSCAR) 5 MG tablet, TAKE 1 TABLET BY MOUTH EVERY DAY, Disp: 90 tablet, Rfl: 2  •  HYDROcodone-acetaminophen (NORCO) 5-325 MG per tablet, Take 1 tablet by mouth Every 12 (Twelve) Hours As Needed for Severe Pain ., Disp: 30 tablet, Rfl: 0  •  hydroxychloroquine (PLAQUENIL) 200 MG tablet, Take 200 mg by mouth 2 (Two) Times a Day.,  Disp: , Rfl:   •  propafenone (RYTHMOL) 150 MG tablet, Take 1 tablet by mouth Every 8 (Eight) Hours., Disp: 270 tablet, Rfl: 3  •  Psyllium (METAMUCIL PO), Take  by mouth Daily., Disp: , Rfl:   •  warfarin (COUMADIN) 5 MG tablet, Take one-half tablet (2.5 mg) by mouth on Mon and Fri, and take one tablet (5 mg) by mouth all other days or as directed, Disp: 80 tablet, Rfl: 1    Allergies   Allergen Reactions   • Cardizem [Diltiazem Hcl] Itching   • Grass Unknown - Low Severity       Review of Systems   Constitutional: Negative for fever.   Respiratory: Negative for shortness of breath.    Cardiovascular: Negative for chest pain and palpitations.   Gastrointestinal: Negative for abdominal pain and bowel incontinence.   Genitourinary: Positive for flank pain. Negative for bladder incontinence and dysuria.   Musculoskeletal: Positive for arthralgias.   Neurological: Negative for tingling, weakness, numbness and paresthesias.       Objective     /80 (BP Location: Left arm, Patient Position: Sitting, Cuff Size: Adult)   Pulse 51   Temp 98 °F (36.7 °C)   Wt 99.8 kg (220 lb)   SpO2 99%   BMI 27.50 kg/m²   Wt Readings from Last 3 Encounters:   03/05/21 99.8 kg (220 lb)   10/12/20 98.4 kg (217 lb)   09/21/20 97.5 kg (215 lb)     Temp Readings from Last 3 Encounters:   03/05/21 98 °F (36.7 °C)   09/21/20 98.3 °F (36.8 °C) (Oral)   09/15/20 97.1 °F (36.2 °C) (Temporal)     BP Readings from Last 3 Encounters:   03/05/21 116/80   10/12/20 140/78   09/21/20 122/78     Pulse Readings from Last 3 Encounters:   03/05/21 51   10/12/20 (!) 45   09/21/20 50       Physical Exam  Vitals signs reviewed.   Constitutional:       Appearance: He is well-developed.   HENT:      Head: Normocephalic and atraumatic.   Pulmonary:      Effort: Pulmonary effort is normal. No respiratory distress.   Musculoskeletal:      Thoracic back: He exhibits decreased range of motion, tenderness and pain.        Back:    Skin:     General: Skin is warm  and dry.   Neurological:      Mental Status: He is alert.   Psychiatric:         Behavior: Behavior normal.         Thought Content: Thought content normal.         Judgment: Judgment normal.         Assessment/Plan     Diagnoses and all orders for this visit:    1. Musculoskeletal back pain (Primary)  -     baclofen (LIORESAL) 10 MG tablet; Take 1 tablet by mouth 3 (Three) Times a Day As Needed for Muscle Spasms.  Dispense: 20 tablet; Refill: 0    He will perform stretches and range of motion exercises.  Can apply heat and muscle rubs.    Return for worsening of sx.

## 2021-03-11 ENCOUNTER — ANTICOAGULATION VISIT (OUTPATIENT)
Dept: PHARMACY | Facility: HOSPITAL | Age: 76
End: 2021-03-11

## 2021-03-11 DIAGNOSIS — I48.0 PAF (PAROXYSMAL ATRIAL FIBRILLATION) (HCC): ICD-10-CM

## 2021-03-11 LAB — INR PPP: 2.9

## 2021-03-11 RX ORDER — WARFARIN SODIUM 5 MG/1
TABLET ORAL
Qty: 80 TABLET | Refills: 1 | Status: ON HOLD | OUTPATIENT
Start: 2021-03-11 | End: 2021-04-30 | Stop reason: SDUPTHER

## 2021-03-11 NOTE — PROGRESS NOTES
Anticoagulation Clinic Progress Note    Anticoagulation Summary  As of 3/11/2021    INR goal:  2.0-3.0   TTR:  86.5 % (2.3 y)   INR used for dosin.90 (3/11/2021)   Warfarin maintenance plan:  2.5 mg every Mon, Fri; 5 mg all other days   Weekly warfarin total:  30 mg   No change documented:  Silvia Joseph   Plan last modified:  Winter Sanabria [facility] (2018)   Next INR check:  3/25/2021   Priority:  Maintenance   Target end date:      Indications    PAF (paroxysmal atrial fibrillation) (CMS/HCC) [I48.0]             Anticoagulation Episode Summary     INR check location:      Preferred lab:      Send INR reminders to:  [facility]  POOL    Comments:  [place]      Anticoagulation Care Providers     Provider Role Specialty Phone number    David Hernandez MD Referring Cardiology [phone]          Clinic Interview:  No pertinent clinical findings have been reported.    INR History:  Anticoagulation Monitoring 2021 2021 3/11/2021   INR 2.70 2.80 2.90   INR Date 2021 2021 3/11/2021   INR Goal 2.0-3.0 2.0-3.0 2.0-3.0   Trend Same Same Same   Last Week Total 30 mg 30 mg 30 mg   Next Week Total 30 mg 30 mg 30 mg   Sun 5 mg 5 mg 5 mg   Mon 2.5 mg 2.5 mg 2.5 mg   Tue 5 mg 5 mg 5 mg   Wed 5 mg 5 mg 5 mg   Thu 5 mg 5 mg 5 mg   Fri 2.5 mg 2.5 mg 2.5 mg   Sat 5 mg 5 mg 5 mg   Visit Report - - -   Some recent data might be hidden       Plan:  1. INR is therapeutic today- see above in Anticoagulation Summary.    Demond CROSS Bernarda to continue their warfarin regimen- see above in Anticoagulation Summary.  2. Follow up in 2 weeks  3. Pt has agreed to only be called if INR out of range. They have been instructed to call if any changes in medications, doses, concerns, etc. Patient expresses understanding and has no further questions at this time.    Silvia Joseph

## 2021-03-25 ENCOUNTER — ANTICOAGULATION VISIT (OUTPATIENT)
Dept: PHARMACY | Facility: HOSPITAL | Age: 76
End: 2021-03-25

## 2021-03-25 DIAGNOSIS — I48.0 PAF (PAROXYSMAL ATRIAL FIBRILLATION) (HCC): ICD-10-CM

## 2021-03-25 LAB — INR PPP: 2.6

## 2021-03-25 NOTE — PROGRESS NOTES
Anticoagulation Clinic Progress Note    Anticoagulation Summary  As of 3/25/2021    INR goal:  2.0-3.0   TTR:  86.7 % (2.3 y)   INR used for dosin.60 (3/25/2021)   Warfarin maintenance plan:  2.5 mg every Mon, Fri; 5 mg all other days   Weekly warfarin total:  30 mg   No change documented:  Kera Scanlon   Plan last modified:  Winter Sanabria [facility] (2018)   Next INR check:  2021   Priority:  Maintenance   Target end date:      Indications    PAF (paroxysmal atrial fibrillation) (CMS/HCC) [I48.0]             Anticoagulation Episode Summary     INR check location:      Preferred lab:      Send INR reminders to:  [facility]  POOL    Comments:  [place]      Anticoagulation Care Providers     Provider Role Specialty Phone number    David Hernandez MD Referring Cardiology [phone]          Clinic Interview:  No pertinent clinical findings have been reported.    INR History:  Anticoagulation Monitoring 2021 3/11/2021 3/25/2021   INR 2.80 2.90 2.60   INR Date 2021 3/11/2021 3/25/2021   INR Goal 2.0-3.0 2.0-3.0 2.0-3.0   Trend Same Same Same   Last Week Total 30 mg 30 mg 30 mg   Next Week Total 30 mg 30 mg 30 mg   Sun 5 mg 5 mg 5 mg   Mon 2.5 mg 2.5 mg 2.5 mg   Tue 5 mg 5 mg 5 mg   Wed 5 mg 5 mg 5 mg   Thu 5 mg 5 mg 5 mg   Fri 2.5 mg 2.5 mg 2.5 mg   Sat 5 mg 5 mg 5 mg   Visit Report - - -   Some recent data might be hidden       Plan:  1. INR is therapeutic today- see above in Anticoagulation Summary.    Demond CROSS Bernarda to continue their warfarin regimen- see above in Anticoagulation Summary.  2. Follow up in 2 weeks  3. Pt has agreed to only be called if INR out of range. They have been instructed to call if any changes in medications, doses, concerns, etc. Patient expresses understanding and has no further questions at this time.    Kera Scanlon

## 2021-04-08 ENCOUNTER — ANTICOAGULATION VISIT (OUTPATIENT)
Dept: PHARMACY | Facility: HOSPITAL | Age: 76
End: 2021-04-08

## 2021-04-08 DIAGNOSIS — I48.0 PAF (PAROXYSMAL ATRIAL FIBRILLATION) (HCC): ICD-10-CM

## 2021-04-08 LAB — INR PPP: 2.8

## 2021-04-08 NOTE — PROGRESS NOTES
Anticoagulation Clinic Progress Note    Anticoagulation Summary  As of 2021    INR goal:  2.0-3.0   TTR:  86.9 % (2.3 y)   INR used for dosin.80 (2021)   Warfarin maintenance plan:  2.5 mg every Mon, Fri; 5 mg all other days   Weekly warfarin total:  30 mg   No change documented:  Sheri Aviles   Plan last modified:  Winter Sanabria [facility] (2018)   Next INR check:  2021   Priority:  Maintenance   Target end date:      Indications    PAF (paroxysmal atrial fibrillation) (CMS/HCC) [I48.0]             Anticoagulation Episode Summary     INR check location:      Preferred lab:      Send INR reminders to:  [facility]  POOL    Comments:  [place]      Anticoagulation Care Providers     Provider Role Specialty Phone number    David eHrnandez MD Referring Cardiology [phone]          Clinic Interview:  No pertinent clinical findings have been reported.    INR History:  Anticoagulation Monitoring 3/11/2021 3/25/2021 2021   INR 2.90 2.60 2.80   INR Date 3/11/2021 3/25/2021 2021   INR Goal 2.0-3.0 2.0-3.0 2.0-3.0   Trend Same Same Same   Last Week Total 30 mg 30 mg 30 mg   Next Week Total 30 mg 30 mg 30 mg   Sun 5 mg 5 mg 5 mg   Mon 2.5 mg 2.5 mg 2.5 mg   Tue 5 mg 5 mg 5 mg   Wed 5 mg 5 mg 5 mg   Thu 5 mg 5 mg 5 mg   Fri 2.5 mg 2.5 mg 2.5 mg   Sat 5 mg 5 mg 5 mg   Visit Report - - -   Some recent data might be hidden       Plan:  1. INR is therapeutic today- see above in Anticoagulation Summary.    Demond CROSS Bernarda to continue their warfarin regimen- see above in Anticoagulation Summary.  2. Follow up in 2 weeks  3. Pt has agreed to only be called if INR out of range. They have been instructed to call if any changes in medications, doses, concerns, etc. Patient expresses understanding and has no further questions at this time.    Sheri Aviles

## 2021-04-16 ENCOUNTER — TELEPHONE (OUTPATIENT)
Dept: CARDIOLOGY | Facility: CLINIC | Age: 76
End: 2021-04-16

## 2021-04-16 DIAGNOSIS — I48.0 PAF (PAROXYSMAL ATRIAL FIBRILLATION) (HCC): Primary | ICD-10-CM

## 2021-04-16 NOTE — TELEPHONE ENCOUNTER
Pt called to let you know his HR has been running [id number] andis usually 50. He feels fine but can't get him HR down. He went out of rhythm Monday and thinks he is back in NSR now. Pt is taking his propafenone 150 BID and warfarin as prescribed. What do you recommend...Tatum

## 2021-04-16 NOTE — TELEPHONE ENCOUNTER
Pt will pickup him metoprolol and start. He is waiting on scheduling to schedule his monitor...Tatum

## 2021-04-16 NOTE — TELEPHONE ENCOUNTER
A Holter monitor I will put in the order    I also wrote a prescription for metoprolol 25 twice daily as needed for heart rate greater than 100.  He could take this when his heart rate is high and it will help him convert.  Once his heart rate converts down to 50 he should stop the metoprolol

## 2021-04-21 ENCOUNTER — ANTICOAGULATION VISIT (OUTPATIENT)
Dept: PHARMACY | Facility: HOSPITAL | Age: 76
End: 2021-04-21

## 2021-04-21 ENCOUNTER — OFFICE VISIT (OUTPATIENT)
Dept: CARDIOLOGY | Facility: CLINIC | Age: 76
End: 2021-04-21

## 2021-04-21 VITALS
HEART RATE: 121 BPM | SYSTOLIC BLOOD PRESSURE: 110 MMHG | HEIGHT: 75 IN | DIASTOLIC BLOOD PRESSURE: 80 MMHG | WEIGHT: 217 LBS | BODY MASS INDEX: 26.98 KG/M2

## 2021-04-21 DIAGNOSIS — I48.0 PAF (PAROXYSMAL ATRIAL FIBRILLATION) (HCC): ICD-10-CM

## 2021-04-21 DIAGNOSIS — I48.92 ATRIAL FLUTTER WITH RAPID VENTRICULAR RESPONSE (HCC): Primary | ICD-10-CM

## 2021-04-21 DIAGNOSIS — I48.0 PAF (PAROXYSMAL ATRIAL FIBRILLATION) (HCC): Primary | ICD-10-CM

## 2021-04-21 LAB — INR PPP: 3.5

## 2021-04-21 PROCEDURE — 93000 ELECTROCARDIOGRAM COMPLETE: CPT | Performed by: NURSE PRACTITIONER

## 2021-04-21 PROCEDURE — 99214 OFFICE O/P EST MOD 30 MIN: CPT | Performed by: NURSE PRACTITIONER

## 2021-04-21 NOTE — PROGRESS NOTES
Date of Office Visit: 2021  Encounter Provider: ERIC Giles  Place of Service: [facility] CARDIOLOGY  Patient Name: Demond Menchaca  :1945    Chief Complaint   Patient presents with   • Atrial Fibrillation   • Rapid Heart Rate   :     HPI: Demond Menchaca is a 76 y.o. male who is a patient of Dr. Hernandez's.  He has a history of PAF, status post PVI x2, the first 1 in 2019 (cryoablation of all 4 PV's), redo in 2014 which was redo of a right upper pulmonary vein and a CTI dependent flutter ablation.  He also has a history of thrombocytopenia, TIAs and chronic back pain.    He saw Dr. Hernandez in 2020, at that time he said he had PAF about every couple weeks that was lasting hours.  He was going to try and increase his propafenone to 3 times a day.    He called on  with complaints of increasing episodes and elevated HR, Dr. Hernandez ordered prn metoprolol and ordered a Holter.     He presents today for evaluation.     He is having increase in episodes---he says since  he has been in and out almost every day and it last for hours. He has shortness of breath and fatigue and he says he feels wiped out even when he feels like he goes back to normal rhythm. He has also noted that he feels like he converts to a regular rhythm but his HR is remaining elevated a lot, around 120's.     He never did increase the propafenone to 3 times a day after he saw Dr. Hernandez in October, he actually decreased it to once a day but then went back up to twice a day.  He really wanted to try to get off of some medication.    He initially was taking the metoprolol as needed but for the last day or 2 he has been taking at least 1 dose a day and most the time 2 doses a day.    He is on warfarin for anticoagulation with no bleeding issues, his INRs have been extremely stable over the last several months, he was last checked on , it was 2.8, the 1 prior to that was  3/25 and it was 2.6.       Past Medical History:   Diagnosis Date   • LASHONDA positive 6/18/2018    2017: elevated ds LASHONDA   • Chronic obstructive pulmonary disease (CMS/HCC)    • Colon polyp 10/7/2019    Added automatically from request for surgery [id number]   • Diverticulitis of large intestine without perforation or abscess without bleeding 3/7/2016   • Diverticulosis    • ED (erectile dysfunction)    • Elbow fracture, left    • History of blood transfusion    • Hypogonadism in male    • Kidney cysts    • Left femoral shaft fracture (CMS/HCC)    • Lower back pain    • PAF (paroxysmal atrial fibrillation) (CMS/HCC)    • Peptic ulceration    • Plantar fasciitis 12/5/2017   • Prostatic hypertrophy, benign    • 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   • Transient cerebral ischemia     H/O TIAs       Past Surgical History:   Procedure Laterality Date   • ABLATION OF DYSRHYTHMIC FOCUS  2012, 2013   • ADENOIDECTOMY  1973   • APPENDECTOMY  1973   • BACK SURGERY     • CARDIAC ABLATION  2013, 2014   • CARDIAC CATHETERIZATION  2012, 2013   • COLONOSCOPY  approx 2014    normal per pt    • COLONOSCOPY N/A 1/8/2020    Procedure: COLONOSCOPY to cecum with cold snare biopsies;  Surgeon: Henrique Rust MD;  Location: [facility] ENDOSCOPY;  Service: Gastroenterology   • COLONOSCOPY W/ POLYPECTOMY  11/21/2014    : 1 polyp - not precancerous   • ENDOSCOPY N/A 1/8/2020    Procedure: ESOPHAGOGASTRODUODENOSCOPY;  Surgeon: Henrique Rust MD;  Location: [facility] ENDOSCOPY;  Service: Gastroenterology   • FEMUR FRACTURE SURGERY Left 2007    post fall from the ladder   • LUMBAR LAMINECTOMY  1974    Dr.Lester Lino   • NECK SURGERY      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   • OTHER SURGICAL HISTORY      elbow surgery   • THYROID SURGERY  1986    benign tumor removal,    • TOTAL ELBOW ARTHROPLASTY Left 2007    L, post fall and fracture, hardware in       Social History     Socioeconomic History   • Marital status:      Spouse  name: Latisha   • Number of children: 2   • Years of education: College   • Highest education level: Not asked   Tobacco Use   • Smoking status: Former Smoker     Packs/day: 0.50     Years: 15.00     Pack years: 7.50     Types: Cigarettes     Start date: 1959     Quit date:      Years since quittin.3   • Smokeless tobacco: Never Used   Substance and Sexual Activity   • Alcohol use: Yes     Alcohol/week: 1.0 standard drinks     Types: 6 Glasses of wine per week     Comment: Some beer during the summer months   • Drug use: No   • Sexual activity: Not Currently     Partners: Female     Birth control/protection: None       Family History   Problem Relation Age of Onset   • Hypertension Mother    • Osteoporosis Mother    • Thyroid disease Sister    • Diabetes Sister    • Hypertension Sister    • Colon cancer Maternal Grandmother 60   • Hypertension Father    • Heart disease Other    • Atrial fibrillation Other    • Thyroid disease Other    • Pulmonary fibrosis Sister    • Diabetes Other    • Heart disease Other    • Hypertension Other    • Heart disease Sister        Review of Systems   Constitutional: Positive for malaise/fatigue. Negative for chills and fever.   Cardiovascular: Positive for irregular heartbeat and palpitations. Negative for chest pain, dyspnea on exertion, leg swelling, near-syncope, orthopnea, paroxysmal nocturnal dyspnea and syncope.   Respiratory: Positive for shortness of breath. Negative for cough.    Hematologic/Lymphatic: Negative.    Musculoskeletal: Negative for joint pain, joint swelling and myalgias.   Gastrointestinal: Negative for abdominal pain, diarrhea, melena, nausea and vomiting.   Genitourinary: Negative for frequency and hematuria.   Neurological: Negative for light-headedness, numbness, paresthesias and seizures.   Allergic/Immunologic: Negative.    All other systems reviewed and are negative.      Allergies   Allergen Reactions   • Cardizem [Diltiazem Hcl] Itching   •  "Grass Unknown - Low Severity         Current Outpatient Medications:   •  Cetirizine HCl 10 MG capsule, Take 1 capsule by mouth daily., Disp: , Rfl:   •  finasteride (PROSCAR) 5 MG tablet, TAKE 1 TABLET BY MOUTH EVERY DAY, Disp: 90 tablet, Rfl: 2  •  HYDROcodone-acetaminophen (NORCO) 5-325 MG per tablet, Take 1 tablet by mouth Every 12 (Twelve) Hours As Needed for Severe Pain ., Disp: 30 tablet, Rfl: 0  •  hydroxychloroquine (PLAQUENIL) 200 MG tablet, Take 200 mg by mouth 2 (Two) Times a Day., Disp: , Rfl:   •  metoprolol tartrate (LOPRESSOR) 25 MG tablet, Take 1 tablet every 12 hours as needed heart rate greater than 100, Disp: 60 tablet, Rfl: 1  •  propafenone (RYTHMOL) 150 MG tablet, Take 1 tablet by mouth Every 8 (Eight) Hours., Disp: 270 tablet, Rfl: 3  •  Psyllium (METAMUCIL PO), Take  by mouth Daily., Disp: , Rfl:   •  warfarin (COUMADIN) 5 MG tablet, TAKE ONE-HALF TABLET (2.5 MG) BY MOUTH ON MON AND FRI, AND TAKE ONE TABLET (5 MG) BY MOUTH ALL OTHER DAYS OR AS DIRECTED, Disp: 80 tablet, Rfl: 1  •  baclofen (LIORESAL) 10 MG tablet, Take 1 tablet by mouth 3 (Three) Times a Day As Needed for Muscle Spasms., Disp: 20 tablet, Rfl: 0      Objective:     Vitals:    04/21/21 0924   BP: 110/80   Pulse: (!) 121   Weight: 98.4 kg (217 lb)   Height: 190.5 cm (75\")     Body mass index is 27.12 kg/m².    PHYSICAL EXAM:    Vitals Reviewed.   General Appearance: No acute distress, well developed and well nourished.   Eyes: Conjunctiva and lids: No erythema, swelling, or discharge. Sclera non-icteric.   HENT: Atraumatic, normocephalic. External eyes, ears, and nose normal.   Respiratory: No signs of respiratory distress. Respiration rhythm and depth normal.   Clear to auscultation. No rales, crackles, rhonchi, or wheezing auscultated.   Cardiovascular:  Heart Rate and Rhythm: Regular, tachy. Heart Sounds: S1 and S2.   Murmurs: No murmurs noted. No rubs, thrills, or gallops.   Arterial Pulses:  Posterior tibialis and dorsalis " pedis pulses normal.   Lower Extremities: No edema noted.  Gastrointestinal:  Abdomen soft, non-distended, non-tender.   Musculoskeletal: Normal movement of extremities  Skin and Nails: General appearance normal. No pallor, cyanosis, diaphoresis. Skin temperature normal. No clubbing of fingernails.   Psychiatric: Patient alert and oriented to person, place, and time. Speech and behavior appropriate. Normal mood and affect.       ECG 12 Lead    Date/Time: 4/21/2021 9:56 AM  Performed by: Damaris Shell APRN  Authorized by: Damaris Shell APRN   Comparison: compared with previous ECG   Comparison to previous ECG: Atrial flutter has replaced sinus rhythm  Rhythm: atrial flutter  BPM: 121                Assessment:       Diagnosis Plan   1. Atrial flutter with rapid ventricular response (CMS/HCC)  ECG 12 Lead    Case Request EP Lab: Ablation atrial flutter--likely left atrial flutter, hx of PVI x 2   2. PAF (paroxysmal atrial fibrillation) (CMS/[facility])            Plan:       1. Atypical atrial flutter, ----Dr. Hernandez and I both saw patient---recommend redo ablation for atypical atrial flutter. He is on warfarin for AC, INR' s have been therapeutic for several months now and dose stable---he is a home ayo, will check today when he gets home and I sent message to coag clinic for weekly INR's for now. Will get him scheduled for ablation.     Instructed to take metoprolol 25mg BID routinely for now---and can take extra dose middle of the day if HR sustaining about 100 and b/p ok.     2. PAF, s/p PVI x 2---last one 4/2014.       As always, it has been a pleasure to participate in your patient's care.      Sincerely,         ERIC Delvalle

## 2021-04-21 NOTE — PROGRESS NOTES
Anticoagulation Clinic Progress Note    Anticoagulation Summary  As of 4/21/2021    INR goal:  2.0-3.0   TTR:  86.1 % (2.4 y)   INR used for dosing:  3.50 (4/21/2021)   Warfarin maintenance plan:  2.5 mg every Mon, Fri; 5 mg all other days   Weekly warfarin total:  30 mg   Plan last modified:  Winter Sanabria RPH (11/27/2018)   Next INR check:  4/28/2021   Priority:  Maintenance   Target end date:      Indications    PAF (paroxysmal atrial fibrillation) (CMS/HCC) [I48.0]             Anticoagulation Episode Summary     INR check location:      Preferred lab:      Send INR reminders to:  [facility]  POOL    Comments:  [place]      Anticoagulation Care Providers     Provider Role Specialty Phone number    David Hernandez MD Referring Cardiology [phone]        Clinic Interview:  Patient Findings     Negatives:  Signs/symptoms of thrombosis, Signs/symptoms of bleeding,   Laboratory test error suspected, Change in health, Change in alcohol use,   Change in activity, Upcoming invasive procedure, Emergency department   visit, Upcoming dental procedure, Missed doses, Extra doses, Change in   medications, Change in diet/appetite, Hospital admission, Bruising, Other   complaints    Comments:  No diet changes and last wine 10 days ago per patient      Clinical Outcomes     Negatives:  Major bleeding event, Thromboembolic event,   Anticoagulation-related hospital admission, Anticoagulation-related ED   visit, Anticoagulation-related fatality    Comments:  No diet changes and last wine 10 days ago per patient      INR History:  Anticoagulation Monitoring 3/25/2021 4/8/2021 4/21/2021   INR 2.60 2.80 3.50   INR Date 3/25/2021 4/8/2021 4/21/2021   INR Goal 2.0-3.0 2.0-3.0 2.0-3.0   Trend Same Same Same   Last Week Total 30 mg 30 mg 30 mg   Next Week Total 30 mg 30 mg 27.5 mg   Sun 5 mg 5 mg 5 mg   Mon 2.5 mg 2.5 mg 2.5 mg   Tue 5 mg 5 mg 5 mg   Wed 5 mg 5 mg 2.5 mg (4/21)   Thu 5 mg 5 mg 5 mg   Fri 2.5 mg 2.5 mg  2.5 mg   Sat 5 mg 5 mg 5 mg   Visit Report - - -   Some recent data might be hidden     Plan:  1. INR is Supratherapeutic today- see above in Anticoagulation Summary.   Will instruct Demond Menchaca to Change their warfarin regimen- see above in Anticoagulation Summary.  2. Follow up in 1 week.  On 4/21/21, ERIC Shell noted that patient will be scheduled for atrial flutter ablation at some point and will need at least weekly INRs for now and weekly for 3-4 weeks post ablation.    3.  They have been instructed to call if any changes in medications, doses, concerns, etc. Patient expresses understanding and has no further questions at this time.    Arsen Centeno, PharmD

## 2021-04-26 ENCOUNTER — TRANSCRIBE ORDERS (OUTPATIENT)
Dept: SLEEP MEDICINE | Facility: HOSPITAL | Age: 76
End: 2021-04-26

## 2021-04-26 DIAGNOSIS — Z01.818 OTHER SPECIFIED PRE-OPERATIVE EXAMINATION: Primary | ICD-10-CM

## 2021-04-27 ENCOUNTER — ANTICOAGULATION VISIT (OUTPATIENT)
Dept: PHARMACY | Facility: HOSPITAL | Age: 76
End: 2021-04-27

## 2021-04-27 ENCOUNTER — LAB (OUTPATIENT)
Dept: LAB | Facility: HOSPITAL | Age: 76
End: 2021-04-27

## 2021-04-27 ENCOUNTER — TELEPHONE (OUTPATIENT)
Dept: CARDIOLOGY | Facility: CLINIC | Age: 76
End: 2021-04-27

## 2021-04-27 ENCOUNTER — TRANSCRIBE ORDERS (OUTPATIENT)
Dept: ADMINISTRATIVE | Facility: HOSPITAL | Age: 76
End: 2021-04-27

## 2021-04-27 DIAGNOSIS — Z79.01 LONG TERM (CURRENT) USE OF ANTICOAGULANTS: ICD-10-CM

## 2021-04-27 DIAGNOSIS — Z13.6 SCREENING FOR ISCHEMIC HEART DISEASE: ICD-10-CM

## 2021-04-27 DIAGNOSIS — Z01.818 OTHER SPECIFIED PRE-OPERATIVE EXAMINATION: ICD-10-CM

## 2021-04-27 DIAGNOSIS — Z01.810 PRE-OPERATIVE CARDIOVASCULAR EXAMINATION: Primary | ICD-10-CM

## 2021-04-27 DIAGNOSIS — I48.0 PAF (PAROXYSMAL ATRIAL FIBRILLATION) (HCC): Primary | ICD-10-CM

## 2021-04-27 DIAGNOSIS — Z01.810 PRE-OPERATIVE CARDIOVASCULAR EXAMINATION: ICD-10-CM

## 2021-04-27 LAB
ANION GAP SERPL CALCULATED.3IONS-SCNC: 10.6 MMOL/L (ref 5–15)
BASOPHILS # BLD AUTO: 0.02 10*3/MM3 (ref 0–0.2)
BASOPHILS NFR BLD AUTO: 0.7 % (ref 0–1.5)
BUN SERPL-MCNC: 15 MG/DL (ref 8–23)
BUN/CREAT SERPL: 14.2 (ref 7–25)
CALCIUM SPEC-SCNC: 9.1 MG/DL (ref 8.6–10.5)
CHLORIDE SERPL-SCNC: 106 MMOL/L (ref 98–107)
CO2 SERPL-SCNC: 26.4 MMOL/L (ref 22–29)
CREAT SERPL-MCNC: 1.06 MG/DL (ref 0.76–1.27)
DEPRECATED RDW RBC AUTO: 45.8 FL (ref 37–54)
EOSINOPHIL # BLD AUTO: 0.08 10*3/MM3 (ref 0–0.4)
EOSINOPHIL NFR BLD AUTO: 2.7 % (ref 0.3–6.2)
ERYTHROCYTE [DISTWIDTH] IN BLOOD BY AUTOMATED COUNT: 12.5 % (ref 12.3–15.4)
GFR SERPL CREATININE-BSD FRML MDRD: 68 ML/MIN/1.73
GLUCOSE SERPL-MCNC: 72 MG/DL (ref 65–99)
HCT VFR BLD AUTO: 41.2 % (ref 37.5–51)
HGB BLD-MCNC: 13.6 G/DL (ref 13–17.7)
INR PPP: 4.17 (ref 0.9–1.1)
LYMPHOCYTES # BLD AUTO: 0.49 10*3/MM3 (ref 0.7–3.1)
LYMPHOCYTES NFR BLD AUTO: 16.6 % (ref 19.6–45.3)
MCH RBC QN AUTO: 33 PG (ref 26.6–33)
MCHC RBC AUTO-ENTMCNC: 33 G/DL (ref 31.5–35.7)
MCV RBC AUTO: 100 FL (ref 79–97)
MONOCYTES # BLD AUTO: 0.31 10*3/MM3 (ref 0.1–0.9)
MONOCYTES NFR BLD AUTO: 10.5 % (ref 5–12)
NEUTROPHILS NFR BLD AUTO: 2.04 10*3/MM3 (ref 1.7–7)
NEUTROPHILS NFR BLD AUTO: 69.2 % (ref 42.7–76)
PLATELET # BLD AUTO: 110 10*3/MM3 (ref 140–450)
PMV BLD AUTO: 10.2 FL (ref 6–12)
POTASSIUM SERPL-SCNC: 4.8 MMOL/L (ref 3.5–5.2)
PROTHROMBIN TIME: 40 SECONDS (ref 11.7–14.2)
RBC # BLD AUTO: 4.12 10*6/MM3 (ref 4.14–5.8)
SODIUM SERPL-SCNC: 143 MMOL/L (ref 136–145)
WBC # BLD AUTO: 2.95 10*3/MM3 (ref 3.4–10.8)

## 2021-04-27 PROCEDURE — C9803 HOPD COVID-19 SPEC COLLECT: HCPCS

## 2021-04-27 PROCEDURE — 80048 BASIC METABOLIC PNL TOTAL CA: CPT

## 2021-04-27 PROCEDURE — U0005 INFEC AGEN DETEC AMPLI PROBE: HCPCS

## 2021-04-27 PROCEDURE — 85610 PROTHROMBIN TIME: CPT

## 2021-04-27 PROCEDURE — 85025 COMPLETE CBC W/AUTO DIFF WBC: CPT

## 2021-04-27 PROCEDURE — 36415 COLL VENOUS BLD VENIPUNCTURE: CPT

## 2021-04-27 PROCEDURE — U0004 COV-19 TEST NON-CDC HGH THRU: HCPCS

## 2021-04-27 NOTE — PROGRESS NOTES
Anticoagulation Clinic Progress Note    Anticoagulation Summary  As of 2021    INR goal:  2.0-3.0   TTR:  85.4 % (2.4 y)   INR used for dosin.17 (2021)   Warfarin maintenance plan:  2.5 mg every Mon, Fri; 5 mg all other days   Weekly warfarin total:  30 mg   Plan last modified:  Winter Sanabria RPH (2018)   Next INR check:  2021   Priority:  Maintenance   Target end date:      Indications    PAF (paroxysmal atrial fibrillation) (CMS/HCC) [I48.0]             Anticoagulation Episode Summary     INR check location:      Preferred lab:      Send INR reminders to:  [facility]  POOL    Comments:  [place]  Upcoming cardioversion - needs weekly checks (21)      Anticoagulation Care Providers     Provider Role Specialty Phone number    aDvid Hernandez MD Referring Cardiology [phone]          Clinic Interview:  Patient Findings     Negatives:  Signs/symptoms of thrombosis, Signs/symptoms of bleeding,   Laboratory test error suspected, Change in health, Change in alcohol use,   Change in activity, Upcoming invasive procedure, Emergency department   visit, Upcoming dental procedure, Missed doses, Extra doses, Change in   medications, Change in diet/appetite, Hospital admission, Bruising, Other   complaints    Comments:  Continuing off of EtOH.       Clinical Outcomes     Negatives:  Major bleeding event, Thromboembolic event,   Anticoagulation-related hospital admission, Anticoagulation-related ED   visit, Anticoagulation-related fatality    Comments:  Continuing off of EtOH.         INR History:  Anticoagulation Monitoring 2021   INR 2.80 3.50 4.17   INR Date 2021   INR Goal 2.0-3.0 2.0-3.0 2.0-3.0   Trend Same Same Same   Last Week Total 30 mg 30 mg 27.5 mg   Next Week Total 30 mg 27.5 mg 25 mg   Sun 5 mg 5 mg -   Mon 2.5 mg 2.5 mg -   Tue 5 mg 5 mg Hold ()   Wed 5 mg 2.5 mg () 5 mg   Thu 5 mg 5 mg -   Fri 2.5 mg 2.5 mg -    Sat 5 mg 5 mg -   Visit Report - - -   Some recent data might be hidden       Plan:  1. INR is Supratherapeutic today- see above in Anticoagulation Summary.   Will instruct Demond Menchaca to Change their warfarin regimen- see above in Anticoagulation Summary.  2. Follow up in 2 days (day of ablation)  3. They have been instructed to call if any changes in medications, doses, concerns, etc. Patient expresses understanding and has no further questions at this time.    Philip Carmona [facility]

## 2021-04-27 NOTE — TELEPHONE ENCOUNTER
Thank you! I spoke with Mr. Menchaca by phone following today's INR result. May refer to today's anticoag visit.

## 2021-04-28 ENCOUNTER — ANTICOAGULATION VISIT (OUTPATIENT)
Dept: PHARMACY | Facility: HOSPITAL | Age: 76
End: 2021-04-28

## 2021-04-28 DIAGNOSIS — I48.0 PAF (PAROXYSMAL ATRIAL FIBRILLATION) (HCC): Primary | ICD-10-CM

## 2021-04-28 LAB
INR PPP: 3.1
SARS-COV-2 RNA RESP QL NAA+PROBE: NOT DETECTED

## 2021-04-28 NOTE — PROGRESS NOTES
Anticoagulation Clinic Progress Note    Anticoagulation Summary  As of 4/28/2021    INR goal:  2.0-3.0   TTR:  85.4 % (2.4 y)   INR used for dosing:  3.10 (4/28/2021)   Warfarin maintenance plan:  2.5 mg every Mon, Fri; 5 mg all other days   Weekly warfarin total:  30 mg   No change documented:  Philip Carmona RPH   Plan last modified:  Winter Sanabria RPH (11/27/2018)   Next INR check:  5/5/2021   Priority:  Maintenance   Target end date:      Indications    PAF (paroxysmal atrial fibrillation) (CMS/[facility]) [I48.0]             Anticoagulation Episode Summary     INR check location:      Preferred lab:      Send INR reminders to:   [facility]  POOL    Comments:  [place]  Upcoming cardioversion - needs weekly checks (4/21/21)      Anticoagulation Care Providers     Provider Role Specialty Phone number    David Hernandez MD Referring Cardiology [phone]          Clinic Interview:  Patient Findings     Positives:  Other complaints    Negatives:  Signs/symptoms of thrombosis, Signs/symptoms of bleeding,   Laboratory test error suspected, Change in health, Change in alcohol use,   Change in activity, Upcoming invasive procedure, Emergency department   visit, Upcoming dental procedure, Missed doses, Extra doses, Change in   medications, Change in diet/appetite, Hospital admission, Bruising    Comments:  Ablation tomorrow.      Clinical Outcomes     Negatives:  Major bleeding event, Thromboembolic event,   Anticoagulation-related hospital admission, Anticoagulation-related ED   visit, Anticoagulation-related fatality    Comments:  Ablation tomorrow.        INR History:  Anticoagulation Monitoring 4/21/2021 4/27/2021 4/28/2021   INR 3.50 4.17 3.10   INR Date 4/21/2021 4/27/2021 4/28/2021   INR Goal 2.0-3.0 2.0-3.0 2.0-3.0   Trend Same Same Same   Last Week Total 30 mg 27.5 mg 22.5 mg   Next Week Total 27.5 mg 25 mg 30 mg   Sun 5 mg - 5 mg   Mon 2.5 mg - 2.5 mg   Tue 5 mg Hold (4/27) 5 mg   Wed 2.5 mg (4/21) 5  mg 5 mg   Thu 5 mg - 5 mg   Fri 2.5 mg - 2.5 mg   Sat 5 mg - 5 mg   Visit Report - - -   Some recent data might be hidden       Plan:  1. INR is Supratherapeutic today- see above in Anticoagulation Summary.   Will instruct Demond Mecnhaca to Continue their warfarin regimen- see above in Anticoagulation Summary.  2. Follow up in 1 week  3. They have been instructed to call if any changes in medications, doses, concerns, etc. Patient expresses understanding and has no further questions at this time.    Philip Carmona [facility]

## 2021-04-29 ENCOUNTER — HOSPITAL ENCOUNTER (OUTPATIENT)
Facility: HOSPITAL | Age: 76
Discharge: HOME | End: 2021-04-30
Attending: INTERNAL MEDICINE | Admitting: INTERNAL MEDICINE

## 2021-04-29 ENCOUNTER — ANESTHESIA EVENT (OUTPATIENT)
Dept: CARDIOLOGY | Facility: HOSPITAL | Age: 76
End: 2021-04-29

## 2021-04-29 ENCOUNTER — ANESTHESIA (OUTPATIENT)
Dept: CARDIOLOGY | Facility: HOSPITAL | Age: 76
End: 2021-04-29

## 2021-04-29 DIAGNOSIS — I48.92 ATRIAL FLUTTER WITH RAPID VENTRICULAR RESPONSE (HCC): ICD-10-CM

## 2021-04-29 LAB
ACT BLD: 279 SECONDS (ref 82–152)
ACT BLD: 296 SECONDS (ref 82–152)
ACT BLD: 318 SECONDS (ref 82–152)
ACT BLD: 318 SECONDS (ref 82–152)
ACT BLD: 323 SECONDS (ref 82–152)
APTT PPP: 157.3 SECONDS (ref 22.7–35.4)
APTT PPP: 41.4 SECONDS (ref 22.7–35.4)
INR PPP: 2 (ref 0.8–1.2)
INR PPP: 2.47 (ref 0.9–1.1)
INR PPP: 2.56 (ref 0.9–1.1)
PROTHROMBIN TIME: 23.5 SECONDS (ref 12.8–15.2)
PROTHROMBIN TIME: 26.5 SECONDS (ref 11.7–14.2)
PROTHROMBIN TIME: 27.3 SECONDS (ref 11.7–14.2)
QT INTERVAL: 342 MS

## 2021-04-29 PROCEDURE — C1894 INTRO/SHEATH, NON-LASER: HCPCS | Performed by: INTERNAL MEDICINE

## 2021-04-29 PROCEDURE — 85610 PROTHROMBIN TIME: CPT | Performed by: INTERNAL MEDICINE

## 2021-04-29 PROCEDURE — 85730 THROMBOPLASTIN TIME PARTIAL: CPT | Performed by: NURSE PRACTITIONER

## 2021-04-29 PROCEDURE — 85610 PROTHROMBIN TIME: CPT | Performed by: NURSE PRACTITIONER

## 2021-04-29 PROCEDURE — 93005 ELECTROCARDIOGRAM TRACING: CPT | Performed by: NURSE PRACTITIONER

## 2021-04-29 PROCEDURE — 63710000001 HYDROCODONE-ACETAMINOPHEN 5-325 MG TABLET: Performed by: NURSE PRACTITIONER

## 2021-04-29 PROCEDURE — 63710000001 FINASTERIDE 5 MG TABLET: Performed by: NURSE PRACTITIONER

## 2021-04-29 PROCEDURE — C1759 CATH, INTRA ECHOCARDIOGRAPHY: HCPCS | Performed by: INTERNAL MEDICINE

## 2021-04-29 PROCEDURE — 25010000002 ONDANSETRON PER 1 MG: Performed by: NURSE ANESTHETIST, CERTIFIED REGISTERED

## 2021-04-29 PROCEDURE — C1732 CATH, EP, DIAG/ABL, 3D/VECT: HCPCS | Performed by: INTERNAL MEDICINE

## 2021-04-29 PROCEDURE — C1730 CATH, EP, 19 OR FEW ELECT: HCPCS | Performed by: INTERNAL MEDICINE

## 2021-04-29 PROCEDURE — C1893 INTRO/SHEATH, FIXED,NON-PEEL: HCPCS | Performed by: INTERNAL MEDICINE

## 2021-04-29 PROCEDURE — A9270 NON-COVERED ITEM OR SERVICE: HCPCS | Performed by: NURSE PRACTITIONER

## 2021-04-29 PROCEDURE — 93010 ELECTROCARDIOGRAM REPORT: CPT | Performed by: INTERNAL MEDICINE

## 2021-04-29 PROCEDURE — 25010000002 DOPAMINE PER 40 MG: Performed by: NURSE ANESTHETIST, CERTIFIED REGISTERED

## 2021-04-29 PROCEDURE — 93613 INTRACARDIAC EPHYS 3D MAPG: CPT | Performed by: INTERNAL MEDICINE

## 2021-04-29 PROCEDURE — 93657 TX L/R ATRIAL FIB ADDL: CPT | Performed by: INTERNAL MEDICINE

## 2021-04-29 PROCEDURE — 93656 COMPRE EP EVAL ABLTJ ATR FIB: CPT | Performed by: INTERNAL MEDICINE

## 2021-04-29 PROCEDURE — 63710000001 WARFARIN 7.5 MG TABLET: Performed by: NURSE PRACTITIONER

## 2021-04-29 PROCEDURE — 25010000002 FENTANYL CITRATE (PF) 100 MCG/2ML SOLUTION: Performed by: NURSE ANESTHETIST, CERTIFIED REGISTERED

## 2021-04-29 PROCEDURE — 25010000002 PROPOFOL 10 MG/ML EMULSION: Performed by: NURSE ANESTHETIST, CERTIFIED REGISTERED

## 2021-04-29 PROCEDURE — 25010000002 SUCCINYLCHOLINE PER 20 MG: Performed by: NURSE ANESTHETIST, CERTIFIED REGISTERED

## 2021-04-29 PROCEDURE — 85347 COAGULATION TIME ACTIVATED: CPT

## 2021-04-29 PROCEDURE — 63710000001 HYDROXYCHLOROQUINE 200 MG TABLET: Performed by: NURSE PRACTITIONER

## 2021-04-29 PROCEDURE — 25010000002 HEPARIN (PORCINE) PER 1000 UNITS: Performed by: INTERNAL MEDICINE

## 2021-04-29 PROCEDURE — 25010000002 PROTAMINE SULFATE PER 10 MG: Performed by: INTERNAL MEDICINE

## 2021-04-29 PROCEDURE — 25010000002 NEOSTIGMINE 5 MG/10ML SOLUTION: Performed by: ANESTHESIOLOGY

## 2021-04-29 PROCEDURE — 93655 ICAR CATH ABLTJ DSCRT ARRHYT: CPT | Performed by: INTERNAL MEDICINE

## 2021-04-29 PROCEDURE — 93005 ELECTROCARDIOGRAM TRACING: CPT | Performed by: INTERNAL MEDICINE

## 2021-04-29 PROCEDURE — 25010000002 DEXAMETHASONE PER 1 MG: Performed by: NURSE ANESTHETIST, CERTIFIED REGISTERED

## 2021-04-29 PROCEDURE — G0378 HOSPITAL OBSERVATION PER HR: HCPCS

## 2021-04-29 PROCEDURE — 93662 INTRACARDIAC ECG (ICE): CPT | Performed by: INTERNAL MEDICINE

## 2021-04-29 PROCEDURE — 25010000002 PHENYLEPHRINE PER 1 ML: Performed by: NURSE ANESTHETIST, CERTIFIED REGISTERED

## 2021-04-29 PROCEDURE — 85610 PROTHROMBIN TIME: CPT

## 2021-04-29 PROCEDURE — 63710000001 METAMUCIL WAFER: Performed by: NURSE PRACTITIONER

## 2021-04-29 RX ORDER — ACETAMINOPHEN 325 MG/1
650 TABLET ORAL EVERY 4 HOURS PRN
Status: DISCONTINUED | OUTPATIENT
Start: 2021-04-29 | End: 2021-04-30 | Stop reason: HOSPADM

## 2021-04-29 RX ORDER — ONDANSETRON 2 MG/ML
INJECTION INTRAMUSCULAR; INTRAVENOUS AS NEEDED
Status: DISCONTINUED | OUTPATIENT
Start: 2021-04-29 | End: 2021-04-29 | Stop reason: SURG

## 2021-04-29 RX ORDER — SODIUM CHLORIDE 0.9 % (FLUSH) 0.9 %
3-10 SYRINGE (ML) INJECTION AS NEEDED
Status: CANCELLED | OUTPATIENT
Start: 2021-04-29

## 2021-04-29 RX ORDER — EPHEDRINE SULFATE 50 MG/ML
5 INJECTION, SOLUTION INTRAVENOUS ONCE AS NEEDED
Status: DISCONTINUED | OUTPATIENT
Start: 2021-04-29 | End: 2021-04-29 | Stop reason: HOSPADM

## 2021-04-29 RX ORDER — WARFARIN SODIUM 7.5 MG/1
7.5 TABLET ORAL
Status: DISCONTINUED | OUTPATIENT
Start: 2021-04-29 | End: 2021-04-30 | Stop reason: HOSPADM

## 2021-04-29 RX ORDER — CETIRIZINE HYDROCHLORIDE 10 MG/1
10 TABLET ORAL NIGHTLY
Status: DISCONTINUED | OUTPATIENT
Start: 2021-04-29 | End: 2021-04-30 | Stop reason: HOSPADM

## 2021-04-29 RX ORDER — LIDOCAINE HYDROCHLORIDE AND EPINEPHRINE 10; 10 MG/ML; UG/ML
INJECTION, SOLUTION INFILTRATION; PERINEURAL AS NEEDED
Status: DISCONTINUED | OUTPATIENT
Start: 2021-04-29 | End: 2021-04-29 | Stop reason: HOSPADM

## 2021-04-29 RX ORDER — FENTANYL CITRATE 50 UG/ML
INJECTION, SOLUTION INTRAMUSCULAR; INTRAVENOUS AS NEEDED
Status: DISCONTINUED | OUTPATIENT
Start: 2021-04-29 | End: 2021-04-29 | Stop reason: SURG

## 2021-04-29 RX ORDER — LABETALOL HYDROCHLORIDE 5 MG/ML
5 INJECTION, SOLUTION INTRAVENOUS
Status: DISCONTINUED | OUTPATIENT
Start: 2021-04-29 | End: 2021-04-29 | Stop reason: HOSPADM

## 2021-04-29 RX ORDER — PROMETHAZINE HYDROCHLORIDE 25 MG/1
25 SUPPOSITORY RECTAL ONCE AS NEEDED
Status: DISCONTINUED | OUTPATIENT
Start: 2021-04-29 | End: 2021-04-29 | Stop reason: HOSPADM

## 2021-04-29 RX ORDER — ALBUTEROL SULFATE 2.5 MG/3ML
2.5 SOLUTION RESPIRATORY (INHALATION) ONCE AS NEEDED
Status: DISCONTINUED | OUTPATIENT
Start: 2021-04-29 | End: 2021-04-29 | Stop reason: HOSPADM

## 2021-04-29 RX ORDER — SUCCINYLCHOLINE CHLORIDE 20 MG/ML
INJECTION INTRAMUSCULAR; INTRAVENOUS AS NEEDED
Status: DISCONTINUED | OUTPATIENT
Start: 2021-04-29 | End: 2021-04-29 | Stop reason: SURG

## 2021-04-29 RX ORDER — NALOXONE HCL 0.4 MG/ML
0.4 VIAL (ML) INJECTION
Status: DISCONTINUED | OUTPATIENT
Start: 2021-04-29 | End: 2021-04-30 | Stop reason: HOSPADM

## 2021-04-29 RX ORDER — DIPHENHYDRAMINE HCL 25 MG
25 CAPSULE ORAL
Status: DISCONTINUED | OUTPATIENT
Start: 2021-04-29 | End: 2021-04-29 | Stop reason: HOSPADM

## 2021-04-29 RX ORDER — HYDROMORPHONE HYDROCHLORIDE 1 MG/ML
0.25 INJECTION, SOLUTION INTRAMUSCULAR; INTRAVENOUS; SUBCUTANEOUS
Status: DISCONTINUED | OUTPATIENT
Start: 2021-04-29 | End: 2021-04-29 | Stop reason: HOSPADM

## 2021-04-29 RX ORDER — HEPARIN SODIUM 10000 [USP'U]/100ML
10.3 INJECTION, SOLUTION INTRAVENOUS
Status: DISCONTINUED | OUTPATIENT
Start: 2021-04-29 | End: 2021-04-29

## 2021-04-29 RX ORDER — HYDROCODONE BITARTRATE AND ACETAMINOPHEN 5; 325 MG/1; MG/1
1 TABLET ORAL ONCE AS NEEDED
Status: DISCONTINUED | OUTPATIENT
Start: 2021-04-29 | End: 2021-04-29 | Stop reason: HOSPADM

## 2021-04-29 RX ORDER — SODIUM CHLORIDE, SODIUM LACTATE, POTASSIUM CHLORIDE, CALCIUM CHLORIDE 600; 310; 30; 20 MG/100ML; MG/100ML; MG/100ML; MG/100ML
9 INJECTION, SOLUTION INTRAVENOUS CONTINUOUS
Status: CANCELLED | OUTPATIENT
Start: 2021-04-29

## 2021-04-29 RX ORDER — SODIUM CHLORIDE 0.9 % (FLUSH) 0.9 %
3 SYRINGE (ML) INJECTION EVERY 12 HOURS SCHEDULED
Status: CANCELLED | OUTPATIENT
Start: 2021-04-29

## 2021-04-29 RX ORDER — HEPARIN SODIUM 10000 [USP'U]/100ML
10.3 INJECTION, SOLUTION INTRAVENOUS
Status: DISCONTINUED | OUTPATIENT
Start: 2021-04-29 | End: 2021-04-30

## 2021-04-29 RX ORDER — ACETAMINOPHEN 650 MG/1
650 SUPPOSITORY RECTAL EVERY 4 HOURS PRN
Status: DISCONTINUED | OUTPATIENT
Start: 2021-04-29 | End: 2021-04-30 | Stop reason: HOSPADM

## 2021-04-29 RX ORDER — LIDOCAINE HYDROCHLORIDE 20 MG/ML
INJECTION, SOLUTION INFILTRATION; PERINEURAL AS NEEDED
Status: DISCONTINUED | OUTPATIENT
Start: 2021-04-29 | End: 2021-04-29 | Stop reason: SURG

## 2021-04-29 RX ORDER — OXYCODONE AND ACETAMINOPHEN 7.5; 325 MG/1; MG/1
1 TABLET ORAL ONCE AS NEEDED
Status: DISCONTINUED | OUTPATIENT
Start: 2021-04-29 | End: 2021-04-29 | Stop reason: HOSPADM

## 2021-04-29 RX ORDER — PROPOFOL 10 MG/ML
VIAL (ML) INTRAVENOUS AS NEEDED
Status: DISCONTINUED | OUTPATIENT
Start: 2021-04-29 | End: 2021-04-29 | Stop reason: SURG

## 2021-04-29 RX ORDER — NALOXONE HCL 0.4 MG/ML
0.2 VIAL (ML) INJECTION AS NEEDED
Status: DISCONTINUED | OUTPATIENT
Start: 2021-04-29 | End: 2021-04-29 | Stop reason: HOSPADM

## 2021-04-29 RX ORDER — FAMOTIDINE 10 MG/ML
20 INJECTION, SOLUTION INTRAVENOUS ONCE
Status: COMPLETED | OUTPATIENT
Start: 2021-04-29 | End: 2021-04-29

## 2021-04-29 RX ORDER — SODIUM CHLORIDE 0.9 % (FLUSH) 0.9 %
3 SYRINGE (ML) INJECTION EVERY 12 HOURS SCHEDULED
Status: DISCONTINUED | OUTPATIENT
Start: 2021-04-29 | End: 2021-04-29

## 2021-04-29 RX ORDER — FINASTERIDE 5 MG/1
5 TABLET, FILM COATED ORAL DAILY
Status: DISCONTINUED | OUTPATIENT
Start: 2021-04-29 | End: 2021-04-30 | Stop reason: HOSPADM

## 2021-04-29 RX ORDER — HYDROXYCHLOROQUINE SULFATE 200 MG/1
200 TABLET, FILM COATED ORAL 2 TIMES DAILY
Status: DISCONTINUED | OUTPATIENT
Start: 2021-04-29 | End: 2021-04-30 | Stop reason: HOSPADM

## 2021-04-29 RX ORDER — ONDANSETRON 2 MG/ML
4 INJECTION INTRAMUSCULAR; INTRAVENOUS ONCE AS NEEDED
Status: DISCONTINUED | OUTPATIENT
Start: 2021-04-29 | End: 2021-04-29 | Stop reason: HOSPADM

## 2021-04-29 RX ORDER — PROMETHAZINE HYDROCHLORIDE 12.5 MG/1
25 TABLET ORAL ONCE AS NEEDED
Status: DISCONTINUED | OUTPATIENT
Start: 2021-04-29 | End: 2021-04-29 | Stop reason: HOSPADM

## 2021-04-29 RX ORDER — HYDROMORPHONE HYDROCHLORIDE 1 MG/ML
0.5 INJECTION, SOLUTION INTRAMUSCULAR; INTRAVENOUS; SUBCUTANEOUS
Status: DISCONTINUED | OUTPATIENT
Start: 2021-04-29 | End: 2021-04-30 | Stop reason: HOSPADM

## 2021-04-29 RX ORDER — DIPHENHYDRAMINE HYDROCHLORIDE 50 MG/ML
12.5 INJECTION INTRAMUSCULAR; INTRAVENOUS
Status: DISCONTINUED | OUTPATIENT
Start: 2021-04-29 | End: 2021-04-29 | Stop reason: HOSPADM

## 2021-04-29 RX ORDER — PROTAMINE SULFATE 10 MG/ML
INJECTION, SOLUTION INTRAVENOUS AS NEEDED
Status: DISCONTINUED | OUTPATIENT
Start: 2021-04-29 | End: 2021-04-29 | Stop reason: HOSPADM

## 2021-04-29 RX ORDER — PSYLLIUM SEED (WITH DEXTROSE)
2 POWDER (GRAM) ORAL DAILY
Status: DISCONTINUED | OUTPATIENT
Start: 2021-04-29 | End: 2021-04-30 | Stop reason: HOSPADM

## 2021-04-29 RX ORDER — FENTANYL CITRATE 50 UG/ML
25 INJECTION, SOLUTION INTRAMUSCULAR; INTRAVENOUS
Status: DISCONTINUED | OUTPATIENT
Start: 2021-04-29 | End: 2021-04-29 | Stop reason: HOSPADM

## 2021-04-29 RX ORDER — SODIUM CHLORIDE 9 MG/ML
75 INJECTION, SOLUTION INTRAVENOUS CONTINUOUS
Status: DISCONTINUED | OUTPATIENT
Start: 2021-04-29 | End: 2021-04-29

## 2021-04-29 RX ORDER — HYDROCODONE BITARTRATE AND ACETAMINOPHEN 5; 325 MG/1; MG/1
1 TABLET ORAL EVERY 12 HOURS PRN
Status: DISCONTINUED | OUTPATIENT
Start: 2021-04-29 | End: 2021-04-30 | Stop reason: HOSPADM

## 2021-04-29 RX ORDER — FENTANYL CITRATE 50 UG/ML
50 INJECTION, SOLUTION INTRAMUSCULAR; INTRAVENOUS
Status: CANCELLED | OUTPATIENT
Start: 2021-04-29

## 2021-04-29 RX ORDER — ROCURONIUM BROMIDE 10 MG/ML
INJECTION, SOLUTION INTRAVENOUS AS NEEDED
Status: DISCONTINUED | OUTPATIENT
Start: 2021-04-29 | End: 2021-04-29 | Stop reason: SURG

## 2021-04-29 RX ORDER — MIDAZOLAM HYDROCHLORIDE 1 MG/ML
0.5 INJECTION INTRAMUSCULAR; INTRAVENOUS
Status: DISCONTINUED | OUTPATIENT
Start: 2021-04-29 | End: 2021-04-29 | Stop reason: HOSPADM

## 2021-04-29 RX ORDER — HEPARIN SODIUM 5000 [USP'U]/ML
30-41 INJECTION, SOLUTION INTRAVENOUS; SUBCUTANEOUS EVERY 6 HOURS PRN
Status: DISCONTINUED | OUTPATIENT
Start: 2021-04-29 | End: 2021-04-30

## 2021-04-29 RX ORDER — DEXAMETHASONE SODIUM PHOSPHATE 10 MG/ML
INJECTION INTRAMUSCULAR; INTRAVENOUS AS NEEDED
Status: DISCONTINUED | OUTPATIENT
Start: 2021-04-29 | End: 2021-04-29 | Stop reason: SURG

## 2021-04-29 RX ORDER — MIDAZOLAM HYDROCHLORIDE 1 MG/ML
1 INJECTION INTRAMUSCULAR; INTRAVENOUS
Status: DISCONTINUED | OUTPATIENT
Start: 2021-04-29 | End: 2021-04-29 | Stop reason: HOSPADM

## 2021-04-29 RX ORDER — FLUMAZENIL 0.1 MG/ML
0.2 INJECTION INTRAVENOUS AS NEEDED
Status: DISCONTINUED | OUTPATIENT
Start: 2021-04-29 | End: 2021-04-29 | Stop reason: HOSPADM

## 2021-04-29 RX ORDER — WARFARIN SODIUM 5 MG/1
7.5 TABLET ORAL NIGHTLY
Status: DISCONTINUED | OUTPATIENT
Start: 2021-04-29 | End: 2021-04-29

## 2021-04-29 RX ORDER — NEOSTIGMINE METHYLSULFATE 0.5 MG/ML
INJECTION, SOLUTION INTRAVENOUS AS NEEDED
Status: DISCONTINUED | OUTPATIENT
Start: 2021-04-29 | End: 2021-04-29 | Stop reason: SURG

## 2021-04-29 RX ORDER — GLYCOPYRROLATE 0.2 MG/ML
INJECTION INTRAMUSCULAR; INTRAVENOUS AS NEEDED
Status: DISCONTINUED | OUTPATIENT
Start: 2021-04-29 | End: 2021-04-29 | Stop reason: SURG

## 2021-04-29 RX ORDER — SODIUM CHLORIDE 9 MG/ML
INJECTION, SOLUTION INTRAVENOUS CONTINUOUS PRN
Status: COMPLETED | OUTPATIENT
Start: 2021-04-29 | End: 2021-04-29

## 2021-04-29 RX ORDER — DOPAMINE HYDROCHLORIDE 160 MG/100ML
INJECTION, SOLUTION INTRAVENOUS CONTINUOUS PRN
Status: DISCONTINUED | OUTPATIENT
Start: 2021-04-29 | End: 2021-04-29 | Stop reason: SURG

## 2021-04-29 RX ORDER — SODIUM CHLORIDE 0.9 % (FLUSH) 0.9 %
10 SYRINGE (ML) INJECTION AS NEEDED
Status: DISCONTINUED | OUTPATIENT
Start: 2021-04-29 | End: 2021-04-29

## 2021-04-29 RX ORDER — WARFARIN SODIUM 10 MG/1
10 TABLET ORAL NIGHTLY
Status: DISCONTINUED | OUTPATIENT
Start: 2021-04-29 | End: 2021-04-29

## 2021-04-29 RX ORDER — HEPARIN SODIUM 1000 [USP'U]/ML
INJECTION, SOLUTION INTRAVENOUS; SUBCUTANEOUS AS NEEDED
Status: DISCONTINUED | OUTPATIENT
Start: 2021-04-29 | End: 2021-04-29 | Stop reason: HOSPADM

## 2021-04-29 RX ORDER — LIDOCAINE HYDROCHLORIDE 10 MG/ML
0.5 INJECTION, SOLUTION EPIDURAL; INFILTRATION; INTRACAUDAL; PERINEURAL ONCE AS NEEDED
Status: CANCELLED | OUTPATIENT
Start: 2021-04-29

## 2021-04-29 RX ADMIN — GLYCOPYRROLATE 0.2 MG: 0.2 INJECTION INTRAMUSCULAR; INTRAVENOUS at 12:40

## 2021-04-29 RX ADMIN — PHENYLEPHRINE HYDROCHLORIDE 100 MCG: 10 INJECTION INTRAVENOUS at 14:54

## 2021-04-29 RX ADMIN — WARFARIN 7.5 MG: 7.5 TABLET ORAL at 18:48

## 2021-04-29 RX ADMIN — FENTANYL CITRATE 25 MCG: 50 INJECTION INTRAMUSCULAR; INTRAVENOUS at 14:06

## 2021-04-29 RX ADMIN — PHENYLEPHRINE HYDROCHLORIDE 100 MCG: 10 INJECTION INTRAVENOUS at 13:13

## 2021-04-29 RX ADMIN — SODIUM CHLORIDE 75 ML/HR: 9 INJECTION, SOLUTION INTRAVENOUS at 10:37

## 2021-04-29 RX ADMIN — HYDROCODONE BITARTRATE AND ACETAMINOPHEN 1 TABLET: 5; 325 TABLET ORAL at 18:28

## 2021-04-29 RX ADMIN — ROCURONIUM BROMIDE 20 MG: 50 INJECTION INTRAVENOUS at 14:04

## 2021-04-29 RX ADMIN — HYDROXYCHLOROQUINE SULFATE 200 MG: 200 TABLET ORAL at 22:11

## 2021-04-29 RX ADMIN — Medication 2 WAFER: at 22:11

## 2021-04-29 RX ADMIN — DOPAMINE HYDROCHLORIDE IN DEXTROSE 5 MCG/KG/MIN: 1.6 INJECTION, SOLUTION INTRAVENOUS at 15:01

## 2021-04-29 RX ADMIN — LIDOCAINE HYDROCHLORIDE 80 MG: 20 INJECTION, SOLUTION INFILTRATION; PERINEURAL at 12:42

## 2021-04-29 RX ADMIN — FENTANYL CITRATE 25 MCG: 50 INJECTION INTRAMUSCULAR; INTRAVENOUS at 14:27

## 2021-04-29 RX ADMIN — FAMOTIDINE 20 MG: 10 INJECTION INTRAVENOUS at 11:25

## 2021-04-29 RX ADMIN — PROPOFOL 120 MG: 10 INJECTION, EMULSION INTRAVENOUS at 12:42

## 2021-04-29 RX ADMIN — PHENYLEPHRINE HYDROCHLORIDE 150 MCG: 10 INJECTION INTRAVENOUS at 12:57

## 2021-04-29 RX ADMIN — PHENYLEPHRINE HYDROCHLORIDE 150 MCG: 10 INJECTION INTRAVENOUS at 13:36

## 2021-04-29 RX ADMIN — PHENYLEPHRINE HYDROCHLORIDE 150 MCG: 10 INJECTION INTRAVENOUS at 13:30

## 2021-04-29 RX ADMIN — FENTANYL CITRATE 50 MCG: 50 INJECTION INTRAMUSCULAR; INTRAVENOUS at 12:40

## 2021-04-29 RX ADMIN — NEOSTIGMINE METHYLSULFATE 3 MG: 0.5 INJECTION INTRAVENOUS at 15:31

## 2021-04-29 RX ADMIN — PHENYLEPHRINE HYDROCHLORIDE 150 MCG: 10 INJECTION INTRAVENOUS at 12:49

## 2021-04-29 RX ADMIN — SUCCINYLCHOLINE CHLORIDE 120 MG: 20 INJECTION, SOLUTION INTRAMUSCULAR; INTRAVENOUS; PARENTERAL at 12:42

## 2021-04-29 RX ADMIN — ROCURONIUM BROMIDE 45 MG: 50 INJECTION INTRAVENOUS at 12:58

## 2021-04-29 RX ADMIN — PHENYLEPHRINE HYDROCHLORIDE 100 MCG: 10 INJECTION INTRAVENOUS at 13:07

## 2021-04-29 RX ADMIN — GLYCOPYRROLATE 0.4 MG: 0.2 INJECTION INTRAMUSCULAR; INTRAVENOUS at 15:31

## 2021-04-29 RX ADMIN — DEXAMETHASONE SODIUM PHOSPHATE 8 MG: 10 INJECTION INTRAMUSCULAR; INTRAVENOUS at 15:27

## 2021-04-29 RX ADMIN — PHENYLEPHRINE HYDROCHLORIDE 100 MCG: 10 INJECTION INTRAVENOUS at 13:19

## 2021-04-29 RX ADMIN — ROCURONIUM BROMIDE 10 MG: 50 INJECTION INTRAVENOUS at 15:09

## 2021-04-29 RX ADMIN — FENTANYL CITRATE 25 MCG: 50 INJECTION, SOLUTION INTRAMUSCULAR; INTRAVENOUS at 16:21

## 2021-04-29 RX ADMIN — FINASTERIDE 5 MG: 5 TABLET, FILM COATED ORAL at 22:12

## 2021-04-29 RX ADMIN — ONDANSETRON 4 MG: 2 INJECTION INTRAMUSCULAR; INTRAVENOUS at 15:27

## 2021-04-30 VITALS
OXYGEN SATURATION: 94 % | BODY MASS INDEX: 27.59 KG/M2 | WEIGHT: 215 LBS | HEART RATE: 52 BPM | SYSTOLIC BLOOD PRESSURE: 125 MMHG | DIASTOLIC BLOOD PRESSURE: 78 MMHG | TEMPERATURE: 98.2 F | HEIGHT: 74 IN | RESPIRATION RATE: 18 BRPM

## 2021-04-30 LAB
APTT PPP: 39.3 SECONDS (ref 22.7–35.4)
BASOPHILS # BLD AUTO: 0.01 10*3/MM3 (ref 0–0.2)
BASOPHILS NFR BLD AUTO: 0.3 % (ref 0–1.5)
DEPRECATED RDW RBC AUTO: 41.9 FL (ref 37–54)
EOSINOPHIL # BLD AUTO: 0 10*3/MM3 (ref 0–0.4)
EOSINOPHIL NFR BLD AUTO: 0 % (ref 0.3–6.2)
ERYTHROCYTE [DISTWIDTH] IN BLOOD BY AUTOMATED COUNT: 11.9 % (ref 12.3–15.4)
HCT VFR BLD AUTO: 34.8 % (ref 37.5–51)
HGB BLD-MCNC: 12.2 G/DL (ref 13–17.7)
IMM GRANULOCYTES # BLD AUTO: 0.01 10*3/MM3 (ref 0–0.05)
IMM GRANULOCYTES NFR BLD AUTO: 0.3 % (ref 0–0.5)
INR PPP: 2.72 (ref 0.9–1.1)
LYMPHOCYTES # BLD AUTO: 0.22 10*3/MM3 (ref 0.7–3.1)
LYMPHOCYTES NFR BLD AUTO: 6.8 % (ref 19.6–45.3)
MCH RBC QN AUTO: 33.9 PG (ref 26.6–33)
MCHC RBC AUTO-ENTMCNC: 35.1 G/DL (ref 31.5–35.7)
MCV RBC AUTO: 96.7 FL (ref 79–97)
MONOCYTES # BLD AUTO: 0.16 10*3/MM3 (ref 0.1–0.9)
MONOCYTES NFR BLD AUTO: 5 % (ref 5–12)
NEUTROPHILS NFR BLD AUTO: 2.82 10*3/MM3 (ref 1.7–7)
NEUTROPHILS NFR BLD AUTO: 87.6 % (ref 42.7–76)
NRBC BLD AUTO-RTO: 0 /100 WBC (ref 0–0.2)
PLATELET # BLD AUTO: 95 10*3/MM3 (ref 140–450)
PMV BLD AUTO: 10.9 FL (ref 6–12)
PROTHROMBIN TIME: 28.6 SECONDS (ref 11.7–14.2)
QT INTERVAL: 319 MS
QT INTERVAL: 433 MS
RBC # BLD AUTO: 3.6 10*6/MM3 (ref 4.14–5.8)
WBC # BLD AUTO: 3.22 10*3/MM3 (ref 3.4–10.8)

## 2021-04-30 PROCEDURE — A9270 NON-COVERED ITEM OR SERVICE: HCPCS | Performed by: NURSE PRACTITIONER

## 2021-04-30 PROCEDURE — 63710000001 FINASTERIDE 5 MG TABLET: Performed by: NURSE PRACTITIONER

## 2021-04-30 PROCEDURE — 85610 PROTHROMBIN TIME: CPT | Performed by: NURSE PRACTITIONER

## 2021-04-30 PROCEDURE — 63710000001 HYDROXYCHLOROQUINE 200 MG TABLET: Performed by: NURSE PRACTITIONER

## 2021-04-30 PROCEDURE — G0378 HOSPITAL OBSERVATION PER HR: HCPCS

## 2021-04-30 PROCEDURE — 99217 PR OBSERVATION CARE DISCHARGE MANAGEMENT: CPT | Performed by: NURSE PRACTITIONER

## 2021-04-30 PROCEDURE — 85730 THROMBOPLASTIN TIME PARTIAL: CPT | Performed by: NURSE PRACTITIONER

## 2021-04-30 PROCEDURE — 63710000001 METAMUCIL WAFER: Performed by: NURSE PRACTITIONER

## 2021-04-30 PROCEDURE — 85025 COMPLETE CBC W/AUTO DIFF WBC: CPT | Performed by: NURSE PRACTITIONER

## 2021-04-30 PROCEDURE — 93005 ELECTROCARDIOGRAM TRACING: CPT | Performed by: NURSE PRACTITIONER

## 2021-04-30 RX ORDER — WARFARIN SODIUM 5 MG/1
TABLET ORAL
Qty: 80 TABLET | Refills: 1
Start: 2021-04-30 | End: 2021-09-16

## 2021-04-30 RX ORDER — OMEPRAZOLE 20 MG/1
20 TABLET, DELAYED RELEASE ORAL DAILY
Status: CANCELLED
Start: 2021-04-30

## 2021-04-30 RX ADMIN — FINASTERIDE 5 MG: 5 TABLET, FILM COATED ORAL at 08:58

## 2021-04-30 RX ADMIN — HYDROXYCHLOROQUINE SULFATE 200 MG: 200 TABLET ORAL at 08:59

## 2021-04-30 RX ADMIN — Medication 2 WAFER: at 08:59

## 2021-05-02 NOTE — ANESTHESIA POSTPROCEDURE EVALUATION
"Patient: Demond Menchaca    Procedure Summary     Date: 04/29/21 Room / Location: [facility]  /  [facility]    Anesthesia Start: 1229 Anesthesia Stop: 1551    Procedures:       Ablation atrial flutter--likely left atrial flutter, hx of PVI x 2 (N/A )      3D MAPPING CARTO EP (N/A ) Diagnosis:       Atrial flutter with rapid ventricular response (CMS/HCC)      (atypical atrial flutter)    Providers: David Hernandez MD Provider: Iker Cruz MD    Anesthesia Type: general ASA Status: 3          Anesthesia Type: general    Vitals  Vitals Value Taken Time   /66 04/29/21 1625   Temp     Pulse 57 04/29/21 1636   Resp 18 04/29/21 1625   SpO2 100 % 04/29/21 1639   Vitals shown include unvalidated device data.        Post Anesthesia Care and Evaluation    Patient location during evaluation: bedside  Patient participation: complete - patient participated  Level of consciousness: awake and alert  Pain management: adequate  Airway patency: patent  Anesthetic complications: No anesthetic complications    Cardiovascular status: acceptable  Respiratory status: acceptable  Hydration status: acceptable    Comments: /78 (BP Location: Right arm, Patient Position: Lying)   Pulse 52   Temp 36.8 °C (98.2 °F) (Oral)   Resp 18   Ht 188 cm (74\")   Wt 97.5 kg (215 lb)   SpO2 94%   BMI 27.60 kg/m²     Patient is stable postoperatively and has adequately recovered from anesthesia as described above unless otherwise noted      "

## 2021-05-03 ENCOUNTER — ANTICOAGULATION VISIT (OUTPATIENT)
Dept: PHARMACY | Facility: HOSPITAL | Age: 76
End: 2021-05-03

## 2021-05-03 DIAGNOSIS — I48.0 PAF (PAROXYSMAL ATRIAL FIBRILLATION) (HCC): Primary | ICD-10-CM

## 2021-05-03 LAB — INR PPP: 6

## 2021-05-03 NOTE — PROGRESS NOTES
Anticoagulation Clinic Progress Note    Anticoagulation Summary  As of 5/3/2021    INR goal:  2.0-3.0   TTR:  85.2 % (2.4 y)   INR used for dosin.00 (5/3/2021)   Warfarin maintenance plan:  2.5 mg every Mon, Fri; 5 mg all other days   Weekly warfarin total:  30 mg   Plan last modified:  Winter Sanabria [facility] (2018)   Next INR check:  2021   Priority:  Maintenance   Target end date:      Indications    PAF (paroxysmal atrial fibrillation) (CMS/[facility]) [I48.0]             Anticoagulation Episode Summary     INR check location:      Preferred lab:      Send INR reminders to:   [facility]  POOL    Comments:  [place]  Upcoming cardioversion - needs weekly checks (21)      Anticoagulation Care Providers     Provider Role Specialty Phone number    David Hernandez MD Referring Cardiology [phone]          Clinic Interview:  Patient Findings     Positives:  Change in health, Extra doses, Change in medications    Negatives:  Signs/symptoms of thrombosis, Signs/symptoms of bleeding,   Laboratory test error suspected, Change in alcohol use, Change in   activity, Upcoming invasive procedure, Emergency department visit,   Upcoming dental procedure, Missed doses, Change in diet/appetite, Hospital   admission, Bruising, Other complaints    Comments:  Patient had a redo ablation on 21 for his increased   episodes of A.fib/flutter  and was discharged from the   hospital on 21. Patient was given 7.5 mg of warfarin on , and   instructed to take 5 mg daily  -  and instructed to check his INR   this morning at home. Patient's propafenone was discontinued at discharge.   Patient noted that he weighed himself this morning and his weight was up   to 224 lbs which concerned him, he typically weighs around 215 lbs.   Patient denies any signs or symptoms of bleeding at this time and was   instructed to seek emergency care if her were to experience any signs of   bleeding.       Clinical  Outcomes     Negatives:  Major bleeding event, Thromboembolic event,   Anticoagulation-related hospital admission, Anticoagulation-related ED   visit, Anticoagulation-related fatality    Comments:  Patient had a redo ablation on 4/29/21 for his increased   episodes of A.fib/flutter and was discharged from the   hospital on 4/30/21. Patient was given 7.5 mg of warfarin on 4/29, and   instructed to take 5 mg daily 4/30 - 5/2 and instructed to check his INR   this morning at home. Patient's propafenone was discontinued at discharge.   Patient noted that he weighed himself this morning and his weight was up   to 224 lbs which concerned him, he typically weighs around 215 lbs.   Patient denies any signs or symptoms of bleeding at this time and was   instructed to seek emergency care if her were to experience any signs of   bleeding.         INR History:  Anticoagulation Monitoring 4/27/2021 4/28/2021 5/3/2021   INR 4.17 3.10 6.00   INR Date 4/27/2021 4/28/2021 5/3/2021   INR Goal 2.0-3.0 2.0-3.0 2.0-3.0   Trend Same Same Same   Last Week Total 27.5 mg 22.5 mg 30 mg   Next Week Total 25 mg 30 mg 22.5 mg   Sun - 5 mg -   Mon - 2.5 mg Hold (5/3)   Tue Hold (4/27) 5 mg Hold (5/4)   Wed 5 mg 5 mg 5 mg   Thu - 5 mg -   Fri - 2.5 mg -   Sat - 5 mg -   Visit Report - - -   Some recent data might be hidden     Cardiology noted that we will be targeting a higher INR range of 2.5-3.5 (typical range 2-3) for next 4 weeks s/p atrial ablation. Patient noted that his weight increased to 224 lbs from 225 lbs (4/29/21). He denies any signs or symptoms of bleeding and was instructed to seek emergency care if he were to experience any symptoms of bleeding. We will be monitoring his INR at least weekly for the next 4 weeks post ablation or more frequently depending on his INR trend.     Plan:  1. INR is Supratherapeutic today- see above in Anticoagulation Summary.   Will instruct Demond Murdockain to Change their warfarin regimen- see above  in Anticoagulation Summary. Hold warfarin today and tomorrow then resume current regimen.   2. Follow up in 3 days  3. Pt has agreed to only be called if INR out of range. They have been instructed to call if any changes in medications, doses, concerns, etc. Patient expresses understanding and has no further questions at this time.    Ovidio Mccallum, AngusD

## 2021-05-04 ENCOUNTER — TELEPHONE (OUTPATIENT)
Dept: CARDIOLOGY | Facility: CLINIC | Age: 76
End: 2021-05-04

## 2021-05-04 RX ORDER — POTASSIUM CHLORIDE 20 MEQ/1
20 TABLET, EXTENDED RELEASE ORAL DAILY
Qty: 90 TABLET | Refills: 1 | Status: SHIPPED | OUTPATIENT
Start: 2021-05-04 | End: 2021-06-01

## 2021-05-04 RX ORDER — FUROSEMIDE 40 MG/1
40 TABLET ORAL 2 TIMES DAILY
Qty: 90 TABLET | Refills: 3 | Status: SHIPPED | OUTPATIENT
Start: 2021-05-04 | End: 2021-06-01

## 2021-05-04 NOTE — TELEPHONE ENCOUNTER
I will put an order in for Lasix 40 twice daily for 7 days then 40 daily.  I will put an order in for potassium chloride 20 daily.  He should start these medicines today.  If he does not improve he should come to the ER.

## 2021-05-04 NOTE — TELEPHONE ENCOUNTER
Pt was ablated 4-29. He calls with cc of swelling in his feet, SOA and chest pressure. He states he has gained 8 lbs since the procedure....Tatum

## 2021-05-05 NOTE — TELEPHONE ENCOUNTER
SCOTT he will start lasix and potassium today. Also knows to go to the ED if he does not improve...Tatum

## 2021-05-06 ENCOUNTER — ANTICOAGULATION VISIT (OUTPATIENT)
Dept: PHARMACY | Facility: HOSPITAL | Age: 76
End: 2021-05-06

## 2021-05-06 DIAGNOSIS — I48.0 PAF (PAROXYSMAL ATRIAL FIBRILLATION) (HCC): Primary | ICD-10-CM

## 2021-05-06 LAB — INR PPP: 2.4

## 2021-05-06 NOTE — PROGRESS NOTES
Anticoagulation Clinic Progress Note    Anticoagulation Summary  As of 2021    INR goal:  2.0-3.0   TTR:  84.9 % (2.4 y)   INR used for dosin.40 (2021)   Warfarin maintenance plan:  2.5 mg every Mon, Fri; 5 mg all other days   Weekly warfarin total:  30 mg   Plan last modified:  Winter Sanabria [facility] (2018)   Next INR check:  2021   Priority:  Maintenance   Target end date:      Indications    PAF (paroxysmal atrial fibrillation) (CMS/HCC) [I48.0]             Anticoagulation Episode Summary     INR check location:      Preferred lab:      Send INR reminders to:   [facility]  POOL    Comments:  [place]  Upcoming cardioversion - needs weekly checks (21)      Anticoagulation Care Providers     Provider Role Specialty Phone number    David Hernandez MD Referring Cardiology [phone]          Clinic Interview:  Patient Findings     Positives:  Change in medications    Negatives:  Signs/symptoms of thrombosis, Signs/symptoms of bleeding,   Laboratory test error suspected, Change in health, Change in alcohol use,   Change in activity, Upcoming invasive procedure, Emergency department   visit, Upcoming dental procedure, Missed doses, Extra doses, Change in   diet/appetite, Hospital admission, Bruising, Other complaints    Comments:  Started furosemide 40mg and K supplement .  Recent ablation   , shooting for higher INR target 2.5-3.5 per cardiology for a couple   of weeks.      Clinical Outcomes     Negatives:  Major bleeding event, Thromboembolic event,   Anticoagulation-related hospital admission, Anticoagulation-related ED   visit, Anticoagulation-related fatality    Comments:  Started furosemide 40mg and K supplement .  Recent ablation   , shooting for higher INR target 2.5-3.5 per cardiology for a couple   of weeks.        INR History:  Anticoagulation Monitoring 2021 5/3/2021 2021   INR 3.10 6.00 2.40   INR Date 2021 5/3/2021 2021   INR Goal  2.0-3.0 2.0-3.0 2.0-3.0   Trend Same Same Same   Last Week Total 22.5 mg 30 mg 27.5 mg   Next Week Total 30 mg 22.5 mg 32.5 mg   Sun 5 mg - 5 mg   Mon 2.5 mg Hold (5/3) 2.5 mg   Tue 5 mg Hold (5/4) -   Wed 5 mg 5 mg -   Thu 5 mg - 5 mg   Fri 2.5 mg - 5 mg (5/7)   Sat 5 mg - 5 mg   Visit Report - - -   Some recent data might be hidden       Plan:  1. INR is Therapeutic today- see above in Anticoagulation Summary. Targeting INR range 2.5-3.5 for the next few weeks per cardiology after ablation 4/29.  Will instruct Demond CROSS Bernarda to Change their warfarin regimen- see above in Anticoagulation Summary.  2. Follow up in 5 days.  3. They have been instructed to call if any changes in medications, doses, concerns, etc. Patient expresses understanding and has no further questions at this time.    Brandi Young [facility]

## 2021-05-11 ENCOUNTER — ANTICOAGULATION VISIT (OUTPATIENT)
Dept: PHARMACY | Facility: HOSPITAL | Age: 76
End: 2021-05-11

## 2021-05-11 DIAGNOSIS — I48.0 PAF (PAROXYSMAL ATRIAL FIBRILLATION) (HCC): Primary | ICD-10-CM

## 2021-05-11 LAB — INR PPP: 3.1

## 2021-05-11 NOTE — PROGRESS NOTES
Anticoagulation Clinic Progress Note    Anticoagulation Summary  As of 5/11/2021    INR goal:  2.0-3.0   TTR:  84.9 % (2.4 y)   INR used for dosing:  3.10 (5/11/2021)   Warfarin maintenance plan:  2.5 mg every Mon, Fri; 5 mg all other days   Weekly warfarin total:  30 mg   No change documented:  Lissy Roy [facility]   Plan last modified:  Winter Sanabria [facility] (11/27/2018)   Next INR check:  5/14/2021   Priority:  Maintenance   Target end date:      Indications    PAF (paroxysmal atrial fibrillation) (CMS/HCC) [I48.0]             Anticoagulation Episode Summary     INR check location:      Preferred lab:      [facility]    Comments:  [place]  Upcoming cardioversion - needs weekly checks (4/21/21)      Anticoagulation Care Providers     Provider Role Specialty Phone number    David Hernandez MD Referring Cardiology [phone]          Clinic Interview:  Ablation on 4/29, at least weekly INR checks for 3-4 weeks with goal 2.5-3.5    INR History:  Anticoagulation Monitoring 5/3/2021 5/6/2021 5/11/2021   INR 6.00 2.40 3.10   INR Date 5/3/2021 5/6/2021 5/11/2021   INR Goal 2.0-3.0 2.0-3.0 2.0-3.0   Trend Same Same Same   Last Week Total 30 mg 27.5 mg 27.5 mg   Next Week Total 22.5 mg 32.5 mg 30 mg   Sun - 5 mg -   Mon Hold (5/3) 2.5 mg -   Tue Hold (5/4) - 5 mg   Wed 5 mg - 5 mg   Thu - 5 mg 5 mg   Fri - 5 mg (5/7) -   Sat - 5 mg -   Visit Report - - -   Some recent data might be hidden       Plan:  1. INR is therapeutic today- see above in Anticoagulation Summary. Spoke with Demond Menchaca, instructed to take warfarin 5mg daily till Friday, then recheck his INR before the weekend - see above in Anticoagulation Summary.  2. Follow up 5/14  3. Pt has agreed to only be called if INR out of range. They have been instructed to call if any changes in medications, doses, concerns, etc. Patient expresses understanding and has no further questions at this time.    Lissy Roy,  RPH

## 2021-05-14 ENCOUNTER — ANTICOAGULATION VISIT (OUTPATIENT)
Dept: PHARMACY | Facility: HOSPITAL | Age: 76
End: 2021-05-14

## 2021-05-14 DIAGNOSIS — I48.0 PAF (PAROXYSMAL ATRIAL FIBRILLATION) (HCC): Primary | ICD-10-CM

## 2021-05-14 LAB — INR PPP: 3

## 2021-05-20 LAB — INR PPP: 2.6

## 2021-05-21 ENCOUNTER — ANTICOAGULATION VISIT (OUTPATIENT)
Dept: PHARMACY | Facility: HOSPITAL | Age: 76
End: 2021-05-21

## 2021-05-21 DIAGNOSIS — I48.0 PAF (PAROXYSMAL ATRIAL FIBRILLATION) (HCC): Primary | ICD-10-CM

## 2021-05-21 NOTE — PROGRESS NOTES
Anticoagulation Clinic Progress Note    Anticoagulation Summary  As of 2021    INR goal:  2.0-3.0   TTR:  84.7 % (2.5 y)   INR used for dosin.60 (2021)   Warfarin maintenance plan:  2.5 mg every Mon, Fri; 5 mg all other days   Weekly warfarin total:  30 mg   No change documented:  Sheri Aviles   Plan last modified:  Winter Sanabria [facility] (2018)   Next INR check:  6/3/2021   Priority:  Maintenance   Target end date:      Indications    PAF (paroxysmal atrial fibrillation) (CMS/HCC) [I48.0]             Anticoagulation Episode Summary     INR check location:      Preferred lab:      [facility]    Comments:  [place]  Upcoming cardioversion - needs weekly checks (21)      Anticoagulation Care Providers     Provider Role Specialty Phone number    David Hernandez MD Referring Cardiology [phone]          Clinic Interview:  No pertinent clinical findings have been reported.    INR History:  Anticoagulation Monitoring 2021   INR 3.10 3.00 2.60   INR Date 2021   INR Goal 2.0-3.0 2.0-3.0 2.0-3.0   Trend Same Same Same   Last Week Total 27.5 mg 32.5 mg 30 mg   Next Week Total 30 mg 30 mg 30 mg   Sun - 5 mg 5 mg   Mon - 2.5 mg 2.5 mg   Tue 5 mg 5 mg 5 mg   Wed 5 mg 5 mg 5 mg   Thu 5 mg - 5 mg   Fri - 2.5 mg 2.5 mg   Sat - 5 mg 5 mg   Visit Report - - -   Some recent data might be hidden       Plan:  1. INR is therapeutic today- see above in Anticoagulation Summary.    Demond CROSS Bernarda to continue their warfarin regimen- see above in Anticoagulation Summary.  2. Follow up in 2 weeks  3. Pt has agreed to only be called if INR out of range. They have been instructed to call if any changes in medications, doses, concerns, etc. Patient expresses understanding and has no further questions at this time.    Sheri Aviles

## 2021-06-01 ENCOUNTER — OFFICE VISIT (OUTPATIENT)
Dept: CARDIOLOGY | Facility: CLINIC | Age: 76
End: 2021-06-01

## 2021-06-01 VITALS
HEIGHT: 75 IN | DIASTOLIC BLOOD PRESSURE: 70 MMHG | BODY MASS INDEX: 27.35 KG/M2 | HEART RATE: 43 BPM | WEIGHT: 220 LBS | SYSTOLIC BLOOD PRESSURE: 144 MMHG

## 2021-06-01 DIAGNOSIS — Z98.890 H/O CARDIAC RADIOFREQUENCY ABLATION: ICD-10-CM

## 2021-06-01 DIAGNOSIS — I48.0 PAF (PAROXYSMAL ATRIAL FIBRILLATION) (HCC): ICD-10-CM

## 2021-06-01 DIAGNOSIS — I48.92 ATRIAL FLUTTER WITH RAPID VENTRICULAR RESPONSE (HCC): Primary | ICD-10-CM

## 2021-06-01 PROCEDURE — 99213 OFFICE O/P EST LOW 20 MIN: CPT | Performed by: NURSE PRACTITIONER

## 2021-06-01 PROCEDURE — 93000 ELECTROCARDIOGRAM COMPLETE: CPT | Performed by: NURSE PRACTITIONER

## 2021-06-01 RX ORDER — FUROSEMIDE 40 MG/1
40 TABLET ORAL DAILY PRN
Qty: 90 TABLET | Refills: 3
Start: 2021-06-01 | End: 2022-06-06

## 2021-06-01 RX ORDER — POTASSIUM CHLORIDE 20 MEQ/1
20 TABLET, EXTENDED RELEASE ORAL DAILY PRN
Qty: 90 TABLET | Refills: 1
Start: 2021-06-01 | End: 2022-06-06

## 2021-06-01 NOTE — H&P (VIEW-ONLY)
Date of Office Visit: 2021  Encounter Provider: ERIC Giles  Place of Service: [facility] CARDIOLOGY  Patient Name: Demond Menchaca  :1945    Chief Complaint   Patient presents with   • Atrial Flutter     s/p ablation    :     HPI: Demond Menchaca is a 76 y.o. male who is a patient of Dr. Motta.  He has a history of PAF, status post PVI x 2 (first 1 in 2013 which was a cryo of all 4 PV's, redo and 2014 which was a redo of a right upper pulmonary vein and a CTI dependent flutter).  He also has a history of thrombocytopenia, TIAs and chronic back pain.    He had PAF post ablations and had been on propafenone, in April he called with complaints of AF and elevated heart rate.  I saw him in the office on  and he was in an atypical atrial flutter, we scheduled him for redo ablation.    He underwent procedure on 2021--- this was a complex left atrial ablation, the clinical arrhythmia was a mitral isthmus dependent flutter, he also had a roof dependent flutter, and A. tach that originated from fractionated abnormal electrograms in the anterior left atrial wall--- using an anterior atrial line, roofline and mitral isthmus line, including burns in the CS he was able to terminate his atrial flutter's.  This resulted in isolation of his left atrial appendage so he will require lifelong anticoagulation that should not be interrupted without discussing with us.    He presents today for routine follow-up.    He reports that overall he is doing well.  He has not had any sustained episodes of arrhythmia, he says he does have some skips here and there that feel like for 5 irregular beats but nothing that last.  He denies chest pain, dyspnea, PND, orthopnea or edema.    Does complain of some intermittent, rare episodes of what sound like orthostatic hypotension/dizziness.  They are related to position changes and he says they only lasts just a second or 2.  He  says sometimes he feels a little numbness or tingling but again these are just a few seconds in duration and always with position changes and not very often.    He remains on warfarin for anticoagulation with a goal of 2.5-3.5 at this time.    He has been monitoring his blood pressure at home and said his systolic typically is in the low 100s.       Past Medical History:   Diagnosis Date   • LASHONDA positive 6/18/2018    2017: elevated ds LASHONDA   • Atrial flutter with rapid ventricular response (CMS/HCC)    • Chronic obstructive pulmonary disease (CMS/HCC)    • Colon polyp 10/7/2019    Added automatically from request for surgery [id number]   • Diverticulitis of large intestine without perforation or abscess without bleeding 3/7/2016   • Diverticulosis    • ED (erectile dysfunction)    • Elbow fracture, left    • History of blood transfusion    • Hypogonadism in male    • Kidney cysts    • Left femoral shaft fracture (CMS/HCC)    • Lower back pain    • PAF (paroxysmal atrial fibrillation) (CMS/HCC)    • Peptic ulceration    • Plantar fasciitis 12/5/2017   • Prostatic hypertrophy, benign    • 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   • Transient cerebral ischemia     H/O TIAs       Past Surgical History:   Procedure Laterality Date   • ABLATION OF DYSRHYTHMIC FOCUS  2012, 2013   • ADENOIDECTOMY  1973   • APPENDECTOMY  1973   • CARDIAC ABLATION  2013, 2014   • CARDIAC CATHETERIZATION  2012, 2013   • CARDIAC ELECTROPHYSIOLOGY PROCEDURE N/A 4/29/2021    Procedure: Ablation atrial flutter--likely left atrial flutter, hx of PVI x 2;  Surgeon: David Hernandez MD;  Location: [facility] CATH INVASIVE LOCATION;  Service: Cardiovascular;  Laterality: N/A;   • COLONOSCOPY  approx 2014    normal per pt    • COLONOSCOPY N/A 1/8/2020    Procedure: COLONOSCOPY to cecum with cold snare biopsies;  Surgeon: Henrique Rust MD;  Location: [facility] ENDOSCOPY;  Service: Gastroenterology   • COLONOSCOPY W/ POLYPECTOMY  11/21/2014    : 1  polyp - not precancerous   • ENDOSCOPY N/A 2020    Procedure: ESOPHAGOGASTRODUODENOSCOPY;  Surgeon: Henrique Rust MD;  Location: [facility] ENDOSCOPY;  Service: Gastroenterology   • FEMUR FRACTURE SURGERY Left     post fall from the ladder   • LUMBAR LAMINECTOMY      Dr.Lester Lino   • OTHER SURGICAL HISTORY      elbow surgery   • THYROID SURGERY      benign tumor removal,    • TOTAL ELBOW ARTHROPLASTY Left     L, post fall and fracture, hardware in       Social History     Socioeconomic History   • Marital status:      Spouse name: Latisha   • Number of children: 2   • Years of education: College   • Highest education level: Not asked   Tobacco Use   • Smoking status: Former Smoker     Packs/day: 0.50     Years: 15.00     Pack years: 7.50     Types: Cigarettes     Start date: 1959     Quit date:      Years since quittin.4   • Smokeless tobacco: Never Used   Vaping Use   • Vaping Use: Never used   Substance and Sexual Activity   • Alcohol use: Not Currently     Alcohol/week: 1.0 standard drinks     Types: 6 Glasses of wine per week   • Drug use: No   • Sexual activity: Not Currently     Partners: Female     Birth control/protection: None       Family History   Problem Relation Age of Onset   • Hypertension Mother    • Osteoporosis Mother    • Thyroid disease Sister    • Diabetes Sister    • Hypertension Sister    • Colon cancer Maternal Grandmother 60   • Hypertension Father    • Heart disease Other    • Atrial fibrillation Other    • Thyroid disease Other    • Pulmonary fibrosis Sister    • Diabetes Other    • Heart disease Other    • Hypertension Other    • Heart disease Sister        Review of Systems   Constitutional: Negative for chills, fever and malaise/fatigue.   Cardiovascular: Positive for irregular heartbeat and palpitations (occasional). Negative for chest pain, dyspnea on exertion, leg swelling, near-syncope, orthopnea, paroxysmal nocturnal dyspnea and  syncope.   Respiratory: Negative for cough and shortness of breath.    Hematologic/Lymphatic: Negative.    Musculoskeletal: Negative for joint pain, joint swelling and myalgias.   Gastrointestinal: Negative for abdominal pain, diarrhea, melena, nausea and vomiting.   Genitourinary: Negative for frequency and hematuria.   Neurological: Positive for dizziness (brief, occasional with position changes). Negative for light-headedness, numbness, paresthesias and seizures.   Allergic/Immunologic: Negative.    All other systems reviewed and are negative.      Allergies   Allergen Reactions   • Cardizem [Diltiazem Hcl] Itching   • Grass Unknown - Low Severity         Current Outpatient Medications:   •  Cetirizine HCl 10 MG capsule, Take 1 capsule by mouth daily., Disp: , Rfl:   •  finasteride (PROSCAR) 5 MG tablet, TAKE 1 TABLET BY MOUTH EVERY DAY (Patient taking differently: Take 5 mg by mouth Daily. Do not crush.), Disp: 90 tablet, Rfl: 2  •  HYDROcodone-acetaminophen (NORCO) 5-325 MG per tablet, Take 1 tablet by mouth Every 12 (Twelve) Hours As Needed for Severe Pain ., Disp: 30 tablet, Rfl: 0  •  hydroxychloroquine (PLAQUENIL) 200 MG tablet, Take 200 mg by mouth 2 (Two) Times a Day., Disp: , Rfl:   •  metoprolol tartrate (LOPRESSOR) 25 MG tablet, Take 0.5 tablets by mouth Daily., Disp: 60 tablet, Rfl: 1  •  Psyllium (METAMUCIL PO), Take  by mouth Daily., Disp: , Rfl:   •  warfarin (COUMADIN) 5 MG tablet, 5mg today, Sat & Sun--INR on Monday, Disp: 80 tablet, Rfl: 1  •  furosemide (LASIX) 40 MG tablet, Take 1 tablet by mouth Daily As Needed (edema, weight gain or shortness of breath). Take 40 mg twice daily for 7 days then 40 mg daily, Disp: 90 tablet, Rfl: 3  •  potassium chloride (K-DUR,KLOR-CON) 20 MEQ CR tablet, Take 1 tablet by mouth Daily As Needed (take with furosemide PRN)., Disp: 90 tablet, Rfl: 1      Objective:     Vitals:    06/01/21 0903   BP: 144/70   Pulse: (!) 43   Weight: 99.8 kg (220 lb)   Height: 190.5  "cm (75\")     Body mass index is 27.5 kg/m².    PHYSICAL EXAM:    Vitals Reviewed.   General Appearance: No acute distress, well developed and well nourished.   Eyes: Conjunctiva and lids: No erythema, swelling, or discharge. Sclera non-icteric.   HENT: Atraumatic, normocephalic. External eyes, ears, and nose normal.   Respiratory: No signs of respiratory distress. Respiration rhythm and depth normal.   Clear to auscultation. No rales, crackles, rhonchi, or wheezing auscultated.   Cardiovascular:  Jugular Venous Pressure: Normal  Heart Rate and Rhythm: Normal, Heart Sounds: Normal S1 and S2. No S3 or S4 noted.  Murmurs: No murmurs noted. No rubs, thrills, or gallops.   Arterial Pulses:  Posterior tibialis and dorsalis pedis pulses normal.   Lower Extremities: No edema noted.  Gastrointestinal:  Abdomen soft, non-distended, non-tender.   Musculoskeletal: Normal movement of extremities  Skin and Nails: General appearance normal. No pallor, cyanosis, diaphoresis. Skin temperature normal. No clubbing of fingernails.   Psychiatric: Patient alert and oriented to person, place, and time. Speech and behavior appropriate. Normal mood and affect.       ECG 12 Lead    Date/Time: 6/1/2021 9:53 AM  Performed by: Damaris Shell APRN  Authorized by: Damaris Shell APRN   Comparison: compared with previous ECG   Similar to previous ECG  Rhythm: sinus bradycardia  BPM: 43  Conduction: non-specific intraventricular conduction delay              Assessment:       Diagnosis Plan   1. Atrial flutter with rapid ventricular response (CMS/HCC)     2. PAF (paroxysmal atrial fibrillation) (CMS/formerly [facility])     3. H/O cardiac radiofrequency ablation            Plan:       1.-3. AFib/aflutter--s/p ablation x 3---most recent complex atrial ablation. Has had a few palpitations but nothing sustained. We stopped his propafenone post ablation, remains on metoprolol---he has what sounds like some orthostatic hypotension/dizziness---brief and rare---HR 40's " also---will decrease metoprolol to 12.5 mg daily and he will monitor. Warfarin for AC---goal 2.5-3.5 currently---he has an isolated OSITO.     Follow up with Dr. Hernandez in 3 months.     As always, it has been a pleasure to participate in your patient's care.      Sincerely,         ERIC Delvalle

## 2021-06-01 NOTE — PROGRESS NOTES
Date of Office Visit: 2021  Encounter Provider: ERIC Giles  Place of Service: [facility] CARDIOLOGY  Patient Name: Demond Menchaca  :1945    Chief Complaint   Patient presents with   • Atrial Flutter     s/p ablation    :     HPI: Demond Menchaca is a 76 y.o. male who is a patient of Dr. Motta.  He has a history of PAF, status post PVI x 2 (first 1 in 2013 which was a cryo of all 4 PV's, redo and 2014 which was a redo of a right upper pulmonary vein and a CTI dependent flutter).  He also has a history of thrombocytopenia, TIAs and chronic back pain.    He had PAF post ablations and had been on propafenone, in April he called with complaints of AF and elevated heart rate.  I saw him in the office on  and he was in an atypical atrial flutter, we scheduled him for redo ablation.    He underwent procedure on 2021--- this was a complex left atrial ablation, the clinical arrhythmia was a mitral isthmus dependent flutter, he also had a roof dependent flutter, and A. tach that originated from fractionated abnormal electrograms in the anterior left atrial wall--- using an anterior atrial line, roofline and mitral isthmus line, including burns in the CS he was able to terminate his atrial flutter's.  This resulted in isolation of his left atrial appendage so he will require lifelong anticoagulation that should not be interrupted without discussing with us.    He presents today for routine follow-up.    He reports that overall he is doing well.  He has not had any sustained episodes of arrhythmia, he says he does have some skips here and there that feel like for 5 irregular beats but nothing that last.  He denies chest pain, dyspnea, PND, orthopnea or edema.    Does complain of some intermittent, rare episodes of what sound like orthostatic hypotension/dizziness.  They are related to position changes and he says they only lasts just a second or 2.  He  says sometimes he feels a little numbness or tingling but again these are just a few seconds in duration and always with position changes and not very often.    He remains on warfarin for anticoagulation with a goal of 2.5-3.5 at this time.    He has been monitoring his blood pressure at home and said his systolic typically is in the low 100s.       Past Medical History:   Diagnosis Date   • LASHONDA positive 6/18/2018    2017: elevated ds LASHONDA   • Atrial flutter with rapid ventricular response (CMS/HCC)    • Chronic obstructive pulmonary disease (CMS/HCC)    • Colon polyp 10/7/2019    Added automatically from request for surgery [id number]   • Diverticulitis of large intestine without perforation or abscess without bleeding 3/7/2016   • Diverticulosis    • ED (erectile dysfunction)    • Elbow fracture, left    • History of blood transfusion    • Hypogonadism in male    • Kidney cysts    • Left femoral shaft fracture (CMS/HCC)    • Lower back pain    • PAF (paroxysmal atrial fibrillation) (CMS/HCC)    • Peptic ulceration    • Plantar fasciitis 12/5/2017   • Prostatic hypertrophy, benign    • 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   • Transient cerebral ischemia     H/O TIAs       Past Surgical History:   Procedure Laterality Date   • ABLATION OF DYSRHYTHMIC FOCUS  2012, 2013   • ADENOIDECTOMY  1973   • APPENDECTOMY  1973   • CARDIAC ABLATION  2013, 2014   • CARDIAC CATHETERIZATION  2012, 2013   • CARDIAC ELECTROPHYSIOLOGY PROCEDURE N/A 4/29/2021    Procedure: Ablation atrial flutter--likely left atrial flutter, hx of PVI x 2;  Surgeon: David Hernandez MD;  Location: [facility] CATH INVASIVE LOCATION;  Service: Cardiovascular;  Laterality: N/A;   • COLONOSCOPY  approx 2014    normal per pt    • COLONOSCOPY N/A 1/8/2020    Procedure: COLONOSCOPY to cecum with cold snare biopsies;  Surgeon: Henrique Rust MD;  Location: [facility] ENDOSCOPY;  Service: Gastroenterology   • COLONOSCOPY W/ POLYPECTOMY  11/21/2014    : 1  polyp - not precancerous   • ENDOSCOPY N/A 2020    Procedure: ESOPHAGOGASTRODUODENOSCOPY;  Surgeon: Henrique Rust MD;  Location: [facility] ENDOSCOPY;  Service: Gastroenterology   • FEMUR FRACTURE SURGERY Left     post fall from the ladder   • LUMBAR LAMINECTOMY      Dr.Lester Lino   • OTHER SURGICAL HISTORY      elbow surgery   • THYROID SURGERY      benign tumor removal,    • TOTAL ELBOW ARTHROPLASTY Left     L, post fall and fracture, hardware in       Social History     Socioeconomic History   • Marital status:      Spouse name: Latisha   • Number of children: 2   • Years of education: College   • Highest education level: Not asked   Tobacco Use   • Smoking status: Former Smoker     Packs/day: 0.50     Years: 15.00     Pack years: 7.50     Types: Cigarettes     Start date: 1959     Quit date:      Years since quittin.4   • Smokeless tobacco: Never Used   Vaping Use   • Vaping Use: Never used   Substance and Sexual Activity   • Alcohol use: Not Currently     Alcohol/week: 1.0 standard drinks     Types: 6 Glasses of wine per week   • Drug use: No   • Sexual activity: Not Currently     Partners: Female     Birth control/protection: None       Family History   Problem Relation Age of Onset   • Hypertension Mother    • Osteoporosis Mother    • Thyroid disease Sister    • Diabetes Sister    • Hypertension Sister    • Colon cancer Maternal Grandmother 60   • Hypertension Father    • Heart disease Other    • Atrial fibrillation Other    • Thyroid disease Other    • Pulmonary fibrosis Sister    • Diabetes Other    • Heart disease Other    • Hypertension Other    • Heart disease Sister        Review of Systems   Constitutional: Negative for chills, fever and malaise/fatigue.   Cardiovascular: Positive for irregular heartbeat and palpitations (occasional). Negative for chest pain, dyspnea on exertion, leg swelling, near-syncope, orthopnea, paroxysmal nocturnal dyspnea and  syncope.   Respiratory: Negative for cough and shortness of breath.    Hematologic/Lymphatic: Negative.    Musculoskeletal: Negative for joint pain, joint swelling and myalgias.   Gastrointestinal: Negative for abdominal pain, diarrhea, melena, nausea and vomiting.   Genitourinary: Negative for frequency and hematuria.   Neurological: Positive for dizziness (brief, occasional with position changes). Negative for light-headedness, numbness, paresthesias and seizures.   Allergic/Immunologic: Negative.    All other systems reviewed and are negative.      Allergies   Allergen Reactions   • Cardizem [Diltiazem Hcl] Itching   • Grass Unknown - Low Severity         Current Outpatient Medications:   •  Cetirizine HCl 10 MG capsule, Take 1 capsule by mouth daily., Disp: , Rfl:   •  finasteride (PROSCAR) 5 MG tablet, TAKE 1 TABLET BY MOUTH EVERY DAY (Patient taking differently: Take 5 mg by mouth Daily. Do not crush.), Disp: 90 tablet, Rfl: 2  •  HYDROcodone-acetaminophen (NORCO) 5-325 MG per tablet, Take 1 tablet by mouth Every 12 (Twelve) Hours As Needed for Severe Pain ., Disp: 30 tablet, Rfl: 0  •  hydroxychloroquine (PLAQUENIL) 200 MG tablet, Take 200 mg by mouth 2 (Two) Times a Day., Disp: , Rfl:   •  metoprolol tartrate (LOPRESSOR) 25 MG tablet, Take 0.5 tablets by mouth Daily., Disp: 60 tablet, Rfl: 1  •  Psyllium (METAMUCIL PO), Take  by mouth Daily., Disp: , Rfl:   •  warfarin (COUMADIN) 5 MG tablet, 5mg today, Sat & Sun--INR on Monday, Disp: 80 tablet, Rfl: 1  •  furosemide (LASIX) 40 MG tablet, Take 1 tablet by mouth Daily As Needed (edema, weight gain or shortness of breath). Take 40 mg twice daily for 7 days then 40 mg daily, Disp: 90 tablet, Rfl: 3  •  potassium chloride (K-DUR,KLOR-CON) 20 MEQ CR tablet, Take 1 tablet by mouth Daily As Needed (take with furosemide PRN)., Disp: 90 tablet, Rfl: 1      Objective:     Vitals:    06/01/21 0903   BP: 144/70   Pulse: (!) 43   Weight: 99.8 kg (220 lb)   Height: 190.5  "cm (75\")     Body mass index is 27.5 kg/m².    PHYSICAL EXAM:    Vitals Reviewed.   General Appearance: No acute distress, well developed and well nourished.   Eyes: Conjunctiva and lids: No erythema, swelling, or discharge. Sclera non-icteric.   HENT: Atraumatic, normocephalic. External eyes, ears, and nose normal.   Respiratory: No signs of respiratory distress. Respiration rhythm and depth normal.   Clear to auscultation. No rales, crackles, rhonchi, or wheezing auscultated.   Cardiovascular:  Jugular Venous Pressure: Normal  Heart Rate and Rhythm: Normal, Heart Sounds: Normal S1 and S2. No S3 or S4 noted.  Murmurs: No murmurs noted. No rubs, thrills, or gallops.   Arterial Pulses:  Posterior tibialis and dorsalis pedis pulses normal.   Lower Extremities: No edema noted.  Gastrointestinal:  Abdomen soft, non-distended, non-tender.   Musculoskeletal: Normal movement of extremities  Skin and Nails: General appearance normal. No pallor, cyanosis, diaphoresis. Skin temperature normal. No clubbing of fingernails.   Psychiatric: Patient alert and oriented to person, place, and time. Speech and behavior appropriate. Normal mood and affect.       ECG 12 Lead    Date/Time: 6/1/2021 9:53 AM  Performed by: Damaris Shell APRN  Authorized by: Damaris Shell APRN   Comparison: compared with previous ECG   Similar to previous ECG  Rhythm: sinus bradycardia  BPM: 43  Conduction: non-specific intraventricular conduction delay              Assessment:       Diagnosis Plan   1. Atrial flutter with rapid ventricular response (CMS/HCC)     2. PAF (paroxysmal atrial fibrillation) (CMS/[facility])     3. H/O cardiac radiofrequency ablation            Plan:       1.-3. AFib/aflutter--s/p ablation x 3---most recent complex atrial ablation. Has had a few palpitations but nothing sustained. We stopped his propafenone post ablation, remains on metoprolol---he has what sounds like some orthostatic hypotension/dizziness---brief and rare---HR 40's " also---will decrease metoprolol to 12.5 mg daily and he will monitor. Warfarin for AC---goal 2.5-3.5 currently---he has an isolated OSITO.     Follow up with Dr. Hernandez in 3 months.     As always, it has been a pleasure to participate in your patient's care.      Sincerely,         ERIC Delvalle

## 2021-06-03 ENCOUNTER — ANTICOAGULATION VISIT (OUTPATIENT)
Dept: PHARMACY | Facility: HOSPITAL | Age: 76
End: 2021-06-03

## 2021-06-03 DIAGNOSIS — I48.0 PAF (PAROXYSMAL ATRIAL FIBRILLATION) (HCC): Primary | ICD-10-CM

## 2021-06-03 LAB — INR PPP: 2.7

## 2021-06-03 NOTE — PROGRESS NOTES
Anticoagulation Clinic Progress Note    Anticoagulation Summary  As of 6/3/2021    INR goal:  2.0-3.0   TTR:  85.0 % (2.5 y)   INR used for dosin.70 (6/3/2021)   Warfarin maintenance plan:  2.5 mg every Mon, Fri; 5 mg all other days   Weekly warfarin total:  30 mg   No change documented:  Kera Scanlon   Plan last modified:  Winter [facility] (2018)   Next INR check:  2021   Priority:  Maintenance   Target end date:      Indications    PAF (paroxysmal atrial fibrillation) (CMS/HCC) [I48.0]             Anticoagulation Episode Summary     INR check location:      Preferred lab:      Send INR reminders to:  [facility]  POOL    Comments:  [place]  Upcoming cardioversion - needs weekly checks (21)      Anticoagulation Care Providers     Provider Role Specialty Phone number    David Hernandez MD Referring Cardiology [phone]          Clinic Interview:  No pertinent clinical findings have been reported.    INR History:  Anticoagulation Monitoring 2021 2021 6/3/2021   INR 3.00 2.60 2.70   INR Date 2021 2021 6/3/2021   INR Goal 2.0-3.0 2.0-3.0 2.0-3.0   Trend Same Same Same   Last Week Total 32.5 mg 30 mg 30 mg   Next Week Total 30 mg 30 mg 30 mg   Sun 5 mg 5 mg 5 mg   Mon 2.5 mg 2.5 mg 2.5 mg   Tue 5 mg 5 mg 5 mg   Wed 5 mg 5 mg 5 mg   Thu - 5 mg 5 mg   Fri 2.5 mg 2.5 mg 2.5 mg   Sat 5 mg 5 mg 5 mg   Visit Report - - -   Some recent data might be hidden       Plan:  1. INR is therapeutic today- see above in Anticoagulation Summary.    Demond CROSS Bernarda to continue their warfarin regimen- see above in Anticoagulation Summary.  2. Follow up in 2 weeks  3. Pt has agreed to only be called if INR out of range. They have been instructed to call if any changes in medications, doses, concerns, etc. Patient expresses understanding and has no further questions at this time.    Kera Scanlon

## 2021-06-08 ENCOUNTER — OFFICE VISIT (OUTPATIENT)
Dept: FAMILY MEDICINE CLINIC | Facility: CLINIC | Age: 76
End: 2021-06-08

## 2021-06-08 VITALS
OXYGEN SATURATION: 98 % | DIASTOLIC BLOOD PRESSURE: 72 MMHG | TEMPERATURE: 98 F | BODY MASS INDEX: 27.98 KG/M2 | SYSTOLIC BLOOD PRESSURE: 126 MMHG | HEIGHT: 74 IN | WEIGHT: 218 LBS | HEART RATE: 44 BPM

## 2021-06-08 DIAGNOSIS — D61.818 PANCYTOPENIA (HCC): ICD-10-CM

## 2021-06-08 DIAGNOSIS — J30.2 SEASONAL ALLERGIC RHINITIS, UNSPECIFIED TRIGGER: ICD-10-CM

## 2021-06-08 DIAGNOSIS — N40.1 BENIGN PROSTATIC HYPERPLASIA WITH LOWER URINARY TRACT SYMPTOMS, SYMPTOM DETAILS UNSPECIFIED: ICD-10-CM

## 2021-06-08 DIAGNOSIS — Z00.00 MEDICARE ANNUAL WELLNESS VISIT, SUBSEQUENT: Primary | ICD-10-CM

## 2021-06-08 DIAGNOSIS — M32.8 OTHER FORMS OF SYSTEMIC LUPUS ERYTHEMATOSUS, UNSPECIFIED ORGAN INVOLVEMENT STATUS (HCC): ICD-10-CM

## 2021-06-08 DIAGNOSIS — Z12.5 PROSTATE CANCER SCREENING: ICD-10-CM

## 2021-06-08 DIAGNOSIS — R79.9 ABNORMAL FINDING OF BLOOD CHEMISTRY, UNSPECIFIED: ICD-10-CM

## 2021-06-08 DIAGNOSIS — I48.0 PAF (PAROXYSMAL ATRIAL FIBRILLATION) (HCC): ICD-10-CM

## 2021-06-08 DIAGNOSIS — I48.92 ATRIAL FLUTTER WITH RAPID VENTRICULAR RESPONSE (HCC): ICD-10-CM

## 2021-06-08 DIAGNOSIS — M51.36 DDD (DEGENERATIVE DISC DISEASE), LUMBAR: ICD-10-CM

## 2021-06-08 DIAGNOSIS — Z98.890 H/O CARDIAC RADIOFREQUENCY ABLATION: ICD-10-CM

## 2021-06-08 DIAGNOSIS — G25.81 RESTLESS LEGS SYNDROME: ICD-10-CM

## 2021-06-08 DIAGNOSIS — K59.01 SLOW TRANSIT CONSTIPATION: ICD-10-CM

## 2021-06-08 PROBLEM — D69.6 THROMBOCYTOPENIA: Status: RESOLVED | Noted: 2020-08-20 | Resolved: 2021-06-08

## 2021-06-08 PROCEDURE — 99214 OFFICE O/P EST MOD 30 MIN: CPT | Performed by: FAMILY MEDICINE

## 2021-06-08 PROCEDURE — G0439 PPPS, SUBSEQ VISIT: HCPCS | Performed by: FAMILY MEDICINE

## 2021-06-08 RX ORDER — PRAMIPEXOLE DIHYDROCHLORIDE 0.12 MG/1
.125-.25 TABLET ORAL NIGHTLY
Qty: 180 TABLET | Refills: 1 | Status: SHIPPED | OUTPATIENT
Start: 2021-06-08 | End: 2021-12-14

## 2021-06-08 RX ORDER — HYDROCODONE BITARTRATE AND ACETAMINOPHEN 5; 325 MG/1; MG/1
1 TABLET ORAL EVERY 12 HOURS PRN
Qty: 30 TABLET | Refills: 0 | Status: SHIPPED | OUTPATIENT
Start: 2021-06-08 | End: 2021-11-09 | Stop reason: SDUPTHER

## 2021-06-08 NOTE — PROGRESS NOTES
QUICK REFERENCE INFORMATION:  The ABCs of the Annual Wellness Visit    Subsequent Medicare Wellness Visit    HEALTH RISK ASSESSMENT    1945    Recent Hospitalizations:  Recently treated at the following:  [facility].    Allergies- doing well on otc prn    Atrial flutter/paroxysmal atrial fibrillation-patient is on anticoagulation.  Has had ablation.  Following with cardiology and they follow his INR.    Pancytopenia - for years, has followed with hematology and now just having levels checked with pcp every 6 months. Just had one recently.     Constipation- stable on fiber    BPH- no issues on meds, follows with urology    Lupus-patient is on Plaquenil and following with rheumatology. Stable.     Restless leg- worst at night. Has never taken meds for this. Worsening recently. Wanting to try med.     Back pain- has had surgery, uses hydrocodone rarely, once script a year.     Patient wanting to discuss right-sided flank pain.  I see that he saw his primary care doctor back in March for the same issue.  Intermittent and moderate.  Worse with bending or standing.  No bladder symptoms.  At that time he was given a muscle relaxer and told this was likely musculoskeletal.  Since that time it has really improved. Started after he stretched to far over picking something up in April. Almost gone.     Current Medical Providers:  Patient Care Team:  Venus Walton MD as PCP - General (Family Medicine)  Dudley Acosta MD as Consulting Physician (Hematology and Oncology)  Damaris Shell APRN as Nurse Practitioner (Cardiology)  Della Stephenson MD as Referring Physician (Internal Medicine)  Dikc Medina MD as Referring Physician (Family Medicine)        Smoking Status:  Social History     Tobacco Use   Smoking Status Former Smoker   • Packs/day: 0.50   • Years: 15.00   • Pack years: 7.50   • Types: Cigarettes   • Start date: 1959   • Quit date:    • Years since quittin.4    Smokeless Tobacco Never Used       Alcohol Consumption:  Social History     Substance and Sexual Activity   Alcohol Use Not Currently   • Alcohol/week: 1.0 standard drinks   • Types: 6 Glasses of wine per week       Depression Screen:   PHQ-2/PHQ-9 Depression Screening 6/8/2021   Little interest or pleasure in doing things 0   Feeling down, depressed, or hopeless 0   Trouble falling or staying asleep, or sleeping too much -   Feeling tired or having little energy -   Poor appetite or overeating -   Feeling bad about yourself - or that you are a failure or have let yourself or your family down -   Trouble concentrating on things, such as reading the newspaper or watching television -   Moving or speaking so slowly that other people could have noticed. Or the opposite - being so fidgety or restless that you have been moving around a lot more than usual -   Thoughts that you would be better off dead, or of hurting yourself in some way -   Total Score 0   If you checked off any problems, how difficult have these problems made it for you to do your work, take care of things at home, or get along with other people? -       Health Habits and Functional and Cognitive Screening:  Functional & Cognitive Status 6/8/2021   Do you have difficulty preparing food and eating? No   Do you have difficulty bathing yourself, getting dressed or grooming yourself? No   Do you have difficulty using the toilet? No   Do you have difficulty moving around from place to place? No   Do you have trouble with steps or getting out of a bed or a chair? No   Current Diet Well Balanced Diet   Dental Exam Up to date   Eye Exam Up to date   Exercise (times per week) 7 times per week   Current Exercises Include Walking   Do you need help using the phone?  No   Are you deaf or do you have serious difficulty hearing?  No   Do you need help with transportation? No   Do you need help shopping? No   Do you need help preparing meals?  No   Do you need help  with housework?  No   Do you need help with laundry? No   Do you need help taking your medications? No   Do you need help managing money? No   Do you ever drive or ride in a car without wearing a seat belt? No   Have you felt unusual stress, anger or loneliness in the last month? No   Who do you live with? Spouse   If you need help, do you have trouble finding someone available to you? No   Do you have difficulty concentrating, remembering or making decisions? No           Does the patient have evidence of cognitive impairment? No    Aspirin use counseling: Does not need ASA (and currently is not on it)      Recent Lab Results:  CMP:  Lab Results   Component Value Date     (H) 06/23/2020    BUN 15 04/27/2021    CREATININE 1.06 04/27/2021    EGFRIFNONA 68 04/27/2021    EGFRIFAFRI 76 06/23/2020    BCR 14.2 04/27/2021     04/27/2021    K 4.8 04/27/2021    CO2 26.4 04/27/2021    CALCIUM 9.1 04/27/2021    PROTENTOTREF 6.4 06/23/2020    ALBUMIN 4.30 08/20/2020    LABGLOBREF 2.0 06/23/2020    LABIL2 2.2 06/23/2020    BILITOT 0.9 08/20/2020    ALKPHOS 60 08/20/2020    AST 23 08/20/2020    ALT 12 08/20/2020     Lipid Panel:  Lab Results   Component Value Date    CHOL 96 06/20/2019    TRIG 75 06/23/2020    HDL 53 06/23/2020    VLDL 15 06/23/2020    LDLHDL 0.69 06/20/2019     HbA1c:       Visual Acuity:  No exam data present    Age-appropriate Screening Schedule:  Refer to the list below for future screening recommendations based on patient's age, sex and/or medical conditions. Orders for these recommended tests are listed in the plan section. The patient has been provided with a written plan.    Health Maintenance   Topic Date Due   • INFLUENZA VACCINE  08/01/2021   • TDAP/TD VACCINES (2 - Tdap) 06/01/2022   • ZOSTER VACCINE  Completed        Subjective   History of Present Illness    Demond Menchaca is a 76 y.o. male who presents for an Subsequent Wellness Visit.    The following portions of the patient's  history were reviewed and updated as appropriate: allergies, current medications, past family history, past medical history, past social history, past surgical history and problem list.    Outpatient Medications Prior to Visit   Medication Sig Dispense Refill   • Cetirizine HCl 10 MG capsule Take 1 capsule by mouth daily.     • finasteride (PROSCAR) 5 MG tablet TAKE 1 TABLET BY MOUTH EVERY DAY (Patient taking differently: Take 5 mg by mouth Daily. Do not crush.) 90 tablet 2   • furosemide (LASIX) 40 MG tablet Take 1 tablet by mouth Daily As Needed (edema, weight gain or shortness of breath). Take 40 mg twice daily for 7 days then 40 mg daily 90 tablet 3   • hydroxychloroquine (PLAQUENIL) 200 MG tablet Take 200 mg by mouth 2 (Two) Times a Day.     • metoprolol tartrate (LOPRESSOR) 25 MG tablet TAKE 1 TABLET EVERY 12 HOURS AS NEEDED HEART RATE GREATER THAN 100 60 tablet 1   • potassium chloride (K-DUR,KLOR-CON) 20 MEQ CR tablet Take 1 tablet by mouth Daily As Needed (take with furosemide PRN). 90 tablet 1   • Psyllium (METAMUCIL PO) Take  by mouth Daily.     • warfarin (COUMADIN) 5 MG tablet 5mg today, Sat & Sun--INR on Monday 80 tablet 1   • HYDROcodone-acetaminophen (NORCO) 5-325 MG per tablet Take 1 tablet by mouth Every 12 (Twelve) Hours As Needed for Severe Pain . 30 tablet 0     No facility-administered medications prior to visit.       Patient Active Problem List   Diagnosis   • PAF (paroxysmal atrial fibrillation) (CMS/HCC)   • Benign prostatic hyperplasia   • Health care maintenance   • DDD (degenerative disc disease), lumbar   • Seasonal allergic rhinitis   • Chronic laryngitis   • Restless legs syndrome   • Family history of premature coronary artery disease   • Pancytopenia (CMS/HCC)   • Slow transit constipation   • Other forms of systemic lupus erythematosus (CMS/HCC)   • Foot drop, left   • History of colon polyps   • FH: colon polyps   • Dysphasia   • Family history of colon cancer   • Venous stasis  "dermatitis of both lower extremities   • Abnormal barium enema   • Candida esophagitis (CMS/HCC)   • Atrial flutter with rapid ventricular response (CMS/HCC)   • H/O cardiac radiofrequency ablation--x 3   • Medicare annual wellness visit, subsequent       Advance Care Planning:  ACP discussion was held with the patient during this visit. Patient has an advance directive (not in EMR), copy requested.    Identification of Risk Factors:  Risk factors include: Advance Directive Discussion.    Review of Systems    Compared to one year ago, the patient feels his physical health is the same.  Compared to one year ago, the patient feels his mental health is the same.    Objective     Physical Exam    Vitals:    06/08/21 1135   BP: 126/72   Pulse: (!) 44   Temp: 98 °F (36.7 °C)   TempSrc: Infrared   SpO2: 98%   Weight: 98.9 kg (218 lb)   Height: 188 cm (74\")       Patient's Body mass index is 27.99 kg/m². indicating that he is overweight (BMI 25-29.9). Obesity-related health conditions include the following: none. Obesity is newly identified. BMI is is above average; BMI management plan is completed. We discussed portion control and increasing exercise..      Assessment/Plan   Patient Self-Management and Personalized Health Advice  The patient has been provided with information about: diet and exercise and preventive services including:   · Annual Wellness Visit (AWV).    Visit Diagnoses:    ICD-10-CM ICD-9-CM   1. Medicare annual wellness visit, subsequent  Z00.00 V70.0   2. Seasonal allergic rhinitis, unspecified trigger  J30.2 477.9   3. Atrial flutter with rapid ventricular response (CMS/HCC)  I48.92 427.32   4. H/O cardiac radiofrequency ablation--x 3  Z98.890 V15.1   5. PAF (paroxysmal atrial fibrillation) (CMS/[facility])  I48.0 427.31   6. Slow transit constipation  K59.01 564.01   7. Benign prostatic hyperplasia with lower urinary tract symptoms, symptom details unspecified  N40.1 600.01   8. Pancytopenia (CMS/[facility])  " D61.818 284.19   9. Other forms of systemic lupus erythematosus, unspecified organ involvement status (CMS/HCC)  M32.8 710.0   10. Restless legs syndrome  G25.81 333.94   11. DDD (degenerative disc disease), lumbar  M51.36 722.52   12. Abnormal finding of blood chemistry, unspecified   R79.9 790.6   13. Prostate cancer screening  Z12.5 V76.44       Orders Placed This Encounter   Procedures   • Comprehensive Metabolic Panel     Order Specific Question:   Release to patient     Answer:   Immediate   • Lipid Panel   • PSA Screen     Order Specific Question:   Release to patient     Answer:   Immediate   • CBC & Differential     Order Specific Question:   Manual Differential     Answer:   No       Outpatient Encounter Medications as of 6/8/2021   Medication Sig Dispense Refill   • Cetirizine HCl 10 MG capsule Take 1 capsule by mouth daily.     • finasteride (PROSCAR) 5 MG tablet TAKE 1 TABLET BY MOUTH EVERY DAY (Patient taking differently: Take 5 mg by mouth Daily. Do not crush.) 90 tablet 2   • furosemide (LASIX) 40 MG tablet Take 1 tablet by mouth Daily As Needed (edema, weight gain or shortness of breath). Take 40 mg twice daily for 7 days then 40 mg daily 90 tablet 3   • HYDROcodone-acetaminophen (NORCO) 5-325 MG per tablet Take 1 tablet by mouth Every 12 (Twelve) Hours As Needed for Severe Pain . 30 tablet 0   • hydroxychloroquine (PLAQUENIL) 200 MG tablet Take 200 mg by mouth 2 (Two) Times a Day.     • metoprolol tartrate (LOPRESSOR) 25 MG tablet TAKE 1 TABLET EVERY 12 HOURS AS NEEDED HEART RATE GREATER THAN 100 60 tablet 1   • potassium chloride (K-DUR,KLOR-CON) 20 MEQ CR tablet Take 1 tablet by mouth Daily As Needed (take with furosemide PRN). 90 tablet 1   • Psyllium (METAMUCIL PO) Take  by mouth Daily.     • warfarin (COUMADIN) 5 MG tablet 5mg today, Sat & Sun--INR on Monday 80 tablet 1   • [DISCONTINUED] HYDROcodone-acetaminophen (NORCO) 5-325 MG per tablet Take 1 tablet by mouth Every 12 (Twelve) Hours As  Needed for Severe Pain . 30 tablet 0   • pramipexole (Mirapex) 0.125 MG tablet Take 1-2 tablets by mouth Every Night. 180 tablet 1     No facility-administered encounter medications on file as of 6/8/2021.       Reviewed use of high risk medication in the elderly: yes  Reviewed for potential of harmful drug interactions in the elderly: yes    Follow Up:  Return in about 6 months (around 12/8/2021) for Recheck.     An After Visit Summary and PPPS with all of these plans were given to the patient.       Discussed risk factors, cont meds, f/u in 6 months. Timmy run and reviewed. Discussed risks including but not limites to fatigue, falls, constipation, somnolence, dependence and addiction. Also discussed alternatives etc. Urine tox reviewed in chart. Contract.

## 2021-06-09 LAB
ALBUMIN SERPL-MCNC: 4.5 G/DL (ref 3.5–5.2)
ALBUMIN/GLOB SERPL: 2.5 G/DL
ALP SERPL-CCNC: 73 U/L (ref 39–117)
ALT SERPL-CCNC: 19 U/L (ref 1–41)
AST SERPL-CCNC: 20 U/L (ref 1–40)
BASOPHILS # BLD AUTO: 0.02 10*3/MM3 (ref 0–0.2)
BASOPHILS NFR BLD AUTO: 0.7 % (ref 0–1.5)
BILIRUB SERPL-MCNC: 0.7 MG/DL (ref 0–1.2)
BUN SERPL-MCNC: 17 MG/DL (ref 8–23)
BUN/CREAT SERPL: 17.3 (ref 7–25)
CALCIUM SERPL-MCNC: 9.3 MG/DL (ref 8.6–10.5)
CHLORIDE SERPL-SCNC: 104 MMOL/L (ref 98–107)
CHOLEST SERPL-MCNC: 98 MG/DL (ref 0–200)
CO2 SERPL-SCNC: 27.2 MMOL/L (ref 22–29)
CREAT SERPL-MCNC: 0.98 MG/DL (ref 0.76–1.27)
EOSINOPHIL # BLD AUTO: 0.12 10*3/MM3 (ref 0–0.4)
EOSINOPHIL NFR BLD AUTO: 4.4 % (ref 0.3–6.2)
ERYTHROCYTE [DISTWIDTH] IN BLOOD BY AUTOMATED COUNT: 12.7 % (ref 12.3–15.4)
GLOBULIN SER CALC-MCNC: 1.8 GM/DL
GLUCOSE SERPL-MCNC: 84 MG/DL (ref 65–99)
HCT VFR BLD AUTO: 38.6 % (ref 37.5–51)
HDLC SERPL-MCNC: 47 MG/DL (ref 40–60)
HGB BLD-MCNC: 12.6 G/DL (ref 13–17.7)
IMM GRANULOCYTES # BLD AUTO: 0.01 10*3/MM3 (ref 0–0.05)
IMM GRANULOCYTES NFR BLD AUTO: 0.4 % (ref 0–0.5)
LDLC SERPL CALC-MCNC: 38 MG/DL (ref 0–100)
LYMPHOCYTES # BLD AUTO: 0.45 10*3/MM3 (ref 0.7–3.1)
LYMPHOCYTES NFR BLD AUTO: 16.5 % (ref 19.6–45.3)
MCH RBC QN AUTO: 32.2 PG (ref 26.6–33)
MCHC RBC AUTO-ENTMCNC: 32.6 G/DL (ref 31.5–35.7)
MCV RBC AUTO: 98.7 FL (ref 79–97)
MONOCYTES # BLD AUTO: 0.29 10*3/MM3 (ref 0.1–0.9)
MONOCYTES NFR BLD AUTO: 10.6 % (ref 5–12)
NEUTROPHILS # BLD AUTO: 1.84 10*3/MM3 (ref 1.7–7)
NEUTROPHILS NFR BLD AUTO: 67.4 % (ref 42.7–76)
NRBC BLD AUTO-RTO: 0 /100 WBC (ref 0–0.2)
PLATELET # BLD AUTO: 103 10*3/MM3 (ref 140–450)
POTASSIUM SERPL-SCNC: 4.6 MMOL/L (ref 3.5–5.2)
PROT SERPL-MCNC: 6.3 G/DL (ref 6–8.5)
PSA SERPL-MCNC: 0.17 NG/ML (ref 0–4)
RBC # BLD AUTO: 3.91 10*6/MM3 (ref 4.14–5.8)
SODIUM SERPL-SCNC: 139 MMOL/L (ref 136–145)
TRIGL SERPL-MCNC: 57 MG/DL (ref 0–150)
VLDLC SERPL CALC-MCNC: 13 MG/DL (ref 5–40)
WBC # BLD AUTO: 2.73 10*3/MM3 (ref 3.4–10.8)

## 2021-06-17 ENCOUNTER — ANTICOAGULATION VISIT (OUTPATIENT)
Dept: PHARMACY | Facility: HOSPITAL | Age: 76
End: 2021-06-17

## 2021-06-17 DIAGNOSIS — I48.0 PAF (PAROXYSMAL ATRIAL FIBRILLATION) (HCC): Primary | ICD-10-CM

## 2021-06-17 LAB — INR PPP: 2.6

## 2021-06-17 NOTE — PROGRESS NOTES
Anticoagulation Clinic Progress Note    Anticoagulation Summary  As of 2021    INR goal:  2.0-3.0   TTR:  85.2 % (2.5 y)   INR used for dosin.60 (2021)   Warfarin maintenance plan:  2.5 mg every Mon, Fri; 5 mg all other days   Weekly warfarin total:  30 mg   No change documented:  Sheri Aviles   Plan last modified:  Winter Sanabria [facility] (2018)   Next INR check:  2021   Priority:  Maintenance   Target end date:      Indications    PAF (paroxysmal atrial fibrillation) (CMS/HCC) [I48.0]             Anticoagulation Episode Summary     INR check location:      Preferred lab:      Send INR reminders to:  [facility]  POOL    Comments:  [place]      Anticoagulation Care Providers     Provider Role Specialty Phone number    David Hernandez MD Referring Cardiology [phone]          Clinic Interview:  No pertinent clinical findings have been reported.    INR History:  Anticoagulation Monitoring 2021 6/3/2021 2021   INR 2.60 2.70 2.60   INR Date 2021 6/3/2021 2021   INR Goal 2.0-3.0 2.0-3.0 2.0-3.0   Trend Same Same Same   Last Week Total 30 mg 30 mg 30 mg   Next Week Total 30 mg 30 mg 30 mg   Sun 5 mg 5 mg 5 mg   Mon 2.5 mg 2.5 mg 2.5 mg   Tue 5 mg 5 mg 5 mg   Wed 5 mg 5 mg 5 mg   Thu 5 mg 5 mg 5 mg   Fri 2.5 mg 2.5 mg 2.5 mg   Sat 5 mg 5 mg 5 mg   Visit Report - - -   Some recent data might be hidden       Plan:  1. INR is therapeutic today- see above in Anticoagulation Summary.    Demond CROSS Bernarda to continue their warfarin regimen- see above in Anticoagulation Summary.  2. Follow up in 2 weeks  3. Pt has agreed to only be called if INR out of range. They have been instructed to call if any changes in medications, doses, concerns, etc. Patient expresses understanding and has no further questions at this time.    Sheri Aviles

## 2021-06-25 ENCOUNTER — TELEPHONE (OUTPATIENT)
Dept: CARDIOLOGY | Facility: HOSPITAL | Age: 76
End: 2021-06-25

## 2021-06-25 NOTE — TELEPHONE ENCOUNTER
"Mr. Menchaca calls this morning because he has had a heart rate of 120 since 0630 today. Normal for him is 40s-50s. His BP has been 116/91 and just now it was 126/95. He is asymptomatic with this and states he \"feels great.\"  He feels like his HR is regular and no palpitations.     His MAR wasn't correct and states he takes 25mg Metoprolol PRN HR > 100.  This was changed at his last visit by Damaris.  He now takes Metoprolol 12.5mg nightly. This morning with his HR up he went ahead and took a full 25mg dose of metoprolol with no change in HR or BP in the last 2 hours.      Please advise.    Thank you,  Cindy Duke RN  [facility]  Triage    "

## 2021-06-25 NOTE — TELEPHONE ENCOUNTER
Recommendations and instructions reviewed with patient and he verbalized understanding.    Thank you,  Cindy Duke RN  [facility]  Triage

## 2021-06-28 ENCOUNTER — CLINICAL SUPPORT (OUTPATIENT)
Dept: CARDIOLOGY | Facility: CLINIC | Age: 76
End: 2021-06-28

## 2021-06-28 ENCOUNTER — TELEPHONE (OUTPATIENT)
Dept: CARDIOLOGY | Facility: CLINIC | Age: 76
End: 2021-06-28

## 2021-06-28 DIAGNOSIS — I48.92 ATRIAL FLUTTER WITH RAPID VENTRICULAR RESPONSE (HCC): Primary | ICD-10-CM

## 2021-06-28 PROCEDURE — 93000 ELECTROCARDIOGRAM COMPLETE: CPT | Performed by: NURSE PRACTITIONER

## 2021-06-28 NOTE — TELEPHONE ENCOUNTER
Patient is calling stating he is not any better since last week when he called and spoke to meño. Patient has continued his therapy as directed, however his BP is running low and his Heart rate has been ranging around 137. He is asking to speak to Damaris or Dr Hernandez about what to do next.

## 2021-06-28 NOTE — PROGRESS NOTES
Procedure     ECG 12 Lead    Date/Time: 6/28/2021 5:24 PM  Performed by: Damaris Shell APRN  Authorized by: Damaris Shell APRN   Comparison: compared with previous ECG   Comparison to previous ECG: AF replaces SR  Rhythm: atrial flutter  BPM: 139             patient here for EKG   Dizzy and weak heart rate was 137 this am  Patient didn't take his Metoprolol this Am   Because it makes his BP to low and he was going out.    BP today in the office  100/64 pulse 138      Patient can be reached at   [phone]    See telephone note--plan for CV

## 2021-06-28 NOTE — TELEPHONE ENCOUNTER
Reviewed EKG today with Dr. Hernandez---probably atypical left atrial flutter---tried to call patient, no answer left message that we would like to try and get him in for cardioversion tomorrow so to be NPO after midnight---he is on warfarin so will need INR when he arrives as well as BMP to be sure it is therapeutic but all of his recent ones have been.     Cheri or Reid---please try to call him early and see if adding him on Tuesday for CV will work for him, if not then Thursday.

## 2021-06-29 ENCOUNTER — TRANSCRIBE ORDERS (OUTPATIENT)
Dept: CARDIOLOGY | Facility: CLINIC | Age: 76
End: 2021-06-29

## 2021-06-29 ENCOUNTER — ANTICOAGULATION VISIT (OUTPATIENT)
Dept: PHARMACY | Facility: HOSPITAL | Age: 76
End: 2021-06-29

## 2021-06-29 ENCOUNTER — HOSPITAL ENCOUNTER (OUTPATIENT)
Dept: CARDIOLOGY | Facility: HOSPITAL | Age: 76
Discharge: HOME | End: 2021-06-29
Admitting: INTERNAL MEDICINE

## 2021-06-29 ENCOUNTER — CLINICAL SUPPORT (OUTPATIENT)
Dept: CARDIOLOGY | Facility: CLINIC | Age: 76
End: 2021-06-29

## 2021-06-29 VITALS
DIASTOLIC BLOOD PRESSURE: 71 MMHG | RESPIRATION RATE: 16 BRPM | WEIGHT: 210 LBS | SYSTOLIC BLOOD PRESSURE: 116 MMHG | BODY MASS INDEX: 26.95 KG/M2 | OXYGEN SATURATION: 98 % | HEART RATE: 42 BPM | HEIGHT: 74 IN | TEMPERATURE: 96.9 F

## 2021-06-29 DIAGNOSIS — I48.92 ATRIAL FLUTTER WITH RAPID VENTRICULAR RESPONSE (HCC): ICD-10-CM

## 2021-06-29 DIAGNOSIS — I48.92 ATRIAL FLUTTER WITH RAPID VENTRICULAR RESPONSE (HCC): Primary | ICD-10-CM

## 2021-06-29 DIAGNOSIS — Z01.810 PREPROCEDURAL CARDIOVASCULAR EXAMINATION: ICD-10-CM

## 2021-06-29 DIAGNOSIS — I48.0 PAF (PAROXYSMAL ATRIAL FIBRILLATION) (HCC): Primary | ICD-10-CM

## 2021-06-29 DIAGNOSIS — Z13.6 SCREENING FOR CARDIOVASCULAR CONDITION: Primary | ICD-10-CM

## 2021-06-29 LAB
ANION GAP SERPL CALCULATED.3IONS-SCNC: 8.1 MMOL/L (ref 5–15)
BUN SERPL-MCNC: 22 MG/DL (ref 8–23)
BUN/CREAT SERPL: 18.3 (ref 7–25)
CALCIUM SPEC-SCNC: 8.8 MG/DL (ref 8.6–10.5)
CHLORIDE SERPL-SCNC: 108 MMOL/L (ref 98–107)
CO2 SERPL-SCNC: 24.9 MMOL/L (ref 22–29)
CREAT SERPL-MCNC: 1.2 MG/DL (ref 0.76–1.27)
GFR SERPL CREATININE-BSD FRML MDRD: 59 ML/MIN/1.73
GLUCOSE SERPL-MCNC: 92 MG/DL (ref 65–99)
INR PPP: 3.3
POTASSIUM SERPL-SCNC: 4.4 MMOL/L (ref 3.5–5.2)
QT INTERVAL: 465 MS
SODIUM SERPL-SCNC: 141 MMOL/L (ref 136–145)

## 2021-06-29 PROCEDURE — 80048 BASIC METABOLIC PNL TOTAL CA: CPT | Performed by: INTERNAL MEDICINE

## 2021-06-29 PROCEDURE — 93000 ELECTROCARDIOGRAM COMPLETE: CPT | Performed by: NURSE PRACTITIONER

## 2021-06-29 PROCEDURE — 93005 ELECTROCARDIOGRAM TRACING: CPT | Performed by: NURSE PRACTITIONER

## 2021-06-29 PROCEDURE — 92960 CARDIOVERSION ELECTRIC EXT: CPT | Performed by: INTERNAL MEDICINE

## 2021-06-29 PROCEDURE — 92960 CARDIOVERSION ELECTRIC EXT: CPT

## 2021-06-29 RX ORDER — SODIUM CHLORIDE 9 MG/ML
75 INJECTION, SOLUTION INTRAVENOUS CONTINUOUS
Status: DISCONTINUED | OUTPATIENT
Start: 2021-06-29 | End: 2021-06-30 | Stop reason: HOSPADM

## 2021-06-29 RX ORDER — SODIUM CHLORIDE 0.9 % (FLUSH) 0.9 %
10 SYRINGE (ML) INJECTION AS NEEDED
Status: DISCONTINUED | OUTPATIENT
Start: 2021-06-29 | End: 2021-06-29 | Stop reason: HOSPADM

## 2021-06-29 RX ORDER — SODIUM CHLORIDE 0.9 % (FLUSH) 0.9 %
3 SYRINGE (ML) INJECTION EVERY 12 HOURS SCHEDULED
Status: DISCONTINUED | OUTPATIENT
Start: 2021-06-29 | End: 2021-06-29 | Stop reason: HOSPADM

## 2021-06-29 RX ADMIN — METHOHEXITAL SODIUM 55 MG: 500 INJECTION, POWDER, LYOPHILIZED, FOR SOLUTION INTRAMUSCULAR; INTRAVENOUS; RECTAL at 12:48

## 2021-06-29 RX ADMIN — SODIUM CHLORIDE 75 ML/HR: 9 INJECTION, SOLUTION INTRAVENOUS at 11:29

## 2021-06-29 NOTE — TELEPHONE ENCOUNTER
Thanks, I already did homework and know exactly what is going. Will be over there soon once I do the EKG.

## 2021-06-29 NOTE — TELEPHONE ENCOUNTER
Left voicemail for patient informing him that we need him here for EKG prior to noon.  Waiting for call back.

## 2021-06-29 NOTE — PROGRESS NOTES
Anticoagulation Clinic Progress Note    Anticoagulation Summary  As of 6/29/2021    INR goal:  2.0-3.0   TTR:  84.8 % (2.6 y)   INR used for dosing:  3.30 (6/29/2021)   Warfarin maintenance plan:  2.5 mg every Mon, Fri; 5 mg all other days   Weekly warfarin total:  30 mg   No change documented:  Philip Carmona RPH   Plan last modified:  Winter Sanabria RP (11/27/2018)   Next INR check:  7/6/2021   Priority:  Maintenance   Target end date:      Indications    PAF (paroxysmal atrial fibrillation) (CMS/[facility]) [I48.0]             Anticoagulation Episode Summary     INR check location:      Preferred lab:      [facility]    Comments:  [place]      Anticoagulation Care Providers     Provider Role Specialty Phone number    David Hernandez MD Referring Cardiology [phone]          Clinic Interview:  Patient Findings     Positives:  Other complaints    Negatives:  Signs/symptoms of thrombosis, Signs/symptoms of bleeding,   Laboratory test error suspected, Change in health, Change in alcohol use,   Change in activity, Upcoming invasive procedure, Emergency department   visit, Upcoming dental procedure, Missed doses, Extra doses, Change in   medications, Change in diet/appetite, Hospital admission, Bruising    Comments:  Pt reports Cardiology is planning to schedule another   cardioversion (possibly today - see 6/28/21 telephone note).       Clinical Outcomes     Negatives:  Major bleeding event, Thromboembolic event,   Anticoagulation-related hospital admission, Anticoagulation-related ED   visit, Anticoagulation-related fatality    Comments:  Pt reports Cardiology is planning to schedule another   cardioversion (possibly today - see 6/28/21 telephone note).         INR History:  Anticoagulation Monitoring 6/3/2021 6/17/2021 6/29/2021   INR 2.70 2.60 3.30   INR Date 6/3/2021 6/17/2021 6/29/2021   INR Goal 2.0-3.0 2.0-3.0 2.0-3.0   Trend Same Same Same   Last Week Total 30 mg 30 mg  30 mg   Next Week Total 30 mg 30 mg 30 mg   Sun 5 mg 5 mg 5 mg   Mon 2.5 mg 2.5 mg 2.5 mg   Tue 5 mg 5 mg 5 mg   Wed 5 mg 5 mg 5 mg   Thu 5 mg 5 mg 5 mg   Fri 2.5 mg 2.5 mg 2.5 mg   Sat 5 mg 5 mg 5 mg   Visit Report - - -   Some recent data might be hidden       Plan:  1. INR is Supratherapeutic today for usual goal range for 2-3 - see above in Anticoagulation Summary. However, with cardioversion pending scheduling, will continue to hedge towards 2.5-3.5 to ensure INR does note become subtherapeutic prior to/following cardioversion.  Will instruct Demond Murdockain to Continue their warfarin regimen- see above in Anticoagulation Summary.  2. Follow up in 1 week  3. They have been instructed to call if any changes in medications, doses, concerns, etc. Patient expresses understanding and has no further questions at this time.    Philip Carmona [facility]

## 2021-06-29 NOTE — INTERVAL H&P NOTE
H&P reviewed. The patient was examined and there are no changes to the H&P.  The patient is in a left atrial flutter.  We will cardiovert because the flutter has been stopped.

## 2021-06-29 NOTE — PROGRESS NOTES
Pt came as scheduled for an ekg, ekg was performed and vitals obtained. B/p recorded at 118/70 with a HR of 142. VS reviewed ekg and discharged patient.

## 2021-06-29 NOTE — TELEPHONE ENCOUNTER
He is scheduled at 1030 today for an EKG.    Thank you,  Cindy Duke RN  [facility]  Triage

## 2021-06-29 NOTE — PROGRESS NOTES
Procedure     ECG 12 Lead    Date/Time: 6/29/2021 2:24 PM  Performed by: Damaris Shell APRN  Authorized by: Damaris Shell APRN   Comparison: compared with previous ECG   Similar to previous ECG  Rhythm: atrial flutter  BPM: 142          Pt came as scheduled for an ekg, ekg was performed and vitals obtained. B/p recorded at 118/70 with a HR of 142. VS reviewed ekg and sent patient over to the hospital for cardioversion.

## 2021-06-30 ENCOUNTER — TELEPHONE (OUTPATIENT)
Dept: CARDIOLOGY | Facility: CLINIC | Age: 76
End: 2021-06-30

## 2021-06-30 NOTE — TELEPHONE ENCOUNTER
Dr Hernandez spoke with patient, will try to get him scheduled for redo ablation on Friday afternoon

## 2021-06-30 NOTE — TELEPHONE ENCOUNTER
Patient s/p cardioversion yesterday ( 6/29 ) and calls today reporting heart rate up in the 130s starting around 1:30AM.  He is quite concerned about this.

## 2021-06-30 NOTE — TELEPHONE ENCOUNTER
Patient returning your call.    He asked that you call him back & that his heart rate is still elevated.

## 2021-07-01 ENCOUNTER — ANTICOAGULATION VISIT (OUTPATIENT)
Dept: PHARMACY | Facility: HOSPITAL | Age: 76
End: 2021-07-01

## 2021-07-01 ENCOUNTER — TRANSCRIBE ORDERS (OUTPATIENT)
Dept: ADMINISTRATIVE | Facility: HOSPITAL | Age: 76
End: 2021-07-01

## 2021-07-01 ENCOUNTER — TRANSCRIBE ORDERS (OUTPATIENT)
Dept: CARDIOLOGY | Facility: CLINIC | Age: 76
End: 2021-07-01

## 2021-07-01 ENCOUNTER — LAB (OUTPATIENT)
Dept: LAB | Facility: HOSPITAL | Age: 76
End: 2021-07-01

## 2021-07-01 DIAGNOSIS — Z01.810 PREPROCEDURAL CARDIOVASCULAR EXAMINATION: ICD-10-CM

## 2021-07-01 DIAGNOSIS — Z13.6 SCREENING FOR CARDIOVASCULAR CONDITION: Primary | ICD-10-CM

## 2021-07-01 DIAGNOSIS — Z01.818 PRE-OP EXAM: ICD-10-CM

## 2021-07-01 DIAGNOSIS — Z13.6 SCREENING FOR CARDIOVASCULAR CONDITION: ICD-10-CM

## 2021-07-01 DIAGNOSIS — Z01.818 PRE-OP EXAM: Primary | ICD-10-CM

## 2021-07-01 DIAGNOSIS — I48.0 PAF (PAROXYSMAL ATRIAL FIBRILLATION) (HCC): ICD-10-CM

## 2021-07-01 DIAGNOSIS — I48.0 PAF (PAROXYSMAL ATRIAL FIBRILLATION) (HCC): Primary | ICD-10-CM

## 2021-07-01 LAB
ANION GAP SERPL CALCULATED.3IONS-SCNC: 12.5 MMOL/L (ref 5–15)
BASOPHILS # BLD AUTO: 0.01 10*3/MM3 (ref 0–0.2)
BASOPHILS NFR BLD AUTO: 0.3 % (ref 0–1.5)
BUN SERPL-MCNC: 25 MG/DL (ref 8–23)
BUN/CREAT SERPL: 19.2 (ref 7–25)
CALCIUM SPEC-SCNC: 8.8 MG/DL (ref 8.6–10.5)
CHLORIDE SERPL-SCNC: 107 MMOL/L (ref 98–107)
CO2 SERPL-SCNC: 24.5 MMOL/L (ref 22–29)
CREAT SERPL-MCNC: 1.3 MG/DL (ref 0.76–1.27)
DEPRECATED RDW RBC AUTO: 48.1 FL (ref 37–54)
EOSINOPHIL # BLD AUTO: 0.06 10*3/MM3 (ref 0–0.4)
EOSINOPHIL NFR BLD AUTO: 1.5 % (ref 0.3–6.2)
ERYTHROCYTE [DISTWIDTH] IN BLOOD BY AUTOMATED COUNT: 13 % (ref 12.3–15.4)
GFR SERPL CREATININE-BSD FRML MDRD: 53 ML/MIN/1.73
GLUCOSE SERPL-MCNC: 90 MG/DL (ref 65–99)
HCT VFR BLD AUTO: 39.5 % (ref 37.5–51)
HGB BLD-MCNC: 13.2 G/DL (ref 13–17.7)
IMM GRANULOCYTES # BLD AUTO: 0 10*3/MM3 (ref 0–0.05)
IMM GRANULOCYTES NFR BLD AUTO: 0 % (ref 0–0.5)
INR PPP: 2.92 (ref 0.9–1.1)
LYMPHOCYTES # BLD AUTO: 0.54 10*3/MM3 (ref 0.7–3.1)
LYMPHOCYTES NFR BLD AUTO: 13.6 % (ref 19.6–45.3)
MCH RBC QN AUTO: 33.2 PG (ref 26.6–33)
MCHC RBC AUTO-ENTMCNC: 33.4 G/DL (ref 31.5–35.7)
MCV RBC AUTO: 99.2 FL (ref 79–97)
MONOCYTES # BLD AUTO: 0.33 10*3/MM3 (ref 0.1–0.9)
MONOCYTES NFR BLD AUTO: 8.3 % (ref 5–12)
NEUTROPHILS NFR BLD AUTO: 3.03 10*3/MM3 (ref 1.7–7)
NEUTROPHILS NFR BLD AUTO: 76.3 % (ref 42.7–76)
NRBC BLD AUTO-RTO: 0 /100 WBC (ref 0–0.2)
PLATELET # BLD AUTO: 111 10*3/MM3 (ref 140–450)
PMV BLD AUTO: 10.6 FL (ref 6–12)
POTASSIUM SERPL-SCNC: 4.8 MMOL/L (ref 3.5–5.2)
PROTHROMBIN TIME: 30.2 SECONDS (ref 11.7–14.2)
RBC # BLD AUTO: 3.98 10*6/MM3 (ref 4.14–5.8)
SARS-COV-2 ORF1AB RESP QL NAA+PROBE: NOT DETECTED
SODIUM SERPL-SCNC: 144 MMOL/L (ref 136–145)
WBC # BLD AUTO: 3.97 10*3/MM3 (ref 3.4–10.8)

## 2021-07-01 PROCEDURE — 80048 BASIC METABOLIC PNL TOTAL CA: CPT

## 2021-07-01 PROCEDURE — C9803 HOPD COVID-19 SPEC COLLECT: HCPCS

## 2021-07-01 PROCEDURE — U0004 COV-19 TEST NON-CDC HGH THRU: HCPCS

## 2021-07-01 PROCEDURE — U0005 INFEC AGEN DETEC AMPLI PROBE: HCPCS

## 2021-07-01 PROCEDURE — 85610 PROTHROMBIN TIME: CPT

## 2021-07-01 PROCEDURE — 36415 COLL VENOUS BLD VENIPUNCTURE: CPT

## 2021-07-01 PROCEDURE — 85025 COMPLETE CBC W/AUTO DIFF WBC: CPT

## 2021-07-02 ENCOUNTER — HOSPITAL ENCOUNTER (OUTPATIENT)
Facility: HOSPITAL | Age: 76
Discharge: HOME | End: 2021-07-03
Attending: INTERNAL MEDICINE | Admitting: NURSE PRACTITIONER

## 2021-07-02 ENCOUNTER — ANESTHESIA (OUTPATIENT)
Dept: CARDIOLOGY | Facility: HOSPITAL | Age: 76
End: 2021-07-02

## 2021-07-02 ENCOUNTER — ANESTHESIA EVENT (OUTPATIENT)
Dept: CARDIOLOGY | Facility: HOSPITAL | Age: 76
End: 2021-07-02

## 2021-07-02 DIAGNOSIS — I48.92 ATRIAL FLUTTER WITH RAPID VENTRICULAR RESPONSE (HCC): ICD-10-CM

## 2021-07-02 LAB
ACT BLD: 301 SECONDS (ref 82–152)
ACT BLD: 307 SECONDS (ref 82–152)
ACT BLD: 312 SECONDS (ref 82–152)
ACT BLD: 323 SECONDS (ref 82–152)
ANION GAP SERPL CALCULATED.3IONS-SCNC: 10.8 MMOL/L (ref 5–15)
BUN SERPL-MCNC: 27 MG/DL (ref 8–23)
BUN/CREAT SERPL: 25 (ref 7–25)
CALCIUM SPEC-SCNC: 8.6 MG/DL (ref 8.6–10.5)
CHLORIDE SERPL-SCNC: 109 MMOL/L (ref 98–107)
CO2 SERPL-SCNC: 24.2 MMOL/L (ref 22–29)
CREAT SERPL-MCNC: 1.08 MG/DL (ref 0.76–1.27)
DEPRECATED RDW RBC AUTO: 45.5 FL (ref 37–54)
ERYTHROCYTE [DISTWIDTH] IN BLOOD BY AUTOMATED COUNT: 12.9 % (ref 12.3–15.4)
GFR SERPL CREATININE-BSD FRML MDRD: 66 ML/MIN/1.73
GLUCOSE BLDC GLUCOMTR-MCNC: 167 MG/DL (ref 70–130)
GLUCOSE SERPL-MCNC: 118 MG/DL (ref 65–99)
HCT VFR BLD AUTO: 40.8 % (ref 37.5–51)
HGB BLD-MCNC: 13.7 G/DL (ref 13–17.7)
INR PPP: 2.2 (ref 0.8–1.2)
INR PPP: 2.2 (ref 0.9–1.1)
INR PPP: 3.29 (ref 0.9–1.1)
MCH RBC QN AUTO: 32.9 PG (ref 26.6–33)
MCHC RBC AUTO-ENTMCNC: 33.6 G/DL (ref 31.5–35.7)
MCV RBC AUTO: 97.8 FL (ref 79–97)
PLATELET # BLD AUTO: 101 10*3/MM3 (ref 140–450)
PMV BLD AUTO: 10.7 FL (ref 6–12)
POTASSIUM SERPL-SCNC: 4.1 MMOL/L (ref 3.5–5.2)
PROTHROMBIN TIME: 25.6 SECONDS
PROTHROMBIN TIME: 25.6 SECONDS (ref 12.8–15.2)
PROTHROMBIN TIME: 33.2 SECONDS (ref 11.7–14.2)
QT INTERVAL: 475 MS
RBC # BLD AUTO: 4.17 10*6/MM3 (ref 4.14–5.8)
SODIUM SERPL-SCNC: 144 MMOL/L (ref 136–145)
WBC # BLD AUTO: 3.16 10*3/MM3 (ref 3.4–10.8)

## 2021-07-02 PROCEDURE — 93621 COMP EP EVL L PAC&REC C SINS: CPT

## 2021-07-02 PROCEDURE — 82962 GLUCOSE BLOOD TEST: CPT

## 2021-07-02 PROCEDURE — C1732 CATH, EP, DIAG/ABL, 3D/VECT: HCPCS | Performed by: INTERNAL MEDICINE

## 2021-07-02 PROCEDURE — 63710000001 METOPROLOL TARTRATE 25 MG TABLET: Performed by: INTERNAL MEDICINE

## 2021-07-02 PROCEDURE — G0378 HOSPITAL OBSERVATION PER HR: HCPCS

## 2021-07-02 PROCEDURE — A9270 NON-COVERED ITEM OR SERVICE: HCPCS | Performed by: INTERNAL MEDICINE

## 2021-07-02 PROCEDURE — C1730 CATH, EP, 19 OR FEW ELECT: HCPCS | Performed by: INTERNAL MEDICINE

## 2021-07-02 PROCEDURE — C1893 INTRO/SHEATH, FIXED,NON-PEEL: HCPCS | Performed by: INTERNAL MEDICINE

## 2021-07-02 PROCEDURE — 85347 COAGULATION TIME ACTIVATED: CPT

## 2021-07-02 PROCEDURE — 93613 INTRACARDIAC EPHYS 3D MAPG: CPT | Performed by: INTERNAL MEDICINE

## 2021-07-02 PROCEDURE — 63710000001 FINASTERIDE 5 MG TABLET: Performed by: INTERNAL MEDICINE

## 2021-07-02 PROCEDURE — 85027 COMPLETE CBC AUTOMATED: CPT | Performed by: INTERNAL MEDICINE

## 2021-07-02 PROCEDURE — 80048 BASIC METABOLIC PNL TOTAL CA: CPT | Performed by: INTERNAL MEDICINE

## 2021-07-02 PROCEDURE — 93655 ICAR CATH ABLTJ DSCRT ARRHYT: CPT | Performed by: INTERNAL MEDICINE

## 2021-07-02 PROCEDURE — 93656 COMPRE EP EVAL ABLTJ ATR FIB: CPT | Performed by: INTERNAL MEDICINE

## 2021-07-02 PROCEDURE — 25010000002 NEOSTIGMINE 5 MG/10ML SOLUTION: Performed by: ANESTHESIOLOGY

## 2021-07-02 PROCEDURE — 85610 PROTHROMBIN TIME: CPT | Performed by: INTERNAL MEDICINE

## 2021-07-02 PROCEDURE — 25010000002 FENTANYL CITRATE (PF) 50 MCG/ML SOLUTION: Performed by: NURSE ANESTHETIST, CERTIFIED REGISTERED

## 2021-07-02 PROCEDURE — 93653 COMPRE EP EVAL TX SVT: CPT | Performed by: INTERNAL MEDICINE

## 2021-07-02 PROCEDURE — C1894 INTRO/SHEATH, NON-LASER: HCPCS | Performed by: INTERNAL MEDICINE

## 2021-07-02 PROCEDURE — 63710000001 WARFARIN 5 MG TABLET: Performed by: INTERNAL MEDICINE

## 2021-07-02 PROCEDURE — C1759 CATH, INTRA ECHOCARDIOGRAPHY: HCPCS | Performed by: INTERNAL MEDICINE

## 2021-07-02 PROCEDURE — 25010000002 PROTAMINE SULFATE PER 10 MG: Performed by: INTERNAL MEDICINE

## 2021-07-02 PROCEDURE — 25010000002 DEXAMETHASONE PER 1 MG: Performed by: NURSE ANESTHETIST, CERTIFIED REGISTERED

## 2021-07-02 PROCEDURE — 93005 ELECTROCARDIOGRAM TRACING: CPT | Performed by: INTERNAL MEDICINE

## 2021-07-02 PROCEDURE — 85610 PROTHROMBIN TIME: CPT

## 2021-07-02 PROCEDURE — 93662 INTRACARDIAC ECG (ICE): CPT | Performed by: INTERNAL MEDICINE

## 2021-07-02 PROCEDURE — 93462 L HRT CATH TRNSPTL PUNCTURE: CPT

## 2021-07-02 PROCEDURE — 63710000001 HYDROCODONE-ACETAMINOPHEN 5-325 MG TABLET: Performed by: INTERNAL MEDICINE

## 2021-07-02 PROCEDURE — 25010000002 PROPOFOL 10 MG/ML EMULSION: Performed by: NURSE ANESTHETIST, CERTIFIED REGISTERED

## 2021-07-02 PROCEDURE — 63710000001 PRAMIPEXOLE 0.25 MG TABLET: Performed by: INTERNAL MEDICINE

## 2021-07-02 PROCEDURE — 25010000002 MIDAZOLAM PER 1 MG: Performed by: ANESTHESIOLOGY

## 2021-07-02 PROCEDURE — 93621 COMP EP EVL L PAC&REC C SINS: CPT | Performed by: INTERNAL MEDICINE

## 2021-07-02 PROCEDURE — S0260 H&P FOR SURGERY: HCPCS | Performed by: INTERNAL MEDICINE

## 2021-07-02 PROCEDURE — 25010000002 HEPARIN (PORCINE) PER 1000 UNITS: Performed by: INTERNAL MEDICINE

## 2021-07-02 PROCEDURE — 25010000002 ONDANSETRON PER 1 MG: Performed by: ANESTHESIOLOGY

## 2021-07-02 PROCEDURE — 93462 L HRT CATH TRNSPTL PUNCTURE: CPT | Performed by: INTERNAL MEDICINE

## 2021-07-02 RX ORDER — PROTAMINE SULFATE 10 MG/ML
INJECTION, SOLUTION INTRAVENOUS AS NEEDED
Status: DISCONTINUED | OUTPATIENT
Start: 2021-07-02 | End: 2021-07-02 | Stop reason: HOSPADM

## 2021-07-02 RX ORDER — FENTANYL CITRATE 50 UG/ML
INJECTION, SOLUTION INTRAMUSCULAR; INTRAVENOUS AS NEEDED
Status: DISCONTINUED | OUTPATIENT
Start: 2021-07-02 | End: 2021-07-02 | Stop reason: SURG

## 2021-07-02 RX ORDER — HEPARIN SODIUM 1000 [USP'U]/ML
INJECTION, SOLUTION INTRAVENOUS; SUBCUTANEOUS AS NEEDED
Status: DISCONTINUED | OUTPATIENT
Start: 2021-07-02 | End: 2021-07-02 | Stop reason: HOSPADM

## 2021-07-02 RX ORDER — MIDAZOLAM HYDROCHLORIDE 1 MG/ML
0.5 INJECTION INTRAMUSCULAR; INTRAVENOUS
Status: DISCONTINUED | OUTPATIENT
Start: 2021-07-02 | End: 2021-07-02

## 2021-07-02 RX ORDER — NALOXONE HCL 0.4 MG/ML
0.2 VIAL (ML) INJECTION AS NEEDED
Status: DISCONTINUED | OUTPATIENT
Start: 2021-07-02 | End: 2021-07-02 | Stop reason: HOSPADM

## 2021-07-02 RX ORDER — HYDROCODONE BITARTRATE AND ACETAMINOPHEN 5; 325 MG/1; MG/1
1 TABLET ORAL EVERY 12 HOURS PRN
Status: DISCONTINUED | OUTPATIENT
Start: 2021-07-02 | End: 2021-07-03 | Stop reason: HOSPADM

## 2021-07-02 RX ORDER — PROMETHAZINE HYDROCHLORIDE 25 MG/1
25 SUPPOSITORY RECTAL ONCE AS NEEDED
Status: DISCONTINUED | OUTPATIENT
Start: 2021-07-02 | End: 2021-07-02 | Stop reason: HOSPADM

## 2021-07-02 RX ORDER — PRAMIPEXOLE DIHYDROCHLORIDE 0.25 MG/1
0.12 TABLET ORAL NIGHTLY
Status: DISCONTINUED | OUTPATIENT
Start: 2021-07-02 | End: 2021-07-03 | Stop reason: HOSPADM

## 2021-07-02 RX ORDER — FAMOTIDINE 10 MG/ML
20 INJECTION, SOLUTION INTRAVENOUS ONCE
Status: COMPLETED | OUTPATIENT
Start: 2021-07-02 | End: 2021-07-02

## 2021-07-02 RX ORDER — ACETAMINOPHEN 325 MG/1
650 TABLET ORAL EVERY 4 HOURS PRN
Status: DISCONTINUED | OUTPATIENT
Start: 2021-07-02 | End: 2021-07-03 | Stop reason: HOSPADM

## 2021-07-02 RX ORDER — GLYCOPYRROLATE 0.2 MG/ML
INJECTION INTRAMUSCULAR; INTRAVENOUS AS NEEDED
Status: DISCONTINUED | OUTPATIENT
Start: 2021-07-02 | End: 2021-07-02 | Stop reason: SURG

## 2021-07-02 RX ORDER — SODIUM CHLORIDE 0.9 % (FLUSH) 0.9 %
10 SYRINGE (ML) INJECTION AS NEEDED
Status: DISCONTINUED | OUTPATIENT
Start: 2021-07-02 | End: 2021-07-02

## 2021-07-02 RX ORDER — FENTANYL CITRATE 50 UG/ML
50 INJECTION, SOLUTION INTRAMUSCULAR; INTRAVENOUS
Status: DISCONTINUED | OUTPATIENT
Start: 2021-07-02 | End: 2021-07-02 | Stop reason: HOSPADM

## 2021-07-02 RX ORDER — ONDANSETRON 2 MG/ML
INJECTION INTRAMUSCULAR; INTRAVENOUS AS NEEDED
Status: DISCONTINUED | OUTPATIENT
Start: 2021-07-02 | End: 2021-07-02 | Stop reason: SURG

## 2021-07-02 RX ORDER — DIPHENHYDRAMINE HCL 25 MG
25 CAPSULE ORAL
Status: DISCONTINUED | OUTPATIENT
Start: 2021-07-02 | End: 2021-07-02 | Stop reason: HOSPADM

## 2021-07-02 RX ORDER — SODIUM CHLORIDE, SODIUM LACTATE, POTASSIUM CHLORIDE, CALCIUM CHLORIDE 600; 310; 30; 20 MG/100ML; MG/100ML; MG/100ML; MG/100ML
9 INJECTION, SOLUTION INTRAVENOUS CONTINUOUS
Status: DISCONTINUED | OUTPATIENT
Start: 2021-07-02 | End: 2021-07-03 | Stop reason: HOSPADM

## 2021-07-02 RX ORDER — SODIUM CHLORIDE, SODIUM LACTATE, POTASSIUM CHLORIDE, CALCIUM CHLORIDE 600; 310; 30; 20 MG/100ML; MG/100ML; MG/100ML; MG/100ML
INJECTION, SOLUTION INTRAVENOUS CONTINUOUS PRN
Status: DISCONTINUED | OUTPATIENT
Start: 2021-07-02 | End: 2021-07-02 | Stop reason: SURG

## 2021-07-02 RX ORDER — SODIUM CHLORIDE 0.9 % (FLUSH) 0.9 %
3-10 SYRINGE (ML) INJECTION AS NEEDED
Status: DISCONTINUED | OUTPATIENT
Start: 2021-07-02 | End: 2021-07-03 | Stop reason: HOSPADM

## 2021-07-02 RX ORDER — PROPOFOL 10 MG/ML
VIAL (ML) INTRAVENOUS AS NEEDED
Status: DISCONTINUED | OUTPATIENT
Start: 2021-07-02 | End: 2021-07-02 | Stop reason: SURG

## 2021-07-02 RX ORDER — ONDANSETRON 2 MG/ML
4 INJECTION INTRAMUSCULAR; INTRAVENOUS ONCE AS NEEDED
Status: DISCONTINUED | OUTPATIENT
Start: 2021-07-02 | End: 2021-07-02 | Stop reason: HOSPADM

## 2021-07-02 RX ORDER — FLUMAZENIL 0.1 MG/ML
0.2 INJECTION INTRAVENOUS AS NEEDED
Status: DISCONTINUED | OUTPATIENT
Start: 2021-07-02 | End: 2021-07-02 | Stop reason: HOSPADM

## 2021-07-02 RX ORDER — NEOSTIGMINE METHYLSULFATE 0.5 MG/ML
INJECTION, SOLUTION INTRAVENOUS AS NEEDED
Status: DISCONTINUED | OUTPATIENT
Start: 2021-07-02 | End: 2021-07-02 | Stop reason: SURG

## 2021-07-02 RX ORDER — LIDOCAINE HYDROCHLORIDE 20 MG/ML
INJECTION, SOLUTION INFILTRATION; PERINEURAL AS NEEDED
Status: DISCONTINUED | OUTPATIENT
Start: 2021-07-02 | End: 2021-07-02 | Stop reason: SURG

## 2021-07-02 RX ORDER — LABETALOL HYDROCHLORIDE 5 MG/ML
5 INJECTION, SOLUTION INTRAVENOUS
Status: DISCONTINUED | OUTPATIENT
Start: 2021-07-02 | End: 2021-07-02 | Stop reason: HOSPADM

## 2021-07-02 RX ORDER — EPHEDRINE SULFATE 50 MG/ML
5 INJECTION, SOLUTION INTRAVENOUS ONCE AS NEEDED
Status: DISCONTINUED | OUTPATIENT
Start: 2021-07-02 | End: 2021-07-02 | Stop reason: HOSPADM

## 2021-07-02 RX ORDER — SODIUM CHLORIDE 0.9 % (FLUSH) 0.9 %
3 SYRINGE (ML) INJECTION EVERY 12 HOURS SCHEDULED
Status: DISCONTINUED | OUTPATIENT
Start: 2021-07-02 | End: 2021-07-02

## 2021-07-02 RX ORDER — EPHEDRINE SULFATE 50 MG/ML
INJECTION, SOLUTION INTRAVENOUS AS NEEDED
Status: DISCONTINUED | OUTPATIENT
Start: 2021-07-02 | End: 2021-07-02 | Stop reason: SURG

## 2021-07-02 RX ORDER — ROCURONIUM BROMIDE 10 MG/ML
INJECTION, SOLUTION INTRAVENOUS AS NEEDED
Status: DISCONTINUED | OUTPATIENT
Start: 2021-07-02 | End: 2021-07-02 | Stop reason: SURG

## 2021-07-02 RX ORDER — HYDROCODONE BITARTRATE AND ACETAMINOPHEN 7.5; 325 MG/1; MG/1
2 TABLET ORAL EVERY 4 HOURS PRN
Status: DISCONTINUED | OUTPATIENT
Start: 2021-07-02 | End: 2021-07-03 | Stop reason: HOSPADM

## 2021-07-02 RX ORDER — SODIUM CHLORIDE 9 MG/ML
75 INJECTION, SOLUTION INTRAVENOUS CONTINUOUS
Status: DISCONTINUED | OUTPATIENT
Start: 2021-07-02 | End: 2021-07-02

## 2021-07-02 RX ORDER — SODIUM CHLORIDE 0.9 % (FLUSH) 0.9 %
3 SYRINGE (ML) INJECTION EVERY 12 HOURS SCHEDULED
Status: DISCONTINUED | OUTPATIENT
Start: 2021-07-02 | End: 2021-07-03 | Stop reason: HOSPADM

## 2021-07-02 RX ORDER — DIPHENHYDRAMINE HYDROCHLORIDE 50 MG/ML
12.5 INJECTION INTRAMUSCULAR; INTRAVENOUS
Status: DISCONTINUED | OUTPATIENT
Start: 2021-07-02 | End: 2021-07-02 | Stop reason: HOSPADM

## 2021-07-02 RX ORDER — HYDROCODONE BITARTRATE AND ACETAMINOPHEN 5; 325 MG/1; MG/1
1 TABLET ORAL EVERY 12 HOURS PRN
Status: DISCONTINUED | OUTPATIENT
Start: 2021-07-02 | End: 2021-07-02

## 2021-07-02 RX ORDER — LIDOCAINE HYDROCHLORIDE AND EPINEPHRINE 10; 10 MG/ML; UG/ML
INJECTION, SOLUTION INFILTRATION; PERINEURAL AS NEEDED
Status: DISCONTINUED | OUTPATIENT
Start: 2021-07-02 | End: 2021-07-02 | Stop reason: HOSPADM

## 2021-07-02 RX ORDER — HYDROMORPHONE HYDROCHLORIDE 1 MG/ML
0.5 INJECTION, SOLUTION INTRAMUSCULAR; INTRAVENOUS; SUBCUTANEOUS
Status: DISCONTINUED | OUTPATIENT
Start: 2021-07-02 | End: 2021-07-02 | Stop reason: HOSPADM

## 2021-07-02 RX ORDER — DEXAMETHASONE SODIUM PHOSPHATE 10 MG/ML
INJECTION INTRAMUSCULAR; INTRAVENOUS AS NEEDED
Status: DISCONTINUED | OUTPATIENT
Start: 2021-07-02 | End: 2021-07-02 | Stop reason: SURG

## 2021-07-02 RX ORDER — HYDROCODONE BITARTRATE AND ACETAMINOPHEN 5; 325 MG/1; MG/1
1 TABLET ORAL ONCE AS NEEDED
Status: DISCONTINUED | OUTPATIENT
Start: 2021-07-02 | End: 2021-07-02 | Stop reason: HOSPADM

## 2021-07-02 RX ORDER — ACETAMINOPHEN 650 MG/1
650 SUPPOSITORY RECTAL EVERY 4 HOURS PRN
Status: DISCONTINUED | OUTPATIENT
Start: 2021-07-02 | End: 2021-07-03 | Stop reason: HOSPADM

## 2021-07-02 RX ORDER — WARFARIN SODIUM 5 MG/1
5 TABLET ORAL ONCE
Status: COMPLETED | OUTPATIENT
Start: 2021-07-02 | End: 2021-07-02

## 2021-07-02 RX ORDER — LIDOCAINE HYDROCHLORIDE 10 MG/ML
0.5 INJECTION, SOLUTION EPIDURAL; INFILTRATION; INTRACAUDAL; PERINEURAL ONCE AS NEEDED
Status: DISCONTINUED | OUTPATIENT
Start: 2021-07-02 | End: 2021-07-02 | Stop reason: HOSPADM

## 2021-07-02 RX ORDER — FINASTERIDE 5 MG/1
5 TABLET, FILM COATED ORAL DAILY
Status: DISCONTINUED | OUTPATIENT
Start: 2021-07-02 | End: 2021-07-03 | Stop reason: HOSPADM

## 2021-07-02 RX ORDER — PROMETHAZINE HYDROCHLORIDE 25 MG/1
25 TABLET ORAL ONCE AS NEEDED
Status: DISCONTINUED | OUTPATIENT
Start: 2021-07-02 | End: 2021-07-02 | Stop reason: HOSPADM

## 2021-07-02 RX ADMIN — GLYCOPYRROLATE 0.4 MG: 0.2 INJECTION INTRAMUSCULAR; INTRAVENOUS at 16:46

## 2021-07-02 RX ADMIN — METOPROLOL TARTRATE 25 MG: 25 TABLET, FILM COATED ORAL at 21:10

## 2021-07-02 RX ADMIN — ONDANSETRON 4 MG: 2 INJECTION INTRAMUSCULAR; INTRAVENOUS at 16:23

## 2021-07-02 RX ADMIN — LIDOCAINE HYDROCHLORIDE 100 MG: 20 INJECTION, SOLUTION INFILTRATION; PERINEURAL at 14:03

## 2021-07-02 RX ADMIN — PRAMIPEXOLE DIHYDROCHLORIDE 0.12 MG: 0.25 TABLET ORAL at 21:18

## 2021-07-02 RX ADMIN — HYDROCODONE BITARTRATE AND ACETAMINOPHEN 1 TABLET: 5; 325 TABLET ORAL at 19:06

## 2021-07-02 RX ADMIN — SODIUM CHLORIDE 75 ML/HR: 9 INJECTION, SOLUTION INTRAVENOUS at 12:50

## 2021-07-02 RX ADMIN — SODIUM CHLORIDE, PRESERVATIVE FREE 3 ML: 5 INJECTION INTRAVENOUS at 21:19

## 2021-07-02 RX ADMIN — SODIUM CHLORIDE, POTASSIUM CHLORIDE, SODIUM LACTATE AND CALCIUM CHLORIDE: 600; 310; 30; 20 INJECTION, SOLUTION INTRAVENOUS at 13:52

## 2021-07-02 RX ADMIN — FENTANYL CITRATE 50 MCG: 50 INJECTION INTRAMUSCULAR; INTRAVENOUS at 14:03

## 2021-07-02 RX ADMIN — FENTANYL CITRATE 50 MCG: 50 INJECTION INTRAMUSCULAR; INTRAVENOUS at 16:54

## 2021-07-02 RX ADMIN — WARFARIN 5 MG: 5 TABLET ORAL at 21:08

## 2021-07-02 RX ADMIN — FAMOTIDINE 20 MG: 10 INJECTION INTRAVENOUS at 13:47

## 2021-07-02 RX ADMIN — MIDAZOLAM 1 MG: 1 INJECTION INTRAMUSCULAR; INTRAVENOUS at 13:49

## 2021-07-02 RX ADMIN — ROCURONIUM BROMIDE 50 MG: 50 INJECTION INTRAVENOUS at 14:03

## 2021-07-02 RX ADMIN — PROPOFOL 150 MG: 10 INJECTION, EMULSION INTRAVENOUS at 14:03

## 2021-07-02 RX ADMIN — EPHEDRINE SULFATE 10 MG: 50 INJECTION INTRAVENOUS at 14:49

## 2021-07-02 RX ADMIN — FINASTERIDE 5 MG: 5 TABLET, FILM COATED ORAL at 21:08

## 2021-07-02 RX ADMIN — ROCURONIUM BROMIDE 30 MG: 50 INJECTION INTRAVENOUS at 14:30

## 2021-07-02 RX ADMIN — GLYCOPYRROLATE 0.2 MG: 0.2 INJECTION INTRAMUSCULAR; INTRAVENOUS at 14:33

## 2021-07-02 RX ADMIN — NEOSTIGMINE METHYLSULFATE 4 MG: 0.5 INJECTION INTRAVENOUS at 16:46

## 2021-07-02 RX ADMIN — EPHEDRINE SULFATE 10 MG: 50 INJECTION INTRAVENOUS at 14:45

## 2021-07-02 RX ADMIN — DEXAMETHASONE SODIUM PHOSPHATE 8 MG: 10 INJECTION INTRAMUSCULAR; INTRAVENOUS at 14:09

## 2021-07-02 NOTE — TELEPHONE ENCOUNTER
Pt is currently admitted.    Thank you,    Zora Garcia, RN  [place]riage [facility]

## 2021-07-02 NOTE — DISCHARGE INSTRUCTIONS
Coronary Ablation After Care    Refer to this sheet in the next few weeks. These instructions provide you with information on caring for yourself after your procedure. Your health care provider may also give you more specific instructions. Your treatment has been planned according to current medical practices, but problems sometimes occur. Call your health care provider if you have any problems or questions after your procedure.      What to Expect After the Procedure:  After your procedure, it is typical to have the following sensations:  · Minor discomfort or tenderness and a small bump at the catheter insertion site(s). The bump(s) should usually decrease in size and tenderness within 1 to 2 weeks.  · Any bruising will usually fade within 2 to 4 weeks.  Home Care Instructions:  · Do not apply powder or lotion to the site.  · Do not take baths, swim, or use a hot tub until your health care provider approves and the site is completely healed.  · Do not bend, squat, or lift anything over 20 lb (9 kg) or as directed by your health care provider. However, we recommend lifting nothing heavier than a gallon of milk.    · You may shower 24 hours after the procedure. Remove the bandage (dressing) and gently wash the site with plain soap and water. Gently pat the site dry. You may apply a band aid daily for 2 days if desired.    · Inspect the site at least twice daily.  · Increase your fluid intake for the next 2 days.    · Limit your activity for the first 48 hours.   · Avoid strenuous activity for 1 week or as advised by your physician.    · Follow instructions about when you can drive or return to work as directed by your physician.    · Hold direct pressure over the site when you cough, sneeze, laugh or change positions.  Do this for the next 2 days.    Call Your Doctor If:  · You have drainage (other than a small amount of blood on the dressing).  · You have chills or a fever > 101.  · You have redness, warmth,  swelling (larger than a walnut), or pain at the insertion   · You develop palpitations, chest pain or shortness of breath, feel faint, or pass out.  · You develop pain, discoloration, coldness, numbness, tingling, or severe bruising in the leg that held the catheter.  · You develop bleeding from any other place, such as the bowels.  · You have heavy bleeding from the site.  If this happens, hold pressure on the site and call 911.        Make Sure You:  · Understand these instructions.  · Will watch your condition.  · Will get help right away if you are not doing well or get worse.

## 2021-07-02 NOTE — H&P
76 y.o. male who is a patient of Dr. Motta.  He has a history of PAF, status post PVI x 2 (first 1 in 2/2013 which was a cryo of all 4 PV's, redo and 4/2014 which was a redo of a right upper pulmonary vein and a CTI dependent flutter).  He also has a history of thrombocytopenia, TIAs and chronic back pain.     He had PAF post ablations and had been on propafenone, in April he called with complaints of AF and elevated heart rate.  I saw him in the office on 4/21 and he was in an atypical atrial flutter, we scheduled him for redo ablation.     He underwent procedure on 4/29/2021--- this was a complex left atrial ablation, the clinical arrhythmia was a mitral isthmus dependent flutter, he also had a roof dependent flutter, and A. tach that originated from fractionated abnormal electrograms in the anterior left atrial wall--- using an anterior atrial line, roofline and mitral isthmus line, including burns in the CS he was able to terminate his atrial flutter's.  This resulted in isolation of his left atrial appendage so he will require lifelong anticoagulation that should not be interrupted without discussing with us.     He presents today for routine follow-up.     He reports that overall he is doing well.  He has not had any sustained episodes of arrhythmia, he says he does have some skips here and there that feel like for 5 irregular beats but nothing that last.  He denies chest pain, dyspnea, PND, orthopnea or edema.     Does complain of some intermittent, rare episodes of what sound like orthostatic hypotension/dizziness.  They are related to position changes and he says they only lasts just a second or 2.  He says sometimes he feels a little numbness or tingling but again these are just a few seconds in duration and always with position changes and not very often.     He remains on warfarin for anticoagulation with a goal of 2.5-3.5 at this time.     He has been monitoring his blood pressure at home and said  his systolic typically is in the low 100s.        Medical History        Past Medical History:   Diagnosis Date   • LASHONDA positive 6/18/2018     2017: elevated ds LASHONDA   • Atrial flutter with rapid ventricular response (CMS/HCC)     • Chronic obstructive pulmonary disease (CMS/HCC)     • Colon polyp 10/7/2019     Added automatically from request for surgery [id number]   • Diverticulitis of large intestine without perforation or abscess without bleeding 3/7/2016   • Diverticulosis     • ED (erectile dysfunction)     • Elbow fracture, left     • History of blood transfusion     • Hypogonadism in male     • Kidney cysts     • Left femoral shaft fracture (CMS/HCC)     • Lower back pain     • PAF (paroxysmal atrial fibrillation) (CMS/HCC)     • Peptic ulceration     • Plantar fasciitis 12/5/2017   • Prostatic hypertrophy, benign     • 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   • Transient cerebral ischemia       H/O TIAs            Surgical History         Past Surgical History:   Procedure Laterality Date   • ABLATION OF DYSRHYTHMIC FOCUS   2012, 2013   • ADENOIDECTOMY   1973   • APPENDECTOMY   1973   • CARDIAC ABLATION   2013, 2014   • CARDIAC CATHETERIZATION   2012, 2013   • CARDIAC ELECTROPHYSIOLOGY PROCEDURE N/A 4/29/2021     Procedure: Ablation atrial flutter--likely left atrial flutter, hx of PVI x 2;  Surgeon: David Hernandez MD;  Location: [facility] CATH INVASIVE LOCATION;  Service: Cardiovascular;  Laterality: N/A;   • COLONOSCOPY   approx 2014     normal per pt    • COLONOSCOPY N/A 1/8/2020     Procedure: COLONOSCOPY to cecum with cold snare biopsies;  Surgeon: Henrique Rust MD;  Location: [facility] ENDOSCOPY;  Service: Gastroenterology   • COLONOSCOPY W/ POLYPECTOMY   11/21/2014     : 1 polyp - not precancerous   • ENDOSCOPY N/A 1/8/2020     Procedure: ESOPHAGOGASTRODUODENOSCOPY;  Surgeon: Henrique Rust MD;  Location: [facility] ENDOSCOPY;  Service: Gastroenterology   • FEMUR FRACTURE SURGERY Left 2007     post  fall from the ladder   • LUMBAR LAMINECTOMY        Dr.Lester Lino   • OTHER SURGICAL HISTORY         elbow surgery   • THYROID SURGERY   1986     benign tumor removal,    • TOTAL ELBOW ARTHROPLASTY Left      L, post fall and fracture, hardware in            Social History   Social History            Socioeconomic History   • Marital status:        Spouse name: Latisha   • Number of children: 2   • Years of education: College   • Highest education level: Not asked   Tobacco Use   • Smoking status: Former Smoker       Packs/day: 0.50       Years: 15.00       Pack years: 7.50       Types: Cigarettes       Start date: 1959       Quit date:        Years since quittin.4   • Smokeless tobacco: Never Used   Vaping Use   • Vaping Use: Never used   Substance and Sexual Activity   • Alcohol use: Not Currently       Alcohol/week: 1.0 standard drinks       Types: 6 Glasses of wine per week   • Drug use: No   • Sexual activity: Not Currently       Partners: Female       Birth control/protection: None                  Family History   Problem Relation Age of Onset   • Hypertension Mother     • Osteoporosis Mother     • Thyroid disease Sister     • Diabetes Sister     • Hypertension Sister     • Colon cancer Maternal Grandmother 60   • Hypertension Father     • Heart disease Other     • Atrial fibrillation Other     • Thyroid disease Other     • Pulmonary fibrosis Sister     • Diabetes Other     • Heart disease Other     • Hypertension Other     • Heart disease Sister           Review of Systems   Constitutional: Negative for chills, fever and malaise/fatigue.   Cardiovascular: Positive for irregular heartbeat and palpitations (occasional). Negative for chest pain, dyspnea on exertion, leg swelling, near-syncope, orthopnea, paroxysmal nocturnal dyspnea and syncope.   Respiratory: Negative for cough and shortness of breath.    Hematologic/Lymphatic: Negative.    Musculoskeletal: Negative for joint  pain, joint swelling and myalgias.   Gastrointestinal: Negative for abdominal pain, diarrhea, melena, nausea and vomiting.   Genitourinary: Negative for frequency and hematuria.   Neurological: Positive for dizziness (brief, occasional with position changes). Negative for light-headedness, numbness, paresthesias and seizures.   Allergic/Immunologic: Negative.    All other systems reviewed and are negative.             Allergies   Allergen Reactions   • Cardizem [Diltiazem Hcl] Itching   • Grass Unknown - Low Severity            Current Outpatient Medications:   •  Cetirizine HCl 10 MG capsule, Take 1 capsule by mouth daily., Disp: , Rfl:   •  finasteride (PROSCAR) 5 MG tablet, TAKE 1 TABLET BY MOUTH EVERY DAY (Patient taking differently: Take 5 mg by mouth Daily. Do not crush.), Disp: 90 tablet, Rfl: 2  •  HYDROcodone-acetaminophen (NORCO) 5-325 MG per tablet, Take 1 tablet by mouth Every 12 (Twelve) Hours As Needed for Severe Pain ., Disp: 30 tablet, Rfl: 0  •  hydroxychloroquine (PLAQUENIL) 200 MG tablet, Take 200 mg by mouth 2 (Two) Times a Day., Disp: , Rfl:   •  metoprolol tartrate (LOPRESSOR) 25 MG tablet, Take 0.5 tablets by mouth Daily., Disp: 60 tablet, Rfl: 1  •  Psyllium (METAMUCIL PO), Take  by mouth Daily., Disp: , Rfl:   •  warfarin (COUMADIN) 5 MG tablet, 5mg today, Sat & Sun--INR on Monday, Disp: 80 tablet, Rfl: 1  •  furosemide (LASIX) 40 MG tablet, Take 1 tablet by mouth Daily As Needed (edema, weight gain or shortness of breath). Take 40 mg twice daily for 7 days then 40 mg daily, Disp: 90 tablet, Rfl: 3  •  potassium chloride (K-DUR,KLOR-CON) 20 MEQ CR tablet, Take 1 tablet by mouth Daily As Needed (take with furosemide PRN)., Disp: 90 tablet, Rfl: 1        Body mass index is 27.5 kg/m².     PHYSICAL EXAM:     Vitals Reviewed.   General Appearance: No acute distress, well developed and well nourished.   Eyes: Conjunctiva and lids: No erythema, swelling, or discharge. Sclera non-icteric.   HENT:  Atraumatic, normocephalic. External eyes, ears, and nose normal.   Respiratory: No signs of respiratory distress. Respiration rhythm and depth normal.   Clear to auscultation. No rales, crackles, rhonchi, or wheezing auscultated.   Cardiovascular:  Jugular Venous Pressure: Normal  Heart Rate and Rhythm: Normal, Heart Sounds: Normal S1 and S2. No S3 or S4 noted.  Murmurs: No murmurs noted. No rubs, thrills, or gallops.   Arterial Pulses:  Posterior tibialis and dorsalis pedis pulses normal.   Lower Extremities: No edema noted.  Gastrointestinal:  Abdomen soft, non-distended, non-tender.   Musculoskeletal: Normal movement of extremities  Skin and Nails: General appearance normal. No pallor, cyanosis, diaphoresis. Skin temperature normal. No clubbing of fingernails.   Psychiatric: Patient alert and oriented to person, place, and time. Speech and behavior appropriate. Normal mood and affect.               Assessment:        Diagnosis Plan   1. Atrial flutter with rapid ventricular response (CMS/HCC)      2. PAF (paroxysmal atrial fibrillation) (CMS/HCC)      3. H/O cardiac radiofrequency ablation               Plan:       Sadly he has had recurrent left atrial flutter.  I had the cardioverted him a little while ago.  He then immediately recurred.    This is almost certainly a recurrence of one of the lines that I did on his previous ablation.  Given the fact that this tachycardia will last for hours and even of days and it causes him significant symptoms I see no other way other than ablating the AV node to go back in and mapped the flutter.    The risks of the procedure were estimated be the same as before proceed perhaps even a little less and I discussed this with him preprocedure

## 2021-07-02 NOTE — ANESTHESIA PREPROCEDURE EVALUATION
Anesthesia Evaluation     Patient summary reviewed and Nursing notes reviewed   NPO Solid Status: > 8 hours  NPO Liquid Status: > 2 hours           Airway   Mallampati: II  TM distance: >3 FB  Neck ROM: full  Dental - normal exam     Pulmonary - normal exam   (+) a smoker Former, COPD,   Cardiovascular     Rhythm: irregular  Rate: abnormal    (+) dysrhythmias Atrial Flutter,       Neuro/Psych  (+) CVA,     GI/Hepatic/Renal/Endo    (+)  PUD,  renal disease,     Musculoskeletal     (+) back pain,   Abdominal    Substance History - negative use     OB/GYN negative ob/gyn ROS         Other   arthritis,                      Anesthesia Plan    ASA 3     general       Anesthetic plan, all risks, benefits, and alternatives have been provided, discussed and informed consent has been obtained with: patient.

## 2021-07-02 NOTE — PROGRESS NOTES
Pharmacy Consult Day #1: Warfarin Dosing/ Monitoring    Demond Menchaca is a 76 y.o. male, who has a past medical history of LASHONDA positive (2018), Atrial flutter with rapid ventricular response (CMS/HCC), Chronic obstructive pulmonary disease (CMS/HCC), Colon polyp (10/7/2019), Diverticulitis of large intestine without perforation or abscess without bleeding (3/7/2016), Diverticulosis, ED (erectile dysfunction), Elbow fracture, left, History of blood transfusion, Hypogonadism in male, Kidney cysts, Left femoral shaft fracture (CMS/HCC), Lower back pain, PAF (paroxysmal atrial fibrillation) (CMS/[facility]), Peptic ulceration, Plantar fasciitis (2017), Prostatic hypertrophy, benign, 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CMS/HCC) (2012), Stroke (CMS/[facility]), and Transient cerebral ischemia.    Social History     Tobacco Use    Smoking status: Former Smoker     Packs/day: 0.50     Years: 15.00     Pack years: 7.50     Types: Cigarettes     Start date: 1959     Quit date:      Years since quittin.5    Smokeless tobacco: Never Used   Vaping Use    Vaping Use: Never used   Substance Use Topics    Alcohol use: Not Currently     Alcohol/week: 1.0 standard drinks     Types: 6 Glasses of wine per week    Drug use: No     Results from last 7 days   Lab Units 21  1253 21  1251 21  1358 21  0000   INR  2.2* 2.2* 2.92* 3.30   HEMOGLOBIN g/dL  --   --  13.2  --    HEMATOCRIT %  --   --  39.5  --    PLATELETS 10*3/mm3  --   --  111*  --      Results from last 7 days   Lab Units 21  1358 21  1126   SODIUM mmol/L 144 141   POTASSIUM mmol/L 4.8 4.4   CHLORIDE mmol/L 107 108*   CO2 mmol/L 24.5 24.9   BUN mg/dL 25* 22   CREATININE mg/dL 1.30* 1.20   CALCIUM mg/dL 8.8 8.8   GLUCOSE mg/dL 90 92     Anticoagulation history: Per [facility] anticoagulation clinic note on , patient's home warfarin regimen is 2.5 mg every Monday and 5 mg all other days of the week.     [facility]  Anticoagulation:  Consulting provider: Dr. Hernandez  Start date: 7/2/21  Indication: Atrial fibrillation s/p ablation  Target INR: 2-3  Expected duration: lifelong  Bridge Therapy: None                Date 7/2             INR 2.2             Warfarin  Dose 5 mg               Potential drug interactions: None    Relevant nutrition status: Regular diet-advance as tolerated    Education complete: No  Date:     Assessment/Plan:  INR is on the low side of therapeutic. Will give 5 mg tonight, then assess INR tomorrow morning to see if his home dosage can be restarted. Daily PT/INR has already been ordered. Pharmacy will continue to follow until discharge or discontinuation of warfarin.     Felisha العراقي, Pharm.D., [facility]   Clinical Staff Pharmacist  7/2/2021 19:05 EDT

## 2021-07-02 NOTE — ANESTHESIA PROCEDURE NOTES
Airway  Urgency: elective    Date/Time: 7/2/2021 2:06 PM  Airway not difficult    General Information and Staff    Patient location during procedure: OR  Anesthesiologist: Lewis Ramesh MD  CRNA: Alexandra Koo CRNA    Indications and Patient Condition  Indications for airway management: airway protection    Preoxygenated: yes  Mask difficulty assessment: 2 - vent by mask + OA or adjuvant +/- NMBA    Final Airway Details  Final airway type: endotracheal airway      Successful airway: ETT  Cuffed: yes   Successful intubation technique: direct laryngoscopy  Facilitating devices/methods: intubating stylet  Endotracheal tube insertion site: oral  Blade: Ascencion  Blade size: 4  ETT size (mm): 7.5  Cormack-Lehane Classification: grade I - full view of glottis  Placement verified by: chest auscultation and capnometry   Measured from: lips  ETT/EBT  to lips (cm): 21  Number of attempts at approach: 1  Assessment: lips, teeth, and gum same as pre-op and atraumatic intubation

## 2021-07-02 NOTE — PROGRESS NOTES
Attempted to contact Mr. Menchaca by phone to discuss INR 2.92 from 7/1/21 without success. Noted 7/2/21, that pt is currently having an ablation performed. Will follow up next week post-ablation.

## 2021-07-03 ENCOUNTER — READMISSION MANAGEMENT (OUTPATIENT)
Dept: CALL CENTER | Facility: HOSPITAL | Age: 76
End: 2021-07-03

## 2021-07-03 VITALS
WEIGHT: 210 LBS | SYSTOLIC BLOOD PRESSURE: 159 MMHG | HEIGHT: 74 IN | RESPIRATION RATE: 16 BRPM | HEART RATE: 53 BPM | DIASTOLIC BLOOD PRESSURE: 86 MMHG | OXYGEN SATURATION: 99 % | BODY MASS INDEX: 26.95 KG/M2 | TEMPERATURE: 97.7 F

## 2021-07-03 LAB
GLUCOSE BLDC GLUCOMTR-MCNC: 133 MG/DL (ref 70–130)
INR PPP: 3.51 (ref 0.9–1.1)
PROTHROMBIN TIME: 35 SECONDS (ref 11.7–14.2)
QT INTERVAL: 418 MS
QT INTERVAL: 467 MS

## 2021-07-03 PROCEDURE — 99217 PR OBSERVATION CARE DISCHARGE MANAGEMENT: CPT | Performed by: NURSE PRACTITIONER

## 2021-07-03 PROCEDURE — G0378 HOSPITAL OBSERVATION PER HR: HCPCS

## 2021-07-03 PROCEDURE — A9270 NON-COVERED ITEM OR SERVICE: HCPCS | Performed by: INTERNAL MEDICINE

## 2021-07-03 PROCEDURE — 82962 GLUCOSE BLOOD TEST: CPT

## 2021-07-03 PROCEDURE — 63710000001 METOPROLOL TARTRATE 25 MG TABLET: Performed by: INTERNAL MEDICINE

## 2021-07-03 PROCEDURE — 93005 ELECTROCARDIOGRAM TRACING: CPT | Performed by: INTERNAL MEDICINE

## 2021-07-03 PROCEDURE — 85610 PROTHROMBIN TIME: CPT | Performed by: INTERNAL MEDICINE

## 2021-07-03 RX ADMIN — METOPROLOL TARTRATE 25 MG: 25 TABLET, FILM COATED ORAL at 08:41

## 2021-07-03 RX ADMIN — SODIUM CHLORIDE, PRESERVATIVE FREE 3 ML: 5 INJECTION INTRAVENOUS at 08:41

## 2021-07-03 NOTE — DISCHARGE SUMMARY
Demond Menchaca  [id number]    Date of Admit: 7/2/2021  Date of Discharge:  7/3/2021    Discharge Diagnosis:  Active Hospital Problems    Diagnosis  POA   • **Atrial flutter with rapid ventricular response (CMS/HCC) [I48.92]  Unknown      Resolved Hospital Problems   No resolved problems to display.       Hospital Course:     He has a history of PAF, status post PVI x 2 (first 1 in 2/2013 which was a cryo of all 4 PV's, redo and 4/2014 which was a redo of a right upper pulmonary vein and a CTI dependent flutter).  He also has a history of thrombocytopenia, TIAs and chronic back pain.     He had PAF post ablations and had been on propafenone, in April he called with complaints of AF and elevated heart rate. He was seen in the office on 4/21 and he was in an atypical atrial flutter and had a redo ablation.     He underwent procedure on 4/29/2021--- this was a complex left atrial ablation, the clinical arrhythmia was a mitral isthmus dependent flutter, he also had a roof dependent flutter, and A. tach that originated from fractionated abnormal electrograms in the anterior left atrial wall--- using an anterior atrial line, roofline and mitral isthmus line, including burns in the CS he was able to terminate his atrial flutter's.  This resulted in isolation of his left atrial appendage so he will require lifelong anticoagulation that should not be interrupted without discussing with us.  His INR goal is 2.5-3.5.    He then had recurrent atrial flutter and underwent cardioversion on 6/29/2021 that was successful, however, he then converted back into atrial flutter.     He then underwent successful reablation of the mitral isthmus line with both left atrial and coronary sinus RF energy applications.  Conclusions listed below:    reconnection of the mitral isthmus line  Successful reablation of the mitral isthmus line with both left atrial and coronary sinus RF energy applications  Persistence of the mitral isthmus block for  60 minutes  Reconnection of the left atrial roofline with successful reablation there and this resulted in not only roofline block but also complete posterior wall isolation  Reconnection of the anterior wall line which was the likely source of the flutter with reablation in that line and documentation of North to South conduction block across that anterior line  The left atrial appendage D polarized about 30 to 40 ms after the QRS but was definitely not isolated    He states he feels well, his heart rate steadily in the 50s.  He states at home many times his heart rate is in the 40s.  He does states some burning in his chest that he did not feel after his prior ablations.  I told him this was not abnormal.  He is stable and ready for discharge.    Procedures Performed  Procedure(s):  Ablation atrial fibrillation--redo  3D MAPPING CARTO EP       Consults     No orders found for last 30 day(s).          Discharge Medications     Your medication list      CHANGE how you take these medications      Instructions Last Dose Given Next Dose Due   finasteride 5 MG tablet  Commonly known as: PROSCAR  What changed: additional instructions      TAKE 1 TABLET BY MOUTH EVERY DAY       metoprolol tartrate 25 MG tablet  Commonly known as: LOPRESSOR  What changed: See the new instructions.      Take 1 tablet by mouth 2 (Two) Times a Day.       warfarin 5 MG tablet  Commonly known as: COUMADIN  What changed:   · how much to take  · how to take this  · when to take this  · additional instructions      5mg today, Sat & Sun--INR on Monday          CONTINUE taking these medications      Instructions Last Dose Given Next Dose Due   Cetirizine HCl 10 MG capsule      Take 1 capsule by mouth daily.       furosemide 40 MG tablet  Commonly known as: LASIX      Take 1 tablet by mouth Daily As Needed (edema, weight gain or shortness of breath). Take 40 mg twice daily for 7 days then 40 mg daily       HYDROcodone-acetaminophen 5-325 MG per  tablet  Commonly known as: NORCO      Take 1 tablet by mouth Every 12 (Twelve) Hours As Needed for Severe Pain .       hydroxychloroquine 200 MG tablet  Commonly known as: PLAQUENIL      Take 200 mg by mouth 2 (Two) Times a Day.       METAMUCIL PO      Take  by mouth Daily.       potassium chloride 20 MEQ CR tablet  Commonly known as: K-DUR,KLOR-CON      Take 1 tablet by mouth Daily As Needed (take with furosemide PRN).       pramipexole 0.125 MG tablet  Commonly known as: Mirapex      Take 1-2 tablets by mouth Every Night.             Where to Get Your Medications      These medications were sent to [facility]/pharmacy #[id number] - [place], [place] - [address] [address]. AT [facility] - [phone]  - [phone]   [id number] [place] [address], [place] [address]    Phone: [phone]   · metoprolol tartrate 25 MG tablet         Discharge Diet: Healthy heart    Activity at Discharge: As tolerated    Discharge disposition: Home    Condition on Discharge: Stable    Follow-up Appointments  Future Appointments   Date Time Provider Department Center   8/9/2021  9:30 AM Henrique Rust MD [facility]   9/1/2021 10:40 AM David Hernandez MD [facility]     Additional Instructions for the Follow-ups that You Need to Schedule     Discharge Follow-up with Specified Provider: Dr. Hernandez or Damaris Shell; 1 Month   As directed      To: Dr. Hernandez or Damaris Shell    Follow Up: 1 Month               Test Results Pending at Discharge    He will restart his normal warfarin dosage and obtain a PT/INR on Wednesday, July 7    ERIC Nixon  07/03/21  08:53 EDT

## 2021-07-03 NOTE — OUTREACH NOTE
Prep Survey      Responses   [facility] facility patient discharged from?  [facility]   Is LACE score < 7 ?  Yes   Emergency Room discharge w/ pulse ox?  No   Eligibility  [facility]   Date of Admission  07/02/21   Date of Discharge  07/03/21   Discharge Disposition  Home or Self Care   Discharge diagnosis  Atrial flutter with RVR   Does the patient have one of the following disease processes/diagnoses(primary or secondary)?  Other   Does the patient have Home health ordered?  No   Is there a DME ordered?  No   Prep survey completed?  Yes          Mariana Hackett RN

## 2021-07-05 ENCOUNTER — TRANSITIONAL CARE MANAGEMENT TELEPHONE ENCOUNTER (OUTPATIENT)
Dept: CALL CENTER | Facility: HOSPITAL | Age: 76
End: 2021-07-05

## 2021-07-05 NOTE — OUTREACH NOTE
Call Center TCM Note      Responses   [facility] patient discharged from?  [place]   Does the patient have one of the following disease processes/diagnoses(primary or secondary)?  Other   TCM attempt successful?  Yes   Call start time  1128   Call end time  1140   Discharge diagnosis  Atrial flutter with RVR   Meds reviewed with patient/caregiver?  Yes   Is the patient having any side effects they believe may be caused by any medication additions or changes?  No   Does the patient have all medications ordered at discharge?  Yes   Is the patient taking all medications as directed (includes completed medication regime)?  Yes   Does the patient have a primary care provider?   Yes   Does the patient have an appointment with their PCP within 7 days of discharge?  Greater than 7 days   Comments regarding PCP  Hospital followup appt scheduled for 7/14/2021 with Dr Walton   What is preventing the patient from scheduling follow up appointments within 7 days of discharge?  Earlier appointment not available   Nursing Interventions  Verified appointment date/time/provider   Has the patient kept scheduled appointments due by today?  N/A   Has home health visited the patient within 72 hours of discharge?  N/A   Psychosocial issues?  No   Did the patient receive a copy of their discharge instructions?  Yes   Nursing interventions  Reviewed instructions with patient   What is the patient's perception of their health status since discharge?  Improving   Is the patient/caregiver able to teach back signs and symptoms related to disease process for when to call PCP?  Yes   Is the patient/caregiver able to teach back signs and symptoms related to disease process for when to call 911?  Yes   Is the patient/caregiver able to teach back the hierarchy of who to call/visit for symptoms/problems? PCP, Specialist, Home health nurse, Urgent Care, ED, 911  Yes   If the patient is a current smoker, are they able to teach back resources  for cessation?  Not a smoker   TCM call completed?  Yes          Rashaun Biswas RN    7/5/2021, 11:41 EDT

## 2021-07-07 ENCOUNTER — ANTICOAGULATION VISIT (OUTPATIENT)
Dept: PHARMACY | Facility: HOSPITAL | Age: 76
End: 2021-07-07

## 2021-07-07 DIAGNOSIS — I48.0 PAF (PAROXYSMAL ATRIAL FIBRILLATION) (HCC): Primary | ICD-10-CM

## 2021-07-07 LAB — INR PPP: 3.8

## 2021-07-07 NOTE — PROGRESS NOTES
Anticoagulation Clinic Progress Note    Anticoagulation Summary  As of 7/7/2021    INR goal:  2.5-3.5   TTR:  84.3 % (2.6 y)   Prior goal:  2.0-3.0   INR used for dosing:  3.80 (7/7/2021)   Warfarin maintenance plan:  2.5 mg every Mon; 5 mg all other days   Weekly warfarin total:  32.5 mg   Plan last modified:  Alize Arzola, [facility] (7/7/2021)   Next INR check:  7/14/2021   Priority:  Maintenance   Target end date:      Indications    PAF (paroxysmal atrial fibrillation) (CMS/HCC) [I48.0]             Anticoagulation Episode Summary     INR check location:      Preferred lab:      Send INR reminders to:   GAYLE PETERSON  POOL    Comments:  [place]      Anticoagulation Care Providers     Provider Role Specialty Phone number    David Hernandez MD Referring Cardiology [phone]          Clinic Interview:  Patient Findings     Negatives:  Signs/symptoms of thrombosis, Signs/symptoms of bleeding,   Laboratory test error suspected, Change in health, Change in alcohol use,   Change in activity, Upcoming invasive procedure, Emergency department   visit, Upcoming dental procedure, Missed doses, Extra doses, Change in   medications, Change in diet/appetite, Hospital admission, Bruising, Other   complaints      Clinical Outcomes     Negatives:  Major bleeding event, Thromboembolic event,   Anticoagulation-related hospital admission, Anticoagulation-related ED   visit, Anticoagulation-related fatality        INR History:  Anticoagulation Monitoring 6/17/2021 6/29/2021 7/1/2021 7/7/2021   INR 2.60 3.30 - 3.80   INR Date 6/17/2021 6/29/2021 - 7/7/2021   INR Goal 2.0-3.0 2.0-3.0 2.0-3.0 2.5-3.5   Trend Same Same - Up   Last Week Total 30 mg 30 mg - 32.5 mg   Next Week Total 30 mg 30 mg - 30 mg   Sun 5 mg 5 mg - 5 mg   Mon 2.5 mg 2.5 mg - 2.5 mg   Tue 5 mg 5 mg - 5 mg   Wed 5 mg 5 mg - 5 mg   Thu 5 mg 5 mg - 5 mg   Fri 2.5 mg 2.5 mg - 2.5 mg (7/9)   Sat 5 mg 5 mg - 5 mg   Visit Report - - - -   Some recent data  might be hidden       Plan:  1. INR is Supratherapeutic today- see above in Anticoagulation Summary.   Will instruct Demond Menchaca to Continue their warfarin regimen- see above in Anticoagulation Summary.  2. Follow up in 1 weeks  3. Pt has agreed to only be called if INR out of range. They have been instructed to call if any changes in medications, doses, concerns, etc. Patient expresses understanding and has no further questions at this time.    Alize Arzola, [facility]

## 2021-07-14 ENCOUNTER — TELEPHONE (OUTPATIENT)
Dept: CARDIOLOGY | Facility: CLINIC | Age: 76
End: 2021-07-14

## 2021-07-14 ENCOUNTER — OFFICE VISIT (OUTPATIENT)
Dept: FAMILY MEDICINE CLINIC | Facility: CLINIC | Age: 76
End: 2021-07-14

## 2021-07-14 VITALS
TEMPERATURE: 97.7 F | BODY MASS INDEX: 27.49 KG/M2 | DIASTOLIC BLOOD PRESSURE: 63 MMHG | SYSTOLIC BLOOD PRESSURE: 127 MMHG | OXYGEN SATURATION: 97 % | HEIGHT: 74 IN | WEIGHT: 214.2 LBS | HEART RATE: 36 BPM

## 2021-07-14 DIAGNOSIS — I48.0 PAF (PAROXYSMAL ATRIAL FIBRILLATION) (HCC): ICD-10-CM

## 2021-07-14 DIAGNOSIS — Z98.890 H/O CARDIAC RADIOFREQUENCY ABLATION: ICD-10-CM

## 2021-07-14 DIAGNOSIS — I48.92 ATRIAL FLUTTER WITH RAPID VENTRICULAR RESPONSE (HCC): Primary | ICD-10-CM

## 2021-07-14 PROCEDURE — 1111F DSCHRG MED/CURRENT MED MERGE: CPT | Performed by: FAMILY MEDICINE

## 2021-07-14 PROCEDURE — 99495 TRANSJ CARE MGMT MOD F2F 14D: CPT | Performed by: FAMILY MEDICINE

## 2021-07-14 NOTE — PROGRESS NOTES
Transitional Care Follow Up Visit  Subjective     Demond Menchaca is a 76 y.o. male who presents for a transitional care management visit.    Within 48 business hours after discharge our office contacted him via telephone to coordinate his care and needs.      I reviewed and discussed the details of that call along with the discharge summary, hospital problems, inpatient lab results, inpatient diagnostic studies, and consultation reports with Demond.     Current outpatient and discharge medications have been reconciled for the patient.  Reviewed by: Venus Walton MD      Date of TCM Phone Call 7/3/2021   [facility]   Date of Admission 7/2/2021   Date of Discharge 7/3/2021   Discharge Disposition Home or Self Care     Risk for Readmission (LACE) Score: 4 (7/3/2021  6:01 AM)      History of Present Illness   Course During Hospital Stay: Patient was admitted for re-ablation.  Has a history of PAF.  It was successful and patient was feeling well with his heart rate in the 50s and so was discharged home with cardiology follow-up.  He is to be on lifetime anticoagulation with an INR goal of 2.5-3.5 and is following up with the Coumadin clinic.  Since leaving the hospital he has felt well with no tachycardia or SOA. He is having low HR into the 30's and he has not told his cardiologist. Feels ok except tired with HR in 30's.      The following portions of the patient's history were reviewed and updated as appropriate: allergies, current medications, past family history, past medical history, past social history, past surgical history and problem list.    Review of Systems   Constitutional: Negative for fever.   Respiratory: Negative for shortness of breath.    Cardiovascular: Negative for palpitations.       Objective   Physical Exam  Constitutional:       Appearance: Normal appearance. He is well-developed.   Cardiovascular:      Rate and Rhythm: Regular rhythm. Bradycardia present.      Heart sounds: Normal  heart sounds.   Pulmonary:      Effort: Pulmonary effort is normal.      Breath sounds: Normal breath sounds.   Musculoskeletal:         General: No swelling. Normal range of motion.   Skin:     General: Skin is warm and dry.      Findings: No rash.   Neurological:      General: No focal deficit present.      Mental Status: He is alert and oriented to person, place, and time.   Psychiatric:         Mood and Affect: Mood normal.         Behavior: Behavior normal.         Assessment/Plan   Diagnoses and all orders for this visit:    1. Atrial flutter with rapid ventricular response (CMS/HCC) (Primary)    2. H/O cardiac radiofrequency ablation--x 3    3. PAF (paroxysmal atrial fibrillation) (CMS/HCC)             Stop metoprolol and only take with HR above 100. Call cardiologist. Cont meds and f/u in 6 months.

## 2021-07-14 NOTE — TELEPHONE ENCOUNTER
Pt went to see Dr. Walotn today and his HR was 36. PCP wants to only use metoprolol when his HR is over 100. Is this ok to do on PRN basis ...[place]

## 2021-07-22 ENCOUNTER — ANTICOAGULATION VISIT (OUTPATIENT)
Dept: PHARMACY | Facility: HOSPITAL | Age: 76
End: 2021-07-22

## 2021-07-22 DIAGNOSIS — I48.0 PAF (PAROXYSMAL ATRIAL FIBRILLATION) (HCC): Primary | ICD-10-CM

## 2021-07-22 LAB — INR PPP: 2.5

## 2021-07-22 NOTE — PROGRESS NOTES
Anticoagulation Clinic Progress Note    Anticoagulation Summary  As of 2021    INR goal:  2.5-3.5   TTR:  84.2 % (2.6 y)   INR used for dosin.50 (2021)   Warfarin maintenance plan:  2.5 mg every Mon; 5 mg all other days   Weekly warfarin total:  32.5 mg   No change documented:  Crystal Pineda [facility]   Plan last modified:  Alize Arzola [facility] (2021)   Next INR check:  2021   Priority:  Maintenance   Target end date:      Indications    PAF (paroxysmal atrial fibrillation) (CMS/HCC) [I48.0]             Anticoagulation Episode Summary     INR check location:      Preferred lab:      Send INR reminders to:   GAYLE PETERSON  POOL    Comments:  [place]      Anticoagulation Care Providers     Provider Role Specialty Phone number    David Hernandez MD Referring Cardiology [phone]          Clinic Interview:  No pertinent clinical findings have been reported.    INR History:  Anticoagulation Monitoring 2021   INR 3.30 - 3.80 2.50   INR Date 2021 - 2021   INR Goal 2.0-3.0 2.0-3.0 2.5-3.5 2.5-3.5   Trend Same - Up Same   Last Week Total 30 mg - 32.5 mg 32.5 mg   Next Week Total 30 mg - 30 mg 32.5 mg   Sun 5 mg - 5 mg 5 mg   Mon 2.5 mg - 2.5 mg 2.5 mg   Tue 5 mg - 5 mg 5 mg   Wed 5 mg - 5 mg 5 mg   Thu 5 mg - 5 mg 5 mg   Fri 2.5 mg - 2.5 mg () 5 mg   Sat 5 mg - 5 mg 5 mg   Visit Report - - - -   Some recent data might be hidden       Plan:  1. INR is therapeutic today- see above in Anticoagulation Summary.   Spoke with Demond Menchaca to continue their warfarin regimen- see above in Anticoagulation Summary.  2. Follow up in 1 weeks  3. Pt has agreed to only be called if INR out of range. They have been instructed to call if any changes in medications, doses, concerns, etc. Patient expresses understanding and has no further questions at this time.    Crystal Pineda [facility]

## 2021-07-29 ENCOUNTER — ANTICOAGULATION VISIT (OUTPATIENT)
Dept: PHARMACY | Facility: HOSPITAL | Age: 76
End: 2021-07-29

## 2021-07-29 DIAGNOSIS — I48.0 PAF (PAROXYSMAL ATRIAL FIBRILLATION) (HCC): Primary | ICD-10-CM

## 2021-07-29 LAB — INR PPP: 2.6

## 2021-07-29 NOTE — PROGRESS NOTES
Anticoagulation Clinic Progress Note    Anticoagulation Summary  As of 2021    INR goal:  2.5-3.5   TTR:  84.3 % (2.6 y)   INR used for dosin.60 (2021)   Warfarin maintenance plan:  2.5 mg every Mon; 5 mg all other days   Weekly warfarin total:  32.5 mg   No change documented:  Sheri Aviles   Plan last modified:  Alize Arzola, [facility] (2021)   Next INR check:  2021   Priority:  Maintenance   Target end date:      Indications    PAF (paroxysmal atrial fibrillation) (CMS/HCC) [I48.0]             Anticoagulation Episode Summary     INR check location:      Preferred lab:      Send INR reminders to:   GAYLE PETERSON  POOL    Comments:  [place]      Anticoagulation Care Providers     Provider Role Specialty Phone number    David Hernandez MD Referring Cardiology [phone]          Clinic Interview:  No pertinent clinical findings have been reported.    INR History:  Anticoagulation Monitoring 2021   INR 3.80 2.50 2.60   INR Date 2021   INR Goal 2.5-3.5 2.5-3.5 2.5-3.5   Trend Up Same Same   Last Week Total 32.5 mg 32.5 mg 32.5 mg   Next Week Total 30 mg 32.5 mg 32.5 mg   Sun 5 mg 5 mg 5 mg   Mon 2.5 mg 2.5 mg 2.5 mg   Tue 5 mg 5 mg 5 mg   Wed 5 mg 5 mg 5 mg   Thu 5 mg 5 mg 5 mg   Fri 2.5 mg () 5 mg 5 mg   Sat 5 mg 5 mg 5 mg   Visit Report - - -   Some recent data might be hidden       Plan:  1. INR is therapeutic today- see above in Anticoagulation Summary.    Demond CROSS Bernarda to continue their warfarin regimen- see above in Anticoagulation Summary.  2. Follow up in 2 weeks  3. Pt has agreed to only be called if INR out of range. They have been instructed to call if any changes in medications, doses, concerns, etc. Patient expresses understanding and has no further questions at this time.    Sheri Aviles

## 2021-08-05 ENCOUNTER — ANTICOAGULATION VISIT (OUTPATIENT)
Dept: PHARMACY | Facility: HOSPITAL | Age: 76
End: 2021-08-05

## 2021-08-05 DIAGNOSIS — I48.0 PAF (PAROXYSMAL ATRIAL FIBRILLATION) (HCC): Primary | ICD-10-CM

## 2021-08-05 LAB — INR PPP: 2.5

## 2021-08-05 NOTE — PROGRESS NOTES
Anticoagulation Clinic Progress Note    Anticoagulation Summary  As of 2021    INR goal:  2.5-3.5   TTR:  84.4 % (2.7 y)   INR used for dosin.50 (2021)   Warfarin maintenance plan:  2.5 mg every Mon; 5 mg all other days   Weekly warfarin total:  32.5 mg   No change documented:  Kera Scanlon   Plan last modified:  Alize Arzola [facility] (2021)   Next INR check:  2021   Priority:  Maintenance   Target end date:      Indications    PAF (paroxysmal atrial fibrillation) (CMS/HCC) [I48.0]             Anticoagulation Episode Summary     INR check location:      Preferred lab:      Send INR reminders to:   GAYLE PETERSON  POOL    Comments:  [place]      Anticoagulation Care Providers     Provider Role Specialty Phone number    David Hernandez MD Referring Cardiology [phone]          Clinic Interview:  No pertinent clinical findings have been reported.    INR History:  Anticoagulation Monitoring 2021   INR 2.50 2.60 2.50   INR Date 2021   INR Goal 2.5-3.5 2.5-3.5 2.5-3.5   Trend Same Same Same   Last Week Total 32.5 mg 32.5 mg 32.5 mg   Next Week Total 32.5 mg 32.5 mg 32.5 mg   Sun 5 mg 5 mg 5 mg   Mon 2.5 mg 2.5 mg 2.5 mg   Tue 5 mg 5 mg 5 mg   Wed 5 mg 5 mg 5 mg   Thu 5 mg 5 mg 5 mg   Fri 5 mg 5 mg 5 mg   Sat 5 mg 5 mg 5 mg   Visit Report - - -   Some recent data might be hidden       Plan:  1. INR is therapeutic today- see above in Anticoagulation Summary.    Demond CROSS Bernarda to continue their warfarin regimen- see above in Anticoagulation Summary.  2. Follow up in 2 weeks  3. Pt has agreed to only be called if INR out of range. They have been instructed to call if any changes in medications, doses, concerns, etc. Patient expresses understanding and has no further questions at this time.    Kera Scanlon

## 2021-08-06 ENCOUNTER — TELEPHONE (OUTPATIENT)
Dept: CARDIOLOGY | Facility: CLINIC | Age: 76
End: 2021-08-06

## 2021-08-06 ENCOUNTER — LAB (OUTPATIENT)
Dept: LAB | Facility: HOSPITAL | Age: 76
End: 2021-08-06

## 2021-08-06 ENCOUNTER — HOSPITAL ENCOUNTER (OUTPATIENT)
Dept: GENERAL RADIOLOGY | Facility: HOSPITAL | Age: 76
Discharge: HOME | End: 2021-08-06

## 2021-08-06 ENCOUNTER — OFFICE VISIT (OUTPATIENT)
Dept: CARDIOLOGY | Facility: CLINIC | Age: 76
End: 2021-08-06

## 2021-08-06 VITALS
HEIGHT: 74 IN | HEART RATE: 47 BPM | DIASTOLIC BLOOD PRESSURE: 80 MMHG | SYSTOLIC BLOOD PRESSURE: 126 MMHG | BODY MASS INDEX: 27.64 KG/M2 | WEIGHT: 215.4 LBS

## 2021-08-06 DIAGNOSIS — I48.0 PAF (PAROXYSMAL ATRIAL FIBRILLATION) (HCC): ICD-10-CM

## 2021-08-06 DIAGNOSIS — I48.0 PAF (PAROXYSMAL ATRIAL FIBRILLATION) (HCC): Primary | ICD-10-CM

## 2021-08-06 DIAGNOSIS — Z98.890 H/O CARDIAC RADIOFREQUENCY ABLATION: ICD-10-CM

## 2021-08-06 LAB
ALBUMIN SERPL-MCNC: 4.8 G/DL (ref 3.5–5.2)
ALBUMIN/GLOB SERPL: 2.5 G/DL
ALP SERPL-CCNC: 73 U/L (ref 39–117)
ALT SERPL W P-5'-P-CCNC: 18 U/L (ref 1–41)
ANION GAP SERPL CALCULATED.3IONS-SCNC: 9.1 MMOL/L (ref 5–15)
AST SERPL-CCNC: 22 U/L (ref 1–40)
BILIRUB SERPL-MCNC: 0.7 MG/DL (ref 0–1.2)
BUN SERPL-MCNC: 21 MG/DL (ref 8–23)
BUN/CREAT SERPL: 21 (ref 7–25)
CALCIUM SPEC-SCNC: 9.1 MG/DL (ref 8.6–10.5)
CHLORIDE SERPL-SCNC: 103 MMOL/L (ref 98–107)
CO2 SERPL-SCNC: 24.9 MMOL/L (ref 22–29)
CREAT SERPL-MCNC: 1 MG/DL (ref 0.76–1.27)
DEPRECATED RDW RBC AUTO: 48.2 FL (ref 37–54)
ERYTHROCYTE [DISTWIDTH] IN BLOOD BY AUTOMATED COUNT: 13.5 % (ref 12.3–15.4)
GFR SERPL CREATININE-BSD FRML MDRD: 72 ML/MIN/1.73
GLOBULIN UR ELPH-MCNC: 1.9 GM/DL
GLUCOSE SERPL-MCNC: 85 MG/DL (ref 65–99)
HCT VFR BLD AUTO: 39.6 % (ref 37.5–51)
HGB BLD-MCNC: 13.2 G/DL (ref 13–17.7)
MCH RBC QN AUTO: 32.1 PG (ref 26.6–33)
MCHC RBC AUTO-ENTMCNC: 33.3 G/DL (ref 31.5–35.7)
MCV RBC AUTO: 96.4 FL (ref 79–97)
PLATELET # BLD AUTO: 113 10*3/MM3 (ref 140–450)
PMV BLD AUTO: 9.7 FL (ref 6–12)
POTASSIUM SERPL-SCNC: 4.4 MMOL/L (ref 3.5–5.2)
PROT SERPL-MCNC: 6.7 G/DL (ref 6–8.5)
RBC # BLD AUTO: 4.11 10*6/MM3 (ref 4.14–5.8)
SODIUM SERPL-SCNC: 137 MMOL/L (ref 136–145)
T4 FREE SERPL-MCNC: 1.29 NG/DL (ref 0.93–1.7)
WBC # BLD AUTO: 3.36 10*3/MM3 (ref 3.4–10.8)

## 2021-08-06 PROCEDURE — 80053 COMPREHEN METABOLIC PANEL: CPT

## 2021-08-06 PROCEDURE — 85027 COMPLETE CBC AUTOMATED: CPT

## 2021-08-06 PROCEDURE — 99214 OFFICE O/P EST MOD 30 MIN: CPT | Performed by: INTERNAL MEDICINE

## 2021-08-06 PROCEDURE — 36415 COLL VENOUS BLD VENIPUNCTURE: CPT

## 2021-08-06 PROCEDURE — 71046 X-RAY EXAM CHEST 2 VIEWS: CPT

## 2021-08-06 PROCEDURE — 84439 ASSAY OF FREE THYROXINE: CPT

## 2021-08-06 PROCEDURE — 93000 ELECTROCARDIOGRAM COMPLETE: CPT | Performed by: INTERNAL MEDICINE

## 2021-08-06 NOTE — PROGRESS NOTES
Date of Office Visit: 2021  Encounter Provider: David Hernandez MD  Place of Service: [facility] CARDIOLOGY  Patient Name: Demond Menchaca  : 1945    Subjective:     Encounter Date:2021      Patient ID: Demond Menchaca is a 76 y.o. male who has a cc of  AF and I did a redo LA ablation of MI and Roof 5 weeks. No AF.    His BP has been low. He does get lightheaded at times. It comes and goes.     The patient had a good year.   No anginal chest pain,   No sig briseno,       No soa,   No fainting,  No orthostasis.   No edema.   Exercise tolerance: good.     There have been no hospital admission since the last visit.     There have been no bleeding events.       Past Medical History:   Diagnosis Date   • LASHONDA positive 2018    2017: elevated ds LASHONDA   • Atrial flutter with rapid ventricular response (CMS/HCC)    • Chronic obstructive pulmonary disease (CMS/HCC)    • Colon polyp 10/7/2019    Added automatically from request for surgery [id number]   • Diverticulitis of large intestine without perforation or abscess without bleeding 3/7/2016   • Diverticulosis    • ED (erectile dysfunction)    • Elbow fracture, left    • History of blood transfusion    • Hypogonadism in male    • Kidney cysts    • Left femoral shaft fracture (CMS/HCC)    • Lower back pain    • PAF (paroxysmal atrial fibrillation) (CMS/HCC)    • Peptic ulceration    • Plantar fasciitis 2017   • Prostatic hypertrophy, benign    • 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CMS/HCC) 2012   • Stroke (CMS/HCC)     TIA   • Transient cerebral ischemia     H/O TIAs       Social History     Socioeconomic History   • Marital status:      Spouse name: Latisha   • Number of children: 2   • Years of education: College   • Highest education level: Not asked   Tobacco Use   • Smoking status: Former Smoker     Packs/day: 0.50     Years: 15.00     Pack years: 7.50     Types: Cigarettes     Start date: 1959     Quit date: 1974     Years  "since quittin.6   • Smokeless tobacco: Never Used   Vaping Use   • Vaping Use: Never used   Substance and Sexual Activity   • Alcohol use: Not Currently     Alcohol/week: 1.0 standard drinks     Types: 6 Glasses of wine per week   • Drug use: No   • Sexual activity: Not Currently     Partners: Female     Birth control/protection: None       Family History   Problem Relation Age of Onset   • Hypertension Mother    • Osteoporosis Mother    • Thyroid disease Sister    • Diabetes Sister    • Hypertension Sister    • Colon cancer Maternal Grandmother 60   • Hypertension Father    • Heart disease Other    • Atrial fibrillation Other    • Thyroid disease Other    • Pulmonary fibrosis Sister    • Diabetes Other    • Heart disease Other    • Hypertension Other    • Heart disease Sister        Review of Systems   Constitutional: Negative for fever and night sweats.   HENT: Negative for ear pain and stridor.    Eyes: Negative for discharge and visual halos.   Cardiovascular: Negative for cyanosis.   Respiratory: Negative for hemoptysis and sputum production.    Hematologic/Lymphatic: Negative for adenopathy.   Skin: Negative for nail changes and unusual hair distribution.   Musculoskeletal: Negative for gout and joint swelling.   Gastrointestinal: Negative for bowel incontinence and flatus.   Genitourinary: Negative for dysuria and flank pain.   Neurological: Negative for seizures and tremors.   Psychiatric/Behavioral: Negative for altered mental status. The patient is not nervous/anxious.             Objective:     Vitals:    21 0843   BP: 126/80   Pulse: (!) 47   Weight: 97.7 kg (215 lb 6.4 oz)   Height: 188 cm (74.02\")         Eyes:      General:         Right eye: No discharge.         Left eye: No discharge.   HENT:      Head: Normocephalic and atraumatic.   Neck:      Thyroid: No thyromegaly.      Vascular: No JVD.   Pulmonary:      Effort: Pulmonary effort is normal.      Breath sounds: Normal breath sounds. " No rales.   Cardiovascular:      Normal rate. Regular rhythm.      No gallop.   Edema:     Peripheral edema absent.   Abdominal:      General: Bowel sounds are normal.      Palpations: Abdomen is soft.      Tenderness: There is no abdominal tenderness.   Musculoskeletal: Normal range of motion.         General: No deformity. Skin:     General: Skin is warm and dry.      Findings: No erythema.   Neurological:      Mental Status: Alert and oriented to person, place, and time.      Motor: Normal muscle tone.   Psychiatric:         Behavior: Behavior normal.         Thought Content: Thought content normal.           ECG 12 Lead    Date/Time: 8/6/2021 9:14 AM  Performed by: David Hernandez MD  Authorized by: David Hernandez MD   Comparison: compared with previous ECG   Rhythm: sinus rhythm and sinus bradycardia  Rate: normal  Conduction: conduction normal  ST Segments: ST segments normal  T Waves: T waves normal  QRS axis: normal    Clinical impression: normal ECG            Lab Review:       Assessment:          Diagnosis Plan   1. PAF (paroxysmal atrial fibrillation) (CMS/HCC)     2. H/O cardiac radiofrequency ablation--x 3            Plan:     For the occ low BP   I did a quick look echo and saw no sig pericardial effusion. He has good LV function    He does have a very slow heart rate.     I will check a monitor. I will also check a CXR- and a thyroid test.     AF wise, I am glad he doesn't have anymore.

## 2021-08-06 NOTE — TELEPHONE ENCOUNTER
----- Message from David Hernandez MD sent at 8/6/2021  1:19 PM EDT -----  Also, his chest x-ray was okay

## 2021-08-09 ENCOUNTER — OFFICE VISIT (OUTPATIENT)
Dept: GASTROENTEROLOGY | Facility: CLINIC | Age: 76
End: 2021-08-09

## 2021-08-09 VITALS
DIASTOLIC BLOOD PRESSURE: 72 MMHG | HEIGHT: 74 IN | WEIGHT: 218.2 LBS | TEMPERATURE: 97.7 F | BODY MASS INDEX: 28 KG/M2 | SYSTOLIC BLOOD PRESSURE: 130 MMHG

## 2021-08-09 DIAGNOSIS — R13.10 DYSPHAGIA, UNSPECIFIED TYPE: ICD-10-CM

## 2021-08-09 DIAGNOSIS — Z80.0 FH: COLON CANCER: Primary | ICD-10-CM

## 2021-08-09 DIAGNOSIS — K63.5 POLYP OF COLON, UNSPECIFIED PART OF COLON, UNSPECIFIED TYPE: ICD-10-CM

## 2021-08-09 DIAGNOSIS — Z83.71 FH: COLON POLYPS: ICD-10-CM

## 2021-08-09 PROCEDURE — 99214 OFFICE O/P EST MOD 30 MIN: CPT | Performed by: INTERNAL MEDICINE

## 2021-08-09 NOTE — PROGRESS NOTES
Chief Complaint   Patient presents with   • Follow-up       History of Present Illness:   76 y.o. male who was last seen in 8 of 2020.  My assessment and recommendations were as follows:  Assessment:  1.  History of colonic polyps.  His last colonoscopy was in 1 of 2020.  I had recommended a repeat colonoscopy in 2 years and to use a more aggressive colonoscopy prep.  His colonoscopy during 1 of 2020 had only a fair colonic preparation.  2.  Family history (grandmother) colon cancer  3.  Family history ( sister) colon polyps  4. Dypshagia.   5. On coumadin for a fib.   6.      Recommendations:  1. We discussed barium swallow. He doesn't want anymore tests now.   2. F/u one year.        He occasional has solids dysphagia. No odynaphagia. No nausea or vomiting. No fevers, chills. No abdominal or chest pain. No diarrhea. Some constipation. No rectal bleeding or melena.        Past Medical History:   Diagnosis Date   • LASHONDA positive 6/18/2018    2017: elevated ds LASHONDA   • Atrial flutter with rapid ventricular response (CMS/HCC)    • Chronic obstructive pulmonary disease (CMS/HCC)    • Colon polyp 10/7/2019    Added automatically from request for surgery [id number]   • Diverticulitis of large intestine without perforation or abscess without bleeding 3/7/2016   • Diverticulosis    • ED (erectile dysfunction)    • Elbow fracture, left    • History of blood transfusion    • Hypogonadism in male    • Kidney cysts    • Left femoral shaft fracture (CMS/HCC)    • Lower back pain    • PAF (paroxysmal atrial fibrillation) (CMS/HCC)    • Peptic ulceration    • Plantar fasciitis 12/5/2017   • Prostatic hypertrophy, benign    • 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   • Stroke (CMS/HCC)     TIA   • Transient cerebral ischemia     H/O TIAs       Past Surgical History:   Procedure Laterality Date   • ABLATION OF DYSRHYTHMIC FOCUS  2012, 2013   • ADENOIDECTOMY  1973   • APPENDECTOMY  1973   • CARDIAC ABLATION  2013, 2014   • CARDIAC  CATHETERIZATION  2012, 2013   • CARDIAC ELECTROPHYSIOLOGY PROCEDURE N/A 4/29/2021    Procedure: Ablation atrial flutter--likely left atrial flutter, hx of PVI x 2;  Surgeon: David Hernandez MD;  Location:  [facility];  Service: Cardiovascular;  Laterality: N/A;   • CARDIAC ELECTROPHYSIOLOGY PROCEDURE N/A 7/2/2021    Procedure: Ablation atrial fibrillation--redo;  Surgeon: David Hernandez MD;  Location:  [facility];  Service: Cardiovascular;  Laterality: N/A;   • COLONOSCOPY  approx 2014    normal per pt    • COLONOSCOPY N/A 1/8/2020    Procedure: COLONOSCOPY to cecum with cold snare biopsies;  Surgeon: Henrique Rust MD;  Location:  [facility];  Service: Gastroenterology   • COLONOSCOPY W/ POLYPECTOMY  11/21/2014    : 1 polyp - not precancerous   • ENDOSCOPY N/A 1/8/2020    Procedure: ESOPHAGOGASTRODUODENOSCOPY;  Surgeon: Henrique Rust MD;  Location:  [facility];  Service: Gastroenterology   • FEMUR FRACTURE SURGERY Left 2007    post fall from the ladder   • LUMBAR LAMINECTOMY  1974    Dr.Lester Lino   • OTHER SURGICAL HISTORY      elbow surgery   • THYROID SURGERY  1986    benign tumor removal,    • TOTAL ELBOW ARTHROPLASTY Left 2007    L, post fall and fracture, hardware in         Current Outpatient Medications:   •  Cetirizine HCl 10 MG capsule, Take 1 capsule by mouth daily., Disp: , Rfl:   •  finasteride (PROSCAR) 5 MG tablet, TAKE 1 TABLET BY MOUTH EVERY DAY (Patient taking differently: Take 5 mg by mouth Daily. Do not crush.), Disp: 90 tablet, Rfl: 2  •  furosemide (LASIX) 40 MG tablet, Take 1 tablet by mouth Daily As Needed (edema, weight gain or shortness of breath). Take 40 mg twice daily for 7 days then 40 mg daily, Disp: 90 tablet, Rfl: 3  •  HYDROcodone-acetaminophen (NORCO) 5-325 MG per tablet, Take 1 tablet by mouth Every 12 (Twelve) Hours As Needed for Severe Pain ., Disp: 30 tablet, Rfl: 0  •  hydroxychloroquine (PLAQUENIL) 200 MG  tablet, Take 200 mg by mouth 2 (Two) Times a Day., Disp: , Rfl:   •  potassium chloride (K-DUR,KLOR-CON) 20 MEQ CR tablet, Take 1 tablet by mouth Daily As Needed (take with furosemide PRN)., Disp: 90 tablet, Rfl: 1  •  pramipexole (Mirapex) 0.125 MG tablet, Take 1-2 tablets by mouth Every Night., Disp: 180 tablet, Rfl: 1  •  Psyllium (METAMUCIL PO), Take  by mouth Daily., Disp: , Rfl:   •  warfarin (COUMADIN) 5 MG tablet, 5mg today, Sat & Sun--INR on Monday (Patient taking differently: Take 5 mg by mouth Every Night. 5 MG DAILY EXCEPT MON AND FRI 2.5MG), Disp: 80 tablet, Rfl: 1    Allergies   Allergen Reactions   • Cardizem [Diltiazem Hcl] Itching   • Grass Unknown - Low Severity       Family History   Problem Relation Age of Onset   • Hypertension Mother    • Osteoporosis Mother    • Thyroid disease Sister    • Diabetes Sister    • Hypertension Sister    • Colon cancer Maternal Grandmother 60   • Hypertension Father    • Heart disease Other    • Atrial fibrillation Other    • Thyroid disease Other    • Pulmonary fibrosis Sister    • Diabetes Other    • Heart disease Other    • Hypertension Other    • Heart disease Sister        Social History     Socioeconomic History   • Marital status:      Spouse name: Latisha   • Number of children: 2   • Years of education: College   • Highest education level: Not asked   Tobacco Use   • Smoking status: Former Smoker     Packs/day: 0.50     Years: 15.00     Pack years: 7.50     Types: Cigarettes     Start date: 1959     Quit date:      Years since quittin.6   • Smokeless tobacco: Never Used   Vaping Use   • Vaping Use: Never used   Substance and Sexual Activity   • Alcohol use: Not Currently     Alcohol/week: 1.0 standard drinks     Types: 6 Glasses of wine per week   • Drug use: No   • Sexual activity: Not Currently     Partners: Female     Birth control/protection: None       Review of Systems   Gastrointestinal: Negative for abdominal pain.     Pertinent  positives and negatives documented in the HPI and all other systems reviewed and were found to be negative.  Vitals:    08/09/21 0939   BP: 130/72   Temp: 97.7 °F (36.5 °C)       Physical Exam  Vitals reviewed.   Constitutional:       General: He is not in acute distress.     Appearance: Normal appearance. He is well-developed. He is not diaphoretic.   HENT:      Head: Normocephalic and atraumatic. Hair is normal.      Right Ear: Hearing, tympanic membrane, ear canal and external ear normal.      Left Ear: Hearing, tympanic membrane, ear canal and external ear normal.      Nose: Nose normal. No nasal deformity.      Mouth/Throat:      Mouth: Mucous membranes are moist. No oral lesions.      Pharynx: Uvula midline. No uvula swelling.   Eyes:      General: Lids are normal. No scleral icterus.        Right eye: No discharge.         Left eye: No discharge.      Extraocular Movements: Extraocular movements intact.      Right eye: Normal extraocular motion and no nystagmus.      Left eye: Normal extraocular motion and no nystagmus.      Conjunctiva/sclera: Conjunctivae normal.      Pupils: Pupils are equal, round, and reactive to light.   Neck:      Thyroid: No thyromegaly.      Vascular: No JVD.   Cardiovascular:      Rate and Rhythm: Normal rate and regular rhythm.      Pulses: Normal pulses.      Heart sounds: Normal heart sounds. No murmur heard.   No gallop.    Pulmonary:      Effort: Pulmonary effort is normal. No respiratory distress.      Breath sounds: Normal breath sounds. No wheezing or rales.   Chest:      Chest wall: No tenderness.   Abdominal:      General: Bowel sounds are normal. There is no distension.      Palpations: Abdomen is soft. There is no mass.      Tenderness: There is no abdominal tenderness. There is no guarding.      Hernia: No hernia is present.   Genitourinary:     Rectum: Normal. Guaiac result negative.   Musculoskeletal:         General: No tenderness or deformity. Normal range of  motion.      Cervical back: Normal range of motion and neck supple.   Lymphadenopathy:      Cervical: No cervical adenopathy.   Skin:     General: Skin is warm and dry.      Findings: No rash.   Neurological:      Mental Status: He is alert and oriented to person, place, and time.      Cranial Nerves: No cranial nerve deficit.      Motor: No abnormal muscle tone.      Coordination: Coordination normal.      Deep Tendon Reflexes: Reflexes are normal and symmetric. Reflexes normal.   Psychiatric:         Mood and Affect: Mood normal.         Behavior: Behavior normal.         Thought Content: Thought content normal.         Judgment: Judgment normal.         Diagnoses and all orders for this visit:    1. FH: colon cancer (Primary)    2. FH: colon polyps    3. Polyp of colon, unspecified part of colon, unspecified type    4. Dysphagia, unspecified type  -     FL Esophagram Complete Single Contrast; Future      Assessment:  1. History of colonic polyps.  His last colonoscopy was in 1 of 2020.  I had recommended a repeat colonoscopy in 2 years and to use a more aggressive colonoscopy prep.  His colonoscopy during 1 of 2020 had only a fair colonic preparation.  2.  Family history (grandmother) colon cancer  3.  Family history ( sister) colon polyps  4. Dypshagia.   5. On coumadin for a fib.   6.       Recommendations:  1. Barium swallow. Patient to call for results.     No follow-ups on file.    Henrique Rust MD  8/9/2021

## 2021-08-10 ENCOUNTER — TELEPHONE (OUTPATIENT)
Dept: CARDIOLOGY | Facility: CLINIC | Age: 76
End: 2021-08-10

## 2021-08-10 NOTE — TELEPHONE ENCOUNTER
----- Message from David Hernandez MD sent at 8/9/2021  4:47 PM EDT -----  Can you let him know that his Holter does not look much different than in the past.  I think we should watch him for another 4 to 6 months and then see him in 6 months.  Can you arrange a follow-up.

## 2021-08-10 NOTE — TELEPHONE ENCOUNTER
LVM for pt with results and to call with any questions. I will have pt scheduled to follow up in 6 mnths...T[place]

## 2021-08-20 ENCOUNTER — ANTICOAGULATION VISIT (OUTPATIENT)
Dept: PHARMACY | Facility: HOSPITAL | Age: 76
End: 2021-08-20

## 2021-08-20 DIAGNOSIS — I48.0 PAF (PAROXYSMAL ATRIAL FIBRILLATION) (HCC): Primary | ICD-10-CM

## 2021-08-20 LAB — INR PPP: 4.3

## 2021-08-20 NOTE — PROGRESS NOTES
Anticoagulation Clinic Progress Note    Anticoagulation Summary  As of 2021    INR goal:  2.5-3.5   TTR:  84.0 % (2.7 y)   INR used for dosin.30 (2021)   Warfarin maintenance plan:  2.5 mg every Fri; 5 mg all other days   Weekly warfarin total:  32.5 mg   Plan last modified:  Jeanette Montes De Oca RPH (2021)   Next INR check:  2021   Priority:  Maintenance   Target end date:      Indications    PAF (paroxysmal atrial fibrillation) (CMS/HCC) [I48.0]             Anticoagulation Episode Summary     INR check location:      Preferred lab:      Send INR reminders to:   [facility]  [facility]    Comments:  [place]      Anticoagulation Care Providers     Provider Role Specialty Phone number    David Hernandez MD Referring Cardiology [phone]          Clinic Interview:  Patient Findings     Positives:  Change in alcohol use, Change in diet/appetite    Negatives:  Signs/symptoms of thrombosis, Signs/symptoms of bleeding,   Laboratory test error suspected, Change in health, Change in activity,   Upcoming invasive procedure, Emergency department visit, Upcoming dental   procedure, Missed doses, Extra doses, Change in medications, Hospital   admission, Bruising, Other complaints    Comments:  No spinach this week, more alcohol this week (3 beers).   Will resume spinach on Monday.       Clinical Outcomes     Negatives:  Major bleeding event, Thromboembolic event,   Anticoagulation-related hospital admission, Anticoagulation-related ED   visit, Anticoagulation-related fatality    Comments:  No spinach this week, more alcohol this week (3 beers).   Will resume spinach on Monday.         INR History:  Anticoagulation Monitoring 2021   INR 2.60 2.50 4.30   INR Date 2021   INR Goal 2.5-3.5 2.5-3.5 2.5-3.5   Trend Same Same Same   Last Week Total 32.5 mg 32.5 mg 32.5 mg   Next Week Total 32.5 mg 32.5 mg 30 mg   Sun 5 mg 5 mg 5 mg   Mon 2.5 mg 2.5 mg 5 mg    Tue 5 mg 5 mg 5 mg   Wed 5 mg 5 mg 5 mg   Thu 5 mg 5 mg 5 mg   Fri 5 mg 5 mg Hold (8/20)   Sat 5 mg 5 mg 5 mg   Visit Report - - -   Some recent data might be hidden       Plan:  1. INR is Supratherapeutic today- see above in Anticoagulation Summary.   Will instruct Demond Menchaca to Change their warfarin regimen- see above in Anticoagulation Summary.  2. Follow up in 1 week  3. Pt has agreed to only be called if INR out of range. They have been instructed to call if any changes in medications, doses, concerns, etc. Patient expresses understanding and has no further questions at this time.    Jeanette Montes De Oca, [facility], located within [facility]

## 2021-08-26 ENCOUNTER — ANTICOAGULATION VISIT (OUTPATIENT)
Dept: PHARMACY | Facility: HOSPITAL | Age: 76
End: 2021-08-26

## 2021-08-26 DIAGNOSIS — I48.0 PAF (PAROXYSMAL ATRIAL FIBRILLATION) (HCC): Primary | ICD-10-CM

## 2021-08-26 LAB — INR PPP: 3.7

## 2021-08-26 NOTE — PROGRESS NOTES
Anticoagulation Clinic Progress Note    Anticoagulation Summary  As of 8/26/2021    INR goal:  2.5-3.5   TTR:  83.5 % (2.7 y)   INR used for dosing:  3.70 (8/26/2021)   Warfarin maintenance plan:  2.5 mg every Mon, Fri; 5 mg all other days   Weekly warfarin total:  30 mg   Plan last modified:  Jeanette Montes De Oca RPH (8/26/2021)   Next INR check:  9/3/2021   Priority:  Maintenance   Target end date:      Indications    PAF (paroxysmal atrial fibrillation) (CMS/HCC) [I48.0]             Anticoagulation Episode Summary     INR check location:      Preferred lab:      Send INR reminders to:  [facility]  [facility]    Comments:  [place]      Anticoagulation Care Providers     Provider Role Specialty Phone number    David Hernandez MD Referring Cardiology [phone]          Clinic Interview:  Patient Findings     Positives:  Change in alcohol use, Change in diet/appetite    Negatives:  Signs/symptoms of thrombosis, Signs/symptoms of bleeding,   Laboratory test error suspected, Change in health, Change in activity,   Upcoming invasive procedure, Emergency department visit, Upcoming dental   procedure, Missed doses, Extra doses, Change in medications, Hospital   admission, Bruising, Other complaints    Comments:  Patient reports he is going to continue to eat less spinach   over the next week and will be drinking alcohol       Clinical Outcomes     Negatives:  Major bleeding event, Thromboembolic event,   Anticoagulation-related hospital admission, Anticoagulation-related ED   visit, Anticoagulation-related fatality    Comments:  Patient reports he is going to continue to eat less spinach   over the next week and will be drinking alcohol         INR History:  Anticoagulation Monitoring 8/5/2021 8/20/2021 8/26/2021   INR 2.50 4.30 3.70   INR Date 8/5/2021 8/20/2021 8/26/2021   INR Goal 2.5-3.5 2.5-3.5 2.5-3.5   Trend Same Same Down   Last Week Total 32.5 mg 32.5 mg 30 mg   Next Week Total 32.5 mg 30 mg 30 mg   Sun  5 mg 5 mg 5 mg   Mon 2.5 mg 5 mg 2.5 mg   Tue 5 mg 5 mg 5 mg   Wed 5 mg 5 mg 5 mg   Thu 5 mg 5 mg 5 mg   Fri 5 mg Hold (8/20) 2.5 mg   Sat 5 mg 5 mg 5 mg   Visit Report - - -   Some recent data might be hidden       Plan:  1. INR is Supratherapeutic today- see above in Anticoagulation Summary.   Will instruct Demond Menchaca to Change their warfarin regimen- see above in Anticoagulation Summary. Patient reports he will not be eating his normal amount of spinach over the next week and will also be drinking more alcohol. Will reduce dose and recheck next week.   2. Follow up in 1 week  3. Pt has agreed to only be called if INR out of range. They have been instructed to call if any changes in medications, doses, concerns, etc. Patient expresses understanding and has no further questions at this time.    Jeanette Montes De Oca [facility]

## 2021-09-02 ENCOUNTER — ANTICOAGULATION VISIT (OUTPATIENT)
Dept: PHARMACY | Facility: HOSPITAL | Age: 76
End: 2021-09-02

## 2021-09-02 DIAGNOSIS — I48.0 PAF (PAROXYSMAL ATRIAL FIBRILLATION) (HCC): Primary | ICD-10-CM

## 2021-09-02 LAB — INR PPP: 3.9

## 2021-09-02 NOTE — PROGRESS NOTES
Anticoagulation Clinic Progress Note    Anticoagulation Summary  As of 9/2/2021    INR goal:  2.5-3.5   TTR:  82.9 % (2.7 y)   INR used for dosing:  3.90 (9/2/2021)   Warfarin maintenance plan:  2.5 mg every Mon, Fri; 5 mg all other days   Weekly warfarin total:  30 mg   Plan last modified:  Jeanette Montes De Oca RPH (8/26/2021)   Next INR check:  9/9/2021   Priority:  Maintenance   Target end date:      Indications    PAF (paroxysmal atrial fibrillation) (CMS/HCC) [I48.0]             Anticoagulation Episode Summary     INR check location:      Preferred lab:      Send INR reminders to:   [facility]    Comments:  [place]      Anticoagulation Care Providers     Provider Role Specialty Phone number    David Hernandez MD Referring Cardiology [phone]          Clinic Interview:  Patient Findings     Negatives:  Signs/symptoms of thrombosis, Signs/symptoms of bleeding,   Laboratory test error suspected, Change in health, Change in alcohol use,   Change in activity, Upcoming invasive procedure, Emergency department   visit, Upcoming dental procedure, Missed doses, Extra doses, Change in   medications, Change in diet/appetite, Hospital admission, Bruising, Other   complaints      Clinical Outcomes     Negatives:  Major bleeding event, Thromboembolic event,   Anticoagulation-related hospital admission, Anticoagulation-related ED   visit, Anticoagulation-related fatality        INR History:  Anticoagulation Monitoring 8/20/2021 8/26/2021 9/2/2021   INR 4.30 3.70 3.90   INR Date 8/20/2021 8/26/2021 9/2/2021   INR Goal 2.5-3.5 2.5-3.5 2.5-3.5   Trend Same Down Same   Last Week Total 32.5 mg 30 mg 30 mg   Next Week Total 30 mg 30 mg 27.5 mg   Sun 5 mg 5 mg 5 mg   Mon 5 mg 2.5 mg 2.5 mg   Tue 5 mg 5 mg 5 mg   Wed 5 mg 5 mg 5 mg   Thu 5 mg 5 mg 2.5 mg (9/2)   Fri Hold (8/20) 2.5 mg 2.5 mg   Sat 5 mg 5 mg 5 mg   Visit Report - - -   Some recent data might be hidden       Plan:  1. INR is Supratherapeutic  today- see above in Anticoagulation Summary.   Will instruct Demond Menchaca to Change their warfarin regimen- see above in Anticoagulation Summary. Patient did not answer, left HIPAA friendly voicemail for patient to call us if there have been any changes, otherwise he was instructed to reduce dose today to 2.5 mg, then resume normal.  2. Follow up in 1 week  3. They have been instructed to call if any changes in medications, doses, concerns, etc. Patient expresses understanding and has no further questions at this time.    Regina Tobias, PharmD

## 2021-09-03 NOTE — PROGRESS NOTES
I have supervised and reviewed the notes, assessments, and/or procedures performed by our PGY2 Ambulatory Care Pharmacy Resident. I concur with the documentation of this patient encounter. I also have attempted to contact Mr. Menchaca without success.     Philip Carmona, PharmD

## 2021-09-08 ENCOUNTER — OFFICE VISIT (OUTPATIENT)
Dept: FAMILY MEDICINE CLINIC | Facility: CLINIC | Age: 76
End: 2021-09-08

## 2021-09-08 VITALS
TEMPERATURE: 98.2 F | WEIGHT: 215.4 LBS | HEIGHT: 74 IN | OXYGEN SATURATION: 96 % | SYSTOLIC BLOOD PRESSURE: 143 MMHG | DIASTOLIC BLOOD PRESSURE: 78 MMHG | BODY MASS INDEX: 27.64 KG/M2 | HEART RATE: 55 BPM

## 2021-09-08 DIAGNOSIS — L08.9 ABRASION OF LEFT LOWER LEG WITH INFECTION, INITIAL ENCOUNTER: Primary | ICD-10-CM

## 2021-09-08 DIAGNOSIS — S80.812A ABRASION OF LEFT LOWER LEG WITH INFECTION, INITIAL ENCOUNTER: Primary | ICD-10-CM

## 2021-09-08 PROCEDURE — 99213 OFFICE O/P EST LOW 20 MIN: CPT | Performed by: NURSE PRACTITIONER

## 2021-09-08 RX ORDER — CEPHALEXIN 500 MG/1
500 CAPSULE ORAL 4 TIMES DAILY
Qty: 40 CAPSULE | Refills: 0 | Status: SHIPPED | OUTPATIENT
Start: 2021-09-08 | End: 2021-09-18

## 2021-09-09 ENCOUNTER — ANTICOAGULATION VISIT (OUTPATIENT)
Dept: PHARMACY | Facility: HOSPITAL | Age: 76
End: 2021-09-09

## 2021-09-09 DIAGNOSIS — I48.0 PAF (PAROXYSMAL ATRIAL FIBRILLATION) (HCC): Primary | ICD-10-CM

## 2021-09-09 LAB — INR PPP: 2.9

## 2021-09-09 NOTE — PROGRESS NOTES
"Chief Complaint  Leg Pain (SEEN  AT  09/03/21)    Subjective          Demond Menchaca presents to [facility]  History of Present Illness  Abrasion of left lower leg with infection: Patient was seen at urgent care on 9/3/2021.  Treatment included cephalexin 500 mg twice daily for 7 days and topical Silvadene cream with dressing changes.  Patient complains of pain, purulent drainage, surrounding erythema and edema at abrasions of left lower leg.  Patient denies any fever.  Past medical history includes venous stasis dermatitis of bilateral lower extremities.  Objective   Vital Signs:   /78 (BP Location: Left arm, Patient Position: Sitting, Cuff Size: Adult)   Pulse 55   Temp 98.2 °F (36.8 °C) (Temporal)   Ht 188 cm (74\")   Wt 97.7 kg (215 lb 6.4 oz)   SpO2 96%   BMI 27.66 kg/m²     Physical Exam  Vitals and nursing note reviewed.   Constitutional:       Appearance: Normal appearance.   Cardiovascular:      Rate and Rhythm: Normal rate and regular rhythm.      Heart sounds: Normal heart sounds.   Pulmonary:      Effort: Pulmonary effort is normal.      Breath sounds: Normal breath sounds.   Skin:     Comments: Erythema and edema surrounding 2 abrasions of left lower extremity.   Neurological:      Mental Status: He is alert and oriented to person, place, and time.   Psychiatric:         Mood and Affect: Mood normal.            Result Review :   The following data was reviewed by: ERIC Patel on 09/08/2021:    Data reviewed:  note on 9/3/2021.           Assessment and Plan    Diagnoses and all orders for this visit:    1. Abrasion of left lower leg with infection, initial encounter (Primary)  Comments:  - Will increase cephalexin to 4 times a day for an additional 10 days.  - Advised patient to keep area clean and dry.  Orders:  -     cephalexin (KEFLEX) 500 MG capsule; Take 1 capsule by mouth 4 (Four) Times a Day for 10 days.  Dispense: 40 capsule; Refill: 0  -    "  Ambulatory Referral to Wound Clinic      I spent 20 minutes caring for Demond on this date of service. This time includes time spent by me in the following activities:performing a medically appropriate examination and/or evaluation , counseling and educating the patient/family/caregiver, ordering medications, tests, or procedures and documenting information in the medical record  Follow Up   No follow-ups on file.  Patient was given instructions and counseling regarding his condition or for health maintenance advice. Please see specific information pulled into the AVS if appropriate.

## 2021-09-10 NOTE — PROGRESS NOTES
Anticoagulation Clinic Progress Note    Anticoagulation Summary  As of 2021    INR goal:  2.5-3.5   TTR:  82.7 % (2.8 y)   INR used for dosin.90 (2021)   Warfarin maintenance plan:  2.5 mg every Mon, Fri; 5 mg all other days   Weekly warfarin total:  30 mg   No change documented:  Philip Carmona, [place]   Plan last modified:  Jeanette Montes De Oca RPH (2021)   Next INR check:  2021   Priority:  Maintenance   Target end date:      Indications    PAF (paroxysmal atrial fibrillation) (CMS/HCC) [I48.0]             Anticoagulation Episode Summary     INR check location:      Preferred lab:      Send INR reminders to:  [facility]  POOL    Comments:  [place]      Anticoagulation Care Providers     Provider Role Specialty Phone number    David Hernandez MD Referring Cardiology [phone]          INR History:  Anticoagulation Monitoring 2021   INR 3.70 3.90 2.90   INR Date 2021   INR Goal 2.5-3.5 2.5-3.5 2.5-3.5   Trend Down Same Same   Last Week Total 30 mg 30 mg 27.5 mg   Next Week Total 30 mg 27.5 mg 30 mg   Sun 5 mg 5 mg 5 mg   Mon 2.5 mg 2.5 mg 2.5 mg   Tue 5 mg 5 mg 5 mg   Wed 5 mg 5 mg 5 mg   Thu 5 mg 2.5 mg () 5 mg   Fri 2.5 mg 2.5 mg 2.5 mg   Sat 5 mg 5 mg 5 mg   Visit Report - - -   Some recent data might be hidden       Plan:  1. INR is Therapeutic today- see above in Anticoagulation Summary.   Will instruct Demond Menchaca to Continue their warfarin regimen- see above in Anticoagulation Summary.  2. Follow up in 1 week  3. Unsuccessful reaching after multiple attempts. Secure VM w/ instructions; requested call back. They have been instructed to call if any changes in medications, doses, concerns, etc.     Philip Carmona, AngusD

## 2021-09-14 ENCOUNTER — APPOINTMENT (OUTPATIENT)
Dept: GENERAL RADIOLOGY | Facility: HOSPITAL | Age: 76
End: 2021-09-14

## 2021-09-15 ENCOUNTER — OFFICE VISIT (OUTPATIENT)
Dept: FAMILY MEDICINE CLINIC | Facility: CLINIC | Age: 76
End: 2021-09-15

## 2021-09-15 VITALS
DIASTOLIC BLOOD PRESSURE: 71 MMHG | WEIGHT: 213.4 LBS | SYSTOLIC BLOOD PRESSURE: 127 MMHG | HEART RATE: 45 BPM | TEMPERATURE: 98.2 F | BODY MASS INDEX: 27.4 KG/M2 | OXYGEN SATURATION: 98 %

## 2021-09-15 DIAGNOSIS — S80.812D ABRASION OF LEFT LOWER LEG WITH INFECTION, SUBSEQUENT ENCOUNTER: Primary | ICD-10-CM

## 2021-09-15 DIAGNOSIS — L08.9 ABRASION OF LEFT LOWER LEG WITH INFECTION, SUBSEQUENT ENCOUNTER: Primary | ICD-10-CM

## 2021-09-15 PROCEDURE — 99212 OFFICE O/P EST SF 10 MIN: CPT | Performed by: NURSE PRACTITIONER

## 2021-09-15 NOTE — PROGRESS NOTES
Chief Complaint  Abrasion (left lower leg)    Subjective          Demond Menchaca presents to [facility]  History of Present Illness  Abrasion of left lower leg: Last seen on 9/8/2021. Treatment included cephalexin and wound care referral. Patient still has 3 days left of antibiotic and has not heard from referral yet. Patient reports improvement in symptoms.   Objective   Vital Signs:   /71 (BP Location: Left arm, Patient Position: Sitting, Cuff Size: Adult)   Pulse (!) 45   Temp 98.2 °F (36.8 °C) (Temporal)   Wt 96.8 kg (213 lb 6.4 oz)   SpO2 98%   BMI 27.40 kg/m²     Physical Exam  Vitals and nursing note reviewed.   Cardiovascular:      Rate and Rhythm: Normal rate and regular rhythm.      Heart sounds: Normal heart sounds.   Pulmonary:      Effort: Pulmonary effort is normal.      Breath sounds: Normal breath sounds.   Skin:     Comments: 2 abrasions at left lower leg without erythema or edema    Neurological:      Mental Status: He is oriented to person, place, and time.   Psychiatric:         Mood and Affect: Mood normal.        Result Review :            Assessment and Plan    Diagnoses and all orders for this visit:    1. Abrasion of left lower leg with infection, subsequent encounter (Primary)  Comments:  - Advised patient to complete antibiotic as directed.  - Wound care appointment information given to patient while in office.       I spent 10 minutes caring for Demond on this date of service. This time includes time spent by me in the following activities:performing a medically appropriate examination and/or evaluation , counseling and educating the patient/family/caregiver and documenting information in the medical record  Follow Up   No follow-ups on file.  Patient was given instructions and counseling regarding his condition or for health maintenance advice. Please see specific information pulled into the AVS if appropriate.

## 2021-09-16 ENCOUNTER — ANTICOAGULATION VISIT (OUTPATIENT)
Dept: PHARMACY | Facility: HOSPITAL | Age: 76
End: 2021-09-16

## 2021-09-16 DIAGNOSIS — I48.0 PAF (PAROXYSMAL ATRIAL FIBRILLATION) (HCC): Primary | ICD-10-CM

## 2021-09-16 LAB — INR PPP: 2.7

## 2021-09-16 RX ORDER — WARFARIN SODIUM 5 MG/1
TABLET ORAL
Qty: 80 TABLET | Refills: 1 | Status: SHIPPED | OUTPATIENT
Start: 2021-09-16 | End: 2022-03-09

## 2021-09-16 NOTE — PROGRESS NOTES
Anticoagulation Clinic Progress Note    Anticoagulation Summary  As of 2021    INR goal:  2.5-3.5   TTR:  82.9 % (2.8 y)   INR used for dosin.70 (2021)   Warfarin maintenance plan:  2.5 mg every Mon, Fri; 5 mg all other days   Weekly warfarin total:  30 mg   No change documented:  Silvia Joseph   Plan last modified:  Jeanette Montes De Oca RPH (2021)   Next INR check:  2021   Priority:  Maintenance   Target end date:      Indications    PAF (paroxysmal atrial fibrillation) (CMS/[facility]) [I48.0]             Anticoagulation Episode Summary     INR check location:      Preferred lab:      [facility]  [facility]    Comments:  [place]      Anticoagulation Care Providers     Provider Role Specialty Phone number    David Hernandez MD Referring Cardiology [phone]          Clinic Interview:  Patient Findings     Positives:  Change in medications    Negatives:  Signs/symptoms of thrombosis, Signs/symptoms of bleeding,   Laboratory test error suspected, Change in health, Change in alcohol use,   Change in activity, Upcoming invasive procedure, Emergency department   visit, Upcoming dental procedure, Missed doses, Extra doses, Change in   diet/appetite, Hospital admission, Bruising, Other complaints    Comments:  Patient has been on cephalexin for a foot infection since    and will end .       Clinical Outcomes     Negatives:  Major bleeding event, Thromboembolic event,   Anticoagulation-related hospital admission, Anticoagulation-related ED   visit, Anticoagulation-related fatality    Comments:  Patient has been on cephalexin for a foot infection since    and will end .         INR History:  Anticoagulation Monitoring 2021   INR 3.90 2.90 2.70   INR Date 2021   INR Goal 2.5-3.5 2.5-3.5 2.5-3.5   Trend Same Same Same   Last Week Total 30 mg 27.5 mg 30 mg   Next Week Total 27.5 mg 30 mg 30 mg   Sun 5 mg 5  mg 5 mg   Mon 2.5 mg 2.5 mg 2.5 mg   Tue 5 mg 5 mg 5 mg   Wed 5 mg 5 mg 5 mg   Thu 2.5 mg (9/2) 5 mg 5 mg   Fri 2.5 mg 2.5 mg 2.5 mg   Sat 5 mg 5 mg 5 mg   Visit Report - - -   Some recent data might be hidden     Comment: Patient has a follow up 9/20 for is foot wound. He should be finishing current antibiotic 9/18. I have asked that the patient give us a call if there are changes at this appointment or if new antibiotics are started or the antibiotic is continued.    Plan:  1. INR is Therapeutic today- see above in Anticoagulation Summary.   Will instruct Demond Menchaca to Continue their warfarin regimen- see above in Anticoagulation Summary.  2. Follow up in 1 week  3. They have been instructed to call if any changes in medications, doses, concerns, etc. Patient expresses understanding and has no further questions at this time.    Regina Tobias, PharmD

## 2021-09-20 ENCOUNTER — APPOINTMENT (OUTPATIENT)
Dept: WOUND CARE | Facility: HOSPITAL | Age: 76
End: 2021-09-20

## 2021-09-20 PROCEDURE — G0463 HOSPITAL OUTPT CLINIC VISIT: HCPCS

## 2021-09-20 PROCEDURE — 97602 WOUND(S) CARE NON-SELECTIVE: CPT

## 2021-09-27 ENCOUNTER — APPOINTMENT (OUTPATIENT)
Dept: WOUND CARE | Facility: HOSPITAL | Age: 76
End: 2021-09-27

## 2021-09-27 PROCEDURE — G0463 HOSPITAL OUTPT CLINIC VISIT: HCPCS

## 2021-09-30 ENCOUNTER — ANTICOAGULATION VISIT (OUTPATIENT)
Dept: PHARMACY | Facility: HOSPITAL | Age: 76
End: 2021-09-30

## 2021-09-30 DIAGNOSIS — I48.0 PAF (PAROXYSMAL ATRIAL FIBRILLATION) (HCC): Primary | ICD-10-CM

## 2021-09-30 LAB — INR PPP: 2.9

## 2021-09-30 NOTE — PROGRESS NOTES
Anticoagulation Clinic Progress Note    Anticoagulation Summary  As of 2021    INR goal:  2.5-3.5   TTR:  83.1 % (2.8 y)   INR used for dosin.90 (2021)   Warfarin maintenance plan:  2.5 mg every Mon, Fri; 5 mg all other days   Weekly warfarin total:  30 mg   No change documented:  Sheri Aviles   Plan last modified:  Jeanette Montes De Oca RP (2021)   Next INR check:  10/14/2021   Priority:  Maintenance   Target end date:      Indications    PAF (paroxysmal atrial fibrillation) (CMS/HCC) [I48.0]             Anticoagulation Episode Summary     INR check location:      Preferred lab:      Send INR reminders to:  [facility]  POOL    Comments:  [place]      Anticoagulation Care Providers     Provider Role Specialty Phone number    David Hernandez MD Referring Cardiology [phone]          Clinic Interview:  No pertinent clinical findings have been reported.    INR History:  Anticoagulation Monitoring 2021   INR 2.90 2.70 2.90   INR Date 2021   INR Goal 2.5-3.5 2.5-3.5 2.5-3.5   Trend Same Same Same   Last Week Total 27.5 mg 30 mg 30 mg   Next Week Total 30 mg 30 mg 30 mg   Sun 5 mg 5 mg 5 mg   Mon 2.5 mg 2.5 mg 2.5 mg   Tue 5 mg 5 mg 5 mg   Wed 5 mg 5 mg 5 mg   Thu 5 mg 5 mg 5 mg   Fri 2.5 mg 2.5 mg 2.5 mg   Sat 5 mg 5 mg 5 mg   Visit Report - - -   Some recent data might be hidden       Plan:  1. INR is therapeutic today- see above in Anticoagulation Summary.    Demond CROSS Bernarda to continue their warfarin regimen- see above in Anticoagulation Summary.  2. Follow up in 2 weeks  3. Pt has agreed to only be called if INR out of range. They have been instructed to call if any changes in medications, doses, concerns, etc. Patient expresses understanding and has no further questions at this time.    Sheri Aviles

## 2021-10-04 ENCOUNTER — APPOINTMENT (OUTPATIENT)
Dept: WOUND CARE | Facility: HOSPITAL | Age: 76
End: 2021-10-04

## 2021-10-04 PROCEDURE — G0463 HOSPITAL OUTPT CLINIC VISIT: HCPCS

## 2021-10-14 ENCOUNTER — ANTICOAGULATION VISIT (OUTPATIENT)
Dept: PHARMACY | Facility: HOSPITAL | Age: 76
End: 2021-10-14

## 2021-10-14 DIAGNOSIS — I48.0 PAF (PAROXYSMAL ATRIAL FIBRILLATION) (HCC): Primary | ICD-10-CM

## 2021-10-14 LAB — INR PPP: 2.5

## 2021-10-14 NOTE — PROGRESS NOTES
Anticoagulation Clinic Progress Note    Anticoagulation Summary  As of 10/14/2021    INR goal:  2.5-3.5   TTR:  83.3 % (2.9 y)   INR used for dosin.50 (10/14/2021)   Warfarin maintenance plan:  2.5 mg every Mon, Fri; 5 mg all other days   Weekly warfarin total:  30 mg   No change documented:  Sheri Aviles   Plan last modified:  Jeanette Montes De Oca RP (2021)   Next INR check:  10/28/2021   Priority:  Maintenance   Target end date:      Indications    PAF (paroxysmal atrial fibrillation) (HCC) [I48.0]             Anticoagulation Episode Summary     INR check location:      Preferred lab:      [facility]    Comments:  [place]      Anticoagulation Care Providers     Provider Role Specialty Phone number    David Hernandez MD Referring Cardiology [phone]          Clinic Interview:  No pertinent clinical findings have been reported.    INR History:  Anticoagulation Monitoring 2021 2021 10/14/2021   INR 2.70 2.90 2.50   INR Date 2021 2021 10/14/2021   INR Goal 2.5-3.5 2.5-3.5 2.5-3.5   Trend Same Same Same   Last Week Total 30 mg 30 mg 30 mg   Next Week Total 30 mg 30 mg 30 mg   Sun 5 mg 5 mg 5 mg   Mon 2.5 mg 2.5 mg 2.5 mg   Tue 5 mg 5 mg 5 mg   Wed 5 mg 5 mg 5 mg   Thu 5 mg 5 mg 5 mg   Fri 2.5 mg 2.5 mg 2.5 mg   Sat 5 mg 5 mg 5 mg   Visit Report - - -   Some recent data might be hidden       Plan:  1. INR is therapeutic today- see above in Anticoagulation Summary.    Demond CROSS Bernarda to continue their warfarin regimen- see above in Anticoagulation Summary.  2. Follow up in 2 weeks  3. Pt has agreed to only be called if INR out of range. They have been instructed to call if any changes in medications, doses, concerns, etc. Patient expresses understanding and has no further questions at this time.    Sheri Aviles

## 2021-10-18 ENCOUNTER — APPOINTMENT (OUTPATIENT)
Dept: WOUND CARE | Facility: HOSPITAL | Age: 76
End: 2021-10-18

## 2021-10-18 PROCEDURE — G0463 HOSPITAL OUTPT CLINIC VISIT: HCPCS

## 2021-10-28 ENCOUNTER — ANTICOAGULATION VISIT (OUTPATIENT)
Dept: PHARMACY | Facility: HOSPITAL | Age: 76
End: 2021-10-28

## 2021-10-28 DIAGNOSIS — I48.0 PAF (PAROXYSMAL ATRIAL FIBRILLATION) (HCC): Primary | ICD-10-CM

## 2021-10-28 LAB — INR PPP: 3.1

## 2021-10-28 NOTE — PROGRESS NOTES
Anticoagulation Clinic Progress Note  Anticoagulation Summary  As of 10/28/2021    INR goal:  2.5-3.5   TTR:  83.5 % (2.9 y)   INR used for dosing:  3.10 (10/28/2021)   Warfarin maintenance plan:  2.5 mg every Mon, Fri; 5 mg all other days   Weekly warfarin total:  30 mg   No change documented:  Arsen Centeno, PharmD   Plan last modified:  Jeanette Montes De Oca RPH (8/26/2021)   Next INR check:  11/11/2021   Priority:  Maintenance   Target end date:      Indications    PAF (paroxysmal atrial fibrillation) (HCC) [I48.0]             Anticoagulation Episode Summary     INR check location:      Preferred lab:      Send INR reminders to:  [facility]  POOL    Comments:  [place]      Anticoagulation Care Providers     Provider Role Specialty Phone number    David Hernandez MD Referring Cardiology [phone]        Clinic Interview:  Patient Findings     Negatives:  Signs/symptoms of thrombosis, Signs/symptoms of bleeding,   Laboratory test error suspected, Change in health, Change in alcohol use,   Change in activity, Upcoming invasive procedure, Emergency department   visit, Upcoming dental procedure, Missed doses, Extra doses, Change in   medications, Change in diet/appetite, Hospital admission, Bruising, Other   complaints      Clinical Outcomes     Negatives:  Major bleeding event, Thromboembolic event,   Anticoagulation-related hospital admission, Anticoagulation-related ED   visit, Anticoagulation-related fatality      INR History:  Anticoagulation Monitoring 9/30/2021 10/14/2021 10/28/2021   INR 2.90 2.50 3.10   INR Date 9/30/2021 10/14/2021 10/28/2021   INR Goal 2.5-3.5 2.5-3.5 2.5-3.5   Trend Same Same Same   Last Week Total 30 mg 30 mg 30 mg   Next Week Total 30 mg 30 mg 30 mg   Sun 5 mg 5 mg 5 mg   Mon 2.5 mg 2.5 mg 2.5 mg   Tue 5 mg 5 mg 5 mg   Wed 5 mg 5 mg 5 mg   Thu 5 mg 5 mg 5 mg   Fri 2.5 mg 2.5 mg 2.5 mg   Sat 5 mg 5 mg 5 mg   Visit Report - - -   Some recent data might be hidden      Plan:  1. INR is Therapeutic today- see above in Anticoagulation Summary.   Will instruct Demond Menchaca to Continue their warfarin regimen- see above in Anticoagulation Summary.  2. Follow up in 2 weeks  3. They have been instructed to call if any changes in medications, doses, concerns, etc. Patient expresses understanding and has no further questions at this time.    Arsen Centeno, PharmD

## 2021-11-01 ENCOUNTER — APPOINTMENT (OUTPATIENT)
Dept: WOUND CARE | Facility: HOSPITAL | Age: 76
End: 2021-11-01

## 2021-11-01 PROCEDURE — G0463 HOSPITAL OUTPT CLINIC VISIT: HCPCS

## 2021-11-09 DIAGNOSIS — M51.36 DDD (DEGENERATIVE DISC DISEASE), LUMBAR: ICD-10-CM

## 2021-11-09 RX ORDER — HYDROCODONE BITARTRATE AND ACETAMINOPHEN 5; 325 MG/1; MG/1
1 TABLET ORAL EVERY 12 HOURS PRN
Qty: 30 TABLET | Refills: 0 | Status: SHIPPED | OUTPATIENT
Start: 2021-11-09 | End: 2022-03-25 | Stop reason: SDUPTHER

## 2021-11-09 NOTE — TELEPHONE ENCOUNTER
Caller: BernardaDemond    Relationship: Self    Best call back number:     Requested Prescriptions:   Requested Prescriptions     Pending Prescriptions Disp Refills   • HYDROcodone-acetaminophen (NORCO) 5-325 MG per tablet 30 tablet 0     Sig: Take 1 tablet by mouth Every 12 (Twelve) Hours As Needed for Severe Pain .        Pharmacy where request should be sent:  [facility]  [address] [place] [address]  [phone]    Additional details provided by patient:     Does the patient have less than a 3 day supply:  [] Yes  [x] No    Rock Scott Rep   11/09/21 11:56 EST

## 2021-11-11 ENCOUNTER — ANTICOAGULATION VISIT (OUTPATIENT)
Dept: PHARMACY | Facility: HOSPITAL | Age: 76
End: 2021-11-11

## 2021-11-11 DIAGNOSIS — I48.0 PAF (PAROXYSMAL ATRIAL FIBRILLATION) (HCC): Primary | ICD-10-CM

## 2021-11-11 LAB — INR PPP: 2.7

## 2021-11-11 NOTE — PROGRESS NOTES
Anticoagulation Clinic Progress Note    Anticoagulation Summary  As of 2021    INR goal:  2.5-3.5   TTR:  83.8 % (2.9 y)   INR used for dosin.70 (2021)   Warfarin maintenance plan:  2.5 mg every Mon, Fri; 5 mg all other days   Weekly warfarin total:  30 mg   No change documented:  Jeanette Montes De Oca RPH   Plan last modified:  Jeanette Montes De Oca RPH (2021)   Next INR check:  2021   Priority:  Maintenance   Target end date:      Indications    PAF (paroxysmal atrial fibrillation) (HCC) [I48.0]             Anticoagulation Episode Summary     INR check location:      Preferred lab:      Send INR reminders to:   [facility]  POOL    Comments:  [place]      Anticoagulation Care Providers     Provider Role Specialty Phone number    David Hernandez MD Referring Cardiology [phone]          Clinic Interview:  Patient Findings     Negatives:  Signs/symptoms of thrombosis, Signs/symptoms of bleeding,   Laboratory test error suspected, Change in health, Change in alcohol use,   Change in activity, Upcoming invasive procedure, Emergency department   visit, Upcoming dental procedure, Missed doses, Extra doses, Change in   medications, Change in diet/appetite, Hospital admission, Bruising, Other   complaints      Clinical Outcomes     Negatives:  Major bleeding event, Thromboembolic event,   Anticoagulation-related hospital admission, Anticoagulation-related ED   visit, Anticoagulation-related fatality        INR History:  Anticoagulation Monitoring 10/14/2021 10/28/2021 2021   INR 2.50 3.10 2.70   INR Date 10/14/2021 10/28/2021 2021   INR Goal 2.5-3.5 2.5-3.5 2.5-3.5   Trend Same Same Same   Last Week Total 30 mg 30 mg 30 mg   Next Week Total 30 mg 30 mg 30 mg   Sun 5 mg 5 mg 5 mg   Mon 2.5 mg 2.5 mg 2.5 mg   Tue 5 mg 5 mg 5 mg   Wed 5 mg 5 mg 5 mg   Thu 5 mg 5 mg 5 mg   Fri 2.5 mg 2.5 mg 2.5 mg   Sat 5 mg 5 mg 5 mg   Visit Report - - -   Some recent data might be hidden        Plan:  1. INR is Therapeutic today- see above in Anticoagulation Summary.   Will instruct Demond Menchaca to Continue their warfarin regimen- see above in Anticoagulation Summary.  2. Follow up in 2 weeks  3.  They have been instructed to call if any changes in medications, doses, concerns, etc. Patient expresses understanding and has no further questions at this time.    Jeanette Montes De Oca, [facility]

## 2021-11-15 ENCOUNTER — APPOINTMENT (OUTPATIENT)
Dept: WOUND CARE | Facility: HOSPITAL | Age: 76
End: 2021-11-15

## 2021-11-18 ENCOUNTER — LAB REQUISITION (OUTPATIENT)
Dept: LAB | Facility: HOSPITAL | Age: 76
End: 2021-11-18

## 2021-11-18 ENCOUNTER — APPOINTMENT (OUTPATIENT)
Dept: WOUND CARE | Facility: HOSPITAL | Age: 76
End: 2021-11-18

## 2021-11-18 DIAGNOSIS — L97.822 NON-PRESSURE CHRONIC ULCER OF OTHER PART OF LEFT LOWER LEG WITH FAT LAYER EXPOSED (HCC): ICD-10-CM

## 2021-11-18 DIAGNOSIS — Z79.01 LONG TERM (CURRENT) USE OF ANTICOAGULANTS: ICD-10-CM

## 2021-11-18 PROCEDURE — G0463 HOSPITAL OUTPT CLINIC VISIT: HCPCS

## 2021-11-18 PROCEDURE — 87070 CULTURE OTHR SPECIMN AEROBIC: CPT | Performed by: NURSE PRACTITIONER

## 2021-11-19 ENCOUNTER — TRANSCRIBE ORDERS (OUTPATIENT)
Dept: ADMINISTRATIVE | Facility: HOSPITAL | Age: 76
End: 2021-11-19

## 2021-11-19 DIAGNOSIS — L97.822 ULCER OF LEFT MEDIAL LOWER EXTREMITY WITH FAT LAYER EXPOSED (HCC): Primary | ICD-10-CM

## 2021-11-21 LAB — BACTERIA SPEC AEROBE CULT: NORMAL

## 2021-11-24 ENCOUNTER — ANTICOAGULATION VISIT (OUTPATIENT)
Dept: PHARMACY | Facility: HOSPITAL | Age: 76
End: 2021-11-24

## 2021-11-24 DIAGNOSIS — I48.0 PAF (PAROXYSMAL ATRIAL FIBRILLATION) (HCC): Primary | ICD-10-CM

## 2021-11-24 LAB — INR PPP: 2.7

## 2021-11-24 NOTE — PROGRESS NOTES
Anticoagulation Clinic Progress Note    Anticoagulation Summary  As of 2021    INR goal:  2.5-3.5   TTR:  84.0 % (3 y)   INR used for dosin.70 (2021)   Warfarin maintenance plan:  2.5 mg every Mon, Fri; 5 mg all other days   Weekly warfarin total:  30 mg   No change documented:  Sheri Aviles   Plan last modified:  Jeanette Montes De Oca RPH (2021)   Next INR check:  2021   Priority:  Maintenance   Target end date:      Indications    PAF (paroxysmal atrial fibrillation) (HCC) [I48.0]             Anticoagulation Episode Summary     INR check location:      Preferred lab:      Send INR reminders to:  [facility]  POOL    Comments:  [place]      Anticoagulation Care Providers     Provider Role Specialty Phone number    David Hernandez MD Referring Cardiology [phone]          Clinic Interview:  No pertinent clinical findings have been reported.    INR History:  Anticoagulation Monitoring 10/28/2021 2021 2021   INR 3.10 2.70 2.70   INR Date 10/28/2021 2021 2021   INR Goal 2.5-3.5 2.5-3.5 2.5-3.5   Trend Same Same Same   Last Week Total 30 mg 30 mg 30 mg   Next Week Total 30 mg 30 mg 30 mg   Sun 5 mg 5 mg 5 mg   Mon 2.5 mg 2.5 mg 2.5 mg   Tue 5 mg 5 mg 5 mg   Wed 5 mg 5 mg 5 mg   Thu 5 mg 5 mg 5 mg   Fri 2.5 mg 2.5 mg 2.5 mg   Sat 5 mg 5 mg 5 mg   Visit Report - - -   Some recent data might be hidden       Plan:  1. INR is therapeutic today- see above in Anticoagulation Summary.    Demond CROSS Bernarda to continue their warfarin regimen- see above in Anticoagulation Summary.  2. Follow up in 2 weeks  3. Pt has agreed to only be called if INR out of range. They have been instructed to call if any changes in medications, doses, concerns, etc. Patient expresses understanding and has no further questions at this time.    Sheri Aviles

## 2021-11-29 ENCOUNTER — APPOINTMENT (OUTPATIENT)
Dept: WOUND CARE | Facility: HOSPITAL | Age: 76
End: 2021-11-29

## 2021-11-29 ENCOUNTER — APPOINTMENT (OUTPATIENT)
Dept: ULTRASOUND IMAGING | Facility: HOSPITAL | Age: 76
End: 2021-11-29

## 2021-11-29 PROCEDURE — G0463 HOSPITAL OUTPT CLINIC VISIT: HCPCS

## 2021-12-02 ENCOUNTER — HOSPITAL ENCOUNTER (OUTPATIENT)
Dept: ULTRASOUND IMAGING | Facility: HOSPITAL | Age: 76
Discharge: HOME | End: 2021-12-02
Admitting: NURSE PRACTITIONER

## 2021-12-02 DIAGNOSIS — L97.822 ULCER OF LEFT MEDIAL LOWER EXTREMITY WITH FAT LAYER EXPOSED (HCC): ICD-10-CM

## 2021-12-02 PROCEDURE — 93923 UPR/LXTR ART STDY 3+ LVLS: CPT

## 2021-12-13 ENCOUNTER — APPOINTMENT (OUTPATIENT)
Dept: WOUND CARE | Facility: HOSPITAL | Age: 76
End: 2021-12-13

## 2021-12-13 PROCEDURE — G0463 HOSPITAL OUTPT CLINIC VISIT: HCPCS

## 2021-12-14 DIAGNOSIS — G25.81 RESTLESS LEGS SYNDROME: Primary | ICD-10-CM

## 2021-12-14 RX ORDER — PRAMIPEXOLE DIHYDROCHLORIDE 0.12 MG/1
.125-.25 TABLET ORAL NIGHTLY
Qty: 180 TABLET | Refills: 1 | Status: SHIPPED | OUTPATIENT
Start: 2021-12-14 | End: 2022-03-25 | Stop reason: SDUPTHER

## 2021-12-16 ENCOUNTER — ANTICOAGULATION VISIT (OUTPATIENT)
Dept: PHARMACY | Facility: HOSPITAL | Age: 76
End: 2021-12-16

## 2021-12-16 DIAGNOSIS — I48.0 PAF (PAROXYSMAL ATRIAL FIBRILLATION) (HCC): Primary | ICD-10-CM

## 2021-12-16 LAB — INR PPP: 2.9

## 2021-12-16 NOTE — PROGRESS NOTES
Anticoagulation Clinic Progress Note    Anticoagulation Summary  As of 2021    INR goal:  2.5-3.5   TTR:  84.3 % (3 y)   INR used for dosin.90 (2021)   Warfarin maintenance plan:  2.5 mg every Mon, Fri; 5 mg all other days   Weekly warfarin total:  30 mg   No change documented:  Silvia Joseph   Plan last modified:  Jeanette Montes De Oca RPH (2021)   Next INR check:  2021   Priority:  Maintenance   Target end date:      Indications    PAF (paroxysmal atrial fibrillation) (HCC) [I48.0]             Anticoagulation Episode Summary     INR check location:      Preferred lab:      [facility]    Comments:  [place]      Anticoagulation Care Providers     Provider Role Specialty Phone number    David Hernandez MD Referring Cardiology [phone]          Clinic Interview:  No pertinent clinical findings have been reported.    INR History:  Anticoagulation Monitoring 2021   INR 2.70 2.70 2.90   INR Date 2021   INR Goal 2.5-3.5 2.5-3.5 2.5-3.5   Trend Same Same Same   Last Week Total 30 mg 30 mg 30 mg   Next Week Total 30 mg 30 mg 30 mg   Sun 5 mg 5 mg 5 mg   Mon 2.5 mg 2.5 mg 2.5 mg   Tue 5 mg 5 mg 5 mg   Wed 5 mg 5 mg 5 mg   Thu 5 mg 5 mg 5 mg   Fri 2.5 mg 2.5 mg 2.5 mg   Sat 5 mg 5 mg 5 mg   Visit Report - - -   Some recent data might be hidden       Plan:  1. INR is therapeutic today- see above in Anticoagulation Summary.    Demond CROSS Bernarda to continue their warfarin regimen- see above in Anticoagulation Summary.  2. Follow up in 2 weeks  3. Pt has agreed to only be called if INR out of range. They have been instructed to call if any changes in medications, doses, concerns, etc. Patient expresses understanding and has no further questions at this time.    Silvia Joseph

## 2021-12-28 ENCOUNTER — ANTICOAGULATION VISIT (OUTPATIENT)
Dept: PHARMACY | Facility: HOSPITAL | Age: 76
End: 2021-12-28

## 2021-12-28 DIAGNOSIS — I48.0 PAF (PAROXYSMAL ATRIAL FIBRILLATION) (HCC): Primary | ICD-10-CM

## 2021-12-28 LAB — INR PPP: 3.1

## 2021-12-28 NOTE — PROGRESS NOTES
Anticoagulation Clinic Progress Note    Anticoagulation Summary  As of 12/28/2021    INR goal:  2.5-3.5   TTR:  84.4 % (3.1 y)   INR used for dosing:  3.10 (12/28/2021)   Warfarin maintenance plan:  2.5 mg every Mon, Fri; 5 mg all other days   Weekly warfarin total:  30 mg   No change documented:  Crystal Pineda [facility]   Plan last modified:  Jeanette Montes De Oca RPH (8/26/2021)   Next INR check:  1/11/2022   Priority:  Maintenance   Target end date:      Indications    PAF (paroxysmal atrial fibrillation) (HCC) [I48.0]             Anticoagulation Episode Summary     INR check location:      Preferred lab:      Send INR reminders to:  [facility] [place]  POOL    Comments:  [place]      Anticoagulation Care Providers     Provider Role Specialty Phone number    David Hernandez MD Referring Cardiology [phone]          Clinic Interview:  No pertinent clinical findings have been reported.    INR History:  Anticoagulation Monitoring 11/24/2021 12/16/2021 12/28/2021   INR 2.70 2.90 3.10   INR Date 11/24/2021 12/16/2021 12/28/2021   INR Goal 2.5-3.5 2.5-3.5 2.5-3.5   Trend Same Same Same   Last Week Total 30 mg 30 mg 30 mg   Next Week Total 30 mg 30 mg 30 mg   Sun 5 mg 5 mg 5 mg   Mon 2.5 mg 2.5 mg 2.5 mg   Tue 5 mg 5 mg 5 mg   Wed 5 mg 5 mg 5 mg   Thu 5 mg 5 mg 5 mg   Fri 2.5 mg 2.5 mg 2.5 mg   Sat 5 mg 5 mg 5 mg   Visit Report - - -   Some recent data might be hidden       Plan:  1. INR is 3.1 today- see above in Anticoagulation Summary.    Demond CROSS Bernarda to continue their warfarin regimen- see above in Anticoagulation Summary.  2. Follow up in 2 weeks  3. Pt has agreed to only be called if INR out of range. They have been instructed to call if any changes in medications, doses, concerns, etc. Patient expresses understanding and has no further questions at this time.    Crystal Pineda [facility]

## 2022-01-01 NOTE — PROGRESS NOTES
Anticoagulation Clinic Progress Note    Anticoagulation Summary  As of 2019    INR goal:   2.0-3.0   TTR:   83.9 % (6.7 mo)   INR used for dosin.30 (2019)   Warfarin maintenance plan:   2.5 mg every Mon, Fri; 5 mg all other days   Weekly warfarin total:   30 mg   No change documented:   Kera Scanlon   Plan last modified:   [facility] (2018)   Next INR check:   2019   Priority:   Maintenance   Target end date:       Indications    Atrial fibrillation (CMS/HCC) [I48.91]             Anticoagulation Episode Summary     INR check location:       Preferred lab:       Send INR reminders to:   [facility]  POOL    Comments:   [place]      Anticoagulation Care Providers     Provider Role Specialty Phone number    David Hernandez MD Referring Cardiology [phone]          Clinic Interview:  No pertinent clinical findings have been reported.    INR History:  Anticoagulation Monitoring 2019   INR 3.10 2.80 2.30   INR Date 2019   INR Goal 2.0-3.0 2.0-3.0 2.0-3.0   Trend Same Same Same   Last Week Total 30 mg 30 mg 30 mg   Next Week Total 30 mg 30 mg 30 mg   Sun 5 mg 5 mg 5 mg   Mon 2.5 mg 2.5 mg 2.5 mg   Tue 5 mg 5 mg 5 mg   Wed 5 mg 5 mg 5 mg   Thu 5 mg 5 mg 5 mg   Fri 2.5 mg 2.5 mg 2.5 mg   Sat 5 mg 5 mg 5 mg   Visit Report - - -   Some recent data might be hidden       Plan:  1. INR is therapeutic today- see above in Anticoagulation Summary.    Demond CROSS Benrarda to continue their warfarin regimen- see above in Anticoagulation Summary.  2. Follow up in 2 weeks  3. Pt has agreed to only be called if INR out of range. They have been instructed to call if any changes in medications, doses, concerns, etc. Patient expresses understanding and has no further questions at this time.    Kera Scanlon  
1 Hour(s)

## 2022-01-13 ENCOUNTER — ANTICOAGULATION VISIT (OUTPATIENT)
Dept: PHARMACY | Facility: HOSPITAL | Age: 77
End: 2022-01-13

## 2022-01-13 ENCOUNTER — PRE-PROCEDURE SCREENING (OUTPATIENT)
Dept: GASTROENTEROLOGY | Facility: CLINIC | Age: 77
End: 2022-01-13

## 2022-01-13 DIAGNOSIS — I48.0 PAF (PAROXYSMAL ATRIAL FIBRILLATION): Primary | ICD-10-CM

## 2022-01-13 LAB — INR PPP: 2.1

## 2022-01-13 NOTE — PROGRESS NOTES
Anticoagulation Clinic Progress Note    Anticoagulation Summary  As of 2022    INR goal:  2.5-3.5   TTR:  84.1 % (3.1 y)   INR used for dosin.10 (2022)   Warfarin maintenance plan:  2.5 mg every Mon, Fri; 5 mg all other days   Weekly warfarin total:  30 mg   Plan last modified:  Jeanette Montes De Oca RPH (2021)   Next INR check:  2022   Priority:  Maintenance   Target end date:      Indications    PAF (paroxysmal atrial fibrillation) (HCC) [I48.0]             Anticoagulation Episode Summary     INR check location:      Preferred lab:      Send INR reminders to:  NADER PETERSON  POOL    Comments:  [place]      Anticoagulation Care Providers     Provider Role Specialty Phone number    David Hernandez MD Referring Cardiology [phone]          Clinic Interview:  Patient Findings     Positives:  Change in alcohol use, Change in diet/appetite, Other   complaints    Negatives:  Signs/symptoms of thrombosis, Signs/symptoms of bleeding,   Laboratory test error suspected, Change in health, Change in activity,   Upcoming invasive procedure, Emergency department visit, Upcoming dental   procedure, Missed doses, Extra doses, Change in medications, Hospital   admission, Bruising    Comments:  No clear explanation for subtherapeutic INR. Reports he is now   in [place]. Indicates he has been drinking 1 glass of wine nightly rather than   only on weekends, and he has actually been having less vit k in diet.       Clinical Outcomes     Negatives:  Major bleeding event, Thromboembolic event,   Anticoagulation-related hospital admission, Anticoagulation-related ED   visit, Anticoagulation-related fatality    Comments:  No clear explanation for subtherapeutic INR. Reports he is now   in [place]. Indicates he has been drinking 1 glass of wine nightly rather than   only on weekends, and he has actually been having less vit k in diet.         INR History:  Anticoagulation Monitoring 2021  1/13/2022 1/13/2022   INR 2.90 3.10 - 2.10   INR Date 12/16/2021 12/28/2021 - 1/13/2022   INR Goal 2.5-3.5 2.5-3.5 2.5-3.5 2.5-3.5   Trend Same Same - Same   Last Week Total 30 mg 30 mg - 30 mg   Next Week Total 30 mg 30 mg - 32.5 mg   Sun 5 mg 5 mg - 5 mg   Mon 2.5 mg 2.5 mg - 2.5 mg   Tue 5 mg 5 mg - 5 mg   Wed 5 mg 5 mg - 5 mg   Thu 5 mg 5 mg - 7.5 mg (1/13)   Fri 2.5 mg 2.5 mg - 2.5 mg   Sat 5 mg 5 mg - 5 mg   Visit Report - - - -   Some recent data might be hidden       Plan:  1. INR is Subtherapeutic today- see above in Anticoagulation Summary.   Will instruct Demond CROSS Bernarda to Change their warfarin regimen- see above in Anticoagulation Summary.  2. Follow up in 1 week  3. They have been instructed to call if any changes in medications, doses, concerns, etc. Patient expresses understanding and has no further questions at this time.    Philip Carmona, PharmD

## 2022-01-27 ENCOUNTER — ANTICOAGULATION VISIT (OUTPATIENT)
Dept: PHARMACY | Facility: HOSPITAL | Age: 77
End: 2022-01-27

## 2022-01-27 DIAGNOSIS — I48.0 PAF (PAROXYSMAL ATRIAL FIBRILLATION): Primary | ICD-10-CM

## 2022-01-27 LAB — INR PPP: 2.5

## 2022-01-27 NOTE — PROGRESS NOTES
Anticoagulation Clinic Progress Note    Anticoagulation Summary  As of 2022    INR goal:  2.5-3.5   TTR:  83.1 % (3.1 y)   INR used for dosin.50 (2022)   Warfarin maintenance plan:  2.5 mg every Mon, Fri; 5 mg all other days   Weekly warfarin total:  30 mg   No change documented:  Jeanette Montes De Oca RPH   Plan last modified:  Jeanette Montes De Oca RPH (2021)   Next INR check:  2/10/2022   Priority:  Maintenance   Target end date:      Indications    PAF (paroxysmal atrial fibrillation) (HCC) [I48.0]             Anticoagulation Episode Summary     INR check location:      Preferred lab:      Send INR reminders to:   GA[facility] PETERSON  POOL    Comments:  [place]      Anticoagulation Care Providers     Provider Role Specialty Phone number    David Hernandez MD Referring Cardiology [phone]          Clinic Interview:  Patient Findings     Negatives:  Signs/symptoms of thrombosis, Signs/symptoms of bleeding,   Laboratory test error suspected, Change in health, Change in alcohol use,   Change in activity, Upcoming invasive procedure, Emergency department   visit, Upcoming dental procedure, Missed doses, Extra doses, Change in   medications, Change in diet/appetite, Hospital admission, Bruising, Other   complaints      Clinical Outcomes     Negatives:  Major bleeding event, Thromboembolic event,   Anticoagulation-related hospital admission, Anticoagulation-related ED   visit, Anticoagulation-related fatality        INR History:  Anticoagulation Monitoring 2021   INR 3.10 - 2.10 2.50   INR Date 2021 - 2022   INR Goal 2.5-3.5 2.5-3.5 2.5-3.5 2.5-3.5   Trend Same - Same Same   Last Week Total 30 mg - 30 mg 30 mg   Next Week Total 30 mg - 32.5 mg 30 mg   Sun 5 mg - 5 mg 5 mg   Mon 2.5 mg - 2.5 mg 2.5 mg   Tue 5 mg - 5 mg 5 mg   Wed 5 mg - 5 mg 5 mg   Thu 5 mg - 7.5 mg () 5 mg   Fri 2.5 mg - 2.5 mg 2.5 mg   Sat 5 mg - 5 mg 5 mg   Visit Report  - - - -   Some recent data might be hidden       Plan:  1. INR is Therapeutic today- see above in Anticoagulation Summary.   Will instruct Demond Menchaca to Continue their warfarin regimen- see above in Anticoagulation Summary.  2. Follow up in 2 weeks  3. They have been instructed to call if any changes in medications, doses, concerns, etc. Patient expresses understanding and has no further questions at this time.    Jeanette Montes De Oca, [facility]

## 2022-02-10 ENCOUNTER — ANTICOAGULATION VISIT (OUTPATIENT)
Dept: PHARMACY | Facility: HOSPITAL | Age: 77
End: 2022-02-10

## 2022-02-10 DIAGNOSIS — I48.0 PAF (PAROXYSMAL ATRIAL FIBRILLATION): Primary | ICD-10-CM

## 2022-02-10 LAB — INR PPP: 2.7

## 2022-02-10 NOTE — PROGRESS NOTES
Anticoagulation Clinic Progress Note    Anticoagulation Summary  As of 2/10/2022    INR goal:  2.5-3.5   TTR:  83.3 % (3.2 y)   INR used for dosin.70 (2/10/2022)   Warfarin maintenance plan:  2.5 mg every Mon, Fri; 5 mg all other days   Weekly warfarin total:  30 mg   No change documented:  Sheri Aviles   Plan last modified:  Jeanette Montes De Oca [facility] (2021)   Next INR check:  2022   Priority:  Maintenance   Target end date:      Indications    PAF (paroxysmal atrial fibrillation) (HCC) [I48.0]             Anticoagulation Episode Summary     INR check location:      Preferred lab:      Send INR reminders to:  [facility]  POOL    Comments:  [place]      Anticoagulation Care Providers     Provider Role Specialty Phone number    David Hernandez MD Referring Cardiology [phone]          Clinic Interview:  No pertinent clinical findings have been reported.    INR History:  Anticoagulation Monitoring 2022 2022 2/10/2022   INR 2.10 2.50 2.70   INR Date 2022 2022 2/10/2022   INR Goal 2.5-3.5 2.5-3.5 2.5-3.5   Trend Same Same Same   Last Week Total 30 mg 30 mg 30 mg   Next Week Total 32.5 mg 30 mg 30 mg   Sun 5 mg 5 mg 5 mg   Mon 2.5 mg 2.5 mg 2.5 mg   Tue 5 mg 5 mg 5 mg   Wed 5 mg 5 mg 5 mg   Thu 7.5 mg () 5 mg 5 mg   Fri 2.5 mg 2.5 mg 2.5 mg   Sat 5 mg 5 mg 5 mg   Visit Report - - -   Some recent data might be hidden       Plan:  1. INR is therapeutic today- see above in Anticoagulation Summary.    Demond CROSS Bernarda to continue their warfarin regimen- see above in Anticoagulation Summary.  2. Follow up in 2 weeks  3. Pt has agreed to only be called if INR out of range. They have been instructed to call if any changes in medications, doses, concerns, etc. Patient expresses understanding and has no further questions at this time.    Sheri Aviles

## 2022-02-24 ENCOUNTER — ANTICOAGULATION VISIT (OUTPATIENT)
Dept: PHARMACY | Facility: HOSPITAL | Age: 77
End: 2022-02-24

## 2022-02-24 DIAGNOSIS — I48.0 PAF (PAROXYSMAL ATRIAL FIBRILLATION): Primary | ICD-10-CM

## 2022-02-24 LAB — INR PPP: 3

## 2022-02-24 NOTE — PROGRESS NOTES
Anticoagulation Clinic Progress Note    Anticoagulation Summary  As of 2/24/2022    INR goal:  2.5-3.5   TTR:  83.5 % (3.2 y)   INR used for dosing:  3.00 (2/24/2022)   Warfarin maintenance plan:  2.5 mg every Mon, Fri; 5 mg all other days   Weekly warfarin total:  30 mg   No change documented:  Sheri Aviles   Plan last modified:  Jeanette Montes De Oca [facility] (8/26/2021)   Next INR check:  3/10/2022   Priority:  Maintenance   Target end date:      Indications    PAF (paroxysmal atrial fibrillation) (HCC) [I48.0]             Anticoagulation Episode Summary     INR check location:      Preferred lab:      Send INR reminders to:  [facility]  POOL    Comments:  [place]      Anticoagulation Care Providers     Provider Role Specialty Phone number    David Hernandez MD Referring Cardiology [phone]          Clinic Interview:  No pertinent clinical findings have been reported.    INR History:  Anticoagulation Monitoring 1/27/2022 2/10/2022 2/24/2022   INR 2.50 2.70 3.00   INR Date 1/27/2022 2/10/2022 2/24/2022   INR Goal 2.5-3.5 2.5-3.5 2.5-3.5   Trend Same Same Same   Last Week Total 30 mg 30 mg 30 mg   Next Week Total 30 mg 30 mg 30 mg   Sun 5 mg 5 mg 5 mg   Mon 2.5 mg 2.5 mg 2.5 mg   Tue 5 mg 5 mg 5 mg   Wed 5 mg 5 mg 5 mg   Thu 5 mg 5 mg 5 mg   Fri 2.5 mg 2.5 mg 2.5 mg   Sat 5 mg 5 mg 5 mg   Visit Report - - -   Some recent data might be hidden       Plan:  1. INR is therapeutic today- see above in Anticoagulation Summary.    Demond CROSS Bernarda to continue their warfarin regimen- see above in Anticoagulation Summary.  2. Follow up in 2 weeks  3. Pt has agreed to only be called if INR out of range. They have been instructed to call if any changes in medications, doses, concerns, etc. Patient expresses understanding and has no further questions at this time.    Sheri Aviles

## 2022-03-07 DIAGNOSIS — M51.36 DDD (DEGENERATIVE DISC DISEASE), LUMBAR: ICD-10-CM

## 2022-03-07 RX ORDER — HYDROCODONE BITARTRATE AND ACETAMINOPHEN 5; 325 MG/1; MG/1
1 TABLET ORAL EVERY 12 HOURS PRN
Qty: 30 TABLET | Refills: 0 | OUTPATIENT
Start: 2022-03-07

## 2022-03-07 NOTE — TELEPHONE ENCOUNTER
Caller: Demond Menchaca    Relationship: Self    Best call back number: [phone]     Requested Prescriptions:   Requested Prescriptions     Pending Prescriptions Disp Refills   • HYDROcodone-acetaminophen (NORCO) 5-325 MG per tablet 30 tablet 0     Sig: Take 1 tablet by mouth Every 12 (Twelve) Hours As Needed for Severe Pain .        Pharmacy where request should be sent: [id number] [place], [place] - [address] AT [facility] [phone] [facility] [phone] FX     Additional details provided by patient:     Does the patient have less than a 3 day supply:  [] Yes  [x] No    Rock Chavarria Rep   03/07/22 10:53 EST

## 2022-03-09 RX ORDER — WARFARIN SODIUM 5 MG/1
TABLET ORAL
Qty: 80 TABLET | Refills: 1 | Status: SHIPPED | OUTPATIENT
Start: 2022-03-09 | End: 2022-09-16

## 2022-03-10 ENCOUNTER — ANTICOAGULATION VISIT (OUTPATIENT)
Dept: PHARMACY | Facility: HOSPITAL | Age: 77
End: 2022-03-10

## 2022-03-10 DIAGNOSIS — I48.0 PAF (PAROXYSMAL ATRIAL FIBRILLATION): Primary | ICD-10-CM

## 2022-03-10 LAB — INR PPP: 2.6

## 2022-03-10 NOTE — PROGRESS NOTES
Anticoagulation Clinic Progress Note    Anticoagulation Summary  As of 3/10/2022    INR goal:  2.5-3.5   TTR:  83.6 % (3.3 y)   INR used for dosin.60 (3/10/2022)   Warfarin maintenance plan:  2.5 mg every Mon, Fri; 5 mg all other days   Weekly warfarin total:  30 mg   No change documented:  Silvia Joseph   Plan last modified:  Jeanette Montes De Oca RPH (2021)   Next INR check:  3/24/2022   Priority:  Maintenance   Target end date:      Indications    PAF (paroxysmal atrial fibrillation) (HCC) [I48.0]             Anticoagulation Episode Summary     INR check location:      Preferred lab:      Send INR reminders to:  [facility]  POOL    Comments:  [place]      Anticoagulation Care Providers     Provider Role Specialty Phone number    David Hernandez MD Referring Cardiology [phone]          Clinic Interview:  No pertinent clinical findings have been reported.    INR History:  Anticoagulation Monitoring 2/10/2022 2022 3/10/2022   INR 2.70 3.00 2.60   INR Date 2/10/2022 2022 3/10/2022   INR Goal 2.5-3.5 2.5-3.5 2.5-3.5   Trend Same Same Same   Last Week Total 30 mg 30 mg 30 mg   Next Week Total 30 mg 30 mg 30 mg   Sun 5 mg 5 mg 5 mg   Mon 2.5 mg 2.5 mg 2.5 mg   Tue 5 mg 5 mg 5 mg   Wed 5 mg 5 mg 5 mg   Thu 5 mg 5 mg 5 mg   Fri 2.5 mg 2.5 mg 2.5 mg   Sat 5 mg 5 mg 5 mg   Visit Report - - -   Some recent data might be hidden       Plan:  1. INR is therapeutic today- see above in Anticoagulation Summary.    Demond CROSS Bernarda to continue their warfarin regimen- see above in Anticoagulation Summary.  2. Follow up in 2 weeks  3. Pt has agreed to only be called if INR out of range. They have been instructed to call if any changes in medications, doses, concerns, etc. Patient expresses understanding and has no further questions at this time.    Silvia Joseph

## 2022-03-24 ENCOUNTER — ANTICOAGULATION VISIT (OUTPATIENT)
Dept: PHARMACY | Facility: HOSPITAL | Age: 77
End: 2022-03-24

## 2022-03-24 DIAGNOSIS — I48.0 PAF (PAROXYSMAL ATRIAL FIBRILLATION): Primary | ICD-10-CM

## 2022-03-24 LAB — INR PPP: 2.8

## 2022-03-24 NOTE — PROGRESS NOTES
Anticoagulation Clinic Progress Note    Anticoagulation Summary  As of 3/24/2022    INR goal:  2.5-3.5   TTR:  83.7 % (3.3 y)   INR used for dosin.80 (3/24/2022)   Warfarin maintenance plan:  2.5 mg every Mon, Fri; 5 mg all other days   Weekly warfarin total:  30 mg   No change documented:  Yana Malloy, Pharmacy Technician   Plan last modified:  Jeanette Montes De Oca RPH (2021)   Next INR check:  3/31/2022   Priority:  Maintenance   Target end date:      Indications    PAF (paroxysmal atrial fibrillation) (HCC) [I48.0]             Anticoagulation Episode Summary     INR check location:      Preferred lab:      Send INR reminders to:  [facility]  POOL    Comments:  [place]      Anticoagulation Care Providers     Provider Role Specialty Phone number    David Hernandez MD Referring Cardiology [phone]          Clinic Interview:  No pertinent clinical findings have been reported.    INR History:  Anticoagulation Monitoring 2022 3/10/2022 3/24/2022   INR 3.00 2.60 2.80   INR Date 2022 3/10/2022 3/24/2022   INR Goal 2.5-3.5 2.5-3.5 2.5-3.5   Trend Same Same Same   Last Week Total 30 mg 30 mg 30 mg   Next Week Total 30 mg 30 mg 30 mg   Sun 5 mg 5 mg 5 mg   Mon 2.5 mg 2.5 mg 2.5 mg   Tue 5 mg 5 mg 5 mg   Wed 5 mg 5 mg 5 mg   Thu 5 mg 5 mg 5 mg   Fri 2.5 mg 2.5 mg 2.5 mg   Sat 5 mg 5 mg 5 mg   Visit Report - - -   Some recent data might be hidden       Plan:  1. INR is therapeutic today- see above in Anticoagulation Summary.    Demond CROSS Bernarda to continue their warfarin regimen- see above in Anticoagulation Summary.  2. Follow up in 1 week  3. Pt has agreed to only be called if INR out of range. They have been instructed to call if any changes in medications, doses, concerns, etc. Patient expresses understanding and has no further questions at this time.    Yana Malloy, Pharmacy Technician

## 2022-03-25 ENCOUNTER — OFFICE VISIT (OUTPATIENT)
Dept: FAMILY MEDICINE CLINIC | Facility: CLINIC | Age: 77
End: 2022-03-25

## 2022-03-25 ENCOUNTER — PRIOR AUTHORIZATION (OUTPATIENT)
Dept: FAMILY MEDICINE CLINIC | Facility: CLINIC | Age: 77
End: 2022-03-25

## 2022-03-25 VITALS
TEMPERATURE: 97.8 F | HEIGHT: 74 IN | DIASTOLIC BLOOD PRESSURE: 82 MMHG | OXYGEN SATURATION: 100 % | BODY MASS INDEX: 28.28 KG/M2 | SYSTOLIC BLOOD PRESSURE: 132 MMHG | HEART RATE: 52 BPM | WEIGHT: 220.4 LBS

## 2022-03-25 DIAGNOSIS — K59.01 SLOW TRANSIT CONSTIPATION: ICD-10-CM

## 2022-03-25 DIAGNOSIS — M51.36 DDD (DEGENERATIVE DISC DISEASE), LUMBAR: ICD-10-CM

## 2022-03-25 DIAGNOSIS — M32.8 OTHER FORMS OF SYSTEMIC LUPUS ERYTHEMATOSUS, UNSPECIFIED ORGAN INVOLVEMENT STATUS: ICD-10-CM

## 2022-03-25 DIAGNOSIS — I48.0 PAF (PAROXYSMAL ATRIAL FIBRILLATION): ICD-10-CM

## 2022-03-25 DIAGNOSIS — R25.1 TREMOR: Primary | ICD-10-CM

## 2022-03-25 DIAGNOSIS — J30.2 SEASONAL ALLERGIC RHINITIS, UNSPECIFIED TRIGGER: ICD-10-CM

## 2022-03-25 DIAGNOSIS — R79.9 ABNORMAL FINDING OF BLOOD CHEMISTRY, UNSPECIFIED: ICD-10-CM

## 2022-03-25 DIAGNOSIS — Z82.49 FAMILY HISTORY OF PREMATURE CORONARY ARTERY DISEASE: ICD-10-CM

## 2022-03-25 DIAGNOSIS — G25.81 RESTLESS LEGS SYNDROME: ICD-10-CM

## 2022-03-25 DIAGNOSIS — I48.92 ATRIAL FLUTTER WITH RAPID VENTRICULAR RESPONSE: ICD-10-CM

## 2022-03-25 DIAGNOSIS — N40.1 BENIGN PROSTATIC HYPERPLASIA WITH LOWER URINARY TRACT SYMPTOMS, SYMPTOM DETAILS UNSPECIFIED: ICD-10-CM

## 2022-03-25 DIAGNOSIS — D61.818 PANCYTOPENIA: ICD-10-CM

## 2022-03-25 PROCEDURE — 99214 OFFICE O/P EST MOD 30 MIN: CPT | Performed by: FAMILY MEDICINE

## 2022-03-25 RX ORDER — HYDROCODONE BITARTRATE AND ACETAMINOPHEN 5; 325 MG/1; MG/1
1 TABLET ORAL EVERY 12 HOURS PRN
Qty: 30 TABLET | Refills: 0 | Status: SHIPPED | OUTPATIENT
Start: 2022-03-25 | End: 2022-10-11 | Stop reason: SDUPTHER

## 2022-03-25 RX ORDER — PRAMIPEXOLE DIHYDROCHLORIDE 0.5 MG/1
0.5 TABLET ORAL NIGHTLY
Qty: 90 TABLET | Refills: 1 | Status: SHIPPED | OUTPATIENT
Start: 2022-03-25 | End: 2022-05-25 | Stop reason: SDUPTHER

## 2022-03-25 NOTE — TELEPHONE ENCOUNTER
Prior authorization approved for hydrocodone hydrocodone-acetaminophen 5-325    Start: 2/23/2022    End: 9/21/2022

## 2022-03-25 NOTE — PROGRESS NOTES
Subjective   Demond Menchaca is a 77 y.o. male.     Chief Complaint   Patient presents with   • Med Refill   • Arthritis     Flaring up in left ankle and sometimes left elbow        History of Present Illness   Allergies- doing well on otc prn     Atrial flutter/paroxysmal atrial fibrillation-patient is on anticoagulation.  Has had ablation.  Following with cardiology and they follow his INR.     Pancytopenia - for years, has followed with hematology and now just having levels checked with pcp every 6 months.      Constipation- stable on fiber     BPH- no issues on meds, follows with urology     Lupus-patient is on Plaquenil and following with rheumatology. Stable. Having some pain in elbows and ankles. Has just gotten an injection in his elbow from rheum. [facility]      Restless leg- worst at night. Taking 3 pills at night.      Back pain- has had surgery, uses hydrocodone rarely, once script a year.     Resting tremor in left hand for a few years, worried he has parkinsons. Some balance issues. Having some smaller witting.     The following portions of the patient's history were reviewed and updated as appropriate: allergies, current medications, past family history, past medical history, past social history, past surgical history and problem list.    Past Medical History:   Diagnosis Date   • LASHONDA positive 6/18/2018    2017: elevated ds LASHONDA   • Atrial flutter with rapid ventricular response (HCC)    • Chronic obstructive pulmonary disease (HCC)    • Colon polyp 10/7/2019    Added automatically from request for surgery [id number]   • Diverticulitis of large intestine without perforation or abscess without bleeding 3/7/2016   • Diverticulosis    • ED (erectile dysfunction)    • Elbow fracture, left    • History of blood transfusion    • Hypogonadism in male    • Kidney cysts    • Left femoral shaft fracture ([facility])    • Lower back pain    • PAF (paroxysmal atrial fibrillation) ([facility])    • Peptic  ulceration    • Plantar fasciitis 12/5/2017   • Prostatic hypertrophy, benign    • 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   • Stroke (HCC)     TIA   • Transient cerebral ischemia     H/O TIAs       Past Surgical History:   Procedure Laterality Date   • ABLATION OF DYSRHYTHMIC FOCUS  2012, 2013   • ADENOIDECTOMY  1973   • APPENDECTOMY  1973   • CARDIAC ABLATION  2013, 2014   • CARDIAC CATHETERIZATION  2012, 2013   • CARDIAC ELECTROPHYSIOLOGY PROCEDURE N/A 4/29/2021    Procedure: Ablation atrial flutter--likely left atrial flutter, hx of PVI x 2;  Surgeon: David Hernandez MD;  Location: [facility] CATH INVASIVE LOCATION;  Service: Cardiovascular;  Laterality: N/A;   • CARDIAC ELECTROPHYSIOLOGY PROCEDURE N/A 7/2/2021    Procedure: Ablation atrial fibrillation--redo;  Surgeon: David Hernandez MD;  Location: [facility] CATH INVASIVE LOCATION;  Service: Cardiovascular;  Laterality: N/A;   • COLONOSCOPY  approx 2014    normal per pt    • COLONOSCOPY N/A 1/8/2020    Procedure: COLONOSCOPY to cecum with cold snare biopsies;  Surgeon: Henrique Rust MD;  Location: [facility] ENDOSCOPY;  Service: Gastroenterology   • COLONOSCOPY W/ POLYPECTOMY  11/21/2014    : 1 polyp - not precancerous   • ENDOSCOPY N/A 1/8/2020    Procedure: ESOPHAGOGASTRODUODENOSCOPY;  Surgeon: Henrique Rust MD;  Location: [facility] ENDOSCOPY;  Service: Gastroenterology   • FEMUR FRACTURE SURGERY Left 2007    post fall from the ladder   • LUMBAR LAMINECTOMY  1974    Dr.Lester Lino   • OTHER SURGICAL HISTORY      elbow surgery   • THYROID SURGERY  1986    benign tumor removal,    • TOTAL ELBOW ARTHROPLASTY Left 2007    L, post fall and fracture, hardware in       Family History   Problem Relation Age of Onset   • Hypertension Mother    • Osteoporosis Mother    • Thyroid disease Sister    • Diabetes Sister    • Hypertension Sister    • Colon cancer Maternal Grandmother 60   • Hypertension Father    • Heart disease Other    • Atrial fibrillation Other   "  • Thyroid disease Other    • Pulmonary fibrosis Sister    • Diabetes Other    • Heart disease Other    • Hypertension Other    • Heart disease Sister        Social History     Socioeconomic History   • Marital status:      Spouse name: Latisha   • Number of children: 2   • Years of education: College   • Highest education level: Not asked   Tobacco Use   • Smoking status: Former Smoker     Packs/day: 0.50     Years: 15.00     Pack years: 7.50     Types: Cigarettes     Start date: 1959     Quit date:      Years since quittin.2   • Smokeless tobacco: Never Used   Vaping Use   • Vaping Use: Never used   Substance and Sexual Activity   • Alcohol use: Not Currently     Alcohol/week: 1.0 standard drink     Types: 6 Glasses of wine per week   • Drug use: No   • Sexual activity: Not Currently     Partners: Female     Birth control/protection: None       Review of Systems   Constitutional: Negative for fever.       Objective   Visit Vitals  /82 (BP Location: Left arm, Patient Position: Sitting)   Pulse 52   Temp 97.8 °F (36.6 °C)   Ht 188 cm (74.02\")   Wt 100 kg (220 lb 6.4 oz)   SpO2 100%   BMI 28.29 kg/m²     Body mass index is 28.29 kg/m².  Physical Exam  Constitutional:       Appearance: Normal appearance. He is well-developed.   Cardiovascular:      Rate and Rhythm: Normal rate and regular rhythm.      Heart sounds: Normal heart sounds.   Pulmonary:      Effort: Pulmonary effort is normal.      Breath sounds: Normal breath sounds.   Musculoskeletal:         General: No swelling. Normal range of motion.   Skin:     General: Skin is warm and dry.      Findings: No rash.   Neurological:      General: No focal deficit present.      Mental Status: He is alert and oriented to person, place, and time.      Comments: Resting pill rolling tremor but no cogwheel.    Psychiatric:         Mood and Affect: Mood normal.         Behavior: Behavior normal.           Assessment/Plan   Diagnoses and all orders " for this visit:    1. Tremor (Primary)  -     Ambulatory Referral to Neurology    2. DDD (degenerative disc disease), lumbar  -     HYDROcodone-acetaminophen (NORCO) 5-325 MG per tablet; Take 1 tablet by mouth Every 12 (Twelve) Hours As Needed for Severe Pain .  Dispense: 30 tablet; Refill: 0    3. Restless legs syndrome  -     pramipexole (MIRAPEX) 0.5 MG tablet; Take 1 tablet by mouth Every Night.  Dispense: 90 tablet; Refill: 1    4. PAF (paroxysmal atrial fibrillation) (HCC)    5. Seasonal allergic rhinitis, unspecified trigger    6. Atrial flutter with rapid ventricular response (HCC)  -     Comprehensive Metabolic Panel  -     Lipid Panel    7. Pancytopenia (HCC)  -     CBC & Differential    8. Slow transit constipation    9. Benign prostatic hyperplasia with lower urinary tract symptoms, symptom details unspecified    10. Other forms of systemic lupus erythematosus, unspecified organ involvement status ([facility])    11. Family history of premature coronary artery disease  -     Comprehensive Metabolic Panel  -     Lipid Panel    12. Abnormal finding of blood chemistry, unspecified   -     Lipid Panel

## 2022-03-26 LAB
ALBUMIN SERPL-MCNC: 4.3 G/DL (ref 3.7–4.7)
ALBUMIN/GLOB SERPL: 2 {RATIO} (ref 1.2–2.2)
ALP SERPL-CCNC: 70 IU/L (ref 44–121)
ALT SERPL-CCNC: 28 IU/L (ref 0–44)
AST SERPL-CCNC: 26 IU/L (ref 0–40)
BASOPHILS # BLD AUTO: 0 X10E3/UL (ref 0–0.2)
BASOPHILS NFR BLD AUTO: 1 %
BILIRUB SERPL-MCNC: 0.8 MG/DL (ref 0–1.2)
BUN SERPL-MCNC: 15 MG/DL (ref 8–27)
BUN/CREAT SERPL: 15 (ref 10–24)
CALCIUM SERPL-MCNC: 9.2 MG/DL (ref 8.6–10.2)
CHLORIDE SERPL-SCNC: 103 MMOL/L (ref 96–106)
CHOLEST SERPL-MCNC: 117 MG/DL (ref 100–199)
CO2 SERPL-SCNC: 24 MMOL/L (ref 20–29)
CREAT SERPL-MCNC: 1.01 MG/DL (ref 0.76–1.27)
EGFRCR SERPLBLD CKD-EPI 2021: 76 ML/MIN/1.73
EOSINOPHIL # BLD AUTO: 0.1 X10E3/UL (ref 0–0.4)
EOSINOPHIL NFR BLD AUTO: 5 %
ERYTHROCYTE [DISTWIDTH] IN BLOOD BY AUTOMATED COUNT: 12.8 % (ref 11.6–15.4)
GLOBULIN SER CALC-MCNC: 2.1 G/DL (ref 1.5–4.5)
GLUCOSE SERPL-MCNC: 88 MG/DL (ref 65–99)
HCT VFR BLD AUTO: 40.6 % (ref 37.5–51)
HDLC SERPL-MCNC: 51 MG/DL
HGB BLD-MCNC: 13.6 G/DL (ref 13–17.7)
IMM GRANULOCYTES # BLD AUTO: 0 X10E3/UL (ref 0–0.1)
IMM GRANULOCYTES NFR BLD AUTO: 0 %
LDLC SERPL CALC-MCNC: 53 MG/DL (ref 0–99)
LYMPHOCYTES # BLD AUTO: 0.5 X10E3/UL (ref 0.7–3.1)
LYMPHOCYTES NFR BLD AUTO: 16 %
MCH RBC QN AUTO: 32.6 PG (ref 26.6–33)
MCHC RBC AUTO-ENTMCNC: 33.5 G/DL (ref 31.5–35.7)
MCV RBC AUTO: 97 FL (ref 79–97)
MONOCYTES # BLD AUTO: 0.3 X10E3/UL (ref 0.1–0.9)
MONOCYTES NFR BLD AUTO: 10 %
NEUTROPHILS # BLD AUTO: 1.9 X10E3/UL (ref 1.4–7)
NEUTROPHILS NFR BLD AUTO: 68 %
PLATELET # BLD AUTO: 127 X10E3/UL (ref 150–450)
POTASSIUM SERPL-SCNC: 4.7 MMOL/L (ref 3.5–5.2)
PROT SERPL-MCNC: 6.4 G/DL (ref 6–8.5)
RBC # BLD AUTO: 4.17 X10E6/UL (ref 4.14–5.8)
SODIUM SERPL-SCNC: 140 MMOL/L (ref 134–144)
TRIGL SERPL-MCNC: 61 MG/DL (ref 0–149)
VLDLC SERPL CALC-MCNC: 13 MG/DL (ref 5–40)
WBC # BLD AUTO: 2.8 X10E3/UL (ref 3.4–10.8)

## 2022-03-28 ENCOUNTER — OFFICE VISIT (OUTPATIENT)
Dept: CARDIOLOGY | Facility: CLINIC | Age: 77
End: 2022-03-28

## 2022-03-28 VITALS
BODY MASS INDEX: 28.88 KG/M2 | DIASTOLIC BLOOD PRESSURE: 78 MMHG | HEIGHT: 74 IN | WEIGHT: 225 LBS | HEART RATE: 42 BPM | SYSTOLIC BLOOD PRESSURE: 150 MMHG

## 2022-03-28 DIAGNOSIS — I48.92 ATRIAL FLUTTER WITH RAPID VENTRICULAR RESPONSE: ICD-10-CM

## 2022-03-28 DIAGNOSIS — I48.0 PAF (PAROXYSMAL ATRIAL FIBRILLATION): Primary | ICD-10-CM

## 2022-03-28 DIAGNOSIS — Z98.890 H/O CARDIAC RADIOFREQUENCY ABLATION: ICD-10-CM

## 2022-03-28 PROCEDURE — 99214 OFFICE O/P EST MOD 30 MIN: CPT | Performed by: INTERNAL MEDICINE

## 2022-03-28 PROCEDURE — 93000 ELECTROCARDIOGRAM COMPLETE: CPT | Performed by: INTERNAL MEDICINE

## 2022-03-28 NOTE — PROGRESS NOTES
Date of Office Visit: 2022  Encounter Provider: David Hernandez MD  Place of Service: [facility] CARDIOLOGY  Patient Name: Demond Menchaca  : 1945    Subjective:     Encounter Date:2022      Patient ID: Demond Menchaca is a 77 y.o. male who has a cc of  PAF and I have done 3 LA ablations. Last was a MI and roof line.     He fell and whacked his tailbone this weekend. He had some palp last night for two hours. None since 9 months the last ablation.       No anginal chest pain,   No sig briseno,   No soa,   No fainting,  No orthostasis.   No edema.   Exercise tolerance: limited by dropped foot.     There have been no hospital admission since the last visit.     There have been no bleeding events.       Past Medical History:   Diagnosis Date   • LASHONDA positive 2018    2017: elevated ds LASHONDA   • Atrial flutter with rapid ventricular response (HCC)    • Chronic obstructive pulmonary disease (HCC)    • Colon polyp 10/7/2019    Added automatically from request for surgery [id number]   • Diverticulitis of large intestine without perforation or abscess without bleeding 3/7/2016   • Diverticulosis    • ED (erectile dysfunction)    • Elbow fracture, left    • History of blood transfusion    • Hypogonadism in male    • Kidney cysts    • Left femoral shaft fracture (HCC)    • Lower back pain    • PAF (paroxysmal atrial fibrillation) (HCC)    • Peptic ulceration    • Plantar fasciitis 2017   • Prostatic hypertrophy, benign    • 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HCC) 2012   • Stroke (HCC)     TIA   • Transient cerebral ischemia     H/O TIAs       Social History     Socioeconomic History   • Marital status:      Spouse name: Latisha   • Number of children: 2   • Years of education: College   • Highest education level: Not asked   Tobacco Use   • Smoking status: Former Smoker     Packs/day: 0.50     Years: 15.00     Pack years: 7.50     Types: Cigarettes     Start date: 1959     Quit date:  "1974     Years since quittin.2   • Smokeless tobacco: Never Used   Vaping Use   • Vaping Use: Never used   Substance and Sexual Activity   • Alcohol use: Not Currently     Alcohol/week: 1.0 standard drink     Types: 6 Glasses of wine per week   • Drug use: No   • Sexual activity: Not Currently     Partners: Female     Birth control/protection: None       Family History   Problem Relation Age of Onset   • Hypertension Mother    • Osteoporosis Mother    • Thyroid disease Sister    • Diabetes Sister    • Hypertension Sister    • Colon cancer Maternal Grandmother 60   • Hypertension Father    • Heart disease Other    • Atrial fibrillation Other    • Thyroid disease Other    • Pulmonary fibrosis Sister    • Diabetes Other    • Heart disease Other    • Hypertension Other    • Heart disease Sister        Review of Systems   Constitutional: Negative for fever and night sweats.   HENT: Negative for ear pain and stridor.    Eyes: Negative for discharge and visual halos.   Cardiovascular: Negative for cyanosis.   Respiratory: Negative for hemoptysis and sputum production.    Hematologic/Lymphatic: Negative for adenopathy.   Skin: Negative for nail changes and unusual hair distribution.   Musculoskeletal: Positive for arthritis and back pain. Negative for gout and joint swelling.   Gastrointestinal: Negative for bowel incontinence and flatus.   Genitourinary: Negative for dysuria and flank pain.   Neurological: Negative for seizures and tremors.   Psychiatric/Behavioral: Negative for altered mental status. The patient is not nervous/anxious.             Objective:     Vitals:    22 0913   BP: 150/78   Pulse: (!) 42   Weight: 102 kg (225 lb)   Height: 188 cm (74\")         Eyes:      General:         Right eye: No discharge.         Left eye: No discharge.   HENT:      Head: Normocephalic and atraumatic.   Neck:      Thyroid: No thyromegaly.      Vascular: No JVD.   Pulmonary:      Effort: Pulmonary effort is normal. "      Breath sounds: Normal breath sounds. No rales.   Cardiovascular:      Normal rate. Regular rhythm.      No gallop.   Edema:     Peripheral edema absent.   Abdominal:      General: Bowel sounds are normal.      Palpations: Abdomen is soft.      Tenderness: There is no abdominal tenderness.   Musculoskeletal: Normal range of motion.         General: No deformity. Skin:     General: Skin is warm and dry.      Findings: No erythema.   Neurological:      Mental Status: Alert and oriented to person, place, and time.      Motor: Normal muscle tone.   Psychiatric:         Behavior: Behavior normal.         Thought Content: Thought content normal.           ECG 12 Lead    Date/Time: 3/28/2022 9:29 AM  Performed by: David Hernandez MD  Authorized by: David Hernandez MD   Comparison: compared with previous ECG   Similar to previous ECG  Rhythm: sinus bradycardia            Lab Review:       Assessment:          Diagnosis Plan   1. PAF (paroxysmal atrial fibrillation) (HCC)     2. Atrial flutter with rapid ventricular response (HCC)     3. H/O cardiac radiofrequency ablation--x 3            Plan:     AF -- excellent control> I don't know what happened last night, but perfect sr today. It could have been an arrhythmia that was stirred up by trauma -- fall this weekend.     We watch and continue warfarin. And prn use of metoprolol.

## 2022-03-30 ENCOUNTER — APPOINTMENT (OUTPATIENT)
Dept: GENERAL RADIOLOGY | Facility: HOSPITAL | Age: 77
End: 2022-03-30

## 2022-03-30 PROCEDURE — 72220 X-RAY EXAM SACRUM TAILBONE: CPT | Performed by: GENERAL PRACTICE

## 2022-04-04 ENCOUNTER — TELEPHONE (OUTPATIENT)
Dept: PHARMACY | Facility: HOSPITAL | Age: 77
End: 2022-04-04

## 2022-04-07 ENCOUNTER — ANTICOAGULATION VISIT (OUTPATIENT)
Dept: PHARMACY | Facility: HOSPITAL | Age: 77
End: 2022-04-07

## 2022-04-07 DIAGNOSIS — I48.0 PAF (PAROXYSMAL ATRIAL FIBRILLATION): Primary | ICD-10-CM

## 2022-04-07 LAB — INR PPP: 2.7

## 2022-04-07 NOTE — PROGRESS NOTES
Anticoagulation Clinic Progress Note    Anticoagulation Summary  As of 2022    INR goal:  2.5-3.5   TTR:  83.9 % (3.3 y)   INR used for dosin.70 (2022)   Warfarin maintenance plan:  2.5 mg every Mon, Fri; 5 mg all other days   Weekly warfarin total:  30 mg   No change documented:  Silvia Joseph   Plan last modified:  Jeanette Montes De Oca RPH (2021)   Next INR check:  2022   Priority:  Maintenance   Target end date:      Indications    PAF (paroxysmal atrial fibrillation) (HCC) [I48.0]             Anticoagulation Episode Summary     INR check location:      Preferred lab:      Send INR reminders to:  [facility]  POOL    Comments:  [place]      Anticoagulation Care Providers     Provider Role Specialty Phone number    David Hernandez MD Referring Cardiology [phone]          Clinic Interview:  No pertinent clinical findings have been reported.    INR History:  Anticoagulation Monitoring 3/10/2022 3/24/2022 2022   INR 2.60 2.80 2.70   INR Date 3/10/2022 3/24/2022 2022   INR Goal 2.5-3.5 2.5-3.5 2.5-3.5   Trend Same Same Same   Last Week Total 30 mg 30 mg 30 mg   Next Week Total 30 mg 30 mg 30 mg   Sun 5 mg 5 mg 5 mg   Mon 2.5 mg 2.5 mg 2.5 mg   Tue 5 mg 5 mg 5 mg   Wed 5 mg 5 mg 5 mg   Thu 5 mg 5 mg 5 mg   Fri 2.5 mg 2.5 mg 2.5 mg   Sat 5 mg 5 mg 5 mg   Visit Report - - -   Some recent data might be hidden       Plan:  1. INR is therapeutic today- see above in Anticoagulation Summary.    Demond CROSS Bernarda to continue their warfarin regimen- see above in Anticoagulation Summary.  2. Follow up in 2 weeks  3. Pt has agreed to only be called if INR out of range. They have been instructed to call if any changes in medications, doses, concerns, etc. Patient expresses understanding and has no further questions at this time.    Silvia Joseph

## 2022-04-21 ENCOUNTER — ANTICOAGULATION VISIT (OUTPATIENT)
Dept: PHARMACY | Facility: HOSPITAL | Age: 77
End: 2022-04-21

## 2022-04-21 DIAGNOSIS — I48.0 PAF (PAROXYSMAL ATRIAL FIBRILLATION): Primary | ICD-10-CM

## 2022-04-21 LAB — INR PPP: 2.5

## 2022-04-21 NOTE — PROGRESS NOTES
Anticoagulation Clinic Progress Note    Anticoagulation Summary  As of 2022    INR goal:  2.5-3.5   TTR:  84.1 % (3.4 y)   INR used for dosin.50 (2022)   Warfarin maintenance plan:  2.5 mg every Mon, Fri; 5 mg all other days   Weekly warfarin total:  30 mg   No change documented:  Yana Malloy, Pharmacy Technician   Plan last modified:  Jeanette Montes De Oca RPH (2021)   Next INR check:  2022   Priority:  Maintenance   Target end date:      Indications    PAF (paroxysmal atrial fibrillation) (HCC) [I48.0]             Anticoagulation Episode Summary     INR check location:      Preferred lab:      Send INR reminders to:  [facility] [place]  POOL    Comments:  [place]      Anticoagulation Care Providers     Provider Role Specialty Phone number    David Hernandez MD Referring Cardiology [phone]          Clinic Interview:  No pertinent clinical findings have been reported.    INR History:  Anticoagulation Monitoring 3/24/2022 2022 2022   INR 2.80 2.70 2.50   INR Date 3/24/2022 2022 2022   INR Goal 2.5-3.5 2.5-3.5 2.5-3.5   Trend Same Same Same   Last Week Total 30 mg 30 mg 30 mg   Next Week Total 30 mg 30 mg 30 mg   Sun 5 mg 5 mg 5 mg   Mon 2.5 mg 2.5 mg 2.5 mg   Tue 5 mg 5 mg 5 mg   Wed 5 mg 5 mg 5 mg   Thu 5 mg 5 mg 5 mg   Fri 2.5 mg 2.5 mg 2.5 mg   Sat 5 mg 5 mg 5 mg   Visit Report - - -   Some recent data might be hidden       Plan:  1. INR is therapeutic today- see above in Anticoagulation Summary.    Demond CROSS Bernarda to continue their warfarin regimen- see above in Anticoagulation Summary.  2. Follow up in 2 weeks  3. They have been instructed to call if any changes in medications, doses, concerns, etc. Patient expresses understanding and has no further questions at this time.    Yana Malloy, Pharmacy Technician

## 2022-04-29 ENCOUNTER — PREP FOR SURGERY (OUTPATIENT)
Dept: OTHER | Facility: HOSPITAL | Age: 77
End: 2022-04-29

## 2022-04-29 DIAGNOSIS — K63.5 COLON POLYP: Primary | ICD-10-CM

## 2022-04-29 DIAGNOSIS — Z80.0 FH: COLON CANCER: ICD-10-CM

## 2022-05-04 ENCOUNTER — OFFICE VISIT (OUTPATIENT)
Dept: FAMILY MEDICINE CLINIC | Facility: CLINIC | Age: 77
End: 2022-05-04

## 2022-05-04 VITALS
SYSTOLIC BLOOD PRESSURE: 142 MMHG | WEIGHT: 219 LBS | OXYGEN SATURATION: 98 % | HEART RATE: 73 BPM | DIASTOLIC BLOOD PRESSURE: 78 MMHG | TEMPERATURE: 97.7 F | BODY MASS INDEX: 27.23 KG/M2 | HEIGHT: 75 IN

## 2022-05-04 DIAGNOSIS — K59.01 SLOW TRANSIT CONSTIPATION: Primary | ICD-10-CM

## 2022-05-04 DIAGNOSIS — R07.81 RIB PAIN ON RIGHT SIDE: ICD-10-CM

## 2022-05-04 PROCEDURE — 99213 OFFICE O/P EST LOW 20 MIN: CPT | Performed by: FAMILY MEDICINE

## 2022-05-04 NOTE — PROGRESS NOTES
Subjective   Demond Menchaca is a 77 y.o. male.     Chief Complaint   Patient presents with   • Abdominal Pain     Right side, on and off for 6 months, if he pushes on his side he said it sounds like a popping sound        History of Present Illness   Right sided abd pain for years but worse for 6 months. Worse if he presses on it. Has a gallbladder but eating does not seem to change pain. He does have constipation and does take fiber. No heartburn or belching. Can put his hand on it and make it pop. Pain is in ribs at cartilage connection on the right side.     The following portions of the patient's history were reviewed and updated as appropriate: allergies, current medications, past family history, past medical history, past social history, past surgical history and problem list.    Past Medical History:   Diagnosis Date   • Allergic 2000    Grass/pollen   • LASHONDA positive 06/18/2018    2017: elevated ds LASHONDA   • Arthritis 1995    Hand, neck, back, etc.   • Atrial flutter with rapid ventricular response ([facility])    • Cataract 2005   • Chronic obstructive pulmonary disease ([facility])    • Colon polyp 10/07/2019    Added automatically from request for surgery [id number]   • Diverticulitis of large intestine without perforation or abscess without bleeding 03/07/2016   • Diverticulosis    • ED (erectile dysfunction)    • Elbow fracture, left    • Erectile dysfunction 2005   • History of blood transfusion    • Hypogonadism in male    • Kidney cysts    • Left femoral shaft fracture ([facility])    • Lower back pain    • PAF (paroxysmal atrial fibrillation) ([facility])    • Peptic ulceration    • Plantar fasciitis 12/05/2017   • Pneumonia 1974   • Prostatic hypertrophy, benign    • 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   • Stroke ([facility])     TIA   • Transient cerebral ischemia     H/O TIAs       Past Surgical History:   Procedure Laterality Date   • ABLATION OF DYSRHYTHMIC FOCUS  2012, 2013   • ADENOIDECTOMY  1973   • APPENDECTOMY  1973   •  CARDIAC ABLATION  2013, 2014   • CARDIAC CATHETERIZATION  2012, 2013   • CARDIAC ELECTROPHYSIOLOGY PROCEDURE N/A 04/29/2021    Procedure: Ablation atrial flutter--likely left atrial flutter, hx of PVI x 2;  Surgeon: David Hernandez MD;  Location:  [facility];  Service: Cardiovascular;  Laterality: N/A;   • CARDIAC ELECTROPHYSIOLOGY PROCEDURE N/A 07/02/2021    Procedure: Ablation atrial fibrillation--redo;  Surgeon: David Hernandez MD;  Location:  [facility];  Service: Cardiovascular;  Laterality: N/A;   • COLONOSCOPY  approx 2014    normal per pt    • COLONOSCOPY N/A 01/08/2020    Procedure: COLONOSCOPY to cecum with cold snare biopsies;  Surgeon: Henrique Rust MD;  Location:  [facility];  Service: Gastroenterology   • COLONOSCOPY W/ POLYPECTOMY  11/21/2014    : 1 polyp - not precancerous   • ENDOMETRIAL ABLATION  2012, 2013   • ENDOSCOPY N/A 01/08/2020    Procedure: ESOPHAGOGASTRODUODENOSCOPY;  Surgeon: Henrique Rust MD;  [facility];  Service: Gastroenterology   • FEMUR FRACTURE SURGERY Left 2007    post fall from the ladder   • FRACTURE SURGERY  2007    Elbow   • LUMBAR LAMINECTOMY  1974    Dr.Lester Lino   • OTHER SURGICAL HISTORY      elbow surgery   • SPINE SURGERY  1974   • THYROID SURGERY  1986    benign tumor removal,    • TOTAL ELBOW ARTHROPLASTY Left 2007    L, post fall and fracture, hardware in       Family History   Problem Relation Age of Onset   • Hypertension Mother    • Osteoporosis Mother    • Thyroid disease Sister    • Diabetes Sister    • Hypertension Sister    • Colon cancer Maternal Grandmother 60   • Hypertension Father    • Heart disease Other    • Atrial fibrillation Other    • Thyroid disease Other    • Pulmonary fibrosis Sister    • Diabetes Other    • Heart disease Other    • Hypertension Other    • Heart disease Sister    • Diabetes Sister        Social History     Socioeconomic History   • Marital status:  "     Spouse name: Latisha   • Number of children: 2   • Years of education: College   • Highest education level: Not asked   Tobacco Use   • Smoking status: Former Smoker     Packs/day: 0.50     Years: 15.00     Pack years: 7.50     Types: Cigarettes     Start date: 1959     Quit date: 1974     Years since quittin.3   • Smokeless tobacco: Never Used   Vaping Use   • Vaping Use: Never used   Substance and Sexual Activity   • Alcohol use: Not Currently     Alcohol/week: 6.0 standard drinks     Types: 6 Glasses of wine per week     Comment: Some beer during the summer months   • Drug use: No   • Sexual activity: Not Currently     Partners: Female     Birth control/protection: None       Review of Systems   Constitutional: Negative for fever and unexpected weight loss.   Respiratory: Negative for shortness of breath.        Objective   Visit Vitals  /78 (BP Location: Left arm, Patient Position: Sitting)   Pulse 73   Temp 97.7 °F (36.5 °C)   Ht 190.5 cm (75\")   Wt 99.3 kg (219 lb)   SpO2 98%   BMI 27.37 kg/m²     Body mass index is 27.37 kg/m².  Physical Exam  Constitutional:       General: He is not in acute distress.     Appearance: Normal appearance.   Abdominal:      General: There is no distension.      Palpations: Abdomen is soft.      Tenderness: There is no abdominal tenderness. There is no guarding.   Musculoskeletal:      Comments: Pain on palpation at junction of ribs and cartilage on right side.    Neurological:      General: No focal deficit present.      Mental Status: He is alert and oriented to person, place, and time.   Psychiatric:         Mood and Affect: Mood normal.         Behavior: Behavior normal.           Assessment/Plan   Diagnoses and all orders for this visit:    1. Slow transit constipation (Primary)    2. Rib pain on right side  -     Ambulatory Referral to Physical Therapy Evaluate and treat        Will try PT first and if this is not helping will try either pain " management or chiropractor.

## 2022-05-05 ENCOUNTER — ANTICOAGULATION VISIT (OUTPATIENT)
Dept: PHARMACY | Facility: HOSPITAL | Age: 77
End: 2022-05-05

## 2022-05-05 DIAGNOSIS — I48.0 PAF (PAROXYSMAL ATRIAL FIBRILLATION): Primary | ICD-10-CM

## 2022-05-05 PROBLEM — K63.5 COLON POLYP: Status: ACTIVE | Noted: 2022-05-05

## 2022-05-05 LAB — INR PPP: 2.8

## 2022-05-05 NOTE — PROGRESS NOTES
Anticoagulation Clinic Progress Note    Anticoagulation Summary  As of 2022    INR goal:  2.5-3.5   TTR:  84.3 % (3.4 y)   INR used for dosin.80 (2022)   Warfarin maintenance plan:  2.5 mg every Mon, Fri; 5 mg all other days   Weekly warfarin total:  30 mg   Plan last modified:  Jeanette Montes De Oca RPH (2021)   Next INR check:  2022   Priority:  Maintenance   Target end date:      Indications    PAF (paroxysmal atrial fibrillation) (HCC) [I48.0]             Anticoagulation Episode Summary     INR check location:      Preferred lab:      Send INR reminders to:   [facility]  POOL    Comments:  [place]      Anticoagulation Care Providers     Provider Role Specialty Phone number    David Hernandez MD Referring Cardiology [phone]          Clinic Interview:  No pertinent clinical findings have been reported.    INR History:  Anticoagulation Monitoring 2022   INR 2.70 2.50 2.80   INR Date 2022   INR Goal 2.5-3.5 2.5-3.5 2.5-3.5   Trend Same Same Same   Last Week Total 30 mg 30 mg 30 mg   Next Week Total 30 mg 30 mg 30 mg   Sun 5 mg 5 mg 5 mg   Mon 2.5 mg 2.5 mg 2.5 mg   Tue 5 mg 5 mg 5 mg   Wed 5 mg 5 mg 5 mg   Thu 5 mg 5 mg 5 mg   Fri 2.5 mg 2.5 mg 2.5 mg   Sat 5 mg 5 mg 5 mg   Visit Report - - -   Some recent data might be hidden       Plan:  1. INR is therapeutic today- see above in Anticoagulation Summary.    Demond CROSS Bernarda to continue their warfarin regimen- see above in Anticoagulation Summary.  2. Follow up in 2 weeks  3. They have been instructed to call if any changes in medications, doses, concerns, etc. Patient expresses understanding and has no further questions at this time.    Yana Malloy, Pharmacy Technician

## 2022-05-19 ENCOUNTER — ANTICOAGULATION VISIT (OUTPATIENT)
Dept: PHARMACY | Facility: HOSPITAL | Age: 77
End: 2022-05-19

## 2022-05-19 DIAGNOSIS — I48.0 PAF (PAROXYSMAL ATRIAL FIBRILLATION): Primary | ICD-10-CM

## 2022-05-19 LAB — INR PPP: 2.4

## 2022-05-19 NOTE — PROGRESS NOTES
Anticoagulation Clinic Progress Note    Anticoagulation Summary  As of 2022    INR goal:  2.5-3.5   TTR:  84.2 % (3.4 y)   INR used for dosin.40 (2022)   Warfarin maintenance plan:  2.5 mg every Mon, Fri; 5 mg all other days   Weekly warfarin total:  30 mg   Plan last modified:  Jeanette Montes De Oca RPH (2021)   Next INR check:  2022   Priority:  Maintenance   Target end date:      Indications    PAF (paroxysmal atrial fibrillation) (HCC) [I48.0]  TIA (transient ischemic attack) (Resolved) [G45.9]             Anticoagulation Episode Summary     INR check location:      Preferred lab:      Send INR reminders to:   [facility]  POOL    Comments:  [place]      Anticoagulation Care Providers     Provider Role Specialty Phone number    David Hernandez MD Referring Cardiology [phone]          Clinic Interview:  Patient Findings     Positives:  Change in diet/appetite    Negatives:  Signs/symptoms of thrombosis, Signs/symptoms of bleeding,   Laboratory test error suspected, Change in health, Change in alcohol use,   Change in activity, Upcoming invasive procedure, Emergency department   visit, Upcoming dental procedure, Missed doses, Extra doses, Change in   medications, Hospital admission, Bruising, Other complaints    Comments:  Possibly a little more vit k in diet; however, eats salads   most days of the week already.       Clinical Outcomes     Negatives:  Major bleeding event, Thromboembolic event,   Anticoagulation-related hospital admission, Anticoagulation-related ED   visit, Anticoagulation-related fatality    Comments:  Possibly a little more vit k in diet; however, eats salads   most days of the week already.         INR History:  Anticoagulation Monitoring 2022   INR 2.50 2.80 2.40   INR Date 2022   INR Goal 2.5-3.5 2.5-3.5 2.5-3.5   Trend Same Same Same   Last Week Total 30 mg 30 mg 30 mg   Next Week Total 30 mg 30 mg 32.5  mg   Sun 5 mg 5 mg 5 mg   Mon 2.5 mg 2.5 mg 2.5 mg   Tue 5 mg 5 mg 5 mg   Wed 5 mg 5 mg 5 mg   Thu 5 mg 5 mg 5 mg   Fri 2.5 mg 2.5 mg 5 mg (5/20); Otherwise 2.5 mg   Sat 5 mg 5 mg 5 mg   Visit Report - - -   Some recent data might be hidden       Plan:  1. INR is Subtherapeutic today- see above in Anticoagulation Summary.   Will instruct Demond Menchaca to Change their warfarin regimen- see above in Anticoagulation Summary.  2. Follow up in 2 weeks  3. They have been instructed to call if any changes in medications, doses, concerns, etc. Patient expresses understanding and has no further questions at this time.    Philip Carmona, PharmD

## 2022-05-23 ENCOUNTER — PREP FOR SURGERY (OUTPATIENT)
Dept: OTHER | Facility: HOSPITAL | Age: 77
End: 2022-05-23

## 2022-05-23 ENCOUNTER — TELEPHONE (OUTPATIENT)
Dept: GASTROENTEROLOGY | Facility: CLINIC | Age: 77
End: 2022-05-23

## 2022-05-23 DIAGNOSIS — R13.10 DYSPHAGIA, UNSPECIFIED TYPE: ICD-10-CM

## 2022-05-23 DIAGNOSIS — K63.5 COLON POLYP: Primary | ICD-10-CM

## 2022-05-23 DIAGNOSIS — Z80.0 FH: COLON CANCER: ICD-10-CM

## 2022-05-23 NOTE — TELEPHONE ENCOUNTER
He is scheduled for a colonoscopy in 9/22. He would also like to have an EGD on the same day because of dysphagia. He would need to be off of warfarin for 4 days prior to doing the EGD and colonoscopy.    AL - please schedule him for an EGD and colonoscopy to be done in 9/22. He will need to be off of warfarin for 4 days prior to the scopes. On the day of the scopes have Endo draw a stat PT/INR. x.angela

## 2022-05-24 ENCOUNTER — TELEPHONE (OUTPATIENT)
Dept: FAMILY MEDICINE CLINIC | Facility: CLINIC | Age: 77
End: 2022-05-24

## 2022-05-24 PROBLEM — R13.10 DYSPHAGIA: Status: ACTIVE | Noted: 2022-05-05

## 2022-05-24 NOTE — TELEPHONE ENCOUNTER
Caller: Demond Menchaca    Relationship: Self    Best call back number: [phone] (M)    Requested Prescriptions:    pramipexole (MIRAPEX) 0.5 MG tablet    Pharmacy where request should be sent:  [facility]/pharmacy #[id number] [place], [place] - [address] [address]. AT [facility] [phone] [facility] [phone]         Additional details provided by patient: PATIENT CALLED TO REQUEST A MEDICATION REFILL ON HIS MEDICATION. PATIENT STATES THAT FOR HIM TO GET ANY RELIEF FROM HIS RESTLESS LEG SYNDROME HE HAS TO TAKE 4 PILLS PER DAY- 2 IN THE EVENING AND 2 MORE AFTER DINNER. PATIENT IS WANTING A NEW PRESCRIPTION THAT STATES THAT HE CAN TAKE UP TO 4 PILLS PER DAY TO HELP WITH THE RESTLESS LEG SYNDROME. PATIENT STATES THAT HE HAS A 3 DAY SUPPLY LEFT.            Does the patient have less than a 3 day supply:  [] Yes  [x] No    Rock Rangel Rep   05/24/22 08:45 EDT         THANKS

## 2022-05-24 NOTE — TELEPHONE ENCOUNTER
TOM patient via telephone for EGD/CS. Scheduled 08/20/2022 with arrival time 8:30am. Prep packet mailed to verified address on file. Patient advised arrival time may vary based on [facility] guidelines. TOM Miller--Moody

## 2022-05-25 DIAGNOSIS — G25.81 RESTLESS LEGS SYNDROME: ICD-10-CM

## 2022-05-25 RX ORDER — PRAMIPEXOLE DIHYDROCHLORIDE 0.5 MG/1
.5-1 TABLET ORAL 2 TIMES DAILY
Qty: 360 TABLET | Refills: 1 | Status: SHIPPED | OUTPATIENT
Start: 2022-05-25 | End: 2022-12-30

## 2022-06-02 ENCOUNTER — ANTICOAGULATION VISIT (OUTPATIENT)
Dept: PHARMACY | Facility: HOSPITAL | Age: 77
End: 2022-06-02

## 2022-06-02 DIAGNOSIS — I48.0 PAF (PAROXYSMAL ATRIAL FIBRILLATION): Primary | ICD-10-CM

## 2022-06-02 LAB — INR PPP: 3.4

## 2022-06-02 NOTE — PROGRESS NOTES
Anticoagulation Clinic Progress Note    Anticoagulation Summary  As of 6/2/2022    INR goal:  2.5-3.5   TTR:  84.2 % (3.5 y)   INR used for dosing:  3.40 (6/2/2022)   Warfarin maintenance plan:  2.5 mg every Mon, Fri; 5 mg all other days   Weekly warfarin total:  30 mg   No change documented:  Crystal Pineda RPH   Plan last modified:  Jeanette Montes De Oca RPH (8/26/2021)   Next INR check:  6/16/2022   Priority:  Maintenance   Target end date:      Indications    PAF (paroxysmal atrial fibrillation) (HCC) [I48.0]  TIA (transient ischemic attack) (Resolved) [G45.9]             Anticoagulation Episode Summary     INR check location:      Preferred lab:      Send INR reminders to:  [facility]  POOL    Comments:  [place]      Anticoagulation Care Providers     Provider Role Specialty Phone number    David Hernandez MD Referring Cardiology [phone]          Clinic Interview:  No pertinent clinical findings have been reported.    INR History:  Anticoagulation Monitoring 5/5/2022 5/19/2022 6/2/2022   INR 2.80 2.40 3.40   INR Date 5/5/2022 5/19/2022 6/2/2022   INR Goal 2.5-3.5 2.5-3.5 2.5-3.5   Trend Same Same Same   Last Week Total 30 mg 30 mg 30 mg   Next Week Total 30 mg 32.5 mg 30 mg   Sun 5 mg 5 mg 5 mg   Mon 2.5 mg 2.5 mg 2.5 mg   Tue 5 mg 5 mg 5 mg   Wed 5 mg 5 mg 5 mg   Thu 5 mg 5 mg 5 mg   Fri 2.5 mg 5 mg (5/20); Otherwise 2.5 mg 2.5 mg   Sat 5 mg 5 mg 5 mg   Visit Report - - -   Some recent data might be hidden       Plan:  1. INR is therapeutic today- see above in Anticoagulation Summary.    Demond CROSS Bernarda to continue their warfarin regimen- see above in Anticoagulation Summary.  2. Follow up in 2 weeks  3. Pt has agreed to only be called if INR out of range. They have been instructed to call if any changes in medications, doses, concerns, etc. Patient expresses understanding and has no further questions at this time.    Crystal Pineda RP

## 2022-06-06 ENCOUNTER — OFFICE VISIT (OUTPATIENT)
Dept: NEUROLOGY | Facility: CLINIC | Age: 77
End: 2022-06-06

## 2022-06-06 VITALS
DIASTOLIC BLOOD PRESSURE: 82 MMHG | BODY MASS INDEX: 27.35 KG/M2 | SYSTOLIC BLOOD PRESSURE: 142 MMHG | OXYGEN SATURATION: 89 % | WEIGHT: 220 LBS | HEIGHT: 75 IN | HEART RATE: 49 BPM

## 2022-06-06 DIAGNOSIS — R29.898 WEAKNESS OF BOTH LOWER EXTREMITIES: Primary | ICD-10-CM

## 2022-06-06 DIAGNOSIS — R25.1 TREMOR: ICD-10-CM

## 2022-06-06 DIAGNOSIS — M21.372 FOOT DROP, LEFT: ICD-10-CM

## 2022-06-06 PROCEDURE — 99204 OFFICE O/P NEW MOD 45 MIN: CPT | Performed by: PSYCHIATRY & NEUROLOGY

## 2022-06-06 NOTE — ASSESSMENT & PLAN NOTE
He has weakness in his lower extremities left is weaker than right side.  He has had falls.  He tells me he has been evaluated by physical therapy but it was not related to his falls or trouble with his balance.  He had back surgery in 1974 and he has a foot drop on the left side.  He has had more recent lab work including CMP and previous TSH was normal.  I will check Vit B12 level.  I am also going to refer him to have an EMG/NCS of bilateral lower extremities.  I will refer him to PT for gait and balance but he tells me he will see his nephew who is a physical therapist.  He has had deconditioning due to the COVID19 pandemic.

## 2022-06-06 NOTE — ASSESSMENT & PLAN NOTE
He has had fast frequency action tremors which are consistent with essential tremors.  On examination he does not have any cogwheel rigidity or significant bradykinesia outside of his trouble with his lower extremities.  He does have trouble initiating gait but this appears to be related to his chronic back issues.  He does not have reduced arm swing or classic Parkinsonian gait on evaluation.  He does report reduced sense of smell and possible RBD.  I advised him to start taking melatonin to help with this symptom.  Furthermore I cannot be sure that this is not the earlier signs of PD as he is currently on pramipexole and has been for the past 1.5 year.  I spoke with him about treatment for essential tremors.  He is doing ok at this time and given he is on Coumadin this makes it difficult to prescribe medication like primidone and his heart rate is also low so propranolol would be contraindicated.  At this time as his symptoms are doing ok we will hold off on starting a medication.  I advised him to exercise and go to therapy.  Also advised him to be evaluated with any falls.

## 2022-06-06 NOTE — PROGRESS NOTES
Chief Complaint  Tremors    Subjective          Demond Menchaca presents to [facility] for   HISTORY OF PRESENT ILLNESS:    Demond Menchaca is a 77 year old right handed man who presents to neurology clinic for initial evaluation and treatment of tremors.  He reports his tremors starting about 2 years ago.  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  He does not have much tremors in the right hand.  He has not noticed the tremors at rest and has only noticed the tremors with action.  He has noticed the tremors when he is holding something in his hands.  He has a hard time sometimes getting out of a chair.  He has had trouble with his balance.  He has had some vivid dreams a times.  His sense of smell has reduced.  He feels like his legs are weak.  He reports being pretty sedentary over the past couple of years.  He is on Coumadin for Afib.  He reports a history of TIA 10 years ago before he was on the Coumadin.  He does not drink any caffeine but tells me he used to drink a lot of coffee in the past.  He does not smoke.  He denies any family history of tremors.  He has been using cane to ambulate for the past couple years.  He had back surgery in 1974 and he has a foot drop on the left side.  He has had more recent lab work including CMP and previous TSH was normal.  He has not noticed any changes in his voice.  He has RLS and he is currently being treated with pramipexole.  His heart rate is low.  His INR has been therapeutic.  He had a fall 1-2 months ago due to getting his feet tangled up.  He was evaluated in the ED following this fall.     Past Medical History:   Diagnosis Date   • Allergic 2000    Grass/pollen   • LASHONDA positive 06/18/2018    2017: elevated ds LASHONDA   • Arthritis 1995    Hand, neck, back, etc.   • Atrial flutter with rapid ventricular response (HCC)    • Cataract 2005   • Chronic obstructive pulmonary disease (HCC)    •  Colon polyp 10/07/2019    Added automatically from request for surgery [id number]   • Diverticulitis of large intestine without perforation or abscess without bleeding 2016   • Diverticulosis    • ED (erectile dysfunction)    • Elbow fracture, left    • Erectile dysfunction    • History of blood transfusion    • Hypogonadism in male    • Kidney cysts    • Left femoral shaft fracture (HCC)    • Lower back pain    • PAF (paroxysmal atrial fibrillation) (HCC)    • Peptic ulceration    • Plantar fasciitis 2017   • Pneumonia    • Prostatic hypertrophy, benign    • 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HCC) 2012   • Stroke ([facility])     TIA   • Transient cerebral ischemia     H/O TIAs        Family History   Problem Relation Age of Onset   • Hypertension Mother    • Osteoporosis Mother    • Thyroid disease Sister    • Diabetes Sister    • Hypertension Sister    • Colon cancer Maternal Grandmother 60   • Hypertension Father    • Heart disease Other    • Atrial fibrillation Other    • Thyroid disease Other    • Pulmonary fibrosis Sister    • Diabetes Other    • Heart disease Other    • Hypertension Other    • Heart disease Sister    • Diabetes Sister         Social History     Socioeconomic History   • Marital status:      Spouse name: Latisha   • Number of children: 2   • Years of education: College   • Highest education level: Not asked   Tobacco Use   • Smoking status: Former Smoker     Packs/day: 0.50     Years: 15.00     Pack years: 7.50     Types: Cigarettes     Start date: 1959     Quit date: 1974     Years since quittin.4   • Smokeless tobacco: Never Used   Vaping Use   • Vaping Use: Never used   Substance and Sexual Activity   • Alcohol use: Not Currently     Alcohol/week: 6.0 standard drinks     Types: 6 Glasses of wine per week     Comment: Some beer during the summer months   • Drug use: No   • Sexual activity: Not Currently     Partners: Female     Birth control/protection: None     "    I have reviewed and confirmed the accuracy of the ROS as documented by the MA/LPN/RN Yonis Welch MD    Review of Systems   Constitutional: Negative for activity change, appetite change and unexpected weight gain.   HENT: Positive for sneezing and trouble swallowing.    Eyes: Negative for blurred vision, double vision and itching.   Gastrointestinal: Negative for abdominal pain and constipation.   Neurological: Positive for tremors and weakness. Negative for dizziness, seizures, syncope, facial asymmetry, speech difficulty, light-headedness, numbness, headache, memory problem and confusion.   Hematological: Bruises/bleeds easily.   Psychiatric/Behavioral: Negative for agitation, behavioral problems, decreased concentration, dysphoric mood, hallucinations, self-injury, sleep disturbance, suicidal ideas, negative for hyperactivity, depressed mood and stress. The patient is not nervous/anxious.         Objective   Vital Signs:   /82 (BP Location: Right arm, Patient Position: Sitting)   Pulse (!) 49   Ht 190.5 cm (75\")   Wt 99.8 kg (220 lb)   SpO2 (!) 89%   BMI 27.50 kg/m²       PHYSICAL EXAM:    General   Mental Status - Alert. General Appearance - Well developed, Well groomed, Oriented and Cooperative. Orientation - Oriented X3.       Head and Neck  Head - normocephalic, atraumatic with no lesions or palpable masses.  Neck    Global Assessment - supple.       Eye   Sclera/Conjunctiva - Bilateral - Normal.    ENMT  Mouth and Throat   Oral Cavity/Oropharynx: Oropharynx - the soft palate,uvula and tongue are normal in appearance.    Chest and Lung Exam   Chest - lung clear to auscultation bilaterally.    Cardiovascular   Cardiovascular examination reveals  - normal heart sounds, regular rate and rhythm.    Neurologic   Mental Status: Speech - Normal. Cognitive function - appropriate fund of knowledge. No impairment of attention, Impairment of concentration, impairment of long term memory or impairment " of short term memory.  Cranial Nerves:   II Optic: Visual acuity - Left - Normal. Right - Normal. Visual fields - Normal (to confrontation).  III Oculomotor: Pupillary constriction - Left - Normal. Right - Normal.  VII Facial: - Normal Bilaterally.   IX Glossopharyngeal / X Vagus - Normal.  XI Accessory: Trapezius - Bilateral - Normal. Sternocleidomastoid - Bilateral - Normal.  XII Hypoglossal - Bilateral - Normal.  Eye Movements: - Normal Bilaterally.  Sensory:   Light Touch: Intact - Globally.  Motor:   Bulk and Contour: - Reduced in lower extremities.  Tone: - Normal.  Tremor: Fast frequency action tremors.    Strength: 5/5 normal muscle strength in upper extremities and 4/5 in lower extremities and weaker in left leg with 3/5 in left dorsiflexion with reduced muscle bulk.   General Assessment of Reflexes: - deep tendon reflexes are normal. Coordination - No Impairment of finger-to-nose or Impairment of rapid alternating movements. Gait - Wide based, hemiparetic with left sided foot drop.  Uses cane to help ambulate.      Result Review :                 Assessment and Plan    Problem List Items Addressed This Visit        Musculoskeletal and Injuries    Weakness of both lower extremities - Primary    Current Assessment & Plan     He has weakness in his lower extremities left is weaker than right side.  He has had falls.  He tells me he has been evaluated by physical therapy but it was not related to his falls or trouble with his balance.  He had back surgery in 1974 and he has a foot drop on the left side.  He has had more recent lab work including CMP and previous TSH was normal.  I will check Vit B12 level.  I am also going to refer him to have an EMG/NCS of bilateral lower extremities.  I will refer him to PT for gait and balance but he tells me he will see his nephew who is a physical therapist.  He has had deconditioning due to the COVID19 pandemic.             Relevant Orders    EMG & Nerve Conduction Test        Neuro    Foot drop, left    Relevant Orders    EMG & Nerve Conduction Test    Tremor    Current Assessment & Plan     He has had fast frequency action tremors which are consistent with essential tremors.  On examination he does not have any cogwheel rigidity or significant bradykinesia outside of his trouble with his lower extremities.  He does have trouble initiating gait but this appears to be related to his chronic back issues.  He does not have reduced arm swing or classic Parkinsonian gait on evaluation.  He does report reduced sense of smell and possible RBD.  I advised him to start taking melatonin to help with this symptom.  Furthermore I cannot be sure that this is not the earlier signs of PD as he is currently on pramipexole and has been for the past 1.5 year.  I spoke with him about treatment for essential tremors.  He is doing ok at this time and given he is on Coumadin this makes it difficult to prescribe medication like primidone and his heart rate is also low so propranolol would be contraindicated.  At this time as his symptoms are doing ok we will hold off on starting a medication.  I advised him to exercise and go to therapy.  Also advised him to be evaluated with any falls.                   I spent 50 minutes caring for Demond on this date of service. This time includes time spent by me in the following activities:preparing for the visit, reviewing tests, obtaining and/or reviewing a separately obtained history, performing a medically appropriate examination and/or evaluation , counseling and educating the patient/family/caregiver, ordering medications, tests, or procedures, documenting information in the medical record and care coordination    Follow Up   No follow-ups on file.  Patient was given instructions and counseling regarding his condition or for health maintenance advice. Please see specific information pulled into the AVS if appropriate.

## 2022-06-07 ENCOUNTER — TELEPHONE (OUTPATIENT)
Dept: NEUROLOGY | Facility: CLINIC | Age: 77
End: 2022-06-07

## 2022-06-07 NOTE — TELEPHONE ENCOUNTER
Provider:  DON  Caller: PATIENT  Relationship to Patient: SELF  Pharmacy: N/A  Phone Number: [phone]  Reason for Call: PATIENT TELEPHONED; HAS SOME QUESTIONS RE: NOTES WHICH WERE PLACED IN PATIENT MY CHART ACCOUNT.    IT WAS NOTED IN HIS MY CHART TO AVOID TAKING THE CARBIDOPA-LEVODOPA (WHICH PATIENT DOES NOT TAKE) WITH FOOD & TO CONTACT [facility] OR [facility] FOR ADDITIONAL INFORMATION & TO CHECK OUT LOCAL EVENTS RE: [facility].    PATIENT IS CONFUSED BECAUSE HE DOESN'T HAVE [facility].    PLEASE REVIEW & ADVISE.    THANK YOU.

## 2022-06-07 NOTE — TELEPHONE ENCOUNTER
Please advise- I see at the end of the visit note you put information about parkinsons in there. Is that just as an FYI? Or placed in the note by mistake?

## 2022-06-07 NOTE — TELEPHONE ENCOUNTER
Pt was wondering if you can remove that portion of the note- since he doesn't have parkinsons he feels it shouldn't be in the note.

## 2022-06-16 ENCOUNTER — ANTICOAGULATION VISIT (OUTPATIENT)
Dept: PHARMACY | Facility: HOSPITAL | Age: 77
End: 2022-06-16

## 2022-06-16 ENCOUNTER — PATIENT ROUNDING (BHMG ONLY) (OUTPATIENT)
Dept: NEUROLOGY | Facility: CLINIC | Age: 77
End: 2022-06-16

## 2022-06-16 DIAGNOSIS — I48.0 PAF (PAROXYSMAL ATRIAL FIBRILLATION): Primary | ICD-10-CM

## 2022-06-16 LAB — INR PPP: 2.8

## 2022-06-16 NOTE — PROGRESS NOTES
Anticoagulation Clinic Progress Note    Anticoagulation Summary  As of 2022    INR goal:  2.5-3.5   TTR:  84.4 % (3.5 y)   INR used for dosin.80 (2022)   Warfarin maintenance plan:  2.5 mg every Mon, Fri; 5 mg all other days   Weekly warfarin total:  30 mg   No change documented:  Jeanette Montes De Oca RPH   Plan last modified:  Jeanette Montes De Oca RPH (2021)   Next INR check:  2022   Priority:  Maintenance   Target end date:      Indications    PAF (paroxysmal atrial fibrillation) (HCC) [I48.0]  TIA (transient ischemic attack) (Resolved) [G45.9]             Anticoagulation Episode Summary     INR check location:      Preferred lab:      Send INR reminders to:   GAYLE PETERSON  POOL    Comments:  [place]      Anticoagulation Care Providers     Provider Role Specialty Phone number    David Hernandez MD Referring Cardiology [phone]          Clinic Interview:  No pertinent clinical findings have been reported.    INR History:  Anticoagulation Monitoring 2022   INR 2.40 3.40 2.80   INR Date 2022   INR Goal 2.5-3.5 2.5-3.5 2.5-3.5   Trend Same Same Same   Last Week Total 30 mg 30 mg 30 mg   Next Week Total 32.5 mg 30 mg 30 mg   Sun 5 mg 5 mg 5 mg   Mon 2.5 mg 2.5 mg 2.5 mg   Tue 5 mg 5 mg 5 mg   Wed 5 mg 5 mg 5 mg   Thu 5 mg 5 mg 5 mg   Fri 5 mg (); Otherwise 2.5 mg 2.5 mg 2.5 mg   Sat 5 mg 5 mg 5 mg   Visit Report - - -   Some recent data might be hidden       Plan:  1. INR is therapeutic today- see above in Anticoagulation Summary.    Demond CROSS Bernarda to continue their warfarin regimen- see above in Anticoagulation Summary.  2. Follow up in 2 weeks  3. They have been instructed to call if any changes in medications, doses, concerns, etc. Patient expresses understanding and has no further questions at this time.    Jeanette Montes De Oca RPH

## 2022-06-30 ENCOUNTER — ANTICOAGULATION VISIT (OUTPATIENT)
Dept: PHARMACY | Facility: HOSPITAL | Age: 77
End: 2022-06-30

## 2022-06-30 DIAGNOSIS — I48.0 PAF (PAROXYSMAL ATRIAL FIBRILLATION): Primary | ICD-10-CM

## 2022-06-30 LAB — INR PPP: 2.7

## 2022-06-30 NOTE — PROGRESS NOTES
Anticoagulation Clinic Progress Note    Anticoagulation Summary  As of 2022    INR goal:  2.5-3.5   TTR:  84.6 % (3.6 y)   INR used for dosin.70 (2022)   Warfarin maintenance plan:  2.5 mg every Mon, Fri; 5 mg all other days   Weekly warfarin total:  30 mg   No change documented:  Yana Malloy, Pharmacy Technician   Plan last modified:  Jeanette Montes De Oca RPH (2021)   Next INR check:  2022   Priority:  Maintenance   Target end date:      Indications    PAF (paroxysmal atrial fibrillation) (HCC) [I48.0]  TIA (transient ischemic attack) (Resolved) [G45.9]             Anticoagulation Episode Summary     INR check location:      Preferred lab:      Send INR reminders to:  [facility]  POOL    Comments:  [place]      Anticoagulation Care Providers     Provider Role Specialty Phone number    David Hernandez MD Referring Cardiology [phone]          Clinic Interview:  No pertinent clinical findings have been reported.    INR History:  Anticoagulation Monitoring 2022   INR 3.40 2.80 2.70   INR Date 2022   INR Goal 2.5-3.5 2.5-3.5 2.5-3.5   Trend Same Same Same   Last Week Total 30 mg 30 mg 30 mg   Next Week Total 30 mg 30 mg 30 mg   Sun 5 mg 5 mg 5 mg   Mon 2.5 mg 2.5 mg 2.5 mg   Tue 5 mg 5 mg 5 mg   Wed 5 mg 5 mg 5 mg   Thu 5 mg 5 mg 5 mg   Fri 2.5 mg 2.5 mg 2.5 mg   Sat 5 mg 5 mg 5 mg   Visit Report - - -   Some recent data might be hidden       Plan:  1. INR is therapeutic today- see above in Anticoagulation Summary.    Demond CROSS Bernarda to continue their warfarin regimen- see above in Anticoagulation Summary.  2. Follow up in 2 weeks  3. Pt has agreed to only be called if INR out of range. They have been instructed to call if any changes in medications, doses, concerns, etc. Patient expresses understanding and has no further questions at this time.    Yana Malloy, Pharmacy Technician

## 2022-07-14 ENCOUNTER — ANTICOAGULATION VISIT (OUTPATIENT)
Dept: PHARMACY | Facility: HOSPITAL | Age: 77
End: 2022-07-14

## 2022-07-14 DIAGNOSIS — I48.0 PAF (PAROXYSMAL ATRIAL FIBRILLATION): Primary | ICD-10-CM

## 2022-07-14 LAB — INR PPP: 3.3

## 2022-07-14 NOTE — PROGRESS NOTES
Anticoagulation Clinic Progress Note    Anticoagulation Summary  As of 7/14/2022    INR goal:  2.5-3.5   TTR:  84.8 % (3.6 y)   INR used for dosing:  3.30 (7/14/2022)   Warfarin maintenance plan:  2.5 mg every Mon, Fri; 5 mg all other days   Weekly warfarin total:  30 mg   No change documented:  Chapis Jaimes, Yessenia   Plan last modified:  Jeanette Montes De Oca RPH (8/26/2021)   Next INR check:  7/28/2022   Priority:  Maintenance   Target end date:      Indications    PAF (paroxysmal atrial fibrillation) (HCC) [I48.0]  TIA (transient ischemic attack) (Resolved) [G45.9]             Anticoagulation Episode Summary     INR check location:      Preferred lab:      [facility]    Comments:  [place]      Anticoagulation Care Providers     Provider Role Specialty Phone number    David Hernandez MD Referring Cardiology [phone]          Clinic Interview:  Patient Findings     Negatives:  Signs/symptoms of thrombosis, Signs/symptoms of bleeding,   Laboratory test error suspected, Change in health, Change in alcohol use,   Change in activity, Upcoming invasive procedure, Emergency department   visit, Upcoming dental procedure, Missed doses, Extra doses, Change in   medications, Change in diet/appetite, Hospital admission, Bruising, Other   complaints      Clinical Outcomes     Negatives:  Major bleeding event, Thromboembolic event,   Anticoagulation-related hospital admission, Anticoagulation-related ED   visit, Anticoagulation-related fatality        INR History:  Anticoagulation Monitoring 6/16/2022 6/30/2022 7/14/2022   INR 2.80 2.70 3.30   INR Date 6/16/2022 6/30/2022 7/14/2022   INR Goal 2.5-3.5 2.5-3.5 2.5-3.5   Trend Same Same Same   Last Week Total 30 mg 30 mg 30 mg   Next Week Total 30 mg 30 mg 30 mg   Sun 5 mg 5 mg 5 mg   Mon 2.5 mg 2.5 mg 2.5 mg   Tue 5 mg 5 mg 5 mg   Wed 5 mg 5 mg 5 mg   Thu 5 mg 5 mg 5 mg   Fri 2.5 mg 2.5 mg 2.5 mg   Sat 5 mg 5 mg 5 mg   Visit Report  - - -   Some recent data might be hidden       Plan:  1. INR is Therapeutic today- see above in Anticoagulation Summary.   Will instruct Demond Menchaca to Continue their warfarin regimen- see above in Anticoagulation Summary.  2. Follow up in 2 weeks  3. They have been instructed to call if any changes in medications, doses, concerns, etc. Patient expresses understanding and has no further questions at this time.    Chapis Jaimes, PharmD

## 2022-07-28 ENCOUNTER — ANTICOAGULATION VISIT (OUTPATIENT)
Dept: PHARMACY | Facility: HOSPITAL | Age: 77
End: 2022-07-28

## 2022-07-28 DIAGNOSIS — I48.0 PAF (PAROXYSMAL ATRIAL FIBRILLATION): Primary | ICD-10-CM

## 2022-07-28 LAB — INR PPP: 3

## 2022-07-28 NOTE — PROGRESS NOTES
Anticoagulation Clinic Progress Note    Anticoagulation Summary  As of 7/28/2022    INR goal:  2.5-3.5   TTR:  84.9 % (3.6 y)   INR used for dosing:  3.00 (7/28/2022)   Warfarin maintenance plan:  2.5 mg every Mon, Fri; 5 mg all other days   Weekly warfarin total:  30 mg   No change documented:  Jeanette Montes De Oca RPH   Plan last modified:  Jeanette Montes De Oca RPH (8/26/2021)   Next INR check:  8/11/2022   Priority:  Maintenance   Target end date:      Indications    PAF (paroxysmal atrial fibrillation) (HCC) [I48.0]  TIA (transient ischemic attack) (Resolved) [G45.9]             Anticoagulation Episode Summary     INR check location:      Preferred lab:      Send INR reminders to:   [facility]  POOL    Comments:  [place]      Anticoagulation Care Providers     Provider Role Specialty Phone number    David Hernandez MD Referring Cardiology [phone]          Clinic Interview:  No pertinent clinical findings have been reported.    INR History:  Anticoagulation Monitoring 6/30/2022 7/14/2022 7/28/2022   INR 2.70 3.30 3.00   INR Date 6/30/2022 7/14/2022 7/28/2022   INR Goal 2.5-3.5 2.5-3.5 2.5-3.5   Trend Same Same Same   Last Week Total 30 mg 30 mg 30 mg   Next Week Total 30 mg 30 mg 30 mg   Sun 5 mg 5 mg 5 mg   Mon 2.5 mg 2.5 mg 2.5 mg   Tue 5 mg 5 mg 5 mg   Wed 5 mg 5 mg 5 mg   Thu 5 mg 5 mg 5 mg   Fri 2.5 mg 2.5 mg 2.5 mg   Sat 5 mg 5 mg 5 mg   Visit Report - - -   Some recent data might be hidden       Plan:  1. INR is therapeutic today- see above in Anticoagulation Summary.    Demond CROSS Bernarda to continue their warfarin regimen- see above in Anticoagulation Summary.  2. Follow up in 2 weeks  3. Pt has agreed to only be called if INR out of range. They have been instructed to call if any changes in medications, doses, concerns, etc. Patient expresses understanding and has no further questions at this time.    Jeanette Montes De Oca RPH

## 2022-08-08 NOTE — TELEPHONE ENCOUNTER
Rx Refill Note  Requested Prescriptions     Pending Prescriptions Disp Refills   • metoprolol tartrate (LOPRESSOR) 25 MG tablet [Pharmacy Med Name: METOPROLOL TARTRATE 25 MG TAB] 180 tablet 3     Sig: TAKE 1 TABLET BY MOUTH TWICE A DAY      Last office visit with prescribing clinician: 3/28/2022 VS      Next office visit with prescribing clinician: 10/24/2022 VS           Lisette Ocampo MA  08/08/22, 15:15 EDT

## 2022-08-11 ENCOUNTER — ANTICOAGULATION VISIT (OUTPATIENT)
Dept: PHARMACY | Facility: HOSPITAL | Age: 77
End: 2022-08-11

## 2022-08-11 ENCOUNTER — TELEPHONE (OUTPATIENT)
Dept: CARDIOLOGY | Facility: CLINIC | Age: 77
End: 2022-08-11

## 2022-08-11 DIAGNOSIS — I48.0 PAF (PAROXYSMAL ATRIAL FIBRILLATION): Primary | ICD-10-CM

## 2022-08-11 LAB — INR PPP: 3

## 2022-08-11 NOTE — PROGRESS NOTES
Anticoagulation Clinic Progress Note    Anticoagulation Summary  As of 8/11/2022    INR goal:  2.5-3.5   TTR:  85.1 % (3.7 y)   INR used for dosing:  3.00 (8/11/2022)   Warfarin maintenance plan:  2.5 mg every Mon, Fri; 5 mg all other days   Weekly warfarin total:  30 mg   No change documented:  Silvia Joseph   Plan last modified:  Jeanette Montes De Oca RPH (8/26/2021)   Next INR check:  8/25/2022   Priority:  Maintenance   Target end date:      Indications    PAF (paroxysmal atrial fibrillation) (HCC) [I48.0]  TIA (transient ischemic attack) (Resolved) [G45.9]             Anticoagulation Episode Summary     INR check location:      Preferred lab:      Send INR reminders to:   [facility]  POOL    Comments:  [place]      Anticoagulation Care Providers     Provider Role Specialty Phone number    David Hernandez MD Referring Cardiology [phone]          Clinic Interview:  No pertinent clinical findings have been reported.    INR History:  Anticoagulation Monitoring 7/14/2022 7/28/2022 8/11/2022   INR 3.30 3.00 3.00   INR Date 7/14/2022 7/28/2022 8/11/2022   INR Goal 2.5-3.5 2.5-3.5 2.5-3.5   Trend Same Same Same   Last Week Total 30 mg 30 mg 30 mg   Next Week Total 30 mg 30 mg 30 mg   Sun 5 mg 5 mg 5 mg   Mon 2.5 mg 2.5 mg 2.5 mg   Tue 5 mg 5 mg 5 mg   Wed 5 mg 5 mg 5 mg   Thu 5 mg 5 mg 5 mg   Fri 2.5 mg 2.5 mg 2.5 mg   Sat 5 mg 5 mg 5 mg   Visit Report - - -   Some recent data might be hidden       Plan:  1. INR is therapeutic today- see above in Anticoagulation Summary.    Demond CROSS Bernarda to continue their warfarin regimen- see above in Anticoagulation Summary.  2. Follow up in 2 weeks  3. Pt has agreed to only be called if INR out of range. They have been instructed to call if any changes in medications, doses, concerns, etc. Patient expresses understanding and has no further questions at this time.    Silvia Joseph

## 2022-08-11 NOTE — TELEPHONE ENCOUNTER
Pt is scheduled for a colonoscopy 9-20. He is asking if he can move forward anf also hold his warfarin 4 days prior to procedure. He stated he has only has 1 2 hour episode about 6 mnths ago since his ablation in 2021. Pt HAS stroke history...Tatum

## 2022-08-12 NOTE — TELEPHONE ENCOUNTER
Called Dr. Rust's office and left a message with Irais for Dr. Rust to call you on your cell...Tatum

## 2022-08-13 ENCOUNTER — TELEPHONE (OUTPATIENT)
Dept: GASTROENTEROLOGY | Facility: CLINIC | Age: 77
End: 2022-08-13

## 2022-08-13 NOTE — TELEPHONE ENCOUNTER
"Irais Jackman RegSched Rep Heine, Kevin, MD  Phone Number: [phone]      DR ANTON \"S OFFICE CALLED AND WOULD LIKE YOU TO CALL HIM REGARDING HOLDING HIS ANTI COAGULANT FOR HIS UPCOMING PROCEDURE.   8/13/22 - I called Dr. Anton and discussed with him. He is concerned about taking the patient off of Warfarin (on it for a fib and h/o CVA) prior to his EGD (we are doing it for dysphagia) and colonsocopy (we are doing it for his h/o colon polyps, FH of colon cancer and colon polyps, and the fact that his last c/s had a \"fair prep only\". Dr. Anton said that we could \"bridge\" the patient when off of warfarin.       I then talked to the patient about this. The patient feels pretty good about his a fib since he can tell when he is in NSR and when in a fib. He says that he is rarely in a fib. I recommended that the patient go talk to Dr. Anton and for them to decide if we are going to do the EGD and colonoscopy (with him off of Warfarin) or not.       Dr. ALONZO SANTOS. [facility]  "

## 2022-08-15 NOTE — TELEPHONE ENCOUNTER
Given his total lack of A. fib since the last ablation and the fact that it was just a TIA that he had I think he can have interruption of his warfarin for his short a time as possible without any bridging needed

## 2022-08-16 NOTE — TELEPHONE ENCOUNTER
"I tried to call Mr. Menchaca about Dr. PAT Hernandez's most recent note. Please call the patient and tell him that Dr. PAT Hernandez sent me a message that said (about Mr. Menchaca's upcoming EGD and colonoscopy that are already scheduled in 9/22):  \"Given his total lack of A. fib since the last ablation and the fact that it was just a TIA that he had I think he can have interruption of his warfarin for his short a time as possible without any bridging needed.\"       Based on the above note from Dr. PAT Hernandez I am comfortable doing his scopes next month with him being off of warfarin for 4 days prior to the scopes. If Mr. Menchaca is OK with that then let's go ahead with the scheduled EGD and colonoscopy next month. Mr. Menchaca will be off of warfarin for 4 days prior to the scopes. Thx.kjh  "

## 2022-08-16 NOTE — TELEPHONE ENCOUNTER
Called pt and advised of Dr Rust and Dr Hernandez's note. Advised pt that the last day he would take his warfarin would be 09/15.  Pt verb understanding and states is comfortable with proceeding with scopes. Update sent to Dr Rust.

## 2022-08-26 ENCOUNTER — ANTICOAGULATION VISIT (OUTPATIENT)
Dept: PHARMACY | Facility: HOSPITAL | Age: 77
End: 2022-08-26

## 2022-08-26 DIAGNOSIS — I48.0 PAF (PAROXYSMAL ATRIAL FIBRILLATION): Primary | ICD-10-CM

## 2022-08-26 LAB — INR PPP: 2.3

## 2022-08-26 NOTE — PROGRESS NOTES
Anticoagulation Clinic Progress Note    Anticoagulation Summary  As of 2022    INR goal:  2.5-3.5   TTR:  84.8 % (3.7 y)   INR used for dosin.30 (2022)   Warfarin maintenance plan:  2.5 mg every Mon, Fri; 5 mg all other days   Weekly warfarin total:  30 mg   Plan last modified:  Jeanette Montes De Oca RPH (2021)   Next INR check:  2022   Priority:  Maintenance   Target end date:      Indications    PAF (paroxysmal atrial fibrillation) (HCC) [I48.0]  TIA (transient ischemic attack) (Resolved) [G45.9]             Anticoagulation Episode Summary     INR check location:      Preferred lab:      Send INR reminders to:   [facility]    Comments:  [place]      Anticoagulation Care Providers     Provider Role Specialty Phone number    David Hernandez MD Referring Cardiology [phone]          Clinic Interview:  Patient Findings     Negatives:  Signs/symptoms of thrombosis, Signs/symptoms of bleeding,   Laboratory test error suspected, Change in health, Change in alcohol use,   Change in activity, Upcoming invasive procedure, Emergency department   visit, Upcoming dental procedure, Missed doses, Extra doses, Change in   medications, Change in diet/appetite, Hospital admission, Bruising, Other   complaints      Clinical Outcomes     Negatives:  Major bleeding event, Thromboembolic event,   Anticoagulation-related hospital admission, Anticoagulation-related ED   visit, Anticoagulation-related fatality        INR History:  Anticoagulation Monitoring 2022   INR 3.00 3.00 2.30   INR Date 2022   INR Goal 2.5-3.5 2.5-3.5 2.5-3.5   Trend Same Same Same   Last Week Total 30 mg 30 mg 30 mg   Next Week Total 30 mg 30 mg 32.5 mg   Sun 5 mg 5 mg 5 mg   Mon 2.5 mg 2.5 mg 2.5 mg   Tue 5 mg 5 mg 5 mg   Wed 5 mg 5 mg 5 mg   Thu 5 mg 5 mg 5 mg   Fri 2.5 mg 2.5 mg 5 mg (); Otherwise 2.5 mg   Sat 5 mg 5 mg 5 mg   Visit Report - - -   Some recent data  might be hidden       Plan:  1. INR is Subtherapeutic today- see above in Anticoagulation Summary.   Will instruct Demond Menchaca to Increase their warfarin regimen- see above in Anticoagulation Summary.  2. Follow up in 2 weeks  3. They have been instructed to call if any changes in medications, doses, concerns, etc. Patient expresses understanding and has no further questions at this time.    Jeanette Montes De Oca, [facility]

## 2022-09-08 ENCOUNTER — ANTICOAGULATION VISIT (OUTPATIENT)
Dept: PHARMACY | Facility: HOSPITAL | Age: 77
End: 2022-09-08

## 2022-09-08 DIAGNOSIS — I48.0 PAF (PAROXYSMAL ATRIAL FIBRILLATION): Primary | ICD-10-CM

## 2022-09-08 LAB — INR PPP: 3.1

## 2022-09-08 NOTE — PROGRESS NOTES
Anticoagulation Clinic Progress Note    Anticoagulation Summary  As of 9/8/2022    INR goal:  2.5-3.5   TTR:  84.8 % (3.8 y)   INR used for dosing:  3.10 (9/8/2022)   Warfarin maintenance plan:  2.5 mg every Mon, Fri; 5 mg all other days   Weekly warfarin total:  30 mg   Plan last modified:  Jeanette Montes De Oca RPH (8/26/2021)   Next INR check:  9/23/2022   Priority:  Maintenance   Target end date:      Indications    PAF (paroxysmal atrial fibrillation) (HCC) [I48.0]  TIA (transient ischemic attack) (Resolved) [G45.9]             Anticoagulation Episode Summary     INR check location:      Preferred lab:      Send INR reminders to:   [facility]  [facility]    Comments:  [place]      Anticoagulation Care Providers     Provider Role Specialty Phone number    David Hernandez MD Referring Cardiology [phone]          Clinic Interview:  Patient Findings     Positives:  Upcoming invasive procedure    Negatives:  Signs/symptoms of thrombosis, Signs/symptoms of bleeding,   Laboratory test error suspected, Change in health, Change in alcohol use,   Change in activity, Emergency department visit, Upcoming dental procedure,   Missed doses, Extra doses, Change in medications, Change in diet/appetite,   Hospital admission, Bruising, Other complaints    Comments:  colonoscopy/ egd scheduled for 9/20- Dr. Hernandez approved a 4   day hold with no bridge (see 8/13/22 telephone note).       Clinical Outcomes     Negatives:  Major bleeding event, Thromboembolic event,   Anticoagulation-related hospital admission, Anticoagulation-related ED   visit, Anticoagulation-related fatality    Comments:  colonoscopy/ egd scheduled for 9/20- Dr. Hernandez approved a 4   day hold with no bridge (see 8/13/22 telephone note).         INR History:  Anticoagulation Monitoring 8/11/2022 8/26/2022 9/8/2022   INR 3.00 2.30 3.10   INR Date 8/11/2022 8/26/2022 9/8/2022   INR Goal 2.5-3.5 2.5-3.5 2.5-3.5   Trend Same Same Same   Last Week Total  30 mg 30 mg 30 mg   Next Week Total 30 mg 32.5 mg 30 mg   Sun 5 mg 5 mg Hold (9/18); Otherwise 5 mg   Mon 2.5 mg 2.5 mg Hold (9/19); Otherwise 2.5 mg   Tue 5 mg 5 mg 7.5 mg (9/20); Otherwise 5 mg   Wed 5 mg 5 mg 7.5 mg (9/21); Otherwise 5 mg   Thu 5 mg 5 mg 5 mg   Fri 2.5 mg 5 mg (8/26); Otherwise 2.5 mg Hold (9/16); Otherwise 2.5 mg   Sat 5 mg 5 mg Hold (9/17); Otherwise 5 mg   Visit Report - - -   Some recent data might be hidden       Plan:  1. INR is Therapeutic today- see above in Anticoagulation Summary.   Will instruct Demond Menchaca to Increase their warfarin regimen- see above in Anticoagulation Summary. Recommended taking an extra half of a tablet for two days after the surgery   2. Follow up in 2 weeks (4 days after procedure to ensure INR is rising appropriately)   3. They have been instructed to call if any changes in medications, doses, concerns, etc. Patient expresses understanding and has no further questions at this time.    Jeanette Montes De Oca [facility]

## 2022-09-16 ENCOUNTER — APPOINTMENT (OUTPATIENT)
Dept: INFUSION THERAPY | Facility: HOSPITAL | Age: 77
End: 2022-09-16

## 2022-09-16 RX ORDER — WARFARIN SODIUM 5 MG/1
TABLET ORAL
Qty: 80 TABLET | Refills: 1 | Status: SHIPPED | OUTPATIENT
Start: 2022-09-16 | End: 2023-03-17

## 2022-09-19 ENCOUNTER — ANESTHESIA EVENT (OUTPATIENT)
Dept: GASTROENTEROLOGY | Facility: HOSPITAL | Age: 77
End: 2022-09-19

## 2022-09-20 ENCOUNTER — HOSPITAL ENCOUNTER (OUTPATIENT)
Facility: HOSPITAL | Age: 77
Setting detail: HOSPITAL OUTPATIENT SURGERY
Discharge: HOME | End: 2022-09-20
Attending: INTERNAL MEDICINE | Admitting: INTERNAL MEDICINE

## 2022-09-20 ENCOUNTER — ANESTHESIA (OUTPATIENT)
Dept: GASTROENTEROLOGY | Facility: HOSPITAL | Age: 77
End: 2022-09-20

## 2022-09-20 VITALS
SYSTOLIC BLOOD PRESSURE: 189 MMHG | RESPIRATION RATE: 16 BRPM | TEMPERATURE: 98 F | HEART RATE: 59 BPM | OXYGEN SATURATION: 97 % | BODY MASS INDEX: 27.08 KG/M2 | HEIGHT: 74 IN | WEIGHT: 211 LBS | DIASTOLIC BLOOD PRESSURE: 94 MMHG

## 2022-09-20 DIAGNOSIS — Z80.0 FH: COLON CANCER: ICD-10-CM

## 2022-09-20 DIAGNOSIS — K63.5 COLON POLYP: ICD-10-CM

## 2022-09-20 LAB
ALBUMIN SERPL-MCNC: 3.7 G/DL (ref 3.5–5.2)
ALBUMIN/GLOB SERPL: 1.7 G/DL
ALP SERPL-CCNC: 63 U/L (ref 39–117)
ALT SERPL W P-5'-P-CCNC: 15 U/L (ref 1–41)
ANION GAP SERPL CALCULATED.3IONS-SCNC: 11.4 MMOL/L (ref 5–15)
AST SERPL-CCNC: 20 U/L (ref 1–40)
BASOPHILS # BLD AUTO: 0.01 10*3/MM3 (ref 0–0.2)
BASOPHILS NFR BLD AUTO: 0.4 % (ref 0–1.5)
BILIRUB SERPL-MCNC: 1.2 MG/DL (ref 0–1.2)
BUN SERPL-MCNC: 10 MG/DL (ref 8–23)
BUN/CREAT SERPL: 12.3 (ref 7–25)
CALCIUM SPEC-SCNC: 8.9 MG/DL (ref 8.6–10.5)
CHLORIDE SERPL-SCNC: 106 MMOL/L (ref 98–107)
CO2 SERPL-SCNC: 21.6 MMOL/L (ref 22–29)
CREAT SERPL-MCNC: 0.81 MG/DL (ref 0.76–1.27)
DEPRECATED RDW RBC AUTO: 47.1 FL (ref 37–54)
EGFRCR SERPLBLD CKD-EPI 2021: 89.7 ML/MIN/1.73
EOSINOPHIL # BLD AUTO: 0.06 10*3/MM3 (ref 0–0.4)
EOSINOPHIL NFR BLD AUTO: 2.4 % (ref 0.3–6.2)
ERYTHROCYTE [DISTWIDTH] IN BLOOD BY AUTOMATED COUNT: 13.2 % (ref 12.3–15.4)
GLOBULIN UR ELPH-MCNC: 2.2 GM/DL
GLUCOSE SERPL-MCNC: 95 MG/DL (ref 65–99)
HCT VFR BLD AUTO: 36.9 % (ref 37.5–51)
HGB BLD-MCNC: 12.6 G/DL (ref 13–17.7)
IMM GRANULOCYTES # BLD AUTO: 0.01 10*3/MM3 (ref 0–0.05)
IMM GRANULOCYTES NFR BLD AUTO: 0.4 % (ref 0–0.5)
INR PPP: 1.87 (ref 0.9–1.1)
LYMPHOCYTES # BLD AUTO: 0.23 10*3/MM3 (ref 0.7–3.1)
LYMPHOCYTES NFR BLD AUTO: 9.1 % (ref 19.6–45.3)
MCH RBC QN AUTO: 33.4 PG (ref 26.6–33)
MCHC RBC AUTO-ENTMCNC: 34.1 G/DL (ref 31.5–35.7)
MCV RBC AUTO: 97.9 FL (ref 79–97)
MONOCYTES # BLD AUTO: 0.18 10*3/MM3 (ref 0.1–0.9)
MONOCYTES NFR BLD AUTO: 7.1 % (ref 5–12)
NEUTROPHILS NFR BLD AUTO: 2.04 10*3/MM3 (ref 1.7–7)
NEUTROPHILS NFR BLD AUTO: 80.6 % (ref 42.7–76)
NRBC BLD AUTO-RTO: 0 /100 WBC (ref 0–0.2)
PLATELET # BLD AUTO: 94 10*3/MM3 (ref 140–450)
PMV BLD AUTO: 9.3 FL (ref 6–12)
POTASSIUM SERPL-SCNC: 3.8 MMOL/L (ref 3.5–5.2)
PROT SERPL-MCNC: 5.9 G/DL (ref 6–8.5)
PROTHROMBIN TIME: 21.1 SECONDS (ref 11.7–14.2)
RBC # BLD AUTO: 3.77 10*6/MM3 (ref 4.14–5.8)
SODIUM SERPL-SCNC: 139 MMOL/L (ref 136–145)
WBC NRBC COR # BLD: 2.53 10*3/MM3 (ref 3.4–10.8)

## 2022-09-20 PROCEDURE — 85025 COMPLETE CBC W/AUTO DIFF WBC: CPT | Performed by: INTERNAL MEDICINE

## 2022-09-20 PROCEDURE — 85610 PROTHROMBIN TIME: CPT | Performed by: INTERNAL MEDICINE

## 2022-09-20 PROCEDURE — 43239 EGD BIOPSY SINGLE/MULTIPLE: CPT | Performed by: INTERNAL MEDICINE

## 2022-09-20 PROCEDURE — 43450 DILATE ESOPHAGUS 1/MULT PASS: CPT | Performed by: INTERNAL MEDICINE

## 2022-09-20 PROCEDURE — 80053 COMPREHEN METABOLIC PANEL: CPT | Performed by: INTERNAL MEDICINE

## 2022-09-20 PROCEDURE — 45380 COLONOSCOPY AND BIOPSY: CPT | Performed by: INTERNAL MEDICINE

## 2022-09-20 PROCEDURE — 88305 TISSUE EXAM BY PATHOLOGIST: CPT | Performed by: INTERNAL MEDICINE

## 2022-09-20 PROCEDURE — 25010000002 PROPOFOL 10 MG/ML EMULSION: Performed by: STUDENT IN AN ORGANIZED HEALTH CARE EDUCATION/TRAINING PROGRAM

## 2022-09-20 RX ORDER — SODIUM CHLORIDE, SODIUM LACTATE, POTASSIUM CHLORIDE, CALCIUM CHLORIDE 600; 310; 30; 20 MG/100ML; MG/100ML; MG/100ML; MG/100ML
30 INJECTION, SOLUTION INTRAVENOUS CONTINUOUS PRN
Status: DISCONTINUED | OUTPATIENT
Start: 2022-09-20 | End: 2022-09-20 | Stop reason: HOSPADM

## 2022-09-20 RX ORDER — PROPOFOL 10 MG/ML
VIAL (ML) INTRAVENOUS CONTINUOUS PRN
Status: DISCONTINUED | OUTPATIENT
Start: 2022-09-20 | End: 2022-09-20 | Stop reason: SURG

## 2022-09-20 RX ORDER — PROMETHAZINE HYDROCHLORIDE 25 MG/1
25 SUPPOSITORY RECTAL ONCE AS NEEDED
Status: DISCONTINUED | OUTPATIENT
Start: 2022-09-20 | End: 2022-09-20 | Stop reason: HOSPADM

## 2022-09-20 RX ORDER — PROMETHAZINE HYDROCHLORIDE 25 MG/1
25 TABLET ORAL ONCE AS NEEDED
Status: DISCONTINUED | OUTPATIENT
Start: 2022-09-20 | End: 2022-09-20 | Stop reason: HOSPADM

## 2022-09-20 RX ORDER — LIDOCAINE HYDROCHLORIDE 20 MG/ML
INJECTION, SOLUTION INFILTRATION; PERINEURAL AS NEEDED
Status: DISCONTINUED | OUTPATIENT
Start: 2022-09-20 | End: 2022-09-20 | Stop reason: SURG

## 2022-09-20 RX ORDER — PROPOFOL 10 MG/ML
VIAL (ML) INTRAVENOUS AS NEEDED
Status: DISCONTINUED | OUTPATIENT
Start: 2022-09-20 | End: 2022-09-20 | Stop reason: SURG

## 2022-09-20 RX ORDER — GLYCOPYRROLATE 0.2 MG/ML
INJECTION INTRAMUSCULAR; INTRAVENOUS AS NEEDED
Status: DISCONTINUED | OUTPATIENT
Start: 2022-09-20 | End: 2022-09-20 | Stop reason: SURG

## 2022-09-20 RX ADMIN — Medication 160 MCG/KG/MIN: at 10:34

## 2022-09-20 RX ADMIN — LIDOCAINE HYDROCHLORIDE 60 MG: 20 INJECTION, SOLUTION INFILTRATION; PERINEURAL at 10:34

## 2022-09-20 RX ADMIN — PROPOFOL 120 MG: 10 INJECTION, EMULSION INTRAVENOUS at 10:34

## 2022-09-20 RX ADMIN — SODIUM CHLORIDE, POTASSIUM CHLORIDE, SODIUM LACTATE AND CALCIUM CHLORIDE 30 ML/HR: 600; 310; 30; 20 INJECTION, SOLUTION INTRAVENOUS at 09:02

## 2022-09-20 RX ADMIN — GLYCOPYRROLATE 0.2 MG: 0.2 INJECTION INTRAMUSCULAR; INTRAVENOUS at 10:34

## 2022-09-20 NOTE — ANESTHESIA PREPROCEDURE EVALUATION
Anesthesia Evaluation     Patient summary reviewed and Nursing notes reviewed   NPO Solid Status: > 8 hours  NPO Liquid Status: > 2 hours           Airway   Mallampati: II  TM distance: >3 FB  Neck ROM: full  Dental - normal exam     Pulmonary - normal exam   (+) a smoker Former, COPD,   Cardiovascular     Rhythm: irregular  Rate: abnormal    (+) dysrhythmias Atrial Flutter,       Neuro/Psych  (+) CVA,    GI/Hepatic/Renal/Endo    (+)  PUD,  renal disease,     Musculoskeletal     (+) back pain,   Abdominal    Substance History - negative use     OB/GYN negative ob/gyn ROS         Other   arthritis,                        Anesthesia Plan    ASA 3     MAC   total IV anesthesia  (I have reviewed the patient's history with the patient and the chart, including all pertinent laboratory results and imaging. I have explained the risks of anesthesia including but not limited to dental damage, corneal abrasion, nerve injury, MI, stroke, and death. Questions asked and answered. Anesthetic plan discussed with patient and team as indicated. Patient expressed understanding of the above.  )    Anesthetic plan, risks, benefits, and alternatives have been provided, discussed and informed consent has been obtained with: patient.

## 2022-09-20 NOTE — H&P
Chief Complaint   Patient presents with   • Follow-up         History of Present Illness:   76 y.o. male who was last seen in 8 of 2020.  My assessment and recommendations were as follows:  Assessment:  1.  History of colonic polyps.  His last colonoscopy was in 1 of 2020.  I had recommended a repeat colonoscopy in 2 years and to use a more aggressive colonoscopy prep.  His colonoscopy during 1 of 2020 had only a fair colonic preparation.  2.  Family history (grandmother) colon cancer  3.  Family history ( sister) colon polyps  4. Dypshagia.   5. On coumadin for a fib.   6.      Recommendations:  1. We discussed barium swallow. He doesn't want anymore tests now.   2. F/u one year.        He occasional has solids dysphagia. No odynaphagia. No nausea or vomiting. No fevers, chills. No abdominal or chest pain. No diarrhea. Some constipation. No rectal bleeding or melena.         Medical History        Past Medical History:   Diagnosis Date   • LASHONDA positive 6/18/2018     2017: elevated ds LASHONDA   • Atrial flutter with rapid ventricular response (CMS/HCC)     • Chronic obstructive pulmonary disease (CMS/HCC)     • Colon polyp 10/7/2019     Added automatically from request for surgery [id number]   • Diverticulitis of large intestine without perforation or abscess without bleeding 3/7/2016   • Diverticulosis     • ED (erectile dysfunction)     • Elbow fracture, left     • History of blood transfusion     • Hypogonadism in male     • Kidney cysts     • Left femoral shaft fracture (CMS/HCC)     • Lower back pain     • PAF (paroxysmal atrial fibrillation) (CMS/HCC)     • Peptic ulceration     • Plantar fasciitis 12/5/2017   • Prostatic hypertrophy, benign     • 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   • Stroke (CMS/HCC)       TIA   • Transient cerebral ischemia       H/O TIAs            Surgical History         Past Surgical History:   Procedure Laterality Date   • ABLATION OF DYSRHYTHMIC FOCUS   2012, 2013   •  ADENOIDECTOMY   1973   • APPENDECTOMY   1973   • CARDIAC ABLATION   2013, 2014   • CARDIAC CATHETERIZATION   2012, 2013   • CARDIAC ELECTROPHYSIOLOGY PROCEDURE N/A 4/29/2021     Procedure: Ablation atrial flutter--likely left atrial flutter, hx of PVI x 2;  Surgeon: David Hernandez MD;  Location:  [facility];  Service: Cardiovascular;  Laterality: N/A;   • CARDIAC ELECTROPHYSIOLOGY PROCEDURE N/A 7/2/2021     Procedure: Ablation atrial fibrillation--redo;  Surgeon: David Hernandez MD;  Location:  [facility];  Service: Cardiovascular;  Laterality: N/A;   • COLONOSCOPY   approx 2014     normal per pt    • COLONOSCOPY N/A 1/8/2020     Procedure: COLONOSCOPY to cecum with cold snare biopsies;  Surgeon: Henrique Rust MD;  [facility];  Service: Gastroenterology   • COLONOSCOPY W/ POLYPECTOMY   11/21/2014     : 1 polyp - not precancerous   • ENDOSCOPY N/A 1/8/2020     Procedure: ESOPHAGOGASTRODUODENOSCOPY;  Surgeon: Henrique Rust MD;  Location:  [facility];  Service: Gastroenterology   • FEMUR FRACTURE SURGERY Left 2007     post fall from the ladder   • LUMBAR LAMINECTOMY   1974     Dr.Lester Lino   • OTHER SURGICAL HISTORY         elbow surgery   • THYROID SURGERY   1986     benign tumor removal,    • TOTAL ELBOW ARTHROPLASTY Left 2007     L, post fall and fracture, hardware in               Current Outpatient Medications:   •  Cetirizine HCl 10 MG capsule, Take 1 capsule by mouth daily., Disp: , Rfl:   •  finasteride (PROSCAR) 5 MG tablet, TAKE 1 TABLET BY MOUTH EVERY DAY (Patient taking differently: Take 5 mg by mouth Daily. Do not crush.), Disp: 90 tablet, Rfl: 2  •  furosemide (LASIX) 40 MG tablet, Take 1 tablet by mouth Daily As Needed (edema, weight gain or shortness of breath). Take 40 mg twice daily for 7 days then 40 mg daily, Disp: 90 tablet, Rfl: 3  •  HYDROcodone-acetaminophen (NORCO) 5-325 MG per tablet, Take 1 tablet by mouth Every 12  (Twelve) Hours As Needed for Severe Pain ., Disp: 30 tablet, Rfl: 0  •  hydroxychloroquine (PLAQUENIL) 200 MG tablet, Take 200 mg by mouth 2 (Two) Times a Day., Disp: , Rfl:   •  potassium chloride (K-DUR,KLOR-CON) 20 MEQ CR tablet, Take 1 tablet by mouth Daily As Needed (take with furosemide PRN)., Disp: 90 tablet, Rfl: 1  •  pramipexole (Mirapex) 0.125 MG tablet, Take 1-2 tablets by mouth Every Night., Disp: 180 tablet, Rfl: 1  •  Psyllium (METAMUCIL PO), Take  by mouth Daily., Disp: , Rfl:   •  warfarin (COUMADIN) 5 MG tablet, 5mg today, Sat & Sun--INR on Monday (Patient taking differently: Take 5 mg by mouth Every Night. 5 MG DAILY EXCEPT MON AND FRI 2.5MG), Disp: 80 tablet, Rfl: 1          Allergies   Allergen Reactions   • Cardizem [Diltiazem Hcl] Itching   • Grass Unknown - Low Severity               Family History   Problem Relation Age of Onset   • Hypertension Mother     • Osteoporosis Mother     • Thyroid disease Sister     • Diabetes Sister     • Hypertension Sister     • Colon cancer Maternal Grandmother 60   • Hypertension Father     • Heart disease Other     • Atrial fibrillation Other     • Thyroid disease Other     • Pulmonary fibrosis Sister     • Diabetes Other     • Heart disease Other     • Hypertension Other     • Heart disease Sister           Social History   Social History            Socioeconomic History   • Marital status:        Spouse name: Latisha   • Number of children: 2   • Years of education: College   • Highest education level: Not asked   Tobacco Use   • Smoking status: Former Smoker       Packs/day: 0.50       Years: 15.00       Pack years: 7.50       Types: Cigarettes       Start date: 1959       Quit date:        Years since quittin.6   • Smokeless tobacco: Never Used   Vaping Use   • Vaping Use: Never used   Substance and Sexual Activity   • Alcohol use: Not Currently       Alcohol/week: 1.0 standard drinks       Types: 6 Glasses of wine per week   • Drug use:  No   • Sexual activity: Not Currently       Partners: Female       Birth control/protection: None            Review of Systems   Gastrointestinal: Negative for abdominal pain.      Pertinent positives and negatives documented in the HPI and all other systems reviewed and were found to be negative.      Vitals:     08/09/21 0939   BP: 130/72   Temp: 97.7 °F (36.5 °C)         Physical Exam  Vitals reviewed.   Constitutional:       General: He is not in acute distress.     Appearance: Normal appearance. He is well-developed. He is not diaphoretic.   HENT:      Head: Normocephalic and atraumatic. Hair is normal.      Right Ear: Hearing, tympanic membrane, ear canal and external ear normal.      Left Ear: Hearing, tympanic membrane, ear canal and external ear normal.      Nose: Nose normal. No nasal deformity.      Mouth/Throat:      Mouth: Mucous membranes are moist. No oral lesions.      Pharynx: Uvula midline. No uvula swelling.   Eyes:      General: Lids are normal. No scleral icterus.        Right eye: No discharge.         Left eye: No discharge.      Extraocular Movements: Extraocular movements intact.      Right eye: Normal extraocular motion and no nystagmus.      Left eye: Normal extraocular motion and no nystagmus.      Conjunctiva/sclera: Conjunctivae normal.      Pupils: Pupils are equal, round, and reactive to light.   Neck:      Thyroid: No thyromegaly.      Vascular: No JVD.   Cardiovascular:      Rate and Rhythm: Normal rate and regular rhythm.      Pulses: Normal pulses.      Heart sounds: Normal heart sounds. No murmur heard.   No gallop.    Pulmonary:      Effort: Pulmonary effort is normal. No respiratory distress.      Breath sounds: Normal breath sounds. No wheezing or rales.   Chest:      Chest wall: No tenderness.   Abdominal:      General: Bowel sounds are normal. There is no distension.      Palpations: Abdomen is soft. There is no mass.      Tenderness: There is no abdominal tenderness. There  is no guarding.      Hernia: No hernia is present.   Genitourinary:     Rectum: Normal. Guaiac result negative.   Musculoskeletal:         General: No tenderness or deformity. Normal range of motion.      Cervical back: Normal range of motion and neck supple.   Lymphadenopathy:      Cervical: No cervical adenopathy.   Skin:     General: Skin is warm and dry.      Findings: No rash.   Neurological:      Mental Status: He is alert and oriented to person, place, and time.      Cranial Nerves: No cranial nerve deficit.      Motor: No abnormal muscle tone.      Coordination: Coordination normal.      Deep Tendon Reflexes: Reflexes are normal and symmetric. Reflexes normal.   Psychiatric:         Mood and Affect: Mood normal.         Behavior: Behavior normal.         Thought Content: Thought content normal.         Judgment: Judgment normal.            Diagnoses and all orders for this visit:     1. FH: colon cancer (Primary)     2. FH: colon polyps     3. Polyp of colon, unspecified part of colon, unspecified type     4. Dysphagia, unspecified type  -     FL Esophagram Complete Single Contrast; Future        Assessment:  1. History of colonic polyps.  His last colonoscopy was in 1 of 2020.  I had recommended a repeat colonoscopy in 2 years and to use a more aggressive colonoscopy prep.  His colonoscopy during 1 of 2020 had only a fair colonic preparation.  2.  Family history (grandmother) colon cancer  3.  Family history ( sister) colon polyps  4. Dypshagia.   5. On coumadin for a fib.   6.         Recommendations:  1. Barium swallow. Patient to call for results.      No follow-ups on file.     9/20/22 - No change from the above H and P.   Henrique Rust MD

## 2022-09-20 NOTE — ANESTHESIA POSTPROCEDURE EVALUATION
"Patient: Demond Menchaca    Procedure Summary     Date: 09/20/22 Room / Location:  [facility]    Anesthesia Start: 1027 Anesthesia Stop: 1103    Procedures:       COLONOSCOPY TO CECUM WITH POLYPECTOMY  ( COLD BX) (N/A )      ESOPHAGOGASTRODUODENOSCOPY WITH BIOPSY AND PRESSLEY DILATATION 50F (N/A Esophagus) Diagnosis:       Colon polyp      FH: colon cancer      (Colon polyp [K63.5])      (FH: colon cancer [Z80.0])    Surgeons: Henrique Rust MD Provider: Johnathan Hill MD    Anesthesia Type: MAC ASA Status: 3          Anesthesia Type: MAC    Vitals  No vitals data found for the desired time range.          Post Anesthesia Care and Evaluation    Patient location during evaluation: PACU  Patient participation: complete - patient participated  Level of consciousness: awake and alert  Pain management: adequate    Airway patency: patent  Anesthetic complications: No anesthetic complications  PONV Status: controlled  Cardiovascular status: acceptable and hemodynamically stable  Respiratory status: acceptable  Hydration status: acceptable    Comments: BP (!) 199/76 (BP Location: Left arm, Patient Position: Lying)   Pulse (!) 49   Temp 36.7 °C (98 °F)   Resp 16   Ht 188 cm (74\")   Wt 95.7 kg (211 lb)   SpO2 99%   BMI 27.09 kg/m²       "

## 2022-09-21 LAB
LAB AP CASE REPORT: NORMAL
PATH REPORT.FINAL DX SPEC: NORMAL
PATH REPORT.GROSS SPEC: NORMAL

## 2022-09-23 ENCOUNTER — ANTICOAGULATION VISIT (OUTPATIENT)
Dept: PHARMACY | Facility: HOSPITAL | Age: 77
End: 2022-09-23

## 2022-09-23 DIAGNOSIS — I48.0 PAF (PAROXYSMAL ATRIAL FIBRILLATION): Primary | ICD-10-CM

## 2022-09-23 LAB — INR PPP: 1.9

## 2022-09-23 NOTE — PROGRESS NOTES
Anticoagulation Clinic Progress Note    Anticoagulation Summary  As of 2022    INR goal:  2.5-3.5   TTR:  84.2 % (3.8 y)   INR used for dosin.90 (2022)   Warfarin maintenance plan:  2.5 mg every Mon, Fri; 5 mg all other days   Weekly warfarin total:  30 mg   Plan last modified:  Jeanette Montes De Oca RPH (2021)   Next INR check:  2022   Priority:  Maintenance   Target end date:      Indications    PAF (paroxysmal atrial fibrillation) (HCC) [I48.0]  TIA (transient ischemic attack) (Resolved) [G45.9]             Anticoagulation Episode Summary     INR check location:      Preferred lab:      Send INR reminders to:   [facility]  [facility]    Comments:  [place]      Anticoagulation Care Providers     Provider Role Specialty Phone number    David Hernandez MD Referring Cardiology [phone]          Clinic Interview:  Patient Findings     Negatives:  Signs/symptoms of thrombosis, Signs/symptoms of bleeding,   Laboratory test error suspected, Change in health, Change in alcohol use,   Change in activity, Upcoming invasive procedure, Emergency department   visit, Upcoming dental procedure, Missed doses, Extra doses, Change in   medications, Change in diet/appetite, Hospital admission, Bruising, Other   complaints    Comments:  Recent colonoscopy; restarted warfarin on .        Clinical Outcomes     Negatives:  Major bleeding event, Thromboembolic event,   Anticoagulation-related hospital admission, Anticoagulation-related ED   visit, Anticoagulation-related fatality    Comments:  Recent colonoscopy; restarted warfarin on .          INR History:  Anticoagulation Monitoring 2022   INR 2.30 3.10 1.90   INR Date 2022   INR Goal 2.5-3.5 2.5-3.5 2.5-3.5   Trend Same Same Same   Last Week Total 30 mg 30 mg 20 mg   Next Week Total 32.5 mg 30 mg 32.5 mg   Sun 5 mg Hold (); Otherwise 5 mg 5 mg   Mon 2.5 mg Hold ();  Otherwise 2.5 mg 2.5 mg   Tue 5 mg 7.5 mg (9/20); Otherwise 5 mg 5 mg   Wed 5 mg 7.5 mg (9/21); Otherwise 5 mg 5 mg   Thu 5 mg 5 mg 5 mg   Fri 5 mg (8/26); Otherwise 2.5 mg Hold (9/16); Otherwise 2.5 mg 5 mg (9/23)   Sat 5 mg Hold (9/17); Otherwise 5 mg 5 mg   Visit Report - - -   Some recent data might be hidden       Plan:  1. INR is Subtherapeutic today- see above in Anticoagulation Summary.   Will instruct Demond Menchaca to Change their warfarin regimen- see above in Anticoagulation Summary.  Recommend taking 5 mg today instead of 2.5 mg, then resume previous weekly dosing until next INR.  2. Follow up in 1 weeks  3. They have been instructed to call if any changes in medications, doses, concerns, etc. Patient expresses understanding and has no further questions at this time.    Chapis Jaimes, PharmD

## 2022-09-25 NOTE — PROGRESS NOTES
09/25/22       Tell him that path from his EGD showed mild inflammation of the stomach but no evidence of helicobacter pylori. The colon polyp that was removed was not cancerous but was precancerous. I recommend that he consider having a repeat colonoscopy in 5 yrs? Send a copy of this report to his PCP.   Patrick bennett

## 2022-09-29 ENCOUNTER — ANTICOAGULATION VISIT (OUTPATIENT)
Dept: PHARMACY | Facility: HOSPITAL | Age: 77
End: 2022-09-29

## 2022-09-29 DIAGNOSIS — I48.0 PAF (PAROXYSMAL ATRIAL FIBRILLATION): Primary | ICD-10-CM

## 2022-09-29 LAB — INR PPP: 2.7

## 2022-09-29 NOTE — PROGRESS NOTES
Anticoagulation Clinic Progress Note    Anticoagulation Summary  As of 2022    INR goal:  2.5-3.5   TTR:  84.1 % (3.8 y)   INR used for dosin.70 (2022)   Warfarin maintenance plan:  2.5 mg every Mon, Fri; 5 mg all other days   Weekly warfarin total:  30 mg   No change documented:  Crystal Pineda [facility]   Plan last modified:  Jeanette Montes De Oca RP (2021)   Next INR check:  10/13/2022   Priority:  Maintenance   Target end date:      Indications    PAF (paroxysmal atrial fibrillation) (HCC) [I48.0]  TIA (transient ischemic attack) (Resolved) [G45.9]             Anticoagulation Episode Summary     INR check location:      Preferred lab:      Send INR reminders to:  [facility]  POOL    Comments:  [place]      Anticoagulation Care Providers     Provider Role Specialty Phone number    David Hernandez MD Referring Cardiology [phone]          Drug interactions: has remained unchanged.  Diet: has remained unchanged.    Clinic Interview:  No pertinent clinical findings have been reported.    INR History:  Anticoagulation Monitoring 2022   INR 3.10 1.90 2.70   INR Date 2022   INR Goal 2.5-3.5 2.5-3.5 2.5-3.5   Trend Same Same Same   Last Week Total 30 mg 20 mg 32.5 mg   Next Week Total 30 mg 32.5 mg 30 mg   Sun Hold (); Otherwise 5 mg 5 mg 5 mg   Mon Hold (); Otherwise 2.5 mg 2.5 mg 2.5 mg   Tue 7.5 mg (); Otherwise 5 mg 5 mg 5 mg   Wed 7.5 mg (); Otherwise 5 mg 5 mg 5 mg   Thu 5 mg 5 mg 5 mg   Fri Hold (); Otherwise 2.5 mg 5 mg () 2.5 mg   Sat Hold (); Otherwise 5 mg 5 mg 5 mg   Visit Report - - -   Some recent data might be hidden       Plan:  1. INR is Therapeutic today- see above in Anticoagulation Summary.   Will instruct Demond Menchaca to Continue their warfarin regimen- see above in Anticoagulation Summary.  2. Follow up in 2 weeks  3. Pt has agreed to only be called if INR out of range. They have been  instructed to call if any changes in medications, doses, concerns, etc. Patient expresses understanding and has no further questions at this time.    Crystal Pineda [facility]

## 2022-10-11 DIAGNOSIS — M51.36 DDD (DEGENERATIVE DISC DISEASE), LUMBAR: ICD-10-CM

## 2022-10-11 NOTE — TELEPHONE ENCOUNTER
Caller: Demond Menchaca    Relationship: Self    Best call back number: [phone]      Requested Prescriptions:   Requested Prescriptions     Pending Prescriptions Disp Refills   • HYDROcodone-acetaminophen (NORCO) 5-325 MG per tablet 30 tablet 0     Sig: Take 1 tablet by mouth Every 12 (Twelve) Hours As Needed for Severe Pain.        Pharmacy where request should be sent: [id number] [place], [place] - [address] [facility]. AT [facility] [phone] [facility] [phone] FX     Additional details provided by patient: HAS ABOUT 3 DAYS.    Does the patient have less than a 3 day supply:  [x] Yes  [] No    RAIZA Andino   10/11/22 08:15 EDT

## 2022-10-12 RX ORDER — HYDROCODONE BITARTRATE AND ACETAMINOPHEN 5; 325 MG/1; MG/1
1 TABLET ORAL EVERY 12 HOURS PRN
Qty: 30 TABLET | Refills: 0 | Status: SHIPPED | OUTPATIENT
Start: 2022-10-12 | End: 2022-12-30 | Stop reason: SDUPTHER

## 2022-10-13 ENCOUNTER — ANTICOAGULATION VISIT (OUTPATIENT)
Dept: PHARMACY | Facility: HOSPITAL | Age: 77
End: 2022-10-13

## 2022-10-13 DIAGNOSIS — I48.0 PAF (PAROXYSMAL ATRIAL FIBRILLATION): Primary | ICD-10-CM

## 2022-10-13 LAB — INR PPP: 3

## 2022-10-13 NOTE — PROGRESS NOTES
Anticoagulation Clinic Progress Note    Anticoagulation Summary  As of 10/13/2022    INR goal:  2.5-3.5   TTR:  84.2 % (3.8 y)   INR used for dosing:  3.00 (10/13/2022)   Warfarin maintenance plan:  2.5 mg every Mon, Fri; 5 mg all other days   Weekly warfarin total:  30 mg   No change documented:  Yana Malloy, Pharmacy Technician   Plan last modified:  Jeanette Montes De Oca RPH (8/26/2021)   Next INR check:  10/27/2022   Priority:  Maintenance   Target end date:      Indications    PAF (paroxysmal atrial fibrillation) (HCC) [I48.0]  TIA (transient ischemic attack) (Resolved) [G45.9]             Anticoagulation Episode Summary     INR check location:      Preferred lab:      Send INR reminders to:  [facility]  POOL    Comments:  [place]      Anticoagulation Care Providers     Provider Role Specialty Phone number    David Hernandez MD Referring Cardiology [phone]          Clinic Interview:  No pertinent clinical findings have been reported.    INR History:  Anticoagulation Monitoring 9/23/2022 9/29/2022 10/13/2022   INR 1.90 2.70 3.00   INR Date 9/23/2022 9/29/2022 10/13/2022   INR Goal 2.5-3.5 2.5-3.5 2.5-3.5   Trend Same Same Same   Last Week Total 20 mg 32.5 mg 30 mg   Next Week Total 32.5 mg 30 mg 30 mg   Sun 5 mg 5 mg 5 mg   Mon 2.5 mg 2.5 mg 2.5 mg   Tue 5 mg 5 mg 5 mg   Wed 5 mg 5 mg 5 mg   Thu 5 mg 5 mg 5 mg   Fri 5 mg (9/23) 2.5 mg 2.5 mg   Sat 5 mg 5 mg 5 mg   Visit Report - - -   Some recent data might be hidden       Plan:  1. INR is therapeutic today- see above in Anticoagulation Summary.    Demond CROSS Bernarda to continue their warfarin regimen- see above in Anticoagulation Summary.  2. Follow up in 2 weeks  3. Pt has agreed to only be called if INR out of range. They have been instructed to call if any changes in medications, doses, concerns, etc. Patient expresses understanding and has no further questions at this time.    Yana Malloy, Pharmacy Technician

## 2022-10-24 ENCOUNTER — OFFICE VISIT (OUTPATIENT)
Dept: CARDIOLOGY | Facility: CLINIC | Age: 77
End: 2022-10-24

## 2022-10-24 VITALS
HEART RATE: 51 BPM | WEIGHT: 217 LBS | SYSTOLIC BLOOD PRESSURE: 146 MMHG | DIASTOLIC BLOOD PRESSURE: 94 MMHG | BODY MASS INDEX: 27.85 KG/M2 | HEIGHT: 74 IN

## 2022-10-24 DIAGNOSIS — R03.0 ELEVATED BP WITHOUT DIAGNOSIS OF HYPERTENSION: ICD-10-CM

## 2022-10-24 DIAGNOSIS — I48.0 PAF (PAROXYSMAL ATRIAL FIBRILLATION): Primary | ICD-10-CM

## 2022-10-24 DIAGNOSIS — Z98.890 H/O CARDIAC RADIOFREQUENCY ABLATION: ICD-10-CM

## 2022-10-24 PROCEDURE — 99213 OFFICE O/P EST LOW 20 MIN: CPT | Performed by: NURSE PRACTITIONER

## 2022-10-24 PROCEDURE — 93000 ELECTROCARDIOGRAM COMPLETE: CPT | Performed by: NURSE PRACTITIONER

## 2022-10-24 NOTE — PROGRESS NOTES
"Date of Office Visit: 10/24/2022  Encounter Provider: ERIC Giles  Place of Service: [facility] CARDIOLOGY  Patient Name: Demond Menchaca  :1945    Chief Complaint   Patient presents with   • paroxysmal AFIB     6 month f/u    • Atrial Flutter w/RVR   :     HPI: Demond Menchaca is a 77 y.o. male who follows with Dr. Hernandez for PAF, he has had 3 LA ablations with the last one being a mitral isthmus and roofline (2021).     Saw Dr. Hernandez in March---had feel and \"whacked\" his tailbone---had some palps for a few hours but no other episodes since 2021 ablation.     Presents for follow up.     Doing well. No chest pain, dyspnea, PND, orthopnea, dizziness or edema.     Warfarin for AC. No issues.      Trouble with leg strength--he is scheduled for EMG with Dr. Rodriguez ---he is following with neurology Dr. Welch.     He had a c-scope ---his b/p was elevated so he has been monitoring at home and by his cuff persistently elevated.         Past Medical History:   Diagnosis Date   • Allergic     Grass/pollen   • LASHONDA positive 2018    2017: elevated ds LASHONDA   • Arthritis 1995    Hand, neck, back, etc.   • Atrial flutter with rapid ventricular response (HCC)    • Cataract    • Chronic obstructive pulmonary disease (HCC)    • Colon polyp 10/07/2019    Added automatically from request for surgery [id number]   • Diverticulitis of large intestine without perforation or abscess without bleeding 2016   • Diverticulosis    • ED (erectile dysfunction)    • Elbow fracture, left    • Erectile dysfunction    • History of blood transfusion    • Hypogonadism in male    • Kidney cysts    • Left femoral shaft fracture ([facility])    • Lower back pain    • PAF (paroxysmal atrial fibrillation) ([facility])    • Peptic ulceration    • Plantar fasciitis 2017   • Pneumonia 1974   • Prostatic hypertrophy, benign    • 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   • Stroke " (HCC)     TIA   • Transient cerebral ischemia     H/O TIAs       Past Surgical History:   Procedure Laterality Date   • ABLATION OF DYSRHYTHMIC FOCUS  2012, 2013   • ADENOIDECTOMY  1973   • APPENDECTOMY  1973   • CARDIAC ABLATION  2013, 2014   • CARDIAC CATHETERIZATION  2012, 2013   • CARDIAC ELECTROPHYSIOLOGY PROCEDURE N/A 04/29/2021    Procedure: Ablation atrial flutter--likely left atrial flutter, hx of PVI x 2;  Surgeon: David Hernandez MD;  Location: [facility] CATH INVASIVE LOCATION;  Service: Cardiovascular;  Laterality: N/A;   • CARDIAC ELECTROPHYSIOLOGY PROCEDURE N/A 07/02/2021    Procedure: Ablation atrial fibrillation--redo;  Surgeon: David Hernandez MD;  Location:  [facility] CATH INVASIVE LOCATION;  Service: Cardiovascular;  Laterality: N/A;   • COLONOSCOPY  approx 2014    normal per pt    • COLONOSCOPY N/A 01/08/2020    Procedure: COLONOSCOPY to cecum with cold snare biopsies;  Surgeon: Henrique Rust MD;  Location: [facility] ENDOSCOPY;  Service: Gastroenterology   • COLONOSCOPY N/A 9/20/2022    Procedure: COLONOSCOPY TO CECUM WITH POLYPECTOMY  ( COLD BX);  Surgeon: Henrique Rust MD;  Location: [facility] ENDOSCOPY;  Service: Gastroenterology;  Laterality: N/A;  HX OF COLON POLYPS; FAM HX OF COLON CANCER/ POLYPS  POST: DIVERTICULOSIS; COLON POLYP; HEMOORHOIDS   • COLONOSCOPY W/ POLYPECTOMY  11/21/2014    : 1 polyp - not precancerous   • ENDOMETRIAL ABLATION  2012, 2013   • ENDOSCOPY N/A 01/08/2020    Procedure: ESOPHAGOGASTRODUODENOSCOPY;  Surgeon: Henrique Rust MD;  Location: [facility] ENDOSCOPY;  Service: Gastroenterology   • ENDOSCOPY N/A 9/20/2022    Procedure: ESOPHAGOGASTRODUODENOSCOPY WITH BIOPSY AND PRESSLEY DILATATION 52F;  Surgeon: Henrique Rust MD;  Location: [facility] ENDOSCOPY;  Service: Gastroenterology;  Laterality: N/A;  DYSPHAGIA  POST: GASTRITIS   • FEMUR FRACTURE SURGERY Left 2007    post fall from the ladder   • FRACTURE SURGERY  2007    Elbow   • LUMBAR LAMINECTOMY  1974    Dr.Lester Lino   •  OTHER SURGICAL HISTORY      elbow surgery   • SPINE SURGERY     • THYROID SURGERY      benign tumor removal,    • TOTAL ELBOW ARTHROPLASTY Left     L, post fall and fracture, hardware in       Social History     Socioeconomic History   • Marital status:      Spouse name: Latisha   • Number of children: 2   • Years of education: College   • Highest education level: Not asked   Tobacco Use   • Smoking status: Former     Packs/day: 0.50     Years: 15.00     Pack years: 7.50     Types: Cigarettes     Start date: 1959     Quit date: 1974     Years since quittin.8   • Smokeless tobacco: Never   Vaping Use   • Vaping Use: Never used   Substance and Sexual Activity   • Alcohol use: Not Currently     Alcohol/week: 6.0 standard drinks     Types: 6 Glasses of wine per week     Comment: Some beer during the summer months   • Drug use: No   • Sexual activity: Not Currently     Partners: Female     Birth control/protection: None       Family History   Problem Relation Age of Onset   • Hypertension Mother    • Osteoporosis Mother    • Hypertension Father    • Thyroid disease Sister    • Diabetes Sister    • Hypertension Sister    • Pulmonary fibrosis Sister    • Heart disease Sister    • Diabetes Sister    • Colon cancer Maternal Grandmother 60   • Heart disease Other    • Atrial fibrillation Other    • Thyroid disease Other    • Diabetes Other    • Heart disease Other    • Hypertension Other    • Malig Hyperthermia Neg Hx        Review of Systems   Constitutional: Negative for chills, fever and malaise/fatigue.   Cardiovascular: Negative for chest pain, dyspnea on exertion, leg swelling, near-syncope, orthopnea, palpitations, paroxysmal nocturnal dyspnea and syncope.   Respiratory: Negative for cough and shortness of breath.    Hematologic/Lymphatic: Negative.    Musculoskeletal: Positive for muscle weakness (Lower extremities). Negative for joint pain, joint swelling and myalgias.  "  Gastrointestinal: Negative for abdominal pain, diarrhea, melena, nausea and vomiting.   Genitourinary: Negative for frequency and hematuria.   Neurological: Negative for light-headedness, numbness, paresthesias and seizures.   Allergic/Immunologic: Negative.    All other systems reviewed and are negative.      Allergies   Allergen Reactions   • Cardizem [Diltiazem Hcl] Itching   • Grass Unknown - Low Severity         Current Outpatient Medications:   •  Cetirizine HCl 10 MG capsule, Take 1 capsule by mouth daily., Disp: , Rfl:   •  finasteride (PROSCAR) 5 MG tablet, TAKE 1 TABLET BY MOUTH EVERY DAY, Disp: 90 tablet, Rfl: 2  •  HYDROcodone-acetaminophen (NORCO) 5-325 MG per tablet, Take 1 tablet by mouth Every 12 (Twelve) Hours As Needed for Severe Pain., Disp: 30 tablet, Rfl: 0  •  hydroxychloroquine (PLAQUENIL) 200 MG tablet, Take 200 mg by mouth 2 (Two) Times a Day., Disp: , Rfl:   •  pramipexole (MIRAPEX) 0.5 MG tablet, Take 1-2 tablets by mouth 2 (Two) Times a Day., Disp: 360 tablet, Rfl: 1  •  Psyllium (METAMUCIL PO), Take  by mouth Daily., Disp: , Rfl:   •  warfarin (COUMADIN) 5 MG tablet, TAKE 1/2 TABLET BY MOUTH ON MONDAY AND FRIDAY. TAKE 1 TABLET BY MOUTH ON ALL OTHER DAYS OR AS DIRECTED (Patient taking differently: TAKE 1/2 TABLET BY MOUTH ON MONDAY AND FRIDAY. TAKE 1 TABLET BY MOUTH ON ALL OTHER DAYS OR AS DIRECTED. HOLDING FOR SCOPE), Disp: 80 tablet, Rfl: 1      Objective:     Vitals:    10/24/22 1117   BP: 146/94   Pulse: 51   Weight: 98.4 kg (217 lb)   Height: 188 cm (74\")     Body mass index is 27.86 kg/m².    PHYSICAL EXAM:    Vitals Reviewed.   General Appearance: No acute distress, well developed and well nourished.   Eyes: Conjunctiva and lids: No erythema, swelling, or discharge. Sclera non-icteric.   HENT: Atraumatic, normocephalic. External eyes, ears, and nose normal.   Respiratory: No signs of respiratory distress. Respiration rhythm and depth normal.   Clear to auscultation. No rales, " crackles, rhonchi, or wheezing auscultated.   Cardiovascular:  Heart Rate and Rhythm: Normal, Heart Sounds: Normal S1 and S2. No S3 or S4 noted.  Murmurs: No murmurs noted. No rubs, thrills, or gallops.   Arterial Pulses:  Posterior tibialis and dorsalis pedis pulses normal.   Lower Extremities: No edema noted.  Gastrointestinal:  Abdomen soft, non-distended, non-tender.   Musculoskeletal: Normal movement of extremities  Skin: Warm and dry.   Psychiatric: Patient alert and oriented to person, place, and time. Speech and behavior appropriate. Normal mood and affect.       ECG 12 Lead    Date/Time: 10/24/2022 11:17 AM  Performed by: Damaris Shell APRN  Authorized by: Damaris Shell APRN   Comparison: compared with previous ECG   Similar to previous ECG  Rhythm: sinus bradycardia  BPM: 51                Assessment:       Diagnosis Plan   1. PAF (paroxysmal atrial fibrillation) ([facility])  ECG 12 Lead      2. H/O cardiac radiofrequency ablation--x 3  ECG 12 Lead      3. Elevated BP without diagnosis of hypertension               Plan:       1.-2. PAFib/flutter, s/p LA ablation x 3---last one 7/2021 (MI and roofline). No episodes. Doing well. Warfarin for AC.     3. Elevated b/p reading---was elevated w/colonscopy 9/20 so he has been monitoring and getting elevated readings at home ---he brought his cuff---it was 40 points higher than manual reading today. Recommended he get a new cuff and monitor and take with him when he sees ERIC Napier 11/8 to verify accuracy and then can determine if has HTN that needs to be treated.     Follow up with Dr. Hernandez in 6 months.     As always, it has been a pleasure to participate in your patient's care.      Sincerely,         ERIC Delvalle

## 2022-10-27 ENCOUNTER — ANTICOAGULATION VISIT (OUTPATIENT)
Dept: PHARMACY | Facility: HOSPITAL | Age: 77
End: 2022-10-27

## 2022-10-27 ENCOUNTER — TELEPHONE (OUTPATIENT)
Dept: GASTROENTEROLOGY | Facility: CLINIC | Age: 77
End: 2022-10-27

## 2022-10-27 DIAGNOSIS — I48.0 PAF (PAROXYSMAL ATRIAL FIBRILLATION): Primary | ICD-10-CM

## 2022-10-27 LAB — INR PPP: 2.8

## 2022-10-27 NOTE — PROGRESS NOTES
Anticoagulation Clinic Progress Note    Anticoagulation Summary  As of 10/27/2022    INR goal:  2.5-3.5   TTR:  84.4 % (3.9 y)   INR used for dosin.80 (10/27/2022)   Warfarin maintenance plan:  2.5 mg every Mon, Fri; 5 mg all other days   Weekly warfarin total:  30 mg   No change documented:  Yana Malloy, Pharmacy Technician   Plan last modified:  Jeanette Montes De Oca RPH (2021)   Next INR check:  11/10/2022   Priority:  Maintenance   Target end date:      Indications    PAF (paroxysmal atrial fibrillation) (HCC) [I48.0]  TIA (transient ischemic attack) (Resolved) [G45.9]             Anticoagulation Episode Summary     INR check location:      Preferred lab:      Send INR reminders to:  [facility]  POOL    Comments:  [place]      Anticoagulation Care Providers     Provider Role Specialty Phone number    David Hernandez MD Referring Cardiology [phone]          Clinic Interview:  No pertinent clinical findings have been reported.    INR History:  Anticoagulation Monitoring 2022 10/13/2022 10/27/2022   INR 2.70 3.00 2.80   INR Date 2022 10/13/2022 10/27/2022   INR Goal 2.5-3.5 2.5-3.5 2.5-3.5   Trend Same Same Same   Last Week Total 32.5 mg 30 mg 30 mg   Next Week Total 30 mg 30 mg 30 mg   Sun 5 mg 5 mg 5 mg   Mon 2.5 mg 2.5 mg 2.5 mg   Tue 5 mg 5 mg 5 mg   Wed 5 mg 5 mg 5 mg   Thu 5 mg 5 mg 5 mg   Fri 2.5 mg 2.5 mg 2.5 mg   Sat 5 mg 5 mg 5 mg   Visit Report - - -   Some recent data might be hidden       Plan:  1. INR is therapeutic today- see above in Anticoagulation Summary.    Demond CROSS Bernarda to continue their warfarin regimen- see above in Anticoagulation Summary.  2. Follow up in 2 weeks  3. Pt has agreed to only be called if INR out of range. They have been instructed to call if any changes in medications, doses, concerns, etc. Patient expresses understanding and has no further questions at this time.    Yana Malloy, Pharmacy Technician

## 2022-10-27 NOTE — TELEPHONE ENCOUNTER
----- Message from Henrique Rust MD sent at 9/25/2022  4:46 PM EDT -----  09/25/22       Tell him that path from his EGD showed mild inflammation of the stomach but no evidence of helicobacter pylori. The colon polyp that was removed was not cancerous but was precancerous. I recommend that he consider having a repeat colonoscopy in 5 yrs? Send a copy of this report to his PCP.   Thx. kjh

## 2022-11-01 ENCOUNTER — TELEPHONE (OUTPATIENT)
Dept: NEUROLOGY | Facility: CLINIC | Age: 77
End: 2022-11-01

## 2022-11-02 ENCOUNTER — HOSPITAL ENCOUNTER (OUTPATIENT)
Dept: INFUSION THERAPY | Facility: HOSPITAL | Age: 77
Discharge: HOME | End: 2022-11-02
Admitting: PSYCHIATRY & NEUROLOGY

## 2022-11-02 DIAGNOSIS — R29.898 WEAKNESS OF BOTH LOWER EXTREMITIES: ICD-10-CM

## 2022-11-02 DIAGNOSIS — M21.372 FOOT DROP, LEFT: ICD-10-CM

## 2022-11-02 PROCEDURE — 95886 MUSC TEST DONE W/N TEST COMP: CPT

## 2022-11-02 PROCEDURE — 95909 NRV CNDJ TST 5-6 STUDIES: CPT

## 2022-11-02 PROCEDURE — 95909 NRV CNDJ TST 5-6 STUDIES: CPT | Performed by: PSYCHIATRY & NEUROLOGY

## 2022-11-02 PROCEDURE — 95886 MUSC TEST DONE W/N TEST COMP: CPT | Performed by: PSYCHIATRY & NEUROLOGY

## 2022-11-02 NOTE — PROCEDURES
EMG and Nerve Conduction Studies    I.      Instrument used: Neuromax 1002  II.     Please see data sheets for tabular summary of NCS and details on methods, temperatures and lab standards.   III.    EMG muscles tested for upper extremity studies include the deltoid, biceps, triceps, pronator teres, extensor digitorum communis, first dorsal interosseous and abductor pollicis brevis.    IV.   EMG muscles tested for lower extremity studies include the vastus lateralis, tibialis anterior, peroneus longus, medial gastrocnemius and extensor digitorum brevis.    V.    Additional muscles tested as needed.  Paraspinal muscles tested as needed.   VI.   Please see data sheets for tabular summary of EMG findings.   VII. The complete report includes the data sheets.      Indication: Weakness in legs, progressive over years.  Baseline left foot drop associated with lumbar disc surgery in 1974  History: As above  The patient is on warfarin.    Ht: 188 cm  Wt: 98.4 kg; BMI 27.86  HbA1C: No results found for: HGBA1C  TSH:   Lab Results   Component Value Date    TSH 2.730 06/23/2020       Technical summary:  Nerve conduction studies were obtained in the left leg with 1 comparison on the right.  Skin temperatures tended to be a bit low.  Temperature correction was used if indicated.  Needle examination was obtained on selected muscles in both legs.    Results:  1.  Absent left sural sensory potential.  2.  Absent left superficial peroneal sensory potential.  3.  Absent peroneal motor potentials from proximal and distal stimulation sites recording over the extensor digitorum brevis on both sides.  The studies were repeated recording over the tibialis anterior showing slow conduction velocities of 32.4 m/s on the left and 28.6 m/s on the right across the fibular head.  The amplitude on the left was low.  The amplitude on the right was normal.  5.  Slow left tibial motor velocity at 33 m/s with a normal distal latency and low amplitude  of 0.500 mV from ankle stimulation.  6.  Needle examination of selected muscles in both legs showed positive sharp waves in the tibialis anterior, peroneus longus and medial gastrocnemius muscles bilaterally.  These 3 muscles showed increased number of large motor units with increased firing rate and reduced interference pattern.  The extensor digitorum brevis showed no insertional activities or motor units.  The vastus lateralis muscle showed normal insertional activities with a questionable increased number of large motor units on the left with some recruitment changes.  The motor units were normal on the right with reduced interference pattern.  Lumbar paraspinals were not studied since the patient had previous surgery and is on warfarin.    Impression:  Abnormal study showing severe peripheral neuropathy.  The needle exam changes were consistent with the nerve conduction findings however I cannot entirely exclude a superimposed L5 or S1 radiculopathy on either side.  Clinical correlation is suggested.  The study results were discussed with the patient.    Jose Cruz Rodriguez M.D.              Dictated utilizing Dragon dictation.

## 2022-11-07 ENCOUNTER — OFFICE VISIT (OUTPATIENT)
Dept: NEUROLOGY | Facility: CLINIC | Age: 77
End: 2022-11-07

## 2022-11-07 VITALS
BODY MASS INDEX: 27.72 KG/M2 | WEIGHT: 216 LBS | HEART RATE: 50 BPM | SYSTOLIC BLOOD PRESSURE: 124 MMHG | HEIGHT: 74 IN | OXYGEN SATURATION: 98 % | DIASTOLIC BLOOD PRESSURE: 68 MMHG

## 2022-11-07 DIAGNOSIS — R29.898 WEAKNESS OF BOTH LOWER EXTREMITIES: ICD-10-CM

## 2022-11-07 DIAGNOSIS — R25.1 TREMOR: Primary | ICD-10-CM

## 2022-11-07 DIAGNOSIS — G60.3 IDIOPATHIC PROGRESSIVE NEUROPATHY: ICD-10-CM

## 2022-11-07 PROCEDURE — 99214 OFFICE O/P EST MOD 30 MIN: CPT | Performed by: PSYCHIATRY & NEUROLOGY

## 2022-11-07 NOTE — ASSESSMENT & PLAN NOTE
EMG/NCS could not rule out an L5/S1 radiculopathy, I will order a lumbar spine MRI for further evaluation.

## 2022-11-07 NOTE — ASSESSMENT & PLAN NOTE
Essential tremor.  Will hold off on medication treatment due to bradycardia (no beta blocker) and warfarin (no primidone).  Also it is mild so will hold off on treatment.

## 2022-11-07 NOTE — ASSESSMENT & PLAN NOTE
77 year old right handed man who returns to neurology clinic for follow up evaluation and treatment of essential tremors and weakness and follow up of his EMG/NCS.  He reports his tremors starting about 2 years ago.  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  He does not have much tremors in the right hand.  He has not noticed the tremors at rest and has only noticed the tremors with action.  He has noticed the tremors when he is holding something in his hands.  He has a hard time sometimes getting out of a chair.  He has had trouble with his balance.  He has had some vivid dreams a times.  His sense of smell has reduced.  He feels like his legs are weak.  He reports being pretty sedentary over the past couple of years.  He is on Coumadin for Afib.  He reports a history of TIA 10 years ago before he was on the Coumadin.  He does not drink any caffeine but tells me he used to drink a lot of coffee in the past.  He does not smoke.  He denies any family history of tremors.  He has been using cane to ambulate for the past couple years.  He had back surgery in 1974 and he has a foot drop on the left side.  He has had more recent lab work including CMP and previous TSH was normal along with previous Vit B12 level which was normal as well.  He has not noticed any changes in his voice.  He has RLS and he is currently being treated with pramipexole.  His heart rate is low.  His INR has been therapeutic.  He had a fall 6 months ago due to getting his feet tangled up but he has not had any further falls since his last visit.  He was evaluated in the ED following this fall.  His EMG/NCS was performed which was read as demonstrating abnormal study showing severe peripheral neuropathy.  The needle exam changes were consistent with the nerve conduction findings however I cannot entirely exclude a superimposed L5 or S1 radiculopathy on either side.  He is on hydroxychloroquine which  can cause neuropathy.  The study results were discussed with the patient.  He has had physical therapy and he has home exercises he can do for his balance at home.  He denies family history of neuropathy.  He drinks some alcohol on the weekends.  Denies any exposure to heavy metals or toxins.  The neuropathy maybe due to hydroxychloroquine, this was discussed with patient.  I will order a lumbar spine MRI for further evaluation of possible L5/S1 radiculopathy.

## 2022-11-07 NOTE — PROGRESS NOTES
Chief Complaint  Extremity Weakness    Subjective          Demond Menchaca presents to [facility] NEUROLOGY for   HISTORY OF PRESENT ILLNESS:    Demond Menchaca is a 77 year old right handed man who returns to neurology clinic for follow up evaluation and treatment of essential tremors and weakness and follow up of his EMG/NCS.  He reports his tremors starting about 2 years ago.  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  He does not have much tremors in the right hand.  He has not noticed the tremors at rest and has only noticed the tremors with action.  He has noticed the tremors when he is holding something in his hands.  He has a hard time sometimes getting out of a chair.  He has had trouble with his balance.  He has had some vivid dreams a times.  His sense of smell has reduced.  He feels like his legs are weak.  He reports being pretty sedentary over the past couple of years.  He is on Coumadin for Afib.  He reports a history of TIA 10 years ago before he was on the Coumadin.  He does not drink any caffeine but tells me he used to drink a lot of coffee in the past.  He does not smoke.  He denies any family history of tremors.  He has been using cane to ambulate for the past couple years.  He had back surgery in 1974 and he has a foot drop on the left side.  He has had more recent lab work including CMP and previous TSH was normal along with previous Vit B12 level which was normal as well.  He has not noticed any changes in his voice.  He has RLS and he is currently being treated with pramipexole.  His heart rate is low.  His INR has been therapeutic.  He had a fall 6 months ago due to getting his feet tangled up but he has not had any further falls since his last visit.  He was evaluated in the ED following this fall.  His EMG/NCS was performed which was read as demonstrating abnormal study showing severe peripheral neuropathy.  The needle exam  changes were consistent with the nerve conduction findings however I cannot entirely exclude a superimposed L5 or S1 radiculopathy on either side.  He is on hydroxychloroquine which can cause neuropathy.  The study results were discussed with the patient.  He has had physical therapy and he has home exercises he can do for his balance at home.  He denies family history of neuropathy.  He drinks some alcohol on the weekends.  Denies any exposure to heavy metals or toxins.      Past Medical History:   Diagnosis Date   • Allergic 2000    Grass/pollen   • LASHONDA positive 06/18/2018    2017: elevated ds LASHONDA   • Arthritis 1995    Hand, neck, back, etc.   • Atrial flutter with rapid ventricular response ([facility])    • Cataract 2005   • Chronic obstructive pulmonary disease ([facility])    • Colon polyp 10/07/2019    Added automatically from request for surgery [id number]   • Diverticulitis of large intestine without perforation or abscess without bleeding 03/07/2016   • Diverticulosis    • ED (erectile dysfunction)    • Elbow fracture, left    • Erectile dysfunction 2005   • History of blood transfusion    • Hypogonadism in male    • Kidney cysts    • Left femoral shaft fracture ([facility])    • Lower back pain    • PAF (paroxysmal atrial fibrillation) ([facility])    • Peptic ulceration    • Plantar fasciitis 12/05/2017   • Pneumonia 1974   • Prostatic hypertrophy, benign    • 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   • Stroke ([facility])     TIA   • Transient cerebral ischemia     H/O TIAs        Family History   Problem Relation Age of Onset   • Hypertension Mother    • Osteoporosis Mother    • Hypertension Father    • Thyroid disease Sister    • Diabetes Sister    • Hypertension Sister    • Pulmonary fibrosis Sister    • Heart disease Sister    • Diabetes Sister    • Colon cancer Maternal Grandmother 60   • Heart disease Other    • Atrial fibrillation Other    • Thyroid disease Other    • Diabetes Other    • Heart disease Other    • Hypertension  "Other    • Malig Hyperthermia Neg Hx         Social History     Socioeconomic History   • Marital status:      Spouse name: Latisha   • Number of children: 2   • Years of education: College   • Highest education level: Not asked   Tobacco Use   • Smoking status: Former     Packs/day: 0.50     Years: 15.00     Pack years: 7.50     Types: Cigarettes     Start date: 1959     Quit date: 1974     Years since quittin.8   • Smokeless tobacco: Never   Vaping Use   • Vaping Use: Never used   Substance and Sexual Activity   • Alcohol use: Not Currently     Alcohol/week: 6.0 standard drinks     Types: 6 Glasses of wine per week     Comment: Some beer during the summer months   • Drug use: No   • Sexual activity: Not Currently     Partners: Female     Birth control/protection: None        I have reviewed and confirmed the accuracy of the ROS as documented by the MA/LPN/RN Yonis Welch MD    Review of Systems   Constitutional: Negative for activity change, appetite change and fatigue.   Musculoskeletal: Positive for gait problem (balance issues. ). Negative for back pain and neck pain.   Neurological: Positive for tremors (hands), weakness (legs. ) and numbness (right hand ). Negative for dizziness, seizures, syncope, facial asymmetry, speech difficulty, light-headedness, headache, memory problem and confusion.   Psychiatric/Behavioral: Negative for agitation, behavioral problems, decreased concentration, dysphoric mood, hallucinations, self-injury, sleep disturbance, suicidal ideas, negative for hyperactivity, depressed mood and stress. The patient is not nervous/anxious.         Objective   Vital Signs:   /68   Pulse 50   Ht 188 cm (74\")   Wt 98 kg (216 lb)   SpO2 98%   BMI 27.73 kg/m²       PHYSICAL EXAM:    General   Mental Status - Alert. General Appearance - Well developed, Well groomed, Oriented and Cooperative. Orientation - Oriented X3.       Head and Neck  Head - normocephalic, atraumatic " with no lesions or palpable masses.  Neck    Global Assessment - supple.       Eye   Sclera/Conjunctiva - Bilateral - Normal.    ENMT  Mouth and Throat   Oral Cavity/Oropharynx: Oropharynx - the soft palate,uvula and tongue are normal in appearance.    Chest and Lung Exam   Chest - lung clear to auscultation bilaterally.    Cardiovascular   Cardiovascular examination reveals  - normal heart sounds, regular rate and rhythm.    Neurologic   Mental Status: Speech - Normal. Cognitive function - appropriate fund of knowledge. No impairment of attention, Impairment of concentration, impairment of long term memory or impairment of short term memory.  Cranial Nerves:   II Optic: Visual acuity - Left - Normal. Right - Normal. Visual fields - Normal (to confrontation).  III Oculomotor: Pupillary constriction - Left - Normal. Right - Normal.  VII Facial: - Normal Bilaterally.   IX Glossopharyngeal / X Vagus - Normal.  XI Accessory: Trapezius - Bilateral - Normal. Sternocleidomastoid - Bilateral - Normal.  XII Hypoglossal - Bilateral - Normal.  Eye Movements: - Normal Bilaterally.  Sensory:   Light Touch: Intact - Globally.  Temperature: Reduced in lower extremities distally >proximally.  Vibration: Reduced in lower extremities distally > proximally.   Motor:   Bulk and Contour: - Reduced in lower extremities.  Tone: - Normal.  Tremor: Fast frequency action tremors.    Strength: 5/5 normal muscle strength in upper extremities and 4/5 in lower extremities and weaker in left leg with 3/5 in left dorsiflexion with reduced muscle bulk.            General Assessment of Reflexes: - deep tendon reflexes are reduced. Coordination - No Impairment of finger-to-nose or Impairment of rapid alternating movements. Gait - Wide based, hemiparetic with left sided foot drop.  Uses cane to help ambulate.      Result Review :                 Assessment and Plan    Problem List Items Addressed This Visit        Musculoskeletal and Injuries     Weakness of both lower extremities    Current Assessment & Plan     EMG/NCS could not rule out an L5/S1 radiculopathy, I will order a lumbar spine MRI for further evaluation.          Relevant Orders    MRI Lumbar Spine Without Contrast       Neuro    Tremor - Primary    Current Assessment & Plan     Essential tremor.  Will hold off on medication treatment due to bradycardia (no beta blocker) and warfarin (no primidone).  Also it is mild so will hold off on treatment.          Idiopathic progressive neuropathy    Current Assessment & Plan     77 year old right handed man who returns to neurology clinic for follow up evaluation and treatment of essential tremors and weakness and follow up of his EMG/NCS.  He reports his tremors starting about 2 years ago.  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  He does not have much tremors in the right hand.  He has not noticed the tremors at rest and has only noticed the tremors with action.  He has noticed the tremors when he is holding something in his hands.  He has a hard time sometimes getting out of a chair.  He has had trouble with his balance.  He has had some vivid dreams a times.  His sense of smell has reduced.  He feels like his legs are weak.  He reports being pretty sedentary over the past couple of years.  He is on Coumadin for Afib.  He reports a history of TIA 10 years ago before he was on the Coumadin.  He does not drink any caffeine but tells me he used to drink a lot of coffee in the past.  He does not smoke.  He denies any family history of tremors.  He has been using cane to ambulate for the past couple years.  He had back surgery in 1974 and he has a foot drop on the left side.  He has had more recent lab work including CMP and previous TSH was normal along with previous Vit B12 level which was normal as well.  He has not noticed any changes in his voice.  He has RLS and he is currently being treated with  pramipexole.  His heart rate is low.  His INR has been therapeutic.  He had a fall 6 months ago due to getting his feet tangled up but he has not had any further falls since his last visit.  He was evaluated in the ED following this fall.  His EMG/NCS was performed which was read as demonstrating abnormal study showing severe peripheral neuropathy.  The needle exam changes were consistent with the nerve conduction findings however I cannot entirely exclude a superimposed L5 or S1 radiculopathy on either side.  He is on hydroxychloroquine which can cause neuropathy.  The study results were discussed with the patient.  He has had physical therapy and he has home exercises he can do for his balance at home.  He denies family history of neuropathy.  He drinks some alcohol on the weekends.  Denies any exposure to heavy metals or toxins.  The neuropathy maybe due to hydroxychloroquine, this was discussed with patient.  I will order a lumbar spine MRI for further evaluation of possible L5/S1 radiculopathy.              I spent 32 minutes caring for Demond on this date of service. This time includes time spent by me in the following activities:preparing for the visit, reviewing tests, obtaining and/or reviewing a separately obtained history, performing a medically appropriate examination and/or evaluation , counseling and educating the patient/family/caregiver, documenting information in the medical record and care coordination and order lumbar spine MRI.     Follow Up   No follow-ups on file.  Patient was given instructions and counseling regarding his condition or for health maintenance advice. Please see specific information pulled into the AVS if appropriate.

## 2022-11-10 ENCOUNTER — ANTICOAGULATION VISIT (OUTPATIENT)
Dept: PHARMACY | Facility: HOSPITAL | Age: 77
End: 2022-11-10

## 2022-11-10 DIAGNOSIS — I48.0 PAF (PAROXYSMAL ATRIAL FIBRILLATION): Primary | ICD-10-CM

## 2022-11-10 LAB — INR PPP: 3.4

## 2022-11-10 NOTE — PROGRESS NOTES
Anticoagulation Clinic Progress Note    Anticoagulation Summary  As of 11/10/2022    INR goal:  2.5-3.5   TTR:  84.5 % (3.9 y)   INR used for dosing:  3.40 (11/10/2022)   Warfarin maintenance plan:  2.5 mg every Mon, Fri; 5 mg all other days; Starting 11/10/2022   Weekly warfarin total:  30 mg   No change documented:  Philip Carmona, PharmD   Plan last modified:  Jeanette Montes De Oca RP (8/26/2021)   Next INR check:  11/24/2022   Priority:  Maintenance   Target end date:      Indications    PAF (paroxysmal atrial fibrillation) (HCC) [I48.0]  TIA (transient ischemic attack) (Resolved) [G45.9]             Anticoagulation Episode Summary     INR check location:      Preferred lab:      Send INR reminders to:   GAYLE PETERSON  POOL    Comments:  [place]      Anticoagulation Care Providers     Provider Role Specialty Phone number    David Hernandez MD Referring Cardiology [phone]          Clinic Interview:  No pertinent clinical findings have been reported.    INR History:  Anticoagulation Monitoring 10/13/2022 10/27/2022 11/10/2022   INR 3.00 2.80 3.40   INR Date 10/13/2022 10/27/2022 11/10/2022   INR Goal 2.5-3.5 2.5-3.5 2.5-3.5   Trend Same Same Same   Last Week Total 30 mg 30 mg 30 mg   Next Week Total 30 mg 30 mg 30 mg   Sun 5 mg 5 mg 5 mg   Mon 2.5 mg 2.5 mg 2.5 mg   Tue 5 mg 5 mg 5 mg   Wed 5 mg 5 mg 5 mg   Thu 5 mg 5 mg 5 mg   Fri 2.5 mg 2.5 mg 2.5 mg   Sat 5 mg 5 mg 5 mg   Visit Report - - -   Some recent data might be hidden       Plan:  1. INR is therapeutic today- see above in Anticoagulation Summary.    Demond CROSS Bernarda to continue their warfarin regimen- see above in Anticoagulation Summary.  2. Follow up in 2 weeks  3. Pt has agreed to only be called if INR out of range. They have been instructed to call if any changes in medications, doses, concerns, etc. Patient expresses understanding and has no further questions at this time.    Philip Carmona, PharmD

## 2022-11-24 LAB — INR PPP: 3.2

## 2022-11-28 ENCOUNTER — ANTICOAGULATION VISIT (OUTPATIENT)
Dept: PHARMACY | Facility: HOSPITAL | Age: 77
End: 2022-11-28

## 2022-11-28 DIAGNOSIS — I48.0 PAF (PAROXYSMAL ATRIAL FIBRILLATION): Primary | ICD-10-CM

## 2022-11-28 NOTE — PROGRESS NOTES
Anticoagulation Clinic Progress Note    Anticoagulation Summary  As of 11/28/2022    INR goal:  2.5-3.5   TTR:  84.7 % (4 y)   INR used for dosing:  3.20 (11/24/2022)   Warfarin maintenance plan:  2.5 mg every Mon, Fri; 5 mg all other days; Starting 11/28/2022   Weekly warfarin total:  30 mg   No change documented:  Crystal Pineda RP   Plan last modified:  Jeanette Montes De Oca RPH (8/26/2021)   Next INR check:  12/8/2022   Priority:  Maintenance   Target end date:      Indications    PAF (paroxysmal atrial fibrillation) (HCC) [I48.0]  TIA (transient ischemic attack) (Resolved) [G45.9]             Anticoagulation Episode Summary     INR check location:      Preferred lab:      Send INR reminders to:   GAYLE PETERSON  POOL    Comments:  [place]      Anticoagulation Care Providers     Provider Role Specialty Phone number    David Hernandez MD Referring Cardiology [phone]          Clinic Interview:  No pertinent clinical findings have been reported.    INR History:  Anticoagulation Monitoring 10/27/2022 11/10/2022 11/28/2022   INR 2.80 3.40 3.20   INR Date 10/27/2022 11/10/2022 11/24/2022   INR Goal 2.5-3.5 2.5-3.5 2.5-3.5   Trend Same Same Same   Last Week Total 30 mg 30 mg 30 mg   Next Week Total 30 mg 30 mg 30 mg   Sun 5 mg 5 mg 5 mg   Mon 2.5 mg 2.5 mg 2.5 mg   Tue 5 mg 5 mg 5 mg   Wed 5 mg 5 mg 5 mg   Thu 5 mg 5 mg 5 mg   Fri 2.5 mg 2.5 mg 2.5 mg   Sat 5 mg 5 mg 5 mg   Visit Report - - -   Some recent data might be hidden       Plan:  1. INR is therapeutic today- see above in Anticoagulation Summary.    Demond CROSS Bernarda to continue their warfarin regimen- see above in Anticoagulation Summary.  2. Follow up in 2 weeks  3. Pt has agreed to only be called if INR out of range. They have been instructed to call if any changes in medications, doses, concerns, etc. Patient expresses understanding and has no further questions at this time.    [facility], located within [facility]

## 2022-12-07 ENCOUNTER — TELEPHONE (OUTPATIENT)
Dept: CARDIOLOGY | Facility: CLINIC | Age: 77
End: 2022-12-07

## 2022-12-07 NOTE — TELEPHONE ENCOUNTER
Patient is scheduled for hand surgery with [facility] on 1/13/22, and is needing clearance as well as instructions on holding warfarin prior.    Last seen 10/24/22, NO hx of stroke or valve.

## 2022-12-08 ENCOUNTER — ANTICOAGULATION VISIT (OUTPATIENT)
Dept: PHARMACY | Facility: HOSPITAL | Age: 77
End: 2022-12-08

## 2022-12-08 DIAGNOSIS — I48.0 PAF (PAROXYSMAL ATRIAL FIBRILLATION): Primary | ICD-10-CM

## 2022-12-08 LAB — INR PPP: 2.9

## 2022-12-08 NOTE — PROGRESS NOTES
Anticoagulation Clinic Progress Note    Anticoagulation Summary  As of 2022    INR goal:  2.5-3.5   TTR:  84.8 % (4 y)   INR used for dosin.90 (2022)   Warfarin maintenance plan:  2.5 mg every Mon, Fri; 5 mg all other days; Starting 2022   Weekly warfarin total:  30 mg   No change documented:  Jeanette Montes De Oca RPH   Plan last modified:  Jeanette Montes De Oca RPH (2021)   Next INR check:  2022   Priority:  Maintenance   Target end date:      Indications    PAF (paroxysmal atrial fibrillation) (HCC) [I48.0]  TIA (transient ischemic attack) (Resolved) [G45.9]             Anticoagulation Episode Summary     INR check location:      Preferred lab:      Send INR reminders to:   GAYLE PETERSON  POOL    Comments:  [place]      Anticoagulation Care Providers     Provider Role Specialty Phone number    David Hernandez MD Referring Cardiology [phone]          Clinic Interview:  No pertinent clinical findings have been reported.    INR History:  Anticoagulation Monitoring 11/10/2022 2022 2022   INR 3.40 3.20 2.90   INR Date 11/10/2022 2022 2022   INR Goal 2.5-3.5 2.5-3.5 2.5-3.5   Trend Same Same Same   Last Week Total 30 mg 30 mg 30 mg   Next Week Total 30 mg 30 mg 30 mg   Sun 5 mg 5 mg 5 mg   Mon 2.5 mg 2.5 mg 2.5 mg   Tue 5 mg 5 mg 5 mg   Wed 5 mg 5 mg 5 mg   Thu 5 mg 5 mg 5 mg   Fri 2.5 mg 2.5 mg 2.5 mg   Sat 5 mg 5 mg 5 mg   Visit Report - - -   Some recent data might be hidden       Plan:  1. INR is therapeutic today- see above in Anticoagulation Summary.    Demond CROSS Bernarda to continue their warfarin regimen- see above in Anticoagulation Summary.  2. Follow up in 2 weeks  3. Pt has agreed to only be called if INR out of range. They have been instructed to call if any changes in medications, doses, concerns, etc. Patient expresses understanding and has no further questions at this time.    Jeanette Montes De Oca RPH

## 2022-12-22 ENCOUNTER — ANTICOAGULATION VISIT (OUTPATIENT)
Dept: PHARMACY | Facility: HOSPITAL | Age: 77
End: 2022-12-22

## 2022-12-22 DIAGNOSIS — I48.0 PAF (PAROXYSMAL ATRIAL FIBRILLATION): Primary | ICD-10-CM

## 2022-12-22 LAB — INR PPP: 3.3

## 2022-12-22 NOTE — PROGRESS NOTES
Anticoagulation Clinic Progress Note    Anticoagulation Summary  As of 12/22/2022    INR goal:  2.5-3.5   TTR:  85.0 % (4 y)   INR used for dosing:  3.30 (12/22/2022)   Warfarin maintenance plan:  2.5 mg every Mon, Fri; 5 mg all other days; Starting 12/22/2022   Weekly warfarin total:  30 mg   No change documented:  Jeanette Montes De Oca RPH   Plan last modified:  Jeanette Montes De Oca RPH (8/26/2021)   Next INR check:  1/5/2023   Priority:  Maintenance   Target end date:      Indications    PAF (paroxysmal atrial fibrillation) (HCC) [I48.0]  TIA (transient ischemic attack) (Resolved) [G45.9]             Anticoagulation Episode Summary     INR check location:      Preferred lab:      Send INR reminders to:   GAYLE PETERSON  POOL    Comments:  [place]      Anticoagulation Care Providers     Provider Role Specialty Phone number    David Hernandez MD Referring Cardiology [phone]          Clinic Interview:  No pertinent clinical findings have been reported.    INR History:  Anticoagulation Monitoring 11/28/2022 12/8/2022 12/22/2022   INR 3.20 2.90 3.30   INR Date 11/24/2022 12/8/2022 12/22/2022   INR Goal 2.5-3.5 2.5-3.5 2.5-3.5   Trend Same Same Same   Last Week Total 30 mg 30 mg 30 mg   Next Week Total 30 mg 30 mg 30 mg   Sun 5 mg 5 mg 5 mg   Mon 2.5 mg 2.5 mg 2.5 mg   Tue 5 mg 5 mg 5 mg   Wed 5 mg 5 mg 5 mg   Thu 5 mg 5 mg 5 mg   Fri 2.5 mg 2.5 mg 2.5 mg   Sat 5 mg 5 mg 5 mg   Visit Report - - -   Some recent data might be hidden       Plan:  1. INR is therapeutic today- see above in Anticoagulation Summary.    Demond CROSS Bernarda to continue their warfarin regimen- see above in Anticoagulation Summary.  2. Follow up in 2 weeks  3. Pt has agreed to only be called if INR out of range. They have been instructed to call if any changes in medications, doses, concerns, etc. Patient expresses understanding and has no further questions at this time.    Jeanette Montes De Oca RPH

## 2022-12-30 ENCOUNTER — OFFICE VISIT (OUTPATIENT)
Dept: FAMILY MEDICINE CLINIC | Facility: CLINIC | Age: 77
End: 2022-12-30

## 2022-12-30 VITALS
HEART RATE: 53 BPM | OXYGEN SATURATION: 100 % | SYSTOLIC BLOOD PRESSURE: 180 MMHG | BODY MASS INDEX: 28.34 KG/M2 | HEIGHT: 74 IN | DIASTOLIC BLOOD PRESSURE: 90 MMHG | WEIGHT: 220.8 LBS | TEMPERATURE: 97.7 F

## 2022-12-30 DIAGNOSIS — R94.6 ABNORMAL RESULTS OF THYROID FUNCTION STUDIES: ICD-10-CM

## 2022-12-30 DIAGNOSIS — G25.81 RESTLESS LEGS SYNDROME: ICD-10-CM

## 2022-12-30 DIAGNOSIS — I10 PRIMARY HYPERTENSION: ICD-10-CM

## 2022-12-30 DIAGNOSIS — Z11.59 ENCOUNTER FOR HEPATITIS C SCREENING TEST FOR LOW RISK PATIENT: ICD-10-CM

## 2022-12-30 DIAGNOSIS — Z23 IMMUNIZATION DUE: ICD-10-CM

## 2022-12-30 DIAGNOSIS — J30.2 SEASONAL ALLERGIC RHINITIS, UNSPECIFIED TRIGGER: ICD-10-CM

## 2022-12-30 DIAGNOSIS — N40.1 BENIGN PROSTATIC HYPERPLASIA WITH LOWER URINARY TRACT SYMPTOMS, SYMPTOM DETAILS UNSPECIFIED: ICD-10-CM

## 2022-12-30 DIAGNOSIS — G60.3 IDIOPATHIC PROGRESSIVE NEUROPATHY: ICD-10-CM

## 2022-12-30 DIAGNOSIS — M32.8 OTHER FORMS OF SYSTEMIC LUPUS ERYTHEMATOSUS, UNSPECIFIED ORGAN INVOLVEMENT STATUS: ICD-10-CM

## 2022-12-30 DIAGNOSIS — M51.36 DDD (DEGENERATIVE DISC DISEASE), LUMBAR: ICD-10-CM

## 2022-12-30 DIAGNOSIS — I48.0 PAF (PAROXYSMAL ATRIAL FIBRILLATION): ICD-10-CM

## 2022-12-30 DIAGNOSIS — D61.818 PANCYTOPENIA: ICD-10-CM

## 2022-12-30 DIAGNOSIS — I48.92 ATRIAL FLUTTER WITH RAPID VENTRICULAR RESPONSE: ICD-10-CM

## 2022-12-30 DIAGNOSIS — Z00.00 MEDICARE ANNUAL WELLNESS VISIT, SUBSEQUENT: Primary | ICD-10-CM

## 2022-12-30 DIAGNOSIS — Z98.890 H/O CARDIAC RADIOFREQUENCY ABLATION: ICD-10-CM

## 2022-12-30 PROCEDURE — 1159F MED LIST DOCD IN RCRD: CPT | Performed by: FAMILY MEDICINE

## 2022-12-30 PROCEDURE — 90677 PCV20 VACCINE IM: CPT | Performed by: FAMILY MEDICINE

## 2022-12-30 PROCEDURE — G0009 ADMIN PNEUMOCOCCAL VACCINE: HCPCS | Performed by: FAMILY MEDICINE

## 2022-12-30 PROCEDURE — G0439 PPPS, SUBSEQ VISIT: HCPCS | Performed by: FAMILY MEDICINE

## 2022-12-30 PROCEDURE — 99214 OFFICE O/P EST MOD 30 MIN: CPT | Performed by: FAMILY MEDICINE

## 2022-12-30 PROCEDURE — 1170F FXNL STATUS ASSESSED: CPT | Performed by: FAMILY MEDICINE

## 2022-12-30 RX ORDER — TAMSULOSIN HYDROCHLORIDE 0.4 MG/1
1 CAPSULE ORAL DAILY
Qty: 90 CAPSULE | Refills: 1 | Status: SHIPPED | OUTPATIENT
Start: 2022-12-30 | End: 2023-01-30

## 2022-12-30 RX ORDER — LISINOPRIL 10 MG/1
10 TABLET ORAL DAILY
Qty: 90 TABLET | Refills: 3 | Status: CANCELLED | OUTPATIENT
Start: 2022-12-30

## 2022-12-30 RX ORDER — HYDROCODONE BITARTRATE AND ACETAMINOPHEN 5; 325 MG/1; MG/1
1 TABLET ORAL EVERY 12 HOURS PRN
Qty: 60 TABLET | Refills: 0 | Status: SHIPPED | OUTPATIENT
Start: 2022-12-30

## 2022-12-30 RX ORDER — HYDROXYCHLOROQUINE SULFATE 200 MG/1
1 TABLET, FILM COATED ORAL 2 TIMES DAILY
COMMUNITY
Start: 2022-11-11 | End: 2022-12-30 | Stop reason: SDUPTHER

## 2022-12-30 NOTE — PROGRESS NOTES
The ABCs of the Annual Wellness Visit  Subsequent Medicare Wellness Visit    Subjective    Demond Menchaca is a 77 y.o. male who presents for a Subsequent Medicare   Wellness Visit.      hpi  Allergies- doing well on otc prn     Atrial flutter/paroxysmal atrial fibrillation-patient is on anticoagulation.  Has had ablation.  Following with cardiology and they follow his INR.     Pancytopenia - for years, has followed with hematology and now just having levels checked with pcp every 6 months.      Constipation- stable on fiber     BPH- Had a recent episode of night time incontinence. Follows with urology and has been recently. Does not happen often. Flomax lowered his bp too much before when he had normal bp.      Lupus-patient is on Plaquenil and following with rheumatology. Stable. Having some pain in elbows and ankles. Has just gotten an injection in his elbow from rheum. [facility]. Doing well.      Restless leg- worst at night. Mirapex making him too sleepy. Say he does well with hydrocodone. Needing twice a day.      Back pain- has had surgery, uses hydrocodone rarely but feels like he needs it more lately. Is getting an MRI with neurology soon.      htn- having highs to 170/90, on average 150/80.        The following portions of the patient's history were reviewed and   updated as appropriate: allergies, current medications, past family history, past medical history, past social history, past surgical history and problem list.    Compared to one year ago, the patient feels his physical   health is the same.    Compared to one year ago, the patient feels his mental   health is the same.    Recent Hospitalizations:  He was not admitted to the hospital during the last year.       Current Medical Providers:  Patient Care Team:  Venus Walton MD as PCP - General (Family Medicine)  Dudley Acosta MD as Consulting Physician (Hematology and Oncology)  Damaris Shell APRN as Nurse  Practitioner (Cardiology)  Della Stephenson MD as Referring Physician (Internal Medicine)  Dick Medina MD as Referring Physician (Family Medicine)  Henrique Rust MD as Consulting Physician (Gastroenterology)    Outpatient Medications Prior to Visit   Medication Sig Dispense Refill   • Cetirizine HCl 10 MG capsule Take 1 capsule by mouth daily.     • finasteride (PROSCAR) 5 MG tablet TAKE 1 TABLET BY MOUTH EVERY DAY 90 tablet 2   • hydroxychloroquine (PLAQUENIL) 200 MG tablet Take 200 mg by mouth 2 (Two) Times a Day.     • Psyllium (METAMUCIL PO) Take  by mouth Daily.     • warfarin (COUMADIN) 5 MG tablet TAKE 1/2 TABLET BY MOUTH ON MONDAY AND FRIDAY. TAKE 1 TABLET BY MOUTH ON ALL OTHER DAYS OR AS DIRECTED (Patient taking differently: TAKE 1/2 TABLET BY MOUTH ON MONDAY AND FRIDAY. TAKE 1 TABLET BY MOUTH ON ALL OTHER DAYS OR AS DIRECTED. HOLDING FOR SCOPE) 80 tablet 1   • HYDROcodone-acetaminophen (NORCO) 5-325 MG per tablet Take 1 tablet by mouth Every 12 (Twelve) Hours As Needed for Severe Pain. 30 tablet 0   • hydroxychloroquine (PLAQUENIL) 200 MG tablet Take 1 tablet by mouth 2 (Two) Times a Day.     • pramipexole (MIRAPEX) 0.5 MG tablet Take 1-2 tablets by mouth 2 (Two) Times a Day. 360 tablet 1     No facility-administered medications prior to visit.       Opioid medication/s are on active medication list.  and I have evaluated his active treatment plan and pain score trends (see table).  Vitals:    12/30/22 0802   PainSc:   2   PainLoc: Generalized     I have reviewed the chart for potential of high risk medication and harmful drug interactions in the elderly.            Aspirin is not on active medication list.  Aspirin use is not indicated based on review of current medical condition/s. Risk of harm outweighs potential benefits.  .    Patient Active Problem List   Diagnosis   • PAF (paroxysmal atrial fibrillation) (HCC)   • Benign prostatic hyperplasia   • Health care maintenance   • DDD (degenerative  "disc disease), lumbar   • Seasonal allergic rhinitis   • Chronic laryngitis   • Restless legs syndrome   • Family history of premature coronary artery disease   • Pancytopenia (HCC)   • Slow transit constipation   • Other forms of systemic lupus erythematosus (HCC)   • Foot drop, left   • History of colon polyps   • FH: colon polyps   • Dysphasia   • Family history of colon cancer   • Venous stasis dermatitis of both lower extremities   • Abnormal barium enema   • Candida esophagitis (HCC)   • Atrial flutter with rapid ventricular response (HCC)   • H/O cardiac radiofrequency ablation--x 3   • Medicare annual wellness visit, subsequent   • Rib pain on right side   • Colon polyp   • Dysphagia   • Weakness of both lower extremities   • Tremor   • Elevated BP without diagnosis of hypertension   • Idiopathic progressive neuropathy   • Primary hypertension     Advance Care Planning  Advance Directive is not on file.  ACP discussion was held with the patient during this visit. Patient has an advance directive (not in EMR), copy requested.     Objective    Vitals:    12/30/22 0802   BP: 180/90   BP Location: Left arm   Patient Position: Sitting   Cuff Size: Adult   Pulse: 53   Temp: 97.7 °F (36.5 °C)   TempSrc: Temporal   SpO2: 100%   Weight: 100 kg (220 lb 12.8 oz)   Height: 188 cm (74\")   PainSc:   2   PainLoc: Generalized     Estimated body mass index is 28.35 kg/m² as calculated from the following:    Height as of this encounter: 188 cm (74\").    Weight as of this encounter: 100 kg (220 lb 12.8 oz).    BMI is >= 25 and <30. (Overweight) The following options were offered after discussion;: weight loss educational material (shared in after visit summary) and exercise counseling/recommendations      Does the patient have evidence of cognitive impairment? No          HEALTH RISK ASSESSMENT    Smoking Status:  Social History     Tobacco Use   Smoking Status Former   • Packs/day: 0.50   • Years: 15.00   • Pack years: 7.50 "   • Types: Cigarettes   • Start date: 1959   • Quit date: 1974   • Years since quittin.0   Smokeless Tobacco Never     Alcohol Consumption:  Social History     Substance and Sexual Activity   Alcohol Use Not Currently   • Alcohol/week: 6.0 standard drinks   • Types: 6 Glasses of wine per week    Comment: Some beer during the summer months     Fall Risk Screen:    CHRISTOS Fall Risk Assessment was completed, and patient is at MODERATE risk for falls. Assessment completed on:2022    Depression Screening:  PHQ-2/PHQ-9 Depression Screening 2022   Little Interest or Pleasure in Doing Things 0-->not at all   Feeling Down, Depressed or Hopeless 0-->not at all   PHQ-9: Brief Depression Severity Measure Score 0       Health Habits and Functional and Cognitive Screening:  Functional & Cognitive Status 2022   Do you have difficulty preparing food and eating? No   Do you have difficulty bathing yourself, getting dressed or grooming yourself? No   Do you have difficulty using the toilet? No   Do you have difficulty moving around from place to place? No   Do you have trouble with steps or getting out of a bed or a chair? No   Current Diet Limited Junk Food   Dental Exam Up to date   Eye Exam Up to date   Exercise (times per week) 3 times per week   Current Exercises Include Other   Do you need help using the phone?  No   Are you deaf or do you have serious difficulty hearing?  No   Do you need help with transportation? No   Do you need help shopping? No   Do you need help preparing meals?  No   Do you need help with housework?  No   Do you need help with laundry? No   Do you need help taking your medications? No   Do you need help managing money? No   Do you ever drive or ride in a car without wearing a seat belt? No   Have you felt unusual stress, anger or loneliness in the last month? No   Who do you live with? Spouse   If you need help, do you have trouble finding someone available to you? No  "  Have you been bothered in the last four weeks by sexual problems? No   Do you have difficulty concentrating, remembering or making decisions? No       Age-appropriate Screening Schedule:  Refer to the list below for future screening recommendations based on patient's age, sex and/or medical conditions. Orders for these recommended tests are listed in the plan section. The patient has been provided with a written plan.    Health Maintenance   Topic Date Due   • TDAP/TD VACCINES (2 - Tdap) 06/01/2022   • INFLUENZA VACCINE  08/01/2022   • ZOSTER VACCINE  Completed                CMS Preventative Services Quick Reference  Risk Factors Identified During Encounter  None Identified  Chronic Pain: Follow up in 1 month.  The above risks/problems have been discussed with the patient.  Pertinent information has been shared with the patient in the After Visit Summary.  An After Visit Summary and PPPS were made available to the patient.    Follow Up:   Next Medicare Wellness visit to be scheduled in 1 year.       Additional E&M Note during same encounter follows:  Patient has multiple medical problems which are significant and separately identifiable that require additional work above and beyond the Medicare Wellness Visit.      Chief Complaint  Medicare Wellness-subsequent and Hypertension (He has a concern with recent elevated BP.  His last reading was 177/80 yesterday p.m. )    Subjective        HPI  Demond Menchaca is also being seen today for     Review of Systems   Respiratory: Negative for shortness of breath.    Cardiovascular: Negative for chest pain.       Objective   Vital Signs:  /90 (BP Location: Left arm, Patient Position: Sitting, Cuff Size: Adult)   Pulse 53   Temp 97.7 °F (36.5 °C) (Temporal)   Ht 188 cm (74\")   Wt 100 kg (220 lb 12.8 oz)   SpO2 100%   BMI 28.35 kg/m²     Physical Exam  Constitutional:       Appearance: Normal appearance. He is well-developed.   Cardiovascular:      Rate and " Rhythm: Normal rate and regular rhythm.      Heart sounds: Normal heart sounds.   Pulmonary:      Effort: Pulmonary effort is normal.      Breath sounds: Normal breath sounds.   Musculoskeletal:         General: No swelling. Normal range of motion.   Skin:     General: Skin is warm and dry.      Findings: No rash.   Neurological:      General: No focal deficit present.      Mental Status: He is alert and oriented to person, place, and time.   Psychiatric:         Mood and Affect: Mood normal.         Behavior: Behavior normal.                         Assessment and Plan   Diagnoses and all orders for this visit:    1. Medicare annual wellness visit, subsequent (Primary)    2. Seasonal allergic rhinitis, unspecified trigger    3. PAF (paroxysmal atrial fibrillation) ([facility])    4. Atrial flutter with rapid ventricular response ([facility])    5. H/O cardiac radiofrequency ablation--x 3    6. Benign prostatic hyperplasia with lower urinary tract symptoms, symptom details unspecified  -     tamsulosin (FLOMAX) 0.4 MG capsule 24 hr capsule; Take 1 capsule by mouth Daily.  Dispense: 90 capsule; Refill: 1    7. Pancytopenia ([facility])  -     Cancel: CBC & Differential  -     CBC & Differential    8. Other forms of systemic lupus erythematosus, unspecified organ involvement status ([facility])    9. DDD (degenerative disc disease), lumbar  -     HYDROcodone-acetaminophen (NORCO) 5-325 MG per tablet; Take 1 tablet by mouth Every 12 (Twelve) Hours As Needed for Severe Pain.  Dispense: 60 tablet; Refill: 0    10. Restless legs syndrome  -     HYDROcodone-acetaminophen (NORCO) 5-325 MG per tablet; Take 1 tablet by mouth Every 12 (Twelve) Hours As Needed for Severe Pain.  Dispense: 60 tablet; Refill: 0    11. Primary hypertension  -     Cancel: Comprehensive Metabolic Panel  -     Cancel: Lipid Panel  -     Comprehensive Metabolic Panel  -     Lipid Panel    12. Immunization due  -     Pneumococcal Polysaccharide Vaccine 23-Valent Greater Than or  Equal To 1yo Subcutaneous / IM    13. Encounter for hepatitis C screening test for low risk patient  -     Cancel: Hepatitis C Antibody  -     Hepatitis C Antibody    14. Idiopathic progressive neuropathy  -     Vitamin B12  -     TSH Rfx On Abnormal To Free T4    15. Abnormal results of thyroid function studies  -     TSH Rfx On Abnormal To Free T4             Follow Up   Return in about 4 weeks (around 1/27/2023) for Recheck.  Patient was given instructions and counseling regarding his condition or for health maintenance advice. Please see specific information pulled into the AVS if appropriate.       Discussed risk factors, f/u in 1 month and monitor BP. Will not start bp med until we see how much flowmax helps with both issues. Increased hydrocodone to bid. Timmy.

## 2022-12-31 LAB
ALBUMIN SERPL-MCNC: 4.3 G/DL (ref 3.5–5.2)
ALBUMIN/GLOB SERPL: 2.2 G/DL
ALP SERPL-CCNC: 84 U/L (ref 39–117)
ALT SERPL-CCNC: 22 U/L (ref 1–41)
AST SERPL-CCNC: 32 U/L (ref 1–40)
BASOPHILS # BLD AUTO: 0.03 10*3/MM3 (ref 0–0.2)
BASOPHILS NFR BLD AUTO: 0.9 % (ref 0–1.5)
BILIRUB SERPL-MCNC: 0.6 MG/DL (ref 0–1.2)
BUN SERPL-MCNC: 16 MG/DL (ref 8–23)
BUN/CREAT SERPL: 15.5 (ref 7–25)
CALCIUM SERPL-MCNC: 8.9 MG/DL (ref 8.6–10.5)
CHLORIDE SERPL-SCNC: 105 MMOL/L (ref 98–107)
CHOLEST SERPL-MCNC: 116 MG/DL (ref 0–200)
CO2 SERPL-SCNC: 28.5 MMOL/L (ref 22–29)
CREAT SERPL-MCNC: 1.03 MG/DL (ref 0.76–1.27)
EGFRCR SERPLBLD CKD-EPI 2021: 73.9 ML/MIN/1.73
EOSINOPHIL # BLD AUTO: 0.17 10*3/MM3 (ref 0–0.4)
EOSINOPHIL NFR BLD AUTO: 5.2 % (ref 0.3–6.2)
ERYTHROCYTE [DISTWIDTH] IN BLOOD BY AUTOMATED COUNT: 12.9 % (ref 12.3–15.4)
GLOBULIN SER CALC-MCNC: 2 GM/DL
GLUCOSE SERPL-MCNC: 98 MG/DL (ref 65–99)
HCT VFR BLD AUTO: 40.7 % (ref 37.5–51)
HCV AB S/CO SERPL IA: <0.1 S/CO RATIO (ref 0–0.9)
HDLC SERPL-MCNC: 60 MG/DL (ref 40–60)
HGB BLD-MCNC: 13.5 G/DL (ref 13–17.7)
IMM GRANULOCYTES # BLD AUTO: 0.02 10*3/MM3 (ref 0–0.05)
IMM GRANULOCYTES NFR BLD AUTO: 0.6 % (ref 0–0.5)
LDLC SERPL CALC-MCNC: 45 MG/DL (ref 0–100)
LYMPHOCYTES # BLD AUTO: 0.48 10*3/MM3 (ref 0.7–3.1)
LYMPHOCYTES NFR BLD AUTO: 14.6 % (ref 19.6–45.3)
MCH RBC QN AUTO: 33 PG (ref 26.6–33)
MCHC RBC AUTO-ENTMCNC: 33.2 G/DL (ref 31.5–35.7)
MCV RBC AUTO: 99.5 FL (ref 79–97)
MONOCYTES # BLD AUTO: 0.28 10*3/MM3 (ref 0.1–0.9)
MONOCYTES NFR BLD AUTO: 8.5 % (ref 5–12)
NEUTROPHILS # BLD AUTO: 2.31 10*3/MM3 (ref 1.7–7)
NEUTROPHILS NFR BLD AUTO: 70.2 % (ref 42.7–76)
NRBC BLD AUTO-RTO: 0 /100 WBC (ref 0–0.2)
PLATELET # BLD AUTO: 110 10*3/MM3 (ref 140–450)
POTASSIUM SERPL-SCNC: 4.7 MMOL/L (ref 3.5–5.2)
PROT SERPL-MCNC: 6.3 G/DL (ref 6–8.5)
RBC # BLD AUTO: 4.09 10*6/MM3 (ref 4.14–5.8)
SODIUM SERPL-SCNC: 142 MMOL/L (ref 136–145)
TRIGL SERPL-MCNC: 41 MG/DL (ref 0–150)
TSH SERPL DL<=0.005 MIU/L-ACNC: 2.98 UIU/ML (ref 0.27–4.2)
VIT B12 SERPL-MCNC: 291 PG/ML (ref 211–946)
VLDLC SERPL CALC-MCNC: 11 MG/DL (ref 5–40)
WBC # BLD AUTO: 3.29 10*3/MM3 (ref 3.4–10.8)

## 2023-01-05 ENCOUNTER — ANTICOAGULATION VISIT (OUTPATIENT)
Dept: PHARMACY | Facility: HOSPITAL | Age: 78
End: 2023-01-05
Payer: MEDICARE

## 2023-01-05 DIAGNOSIS — I48.0 PAF (PAROXYSMAL ATRIAL FIBRILLATION): Primary | ICD-10-CM

## 2023-01-05 LAB — INR PPP: 2.6

## 2023-01-05 NOTE — PROGRESS NOTES
Anticoagulation Clinic Progress Note    Anticoagulation Summary  As of 2023    INR goal:  2.5-3.5   TTR:  85.1 % (4.1 y)   INR used for dosin.60 (2023)   Warfarin maintenance plan:  2.5 mg every Mon, Fri; 5 mg all other days; Starting 2023   Weekly warfarin total:  30 mg   Plan last modified:  Jeanette Montes De Oca RPH (2021)   Next INR check:  2023   Priority:  Maintenance   Target end date:      Indications    PAF (paroxysmal atrial fibrillation) (HCC) [I48.0]  TIA (transient ischemic attack) (Resolved) [G45.9]             Anticoagulation Episode Summary     INR check location:      Preferred lab:      Send INR reminders to:   GAYLE PETERSON  POOL    Comments:  [place]      Anticoagulation Care Providers     Provider Role Specialty Phone number    David Heranndez MD Referring Cardiology [phone]          Clinic Interview:  Patient Findings     Negatives:  Signs/symptoms of thrombosis, Signs/symptoms of bleeding,   Laboratory test error suspected, Change in health, Change in alcohol use,   Change in activity, Upcoming invasive procedure, Emergency department   visit, Upcoming dental procedure, Missed doses, Extra doses, Change in   medications, Change in diet/appetite, Hospital admission, Bruising, Other   complaints      Clinical Outcomes     Negatives:  Major bleeding event, Thromboembolic event,   Anticoagulation-related hospital admission, Anticoagulation-related ED   visit, Anticoagulation-related fatality        INR History:  Anticoagulation Monitoring 2022   INR 2.90 3.30 2.60   INR Date 2022   INR Goal 2.5-3.5 2.5-3.5 2.5-3.5   Trend Same Same Same   Last Week Total 30 mg 30 mg 30 mg   Next Week Total 30 mg 30 mg 30 mg   Sun 5 mg 5 mg 5 mg   Mon 2.5 mg 2.5 mg 2.5 mg   Tue 5 mg 5 mg 5 mg   Wed 5 mg 5 mg 5 mg   Thu 5 mg 5 mg 5 mg   Fri 2.5 mg 2.5 mg 2.5 mg   Sat 5 mg 5 mg 5 mg   Visit Report - - -   Some recent data might  be hidden       Plan:  1. INR is Therapeutic today- see above in Anticoagulation Summary.   Will instruct Demond Menchaca to Continue their warfarin regimen- see above in Anticoagulation Summary.  2. Follow up in 2 weeks  3. Pt has agreed to only be called if INR out of range. They have been instructed to call if any changes in medications, doses, concerns, etc. Patient expresses understanding and has no further questions at this time.    Chapis Jaimes, PharmD

## 2023-01-06 ENCOUNTER — HOSPITAL ENCOUNTER (OUTPATIENT)
Dept: MRI IMAGING | Facility: HOSPITAL | Age: 78
Discharge: HOME | End: 2023-01-06
Admitting: PSYCHIATRY & NEUROLOGY
Payer: MEDICARE

## 2023-01-06 DIAGNOSIS — R29.898 WEAKNESS OF BOTH LOWER EXTREMITIES: ICD-10-CM

## 2023-01-06 PROCEDURE — 72148 MRI LUMBAR SPINE W/O DYE: CPT

## 2023-01-10 ENCOUNTER — TELEPHONE (OUTPATIENT)
Dept: CARDIOLOGY | Facility: CLINIC | Age: 78
End: 2023-01-10
Payer: MEDICARE

## 2023-01-11 NOTE — TELEPHONE ENCOUNTER
Called and informed pt he could take magnesium. He will call back with any other questions.....[place]

## 2023-01-19 ENCOUNTER — ANTICOAGULATION VISIT (OUTPATIENT)
Dept: PHARMACY | Facility: HOSPITAL | Age: 78
End: 2023-01-19
Payer: MEDICARE

## 2023-01-19 ENCOUNTER — TELEPHONE (OUTPATIENT)
Dept: FAMILY MEDICINE CLINIC | Facility: CLINIC | Age: 78
End: 2023-01-19

## 2023-01-19 DIAGNOSIS — I48.0 PAF (PAROXYSMAL ATRIAL FIBRILLATION): Primary | ICD-10-CM

## 2023-01-19 LAB — INR PPP: 3.6

## 2023-01-19 NOTE — TELEPHONE ENCOUNTER
Caller: Demond Menchaca    Relationship to patient: Self    Best call back number:     Date of exposure: UNKNOWN    Date of positive COVID19 test: 1/18/23 HOME TEST [facility]    COVID19 symptoms: SYMPTOMS APPEAR YESTERDAY.  NO FEVER.  THROAT SCRATCHY.  VOICE IS LOW.  MINOR CHEST CONGESTION.  LETHARGIC.     Additional information or concerns: PLEASE CALL THE PATIENT TO ADVISE WHAT DR. OLIVER RECOMMENDS HE DO.

## 2023-01-19 NOTE — PROGRESS NOTES
Anticoagulation Clinic Progress Note    Anticoagulation Summary  As of 1/19/2023    INR goal:  2.5-3.5   TTR:  85.1 % (4.1 y)   INR used for dosing:  3.60 (1/19/2023)   Warfarin maintenance plan:  2.5 mg every Mon, Fri; 5 mg all other days; Starting 1/19/2023   Weekly warfarin total:  30 mg   No change documented:  Jeanette Montes De Oca RPH   Plan last modified:  Jeanette Montes De Oca RPH (8/26/2021)   Next INR check:  2/2/2023   Priority:  Maintenance   Target end date:      Indications    PAF (paroxysmal atrial fibrillation) (HCC) [I48.0]  TIA (transient ischemic attack) (Resolved) [G45.9]             Anticoagulation Episode Summary     INR check location:      Preferred lab:      Send INR reminders to:   GAYLE PETERSON  POOL    Comments:  [place]      Anticoagulation Care Providers     Provider Role Specialty Phone number    David Hernandez MD Referring Cardiology [phone]          Clinic Interview:  Patient Findings     Positives:  Change in medications    Comments:  Patient started magnesium, no diarrhea. No longer on mirapex,   taking hydrocodone x 1 dose. No changes in his diet.      Clinical Outcomes     Comments:  Patient started magnesium, no diarrhea. No longer on mirapex,   taking hydrocodone x 1 dose. No changes in his diet.        INR History:  Anticoagulation Monitoring 12/22/2022 1/5/2023 1/19/2023   INR 3.30 2.60 3.60   INR Date 12/22/2022 1/5/2023 1/19/2023   INR Goal 2.5-3.5 2.5-3.5 2.5-3.5   Trend Same Same Same   Last Week Total 30 mg 30 mg 30 mg   Next Week Total 30 mg 30 mg 30 mg   Sun 5 mg 5 mg 5 mg   Mon 2.5 mg 2.5 mg 2.5 mg   Tue 5 mg 5 mg 5 mg   Wed 5 mg 5 mg 5 mg   Thu 5 mg 5 mg 5 mg   Fri 2.5 mg 2.5 mg 2.5 mg   Sat 5 mg 5 mg 5 mg   Visit Report - - -   Some recent data might be hidden       Plan:  1. INR is Supratherapeutic today- see above in Anticoagulation Summary.   Will instruct Demond Menchaca to continue their warfarin regimen- see above in Anticoagulation Summary.  2.  Follow up in 2 weeks  3.They have been instructed to call if any changes in medications, doses, concerns, etc. Patient expresses understanding and has no further questions at this time.    [facility], located within [facility]

## 2023-01-27 ENCOUNTER — TELEPHONE (OUTPATIENT)
Dept: FAMILY MEDICINE CLINIC | Facility: CLINIC | Age: 78
End: 2023-01-27
Payer: MEDICARE

## 2023-01-27 NOTE — TELEPHONE ENCOUNTER
Patient called and wanted to let you know that he test positive for covid on Jan 18 and he has an appointment for this upcoming Monday and the patient called and he retested again the result is still positive and he wanted to know if you still wanted to come in for his appointment.

## 2023-01-27 NOTE — TELEPHONE ENCOUNTER
Pt notified he is good to come to his appointment that he can still test positive for up to 90 days, but he is not contagious.

## 2023-01-30 ENCOUNTER — OFFICE VISIT (OUTPATIENT)
Dept: FAMILY MEDICINE CLINIC | Facility: CLINIC | Age: 78
End: 2023-01-30
Payer: MEDICARE

## 2023-01-30 VITALS
BODY MASS INDEX: 27.82 KG/M2 | OXYGEN SATURATION: 95 % | SYSTOLIC BLOOD PRESSURE: 152 MMHG | DIASTOLIC BLOOD PRESSURE: 76 MMHG | HEIGHT: 74 IN | HEART RATE: 46 BPM | TEMPERATURE: 97.5 F | WEIGHT: 216.8 LBS

## 2023-01-30 DIAGNOSIS — G25.81 RESTLESS LEGS SYNDROME: ICD-10-CM

## 2023-01-30 DIAGNOSIS — N40.1 BENIGN PROSTATIC HYPERPLASIA WITH LOWER URINARY TRACT SYMPTOMS, SYMPTOM DETAILS UNSPECIFIED: ICD-10-CM

## 2023-01-30 DIAGNOSIS — I10 PRIMARY HYPERTENSION: Primary | ICD-10-CM

## 2023-01-30 PROCEDURE — 99214 OFFICE O/P EST MOD 30 MIN: CPT | Performed by: FAMILY MEDICINE

## 2023-01-30 RX ORDER — TADALAFIL 5 MG/1
TABLET ORAL
Qty: 90 TABLET | Refills: 3 | Status: SHIPPED | OUTPATIENT
Start: 2023-01-30

## 2023-01-30 RX ORDER — LISINOPRIL 20 MG/1
20 TABLET ORAL DAILY
Qty: 90 TABLET | Refills: 1 | Status: SHIPPED | OUTPATIENT
Start: 2023-01-30

## 2023-01-30 NOTE — PROGRESS NOTES
Subjective   Demond Menchaca is a 78 y.o. male.     Chief Complaint   Patient presents with   • Hypertension     Just got over having covid still having some nasal drip and congestion        History of Present Illness   bph- pt did not take it as he was worried it would lower his bp too much. Not willing to try it but would consider something else.     htn- HR runs around 50 and that is normal for him for the last 30 years. His cardiologist is aware. Needing to be on meds now. Having highs to 150/90 regularly.     Just got over covid and very little congestion left over. Had it 2 weeks ago.     rls- hydrocodone not helping when it did in the past. Is taking mag now and it helps some. Not wanting to try anything else right now. We did discuss lyrica.     The following portions of the patient's history were reviewed and updated as appropriate: allergies, current medications, past family history, past medical history, past social history, past surgical history and problem list.    Past Medical History:   Diagnosis Date   • Allergic 2000    Grass/pollen   • LASHONDA positive 06/18/2018    2017: elevated ds LASHONDA   • Arthritis 1995    Hand, neck, back, etc.   • Atrial flutter with rapid ventricular response (HCC)    • Cataract 2005   • Chronic obstructive pulmonary disease ([facility])    • Colon polyp 10/07/2019    Added automatically from request for surgery [id number]   • Diverticulitis of large intestine without perforation or abscess without bleeding 03/07/2016   • Diverticulosis    • ED (erectile dysfunction)    • Elbow fracture, left    • Erectile dysfunction 2005   • History of blood transfusion    • Hypogonadism in male    • Kidney cysts    • Left femoral shaft fracture ([facility])    • Lower back pain    • PAF (paroxysmal atrial fibrillation) (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)  06/14/2012   • Stroke (HCC)     TIA   • Transient cerebral ischemia     H/O TIAs       Past Surgical History:   Procedure Laterality Date   • ABLATION OF DYSRHYTHMIC FOCUS  2012, 2013   • ADENOIDECTOMY  1973   • APPENDECTOMY  1973   • CARDIAC ABLATION  2013, 2014   • CARDIAC CATHETERIZATION  2012, 2013   • CARDIAC ELECTROPHYSIOLOGY PROCEDURE N/A 04/29/2021    Procedure: Ablation atrial flutter--likely left atrial flutter, hx of PVI x 2;  Surgeon: David Hernandez MD;  Location: [facility] CATH INVASIVE LOCATION;  Service: Cardiovascular;  Laterality: N/A;   • CARDIAC ELECTROPHYSIOLOGY PROCEDURE N/A 07/02/2021    Procedure: Ablation atrial fibrillation--redo;  Surgeon: David Hernandez MD;  Location:  [facility] CATH INVASIVE LOCATION;  Service: Cardiovascular;  Laterality: N/A;   • COLONOSCOPY  approx 2014    normal per pt    • COLONOSCOPY N/A 01/08/2020    Procedure: COLONOSCOPY to cecum with cold snare biopsies;  Surgeon: Henrique Rust MD;  Location: [facility] ENDOSCOPY;  Service: Gastroenterology   • COLONOSCOPY N/A 9/20/2022    Procedure: COLONOSCOPY TO CECUM WITH POLYPECTOMY  ( COLD BX);  Surgeon: Henrique Rust MD;  Location: [facility] ENDOSCOPY;  Service: Gastroenterology;  Laterality: N/A;  HX OF COLON POLYPS; FAM HX OF COLON CANCER/ POLYPS  POST: DIVERTICULOSIS; COLON POLYP; HEMOORHOIDS   • COLONOSCOPY W/ POLYPECTOMY  11/21/2014    : 1 polyp - not precancerous   • ENDOMETRIAL ABLATION  2012, 2013   • ENDOSCOPY N/A 01/08/2020    Procedure: ESOPHAGOGASTRODUODENOSCOPY;  Surgeon: Henrique Rust MD;  Location: [facility] ENDOSCOPY;  Service: Gastroenterology   • ENDOSCOPY N/A 9/20/2022    Procedure: ESOPHAGOGASTRODUODENOSCOPY WITH BIOPSY AND PRESSLEY DILATATION 50F;  Surgeon: Henrique Rust MD;  Location: [facility] ENDOSCOPY;  Service: Gastroenterology;  Laterality: N/A;  DYSPHAGIA  POST: GASTRITIS   • FEMUR FRACTURE SURGERY Left 2007    post fall from the ladder   • FRACTURE SURGERY  2007    Elbow   • LUMBAR LAMINECTOMY  1974     "Dr.Lester Lino   • OTHER SURGICAL HISTORY      elbow surgery   • SPINE SURGERY     • THYROID SURGERY  1986    benign tumor removal,    • TOTAL ELBOW ARTHROPLASTY Left     L, post fall and fracture, hardware in       Family History   Problem Relation Age of Onset   • Hypertension Mother    • Osteoporosis Mother    • Hypertension Father    • Thyroid disease Sister    • Diabetes Sister    • Hypertension Sister    • Pulmonary fibrosis Sister    • Heart disease Sister    • Diabetes Sister    • Colon cancer Maternal Grandmother 60   • Heart disease Other    • Atrial fibrillation Other    • Thyroid disease Other    • Diabetes Other    • Heart disease Other    • Hypertension Other    • Malig Hyperthermia Neg Hx        Social History     Socioeconomic History   • Marital status:      Spouse name: Latisha   • Number of children: 2   • Years of education: College   • Highest education level: Not asked   Tobacco Use   • Smoking status: Former     Packs/day: 0.50     Years: 15.00     Pack years: 7.50     Types: Cigarettes     Start date: 1959     Quit date: 1974     Years since quittin.1   • Smokeless tobacco: Never   Vaping Use   • Vaping Use: Never used   Substance and Sexual Activity   • Alcohol use: Not Currently     Alcohol/week: 6.0 standard drinks     Types: 6 Glasses of wine per week     Comment: Some beer during the summer months   • Drug use: No   • Sexual activity: Not Currently     Partners: Female     Birth control/protection: None       Review of Systems   Respiratory: Negative for shortness of breath.    Cardiovascular: Negative for chest pain.       Objective   Visit Vitals  /76 (BP Location: Left arm, Patient Position: Sitting)   Pulse (!) 46   Temp 97.5 °F (36.4 °C)   Ht 188 cm (74.02\")   Wt 98.3 kg (216 lb 12.8 oz)   SpO2 95%   BMI 27.82 kg/m²     Body mass index is 27.82 kg/m².  Physical Exam  Constitutional:       Appearance: Normal appearance. He is well-developed. "   Cardiovascular:      Rate and Rhythm: Normal rate and regular rhythm.      Heart sounds: Normal heart sounds.   Pulmonary:      Effort: Pulmonary effort is normal.      Breath sounds: Normal breath sounds.   Musculoskeletal:         General: No swelling. Normal range of motion.   Skin:     General: Skin is warm and dry.      Findings: No rash.   Neurological:      General: No focal deficit present.      Mental Status: He is alert and oriented to person, place, and time.   Psychiatric:         Mood and Affect: Mood normal.         Behavior: Behavior normal.           Assessment & Plan   Diagnoses and all orders for this visit:    1. Primary hypertension (Primary)  -     lisinopril (PRINIVIL,ZESTRIL) 20 MG tablet; Take 1 tablet by mouth Daily.  Dispense: 90 tablet; Refill: 1    2. Benign prostatic hyperplasia with lower urinary tract symptoms, symptom details unspecified  -     tadalafil (Cialis) 5 MG tablet; One po qday to treat bph  Dispense: 90 tablet; Refill: 3    3. Restless legs syndrome          Rest, fluids and follow up if worse or no better.   Added in ace cialis and monitor BP, discussed risks and benefits.   F/u in 1 month.

## 2023-02-02 ENCOUNTER — ANTICOAGULATION VISIT (OUTPATIENT)
Dept: PHARMACY | Facility: HOSPITAL | Age: 78
End: 2023-02-02
Payer: MEDICARE

## 2023-02-02 DIAGNOSIS — I48.0 PAF (PAROXYSMAL ATRIAL FIBRILLATION): Primary | ICD-10-CM

## 2023-02-02 LAB — INR PPP: 4

## 2023-02-02 NOTE — PROGRESS NOTES
Anticoagulation Clinic Progress Note    Anticoagulation Summary  As of 2023    INR goal:  2.5-3.5   TTR:  84.3 % (4.2 y)   INR used for dosin.00 (2023)   Warfarin maintenance plan:  2.5 mg every Mon, Fri; 5 mg all other days; Starting 2023   Weekly warfarin total:  30 mg   Plan last modified:  Jeanette Montes De Oca RPH (2021)   Next INR check:  2023   Priority:  Maintenance   Target end date:      Indications    PAF (paroxysmal atrial fibrillation) (HCC) [I48.0]  TIA (transient ischemic attack) (Resolved) [G45.9]             Anticoagulation Episode Summary     INR check location:      Preferred lab:      Send INR reminders to:   [facility]  [facility]    Comments:  [place]      Anticoagulation Care Providers     Provider Role Specialty Phone number    David Hernandez MD Referring Cardiology [phone]          Clinic Interview:  Patient Findings     Positives:  Upcoming invasive procedure    Negatives:  Signs/symptoms of thrombosis, Signs/symptoms of bleeding,   Laboratory test error suspected, Change in health, Change in alcohol use,   Change in activity, Emergency department visit, Upcoming dental procedure,   Missed doses, Extra doses, Change in medications, Change in diet/appetite,   Hospital admission, Bruising, Other complaints    Comments:  Patient is having hand surgery on 2/10 holding 4 days prior.          Clinical Outcomes     Negatives:  Major bleeding event, Thromboembolic event,   Anticoagulation-related hospital admission, Anticoagulation-related ED   visit, Anticoagulation-related fatality    Comments:  Patient is having hand surgery on 2/10 holding 4 days prior.            INR History:  Anticoagulation Monitoring 2023   INR 2.60 3.60 4.00   INR Date 2023   INR Goal 2.5-3.5 2.5-3.5 2.5-3.5   Trend Same Same Same   Last Week Total 30 mg 30 mg 30 mg   Next Week Total 30 mg 30 mg 15 mg   Sun 5 mg 5 mg 5 mg   Mon 2.5 mg 2.5  mg Hold (2/6)   Tue 5 mg 5 mg Hold (2/7)   Wed 5 mg 5 mg Hold (2/8)   Thu 5 mg 5 mg 2.5 mg (2/2), Hold (2/9)   Fri 2.5 mg 2.5 mg 5 mg (2/10); Otherwise 2.5 mg   Sat 5 mg 5 mg 5 mg   Visit Report - - -   Some recent data might be hidden       Plan:  1. INR is Supratherapeutic today- see above in Anticoagulation Summary.   Will instruct Demond Menchaca to Change their warfarin regimen- see above in Anticoagulation Summary.  Take 2.5 mg today, then resume previous weekly dose through Sunday. Holding warfarin beginning Monday (2/6) for procedure on 2/10.  Take 5 mg on day of procedure (2/10), then resume pre-procedure warfarin dose until next INR.   2. Follow up in 1.5 weeks  3. They have been instructed to call if any changes in medications, doses, concerns, etc. Patient expresses understanding and has no further questions at this time.    Chapis Jaimes, PharmD

## 2023-02-13 ENCOUNTER — ANTICOAGULATION VISIT (OUTPATIENT)
Dept: PHARMACY | Facility: HOSPITAL | Age: 78
End: 2023-02-13
Payer: MEDICARE

## 2023-02-13 DIAGNOSIS — I48.0 PAF (PAROXYSMAL ATRIAL FIBRILLATION): Primary | ICD-10-CM

## 2023-02-13 LAB — INR PPP: 1.7

## 2023-02-13 NOTE — PROGRESS NOTES
Anticoagulation Clinic Progress Note    Anticoagulation Summary  As of 2023    INR goal:  2.5-3.5   TTR:  84.0 % (4.2 y)   INR used for dosin.70 (2023)   Warfarin maintenance plan:  2.5 mg every Mon, Fri; 5 mg all other days; Starting 2023   Weekly warfarin total:  30 mg   Plan last modified:  Jeanette Montes De Oca RPH (2021)   Next INR check:  2023   Priority:  Maintenance   Target end date:      Indications    PAF (paroxysmal atrial fibrillation) (HCC) [I48.0]  TIA (transient ischemic attack) (Resolved) [G45.9]             Anticoagulation Episode Summary     INR check location:      Preferred lab:      Send INR reminders to:   [facility]  PO[facility]    Comments:  [place]      Anticoagulation Care Providers     Provider Role Specialty Phone number    David Hernandez MD Referring Cardiology [phone]          Clinic Interview:  Patient Findings     Negatives:  Signs/symptoms of thrombosis, Signs/symptoms of bleeding,   Laboratory test error suspected, Change in health, Change in alcohol use,   Change in activity, Upcoming invasive procedure, Emergency department   visit, Upcoming dental procedure, Missed doses, Extra doses, Change in   medications, Change in diet/appetite, Hospital admission, Bruising, Other   complaints    Comments:  Had been holding for a procedure       Clinical Outcomes     Negatives:  Major bleeding event, Thromboembolic event,   Anticoagulation-related hospital admission, Anticoagulation-related ED   visit, Anticoagulation-related fatality    Comments:  Had been holding for a procedure         INR History:  Anticoagulation Monitoring 2023   INR 3.60 4.00 1.70   INR Date 2023   INR Goal 2.5-3.5 2.5-3.5 2.5-3.5   Trend Same Same Same   Last Week Total 30 mg 30 mg 15 mg   Next Week Total 30 mg 15 mg 32.5 mg   Sun 5 mg 5 mg 5 mg   Mon 2.5 mg Hold () 5 mg (); Otherwise 2.5 mg   Tue 5 mg Hold () 5 mg    Wed 5 mg Hold (2/8) 5 mg   Thu 5 mg 2.5 mg (2/2), Hold (2/9) 5 mg   Fri 2.5 mg 5 mg (2/10); Otherwise 2.5 mg 2.5 mg   Sat 5 mg 5 mg 5 mg   Visit Report - - -   Some recent data might be hidden       Plan:  1. INR is Subtherapeutic today- see above in Anticoagulation Summary.   Will instruct Demond Menchaca to Increase their warfarin regimen- see above in Anticoagulation Summary. Recommended 5 mg today then resume 2.5 mg on Mon/Fri and 5 mg AOD, rck 2 weeks   2. Follow up in 2 weeks  3.  They have been instructed to call if any changes in medications, doses, concerns, etc. Patient expresses understanding and has no further questions at this time.    Jeanette Montes De Oca [facility]

## 2023-02-21 ENCOUNTER — PRIOR AUTHORIZATION (OUTPATIENT)
Dept: FAMILY MEDICINE CLINIC | Facility: CLINIC | Age: 78
End: 2023-02-21
Payer: MEDICARE

## 2023-02-28 ENCOUNTER — ANTICOAGULATION VISIT (OUTPATIENT)
Dept: PHARMACY | Facility: HOSPITAL | Age: 78
End: 2023-02-28
Payer: MEDICARE

## 2023-02-28 DIAGNOSIS — I48.0 PAF (PAROXYSMAL ATRIAL FIBRILLATION): Primary | ICD-10-CM

## 2023-02-28 LAB — INR PPP: 2.7

## 2023-02-28 NOTE — PROGRESS NOTES
Anticoagulation Clinic Progress Note    Anticoagulation Summary  As of 2023    INR goal:  2.5-3.5   TTR:  83.4 % (4.2 y)   INR used for dosin.70 (2023)   Warfarin maintenance plan:  2.5 mg every Mon, Fri; 5 mg all other days; Starting 2023   Weekly warfarin total:  30 mg   No change documented:  Jeanette Montes De Oca RPH   Plan last modified:  Jeanette Montes De Oca RPH (2021)   Next INR check:  3/14/2023   Priority:  Maintenance   Target end date:      Indications    PAF (paroxysmal atrial fibrillation) (HCC) [I48.0]  TIA (transient ischemic attack) (Resolved) [G45.9]             Anticoagulation Episode Summary     INR check location:      Preferred lab:      Send INR reminders to:   [facility]  [facility]    Comments:  [place]      Anticoagulation Care Providers     Provider Role Specialty Phone number    David Hernandez MD Referring Cardiology [phone]          Clinic Interview:  No pertinent clinical findings have been reported.    INR History:  Anticoagulation Monitoring 2023   INR 4.00 1.70 2.70   INR Date 2023   INR Goal 2.5-3.5 2.5-3.5 2.5-3.5   Trend Same Same Same   Last Week Total 30 mg 15 mg 30 mg   Next Week Total 15 mg 32.5 mg 30 mg   Sun 5 mg 5 mg 5 mg   Mon Hold () 5 mg (); Otherwise 2.5 mg 2.5 mg   Tue Hold () 5 mg 5 mg   Wed Hold () 5 mg 5 mg   Thu 2.5 mg (), Hold () 5 mg 5 mg   Fri 5 mg (2/10); Otherwise 2.5 mg 2.5 mg 2.5 mg   Sat 5 mg 5 mg 5 mg   Visit Report - - -   Some recent data might be hidden       Plan:  1. INR is therapeutic today- see above in Anticoagulation Summary.    Demond AMERICA Bernarda to continue their warfarin regimen- see above in Anticoagulation Summary.  2. Follow up in 2 weeks  3. Pt has agreed to only be called if INR out of range. They have been instructed to call if any changes in medications, doses, concerns, etc. Patient expresses understanding and has no further questions at this  time.    Jeanette Montes De Oca, [facility]

## 2023-03-01 ENCOUNTER — OFFICE VISIT (OUTPATIENT)
Dept: FAMILY MEDICINE CLINIC | Facility: CLINIC | Age: 78
End: 2023-03-01
Payer: MEDICARE

## 2023-03-01 VITALS
HEART RATE: 77 BPM | SYSTOLIC BLOOD PRESSURE: 140 MMHG | HEIGHT: 74 IN | DIASTOLIC BLOOD PRESSURE: 70 MMHG | BODY MASS INDEX: 27.72 KG/M2 | OXYGEN SATURATION: 99 % | WEIGHT: 216 LBS | TEMPERATURE: 97.8 F

## 2023-03-01 DIAGNOSIS — I10 PRIMARY HYPERTENSION: Primary | ICD-10-CM

## 2023-03-01 DIAGNOSIS — N40.1 BENIGN PROSTATIC HYPERPLASIA WITH LOWER URINARY TRACT SYMPTOMS, SYMPTOM DETAILS UNSPECIFIED: ICD-10-CM

## 2023-03-01 PROCEDURE — 99214 OFFICE O/P EST MOD 30 MIN: CPT | Performed by: FAMILY MEDICINE

## 2023-03-01 RX ORDER — HYDROCODONE BITARTRATE AND ACETAMINOPHEN 7.5; 325 MG/1; MG/1
1 TABLET ORAL 2 TIMES DAILY PRN
COMMUNITY
Start: 2023-02-22

## 2023-03-01 RX ORDER — FINASTERIDE 5 MG/1
1 TABLET, FILM COATED ORAL DAILY
COMMUNITY
Start: 2023-02-02

## 2023-03-01 NOTE — PROGRESS NOTES
Subjective   Demond Menchaca is a 78 y.o. male.     Chief Complaint   Patient presents with   • Follow-up       History of Present Illness   Hypertension-patient was having highs so we added in lisinopril at his last appointment 1 month ago.  Since that time his blood pressure has been well controlled with no side effects.    BPH- patient was not willing to try Flomax but has been on the once a day Cialis for 1 month at this point. He actually never got the med but the PA was approved. He will take it now.     The following portions of the patient's history were reviewed and updated as appropriate: allergies, current medications, past family history, past medical history, past social history, past surgical history and problem list.    Past Medical History:   Diagnosis Date   • Allergic 2000    Grass/pollen   • LASHONDA positive 06/18/2018    2017: elevated ds LASHONDA   • Arthritis 1995    Hand, neck, back, etc.   • Atrial flutter with rapid ventricular response ([facility])    • Cataract 2005   • Chronic obstructive pulmonary disease (HCC)    • Colon polyp 10/07/2019    Added automatically from request for surgery [id number]   • Diverticulitis of large intestine without perforation or abscess without bleeding 03/07/2016   • Diverticulosis    • ED (erectile dysfunction)    • Elbow fracture, left    • Erectile dysfunction 2005   • History of blood transfusion    • Hypogonadism in male    • Kidney cysts    • Left femoral shaft fracture ([facility])    • Lower back pain    • PAF (paroxysmal atrial fibrillation) ([facility])    • Peptic ulceration    • Plantar fasciitis 12/05/2017   • Pneumonia 1974   • Primary hypertension 12/30/2022   • Prostatic hypertrophy, benign    • 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[facility]) 06/14/2012   • Stroke ([facility])     TIA   • Transient cerebral ischemia     H/O TIAs       Past Surgical History:   Procedure Laterality Date   • ABLATION OF DYSRHYTHMIC FOCUS  2012, 2013   • ADENOIDECTOMY  1973   • APPENDECTOMY  1973   • CARDIAC ABLATION   2013, 2014   • CARDIAC CATHETERIZATION  2012, 2013   • CARDIAC ELECTROPHYSIOLOGY PROCEDURE N/A 04/29/2021    Procedure: Ablation atrial flutter--likely left atrial flutter, hx of PVI x 2;  Surgeon: David Hernandez MD;  Location: [facility] CATH INVASIVE LOCATION;  Service: Cardiovascular;  Laterality: N/A;   • CARDIAC ELECTROPHYSIOLOGY PROCEDURE N/A 07/02/2021    Procedure: Ablation atrial fibrillation--redo;  Surgeon: David Hernandez MD;  Location:  [facility] CATH INVASIVE LOCATION;  Service: Cardiovascular;  Laterality: N/A;   • COLONOSCOPY  approx 2014    normal per pt    • COLONOSCOPY N/A 01/08/2020    Procedure: COLONOSCOPY to cecum with cold snare biopsies;  Surgeon: Henrique Rust MD;  Location:  [facility] ENDOSCOPY;  Service: Gastroenterology   • COLONOSCOPY N/A 09/20/2022    Procedure: COLONOSCOPY TO CECUM WITH POLYPECTOMY  ( COLD BX);  Surgeon: Henrique Rust MD;  Location: [facility] ENDOSCOPY;  Service: Gastroenterology;  Laterality: N/A;  HX OF COLON POLYPS; FAM HX OF COLON CANCER/ POLYPS  POST: DIVERTICULOSIS; COLON POLYP; HEMOORHOIDS   • COLONOSCOPY W/ POLYPECTOMY  11/21/2014    : 1 polyp - not precancerous   • ENDOMETRIAL ABLATION  2012, 2013   • ENDOSCOPY N/A 01/08/2020    Procedure: ESOPHAGOGASTRODUODENOSCOPY;  Surgeon: Henrique Rust MD;  Location: [facility] ENDOSCOPY;  Service: Gastroenterology   • ENDOSCOPY N/A 09/20/2022    Procedure: ESOPHAGOGASTRODUODENOSCOPY WITH BIOPSY AND PRESSLEY DILATATION 50F;  Surgeon: Henrique Rust MD;  Location: [facility] ENDOSCOPY;  Service: Gastroenterology;  Laterality: N/A;  DYSPHAGIA  POST: GASTRITIS   • FEMUR FRACTURE SURGERY Left 2007    post fall from the ladder   • FRACTURE SURGERY  2007    Elbow   • HAND SURGERY Left 02/10/2023   • LUMBAR LAMINECTOMY  1974    Dr.Lester Lino   • OTHER SURGICAL HISTORY      elbow surgery   • SPINE SURGERY  1974   • THYROID SURGERY  1986    benign tumor removal,    • TOTAL ELBOW ARTHROPLASTY Left 2007    L, post fall and fracture,  "hardware in       Family History   Problem Relation Age of Onset   • Hypertension Mother    • Osteoporosis Mother    • Hypertension Father    • Thyroid disease Sister    • Diabetes Sister    • Hypertension Sister    • Pulmonary fibrosis Sister    • Heart disease Sister    • Diabetes Sister    • Colon cancer Maternal Grandmother 60   • Heart disease Other    • Atrial fibrillation Other    • Thyroid disease Other    • Diabetes Other    • Heart disease Other    • Hypertension Other    • Malig Hyperthermia Neg Hx        Social History     Socioeconomic History   • Marital status:      Spouse name: Latisha   • Number of children: 2   • Years of education: College   • Highest education level: Not asked   Tobacco Use   • Smoking status: Former     Packs/day: 0.50     Years: 15.00     Pack years: 7.50     Types: Cigarettes     Start date: 1959     Quit date: 1974     Years since quittin.1   • Smokeless tobacco: Never   Vaping Use   • Vaping Use: Never used   Substance and Sexual Activity   • Alcohol use: Not Currently     Alcohol/week: 6.0 standard drinks     Types: 6 Glasses of wine per week     Comment: Some beer during the summer months   • Drug use: No   • Sexual activity: Not Currently     Partners: Female     Birth control/protection: None       Review of Systems   Constitutional: Negative for fever.   Respiratory: Negative for shortness of breath.        Objective   Visit Vitals  /70 (BP Location: Left arm, Patient Position: Sitting)   Pulse 77   Temp 97.8 °F (36.6 °C)   Ht 188 cm (74.02\")   Wt 98 kg (216 lb)   SpO2 99%   BMI 27.72 kg/m²     Body mass index is 27.72 kg/m².  Physical Exam  Constitutional:       Appearance: Normal appearance. He is well-developed.   Cardiovascular:      Rate and Rhythm: Normal rate and regular rhythm.      Heart sounds: Normal heart sounds.   Pulmonary:      Effort: Pulmonary effort is normal.      Breath sounds: Normal breath sounds.   Musculoskeletal:         " General: No swelling. Normal range of motion.   Skin:     General: Skin is warm and dry.      Findings: No rash.   Neurological:      General: No focal deficit present.      Mental Status: He is alert and oriented to person, place, and time.   Psychiatric:         Mood and Affect: Mood normal.         Behavior: Behavior normal.           Assessment & Plan   Diagnoses and all orders for this visit:    1. Primary hypertension (Primary)    2. Benign prostatic hyperplasia with lower urinary tract symptoms, symptom details unspecified          Stable, cont meds, will try cialis, f/u in 6 months.

## 2023-03-14 ENCOUNTER — ANTICOAGULATION VISIT (OUTPATIENT)
Dept: PHARMACY | Facility: HOSPITAL | Age: 78
End: 2023-03-14
Payer: MEDICARE

## 2023-03-14 DIAGNOSIS — I48.0 PAF (PAROXYSMAL ATRIAL FIBRILLATION): Primary | ICD-10-CM

## 2023-03-14 LAB — INR PPP: 2.5

## 2023-03-14 NOTE — PROGRESS NOTES
Anticoagulation Clinic Progress Note    Anticoagulation Summary  As of 3/14/2023    INR goal:  2.5-3.5   TTR:  83.6 % (4.3 y)   INR used for dosin.50 (3/14/2023)   Warfarin maintenance plan:  2.5 mg every Mon, Fri; 5 mg all other days; Starting 3/14/2023   Weekly warfarin total:  30 mg   No change documented:  Cinthya Krueger, Pharmacy Technician   Plan last modified:  Jeanette Montes De Oca RPH (2021)   Next INR check:  3/28/2023   Priority:  Maintenance   Target end date:      Indications    PAF (paroxysmal atrial fibrillation) (HCC) [I48.0]  TIA (transient ischemic attack) (Resolved) [G45.9]             Anticoagulation Episode Summary     INR check location:      Preferred lab:      Send INR reminders to:   [facility]  PO[facility]    Comments:  [place]      Anticoagulation Care Providers     Provider Role Specialty Phone number    David Hernandez MD Referring Cardiology [phone]          Clinic Interview:  No pertinent clinical findings have been reported.    INR History:  Anticoagulation Monitoring 2023 2023 3/14/2023   INR 1.70 2.70 2.50   INR Date 2023 2023 3/14/2023   INR Goal 2.5-3.5 2.5-3.5 2.5-3.5   Trend Same Same Same   Last Week Total 15 mg 30 mg 30 mg   Next Week Total 32.5 mg 30 mg 30 mg   Sun 5 mg 5 mg 5 mg   Mon 5 mg (); Otherwise 2.5 mg 2.5 mg 2.5 mg   Tue 5 mg 5 mg 5 mg   Wed 5 mg 5 mg 5 mg   Thu 5 mg 5 mg 5 mg   Fri 2.5 mg 2.5 mg 2.5 mg   Sat 5 mg 5 mg 5 mg   Visit Report - - -   Some recent data might be hidden       Plan:  1. INR is therapeutic today- see above in Anticoagulation Summary.    Demond CROSS Bernarda to continue their warfarin regimen- see above in Anticoagulation Summary.  2. Follow up in 2 weeks  3. Pt has agreed to only be called if INR out of range. They have been instructed to call if any changes in medications, doses, concerns, etc. Patient expresses understanding and has no further questions at this time.    Cinthya Krueger, Pharmacy  Technician

## 2023-03-17 RX ORDER — WARFARIN SODIUM 5 MG/1
TABLET ORAL
Qty: 80 TABLET | Refills: 1 | Status: SHIPPED | OUTPATIENT
Start: 2023-03-17

## 2023-03-22 RX ORDER — HYDROCODONE BITARTRATE AND ACETAMINOPHEN 7.5; 325 MG/1; MG/1
1 TABLET ORAL 2 TIMES DAILY PRN
OUTPATIENT
Start: 2023-03-22

## 2023-03-22 NOTE — TELEPHONE ENCOUNTER
Caller: Demond Menchaca    Relationship: Self    Best call back number:[phone] (Mobile)    Requested Prescriptions:   HYDROcodone-acetaminophen (NORCO) 7.5-325 MG per tablet     Pharmacy where request should be sent: [facility]/pharmacy #[id number] [place], [place] - [address] [address] [facility]. AT [facility] [phone] [facility] [phone]           Last office visit with prescribing clinician: 3/1/2023   Last telemedicine visit with prescribing clinician: 9/6/2023   Next office visit with prescribing clinician: 9/6/2023     Additional details provided by patient: PATIENT CALLED TO REQUEST A MEDICATION REFILL ON HIS MEDICATION. PATIENT STATES THAT HE HAS A 3 DAY SUPPLY LEFT. PATIENT STATES THAT HE WILL BE LEAVING TO OUT OF TOWN FOR ABOUT 6 WEEKS AND WILL NEED TO HAVE THIS MEDICATION WITH HIM.            Does the patient have less than a 3 day supply:  [] Yes  [x] No    Would you like a call back once the refill request has been completed: [] Yes [] No    If the office needs to give you a call back, can they leave a voicemail: [] Yes [] No    Rock Rangel Rep   03/22/23 10:20 EDT         THANKS

## 2023-03-28 ENCOUNTER — ANTICOAGULATION VISIT (OUTPATIENT)
Dept: PHARMACY | Facility: HOSPITAL | Age: 78
End: 2023-03-28
Payer: MEDICARE

## 2023-03-28 DIAGNOSIS — I48.0 PAF (PAROXYSMAL ATRIAL FIBRILLATION): Primary | ICD-10-CM

## 2023-03-28 LAB — INR PPP: 3.7

## 2023-03-28 NOTE — PROGRESS NOTES
Anticoagulation Clinic Progress Note    Anticoagulation Summary  As of 3/28/2023    INR goal:  2.5-3.5   TTR:  83.5 % (4.3 y)   INR used for dosing:  3.70 (3/28/2023)   Warfarin maintenance plan:  2.5 mg every Mon, Fri; 5 mg all other days; Starting 3/28/2023   Weekly warfarin total:  30 mg   Plan last modified:  Jeanette Montes De Oca RPH (8/26/2021)   Next INR check:  4/4/2023   Priority:  Maintenance   Target end date:      Indications    PAF (paroxysmal atrial fibrillation) (HCC) [I48.0]  TIA (transient ischemic attack) (Resolved) [G45.9]             Anticoagulation Episode Summary     INR check location:      Preferred lab:      Send INR reminders to:   GAYEL PETERSON  POOL    Comments:  [place]      Anticoagulation Care Providers     Provider Role Specialty Phone number    David Hernandez MD Referring Cardiology [phone]          Clinic Interview:  Patient Findings     Positives:  Change in medications    Negatives:  Signs/symptoms of thrombosis, Signs/symptoms of bleeding,   Laboratory test error suspected, Change in health, Change in alcohol use,   Change in activity, Upcoming invasive procedure, Emergency department   visit, Upcoming dental procedure, Missed doses, Extra doses, Change in   diet/appetite, Hospital admission, Bruising, Other complaints    Comments:  Started lisinopril. Started on magnesium (no diarrhea).      Clinical Outcomes     Negatives:  Major bleeding event, Thromboembolic event,   Anticoagulation-related hospital admission, Anticoagulation-related ED   visit, Anticoagulation-related fatality    Comments:  Started lisinopril. Started on magnesium (no diarrhea).        INR History:  Anticoagulation Monitoring 2/28/2023 3/14/2023 3/28/2023   INR 2.70 2.50 3.70   INR Date 2/28/2023 3/14/2023 3/28/2023   INR Goal 2.5-3.5 2.5-3.5 2.5-3.5   Trend Same Same Same   Last Week Total 30 mg 30 mg 30 mg   Next Week Total 30 mg 30 mg 27.5 mg   Sun 5 mg 5 mg 5 mg   Mon 2.5 mg 2.5 mg 2.5 mg    Tue 5 mg 5 mg 2.5 mg (3/28)   Wed 5 mg 5 mg 5 mg   Thu 5 mg 5 mg 5 mg   Fri 2.5 mg 2.5 mg 2.5 mg   Sat 5 mg 5 mg 5 mg   Visit Report - - -   Some recent data might be hidden       Plan:  1. INR is Supratherapeutic today- see above in Anticoagulation Summary.   Will instruct Demond Menchaca to Change their warfarin regimen- see above in Anticoagulation Summary.  2. Follow up in 2 weeks  3.  They have been instructed to call if any changes in medications, doses, concerns, etc. Patient expresses understanding and has no further questions at this time.    Jeanette Montes De Oca, [facility]

## 2023-04-06 ENCOUNTER — ANTICOAGULATION VISIT (OUTPATIENT)
Dept: PHARMACY | Facility: HOSPITAL | Age: 78
End: 2023-04-06
Payer: MEDICARE

## 2023-04-06 DIAGNOSIS — I48.0 PAF (PAROXYSMAL ATRIAL FIBRILLATION): Primary | ICD-10-CM

## 2023-04-06 LAB — INR PPP: 2.9

## 2023-04-06 NOTE — PROGRESS NOTES
Anticoagulation Clinic Progress Note    Anticoagulation Summary  As of 2023    INR goal:  2.5-3.5   TTR:  83.5 % (4.3 y)   INR used for dosin.90 (2023)   Warfarin maintenance plan:  2.5 mg every Mon, Fri; 5 mg all other days; Starting 2023   Weekly warfarin total:  30 mg   No change documented:  Jeanette Montes De Oca RPH   Plan last modified:  Jeanette Montes De Oca RPH (2021)   Next INR check:  2023   Priority:  Maintenance   Target end date:      Indications    PAF (paroxysmal atrial fibrillation) [I48.0]  TIA (transient ischemic attack) (Resolved) [G45.9]             Anticoagulation Episode Summary     INR check location:      Preferred lab:      Send INR reminders to:   GAYLE PETERSON  POOL    Comments:  [place]      Anticoagulation Care Providers     Provider Role Specialty Phone number    David Hernandez MD Referring Cardiology [phone]          Clinic Interview:  No pertinent clinical findings have been reported.    INR History:  Anticoagulation Monitoring 3/14/2023 3/28/2023 2023   INR 2.50 3.70 2.90   INR Date 3/14/2023 3/28/2023 2023   INR Goal 2.5-3.5 2.5-3.5 2.5-3.5   Trend Same Same Same   Last Week Total 30 mg 30 mg 30 mg   Next Week Total 30 mg 27.5 mg 30 mg   Sun 5 mg 5 mg 5 mg   Mon 2.5 mg 2.5 mg 2.5 mg   Tue 5 mg 2.5 mg (3/28) 5 mg   Wed 5 mg 5 mg 5 mg   Thu 5 mg 5 mg 5 mg   Fri 2.5 mg 2.5 mg 2.5 mg   Sat 5 mg 5 mg 5 mg   Visit Report - - -   Some recent data might be hidden       Plan:  1. INR is therapeutic today- see above in Anticoagulation Summary.    Demond CROSS Bernarda to continue their warfarin regimen- see above in Anticoagulation Summary.  2. Follow up in 3 weeks ([place] end of [place]  3. They have been instructed to call if any changes in medications, doses, concerns, etc. Patient expresses understanding and has no further questions at this time.    Jeanette Montes De Oca RPH

## 2023-04-22 NOTE — PROGRESS NOTES
Anticoagulation Clinic Progress Note  Anticoagulation Summary  As of 5/14/2021    INR goal:  2.0-3.0   TTR:  84.6 % (2.4 y)   INR used for dosing:  3.00 (5/14/2021)   Warfarin maintenance plan:  2.5 mg every Mon, Fri; 5 mg all other days   Weekly warfarin total:  30 mg   No change documented:  Arsen Centeno, PharmD   Plan last modified:  Winter Sanabria [facility] (11/27/2018)   Next INR check:  5/20/2021   Priority:  Maintenance   Target end date:      Indications    PAF (paroxysmal atrial fibrillation) (CMS/HCC) [I48.0]             Anticoagulation Episode Summary     INR check location:      Preferred lab:      [facility]    Comments:  [place]  Upcoming cardioversion - needs weekly checks (4/21/21)      Anticoagulation Care Providers     Provider Role Specialty Phone number    David Hernandez MD Referring Cardiology [phone]        Clinic Interview:  Patient Findings     Negatives:  Signs/symptoms of thrombosis, Signs/symptoms of bleeding,   Laboratory test error suspected, Change in health, Change in alcohol use,   Change in activity, Upcoming invasive procedure, Emergency department   visit, Upcoming dental procedure, Missed doses, Extra doses, Change in   medications, Change in diet/appetite, Hospital admission, Bruising, Other   complaints      Clinical Outcomes     Negatives:  Major bleeding event, Thromboembolic event,   Anticoagulation-related hospital admission, Anticoagulation-related ED   visit, Anticoagulation-related fatality      INR History:  Anticoagulation Monitoring 5/6/2021 5/11/2021 5/14/2021   INR 2.40 3.10 3.00   INR Date 5/6/2021 5/11/2021 5/14/2021   INR Goal 2.0-3.0 2.0-3.0 2.0-3.0   Trend Same Same Same   Last Week Total 27.5 mg 27.5 mg 32.5 mg   Next Week Total 32.5 mg 30 mg 30 mg   Sun 5 mg - 5 mg   Mon 2.5 mg - 2.5 mg   Tue - 5 mg 5 mg   Wed - 5 mg 5 mg   Thu 5 mg 5 mg -   Fri 5 mg (5/7) - 2.5 mg   Sat 5 mg - 5 mg   Visit Report - - -   Some  recent data might be hidden     Plan:  1. INR is Therapeutic today- see above in Anticoagulation Summary.   Will instruct Demond Menchaca to Continue their warfarin regimen- see above in Anticoagulation Summary.  2. Follow up in 1 week  3. They have been instructed to call if any changes in medications, doses, concerns, etc. Patient expresses understanding and has no further questions at this time.    Arsen Centeno, PharmD   No

## 2023-04-28 ENCOUNTER — ANTICOAGULATION VISIT (OUTPATIENT)
Dept: PHARMACY | Facility: HOSPITAL | Age: 78
End: 2023-04-28
Payer: MEDICARE

## 2023-04-28 DIAGNOSIS — I48.0 PAF (PAROXYSMAL ATRIAL FIBRILLATION): Primary | ICD-10-CM

## 2023-04-28 LAB — INR PPP: 3.4

## 2023-04-28 NOTE — PROGRESS NOTES
Anticoagulation Clinic Progress Note    Anticoagulation Summary  As of 4/28/2023    INR goal:  2.5-3.5   TTR:  83.7 % (4.4 y)   INR used for dosing:  3.40 (4/28/2023)   Warfarin maintenance plan:  2.5 mg every Mon, Fri; 5 mg all other days; Starting 4/28/2023   Weekly warfarin total:  30 mg   No change documented:  Jeanette Montes De Oca RPH   Plan last modified:  Jeanette Montes De Oca RPH (8/26/2021)   Next INR check:  5/12/2023   Priority:  Maintenance   Target end date:      Indications    PAF (paroxysmal atrial fibrillation) [I48.0]  TIA (transient ischemic attack) (Resolved) [G45.9]             Anticoagulation Episode Summary     INR check location:      Preferred lab:      [facility]    Comments:  [place]      Anticoagulation Care Providers     Provider Role Specialty Phone number    David Hernandez MD Referring Cardiology [phone]          Clinic Interview:  No pertinent clinical findings have been reported.    INR History:      3/14/2023     8:50 AM 3/28/2023    12:00 AM 3/28/2023    11:05 AM 4/6/2023    12:00 AM 4/6/2023     1:15 PM 4/28/2023    12:00 AM 4/28/2023     3:54 PM   Anticoagulation Monitoring   INR 2.50  3.70  2.90  3.40   INR Date 3/14/2023  3/28/2023  4/6/2023  4/28/2023   INR Goal 2.5-3.5  2.5-3.5  2.5-3.5  2.5-3.5   Trend Same  Same  Same  Same   Last Week Total 30 mg  30 mg  30 mg  30 mg   Next Week Total 30 mg  27.5 mg  30 mg  30 mg   Sun 5 mg  5 mg  5 mg  5 mg   Mon 2.5 mg  2.5 mg  2.5 mg  2.5 mg   Tue 5 mg  2.5 mg (3/28)  5 mg  5 mg   Wed 5 mg  5 mg  5 mg  5 mg   Thu 5 mg  5 mg  5 mg  5 mg   Fri 2.5 mg  2.5 mg  2.5 mg  2.5 mg   Sat 5 mg  5 mg  5 mg  5 mg   Historical INR  3.70       2.90       3.40             This result is from an external source.       Plan:  1. INR is therapeutic today- see above in Anticoagulation Summary.    Demond CROSS Bernarda to continue their warfarin regimen- see above in Anticoagulation Summary.  2. Follow up in 2 weeks  3.  Pt has agreed to only be called if INR out of range. They have been instructed to call if any changes in medications, doses, concerns, etc. Patient expresses understanding and has no further questions at this time.    Jeanette Montes De Oca [facility]

## 2023-05-11 ENCOUNTER — ANTICOAGULATION VISIT (OUTPATIENT)
Dept: PHARMACY | Facility: HOSPITAL | Age: 78
End: 2023-05-11
Payer: MEDICARE

## 2023-05-11 DIAGNOSIS — I48.0 PAF (PAROXYSMAL ATRIAL FIBRILLATION): Primary | ICD-10-CM

## 2023-05-11 LAB — INR PPP: 2.7

## 2023-05-11 NOTE — PROGRESS NOTES
Anticoagulation Clinic Progress Note    Anticoagulation Summary  As of 2023    INR goal:  2.5-3.5   TTR:  83.8 % (4.4 y)   INR used for dosin.70 (2023)   Warfarin maintenance plan:  2.5 mg every Mon, Fri; 5 mg all other days; Starting 2023   Weekly warfarin total:  30 mg   No change documented:  Chapis Jaimes, PharmD   Plan last modified:  Jeanette Montes De Oca RPH (2021)   Next INR check:  2023   Priority:  Maintenance   Target end date:      Indications    PAF (paroxysmal atrial fibrillation) [I48.0]  TIA (transient ischemic attack) (Resolved) [G45.9]             Anticoagulation Episode Summary     INR check location:      Preferred lab:      Send INR reminders to:   GAYLE PETERSON  POOL    Comments:  [place]      Anticoagulation Care Providers     Provider Role Specialty Phone number    David Hernandez MD Referring Cardiology [phone]          Clinic Interview:  Patient Findings     Negatives:  Signs/symptoms of thrombosis, Signs/symptoms of bleeding,   Laboratory test error suspected, Change in health, Change in alcohol use,   Change in activity, Upcoming invasive procedure, Emergency department   visit, Upcoming dental procedure, Missed doses, Extra doses, Change in   medications, Change in diet/appetite, Hospital admission, Bruising, Other   complaints      Clinical Outcomes     Negatives:  Major bleeding event, Thromboembolic event,   Anticoagulation-related hospital admission, Anticoagulation-related ED   visit, Anticoagulation-related fatality        INR History:      3/28/2023    11:05 AM 2023    12:00 AM 2023     1:15 PM 2023    12:00 AM 2023     3:54 PM 2023    12:00 AM 2023    11:04 AM   Anticoagulation Monitoring   INR 3.70  2.90  3.40  2.70   INR Date 3/28/2023  2023  2023  2023   INR Goal 2.5-3.5  2.5-3.5  2.5-3.5  2.5-3.5   Trend Same  Same  Same  Same   Last Week Total 30 mg  30 mg  30 mg  30 mg   Next Week Total  27.5 mg  30 mg  30 mg  30 mg   Sun 5 mg  5 mg  5 mg  5 mg   Mon 2.5 mg  2.5 mg  2.5 mg  2.5 mg   Tue 2.5 mg (3/28)  5 mg  5 mg  5 mg   Wed 5 mg  5 mg  5 mg  5 mg   Thu 5 mg  5 mg  5 mg  5 mg   Fri 2.5 mg  2.5 mg  2.5 mg  2.5 mg   Sat 5 mg  5 mg  5 mg  5 mg   Historical INR  2.90       3.40       2.70             This result is from an external source.       Plan:  1. INR is Therapeutic today- see above in Anticoagulation Summary.   Will instruct Demond Menchaca to Continue their warfarin regimen- see above in Anticoagulation Summary.  2. Follow up in 2 weeks  3. They have been instructed to call if any changes in medications, doses, concerns, etc. Patient expresses understanding and has no further questions at this time.    Chapis Jaimes, PharmD

## 2023-05-15 ENCOUNTER — PATIENT MESSAGE (OUTPATIENT)
Dept: FAMILY MEDICINE CLINIC | Facility: CLINIC | Age: 78
End: 2023-05-15
Payer: MEDICARE

## 2023-05-19 ENCOUNTER — OFFICE VISIT (OUTPATIENT)
Dept: NEUROLOGY | Facility: CLINIC | Age: 78
End: 2023-05-19
Payer: MEDICARE

## 2023-05-19 VITALS
WEIGHT: 214.6 LBS | DIASTOLIC BLOOD PRESSURE: 74 MMHG | BODY MASS INDEX: 27.54 KG/M2 | SYSTOLIC BLOOD PRESSURE: 128 MMHG | OXYGEN SATURATION: 99 % | HEART RATE: 74 BPM | HEIGHT: 74 IN

## 2023-05-19 DIAGNOSIS — G60.3 IDIOPATHIC PROGRESSIVE NEUROPATHY: Primary | ICD-10-CM

## 2023-05-19 DIAGNOSIS — R29.898 WEAKNESS OF BOTH LOWER EXTREMITIES: ICD-10-CM

## 2023-05-19 PROCEDURE — 3074F SYST BP LT 130 MM HG: CPT | Performed by: PSYCHIATRY & NEUROLOGY

## 2023-05-19 PROCEDURE — 99213 OFFICE O/P EST LOW 20 MIN: CPT | Performed by: PSYCHIATRY & NEUROLOGY

## 2023-05-19 PROCEDURE — 1159F MED LIST DOCD IN RCRD: CPT | Performed by: PSYCHIATRY & NEUROLOGY

## 2023-05-19 PROCEDURE — 3078F DIAST BP <80 MM HG: CPT | Performed by: PSYCHIATRY & NEUROLOGY

## 2023-05-19 PROCEDURE — 1160F RVW MEDS BY RX/DR IN RCRD: CPT | Performed by: PSYCHIATRY & NEUROLOGY

## 2023-05-19 NOTE — ASSESSMENT & PLAN NOTE
78 year old right handed man with essential tremors and weakness and follow up of his EMG/NCS and lumbar spine MRI.  He reports his tremors starting about 2 years ago.  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  He does not have much tremors in the right hand.  He has not noticed the tremors at rest and has only noticed the tremors with action.  He has noticed the tremors when he is holding something in his hands.  He has a hard time sometimes getting out of a chair.  He has had trouble with his balance.  He has had some vivid dreams a times.  His sense of smell has reduced.  He feels like his legs are weak.  He reports being pretty sedentary over the past couple of years.  He is on Coumadin for Afib.  He reports a history of TIA 10 years ago before he was on the Coumadin.  He does not drink any caffeine but tells me he used to drink a lot of coffee in the past.  He does not smoke.  He denies any family history of tremors.  He has been using cane to ambulate for the past couple years.  He had back surgery in 1974 and he has a foot drop on the left side.  He has had more recent lab work including CMP and previous TSH was normal along with previous Vit B12 level which was normal as well.  He has not noticed any changes in his voice.  He has RLS and he is currently being treated with pramipexole.  His heart rate is low.  His INR has been therapeutic.  He had a fall 6 months ago due to getting his feet tangled up but he has not had any further falls since his last visit.  He was evaluated in the ED following this fall.  His EMG/NCS was performed which was read as demonstrating abnormal study showing severe peripheral neuropathy.  The needle exam changes were consistent with the nerve conduction findings however I cannot entirely exclude a superimposed L5 or S1 radiculopathy on either side.  He is on hydroxychloroquine which can cause neuropathy.  The study results were  discussed with the patient.  He has had physical therapy for a short session in the past and he has home exercises he can do for his balance at home but would like to be involved in doing more therapy.  He denies family history of neuropathy.  He drinks some alcohol on the weekends.  Denies any exposure to heavy metals or toxins.  Lumbar spine MRI which was read as not demonstrating significant change when compared to prior MRI of the lumbar spine from [facility] on 07/07/2015.

## 2023-05-19 NOTE — PROGRESS NOTES
Chief Complaint  Tremors and Extremity Weakness    Subjective          Demond Menchaca presents to [facility] NEUROLOGY for   HISTORY OF PRESENT ILLNESS:    Demond Menchaca is a 78 year old right handed man who returns to neurology clinic for follow up evaluation and treatment of essential tremors and weakness and follow up of his EMG/NCS.  He reports his tremors starting about 2 years ago.  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  He does not have much tremors in the right hand.  He has not noticed the tremors at rest and has only noticed the tremors with action.  He has noticed the tremors when he is holding something in his hands.  He has a hard time sometimes getting out of a chair.  He has had trouble with his balance.  He has had some vivid dreams a times.  His sense of smell has reduced.  He feels like his legs are weak.  He reports being pretty sedentary over the past couple of years.  He is on Coumadin for Afib.  He reports a history of TIA 10 years ago before he was on the Coumadin.  He does not drink any caffeine but tells me he used to drink a lot of coffee in the past.  He does not smoke.  He denies any family history of tremors.  He has been using cane to ambulate for the past couple years.  He had back surgery in 1974 and he has a foot drop on the left side.  He has had more recent lab work including CMP and previous TSH was normal along with previous Vit B12 level which was normal as well.  He has not noticed any changes in his voice.  He has RLS and he is currently being treated with pramipexole.  His heart rate is low.  His INR has been therapeutic.  He had a fall 6 months ago due to getting his feet tangled up but he has not had any further falls since his last visit.  He was evaluated in the ED following this fall.  His EMG/NCS was performed which was read as demonstrating abnormal study showing severe peripheral neuropathy.  The  needle exam changes were consistent with the nerve conduction findings however I cannot entirely exclude a superimposed L5 or S1 radiculopathy on either side.  He is on hydroxychloroquine which can cause neuropathy.  The study results were discussed with the patient.  He has had physical therapy for a short session in the past and he has home exercises he can do for his balance at home but would like to be involved in doing more therapy.  He denies family history of neuropathy.  He drinks some alcohol on the weekends.  Denies any exposure to heavy metals or toxins.      He had a lumbar spine MRI:   1. No significant change when compared to prior MRI of the lumbar spine from [facility] on 07/07/2015.  2. There are large bilateral renal cysts.  3. No lumbar disc herniation and no lumbar central canal narrowing is  identified. There has been previous lower lumbar spine surgery. There  has been a previous left partial hemilaminectomy at L4. There is  moderate disc space narrowing and degenerative endplate changes at L4-5.  There is minimal posterior spurring but no central canal narrowing.  There is mild bilateral facet overgrowth and anteromedial left facet  spurs with adjacent ligamentum flavum thickening that may contact the  posterior lateral aspect of the traversing left L5 nerve root but does  not compress it and there is minimal bilateral foraminal narrowing and  this is unchanged.  4. At L5-S1 there has been previous left partial hemilaminectomy at L5  and there is some disc space narrowing and degenerative endplate  changes. There is posterior spurring that abuts the anteromedial aspect  of the traversing left S1 nerve root but does not compress it. There is  no canal or lateral recess narrowing and there is some spurring into the  foramina and there is mild right and mild-to-moderate left foraminal  narrowing, endplates spurs abut the anteromedial aspect of the left L5  nerve root lateral to the  left foramen to the far left laterally at  L5-S1.  5. At L3-4 there is 3 mm retrolisthesis of L3 with respect to L4,  minimal diffuse annular disc bulge. There is no canal or foraminal  narrowing. At L2-3 there is minimal facet overgrowth and 2 mm  retrolisthesis of L2 with respect to L3, mild posterior spurring bulging  disc material. There is no canal narrowing. The spurs may contact the  ventral aspect of the traversing right L3 nerve root. There is minimal  spurring of the foramina. There is minimal right and no left foraminal  narrowing. The remainder of the lumbar spine MRI is unremarkable.      Past Medical History:   Diagnosis Date   • Allergic 2000    Grass/pollen   • LASHONDA positive 06/18/2018    2017: elevated ds LASHONDA   • Arthritis 1995    Hand, neck, back, etc.   • Atrial flutter with rapid ventricular response    • Cataract 2005   • Chronic obstructive pulmonary disease    • Colon polyp 10/07/2019    Added automatically from request for surgery [id number]   • Diverticulitis of large intestine without perforation or abscess without bleeding 03/07/2016   • Diverticulosis    • ED (erectile dysfunction)    • Elbow fracture, left    • Erectile dysfunction 2005   • History of blood transfusion    • Hypogonadism in male    • Kidney cysts    • Left femoral shaft fracture    • Lower back pain    • PAF (paroxysmal atrial fibrillation)    • Peptic ulceration    • Plantar fasciitis 12/05/2017   • Pneumonia 1974   • Primary hypertension 12/30/2022   • Prostatic hypertrophy, benign    • 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   • Stroke     TIA   • Transient cerebral ischemia     H/O TIAs        Family History   Problem Relation Age of Onset   • Hypertension Mother    • Osteoporosis Mother    • Hypertension Father    • Thyroid disease Sister    • Diabetes Sister    • Hypertension Sister    • Pulmonary fibrosis Sister    • Heart disease Sister    • Diabetes Sister    • Colon cancer Maternal Grandmother 60   • Heart disease  "Other    • Atrial fibrillation Other    • Thyroid disease Other    • Diabetes Other    • Heart disease Other    • Hypertension Other    • Malig Hyperthermia Neg Hx         Social History     Socioeconomic History   • Marital status:      Spouse name: Latisha   • Number of children: 2   • Years of education: College   • Highest education level: Not asked   Tobacco Use   • Smoking status: Former     Packs/day: 0.50     Years: 15.00     Pack years: 7.50     Types: Cigarettes     Start date: 1959     Quit date: 1974     Years since quittin.4   • Smokeless tobacco: Never   Vaping Use   • Vaping Use: Never used   Substance and Sexual Activity   • Alcohol use: Not Currently     Alcohol/week: 6.0 standard drinks     Types: 6 Glasses of wine per week     Comment: Some beer during the summer months   • Drug use: No   • Sexual activity: Not Currently     Partners: Female     Birth control/protection: None        I have reviewed and confirmed the accuracy of the ROS as documented by the MA/LPN/RN Yonis Welch MD    Review of Systems   Neurological: Positive for tremors and weakness. Negative for dizziness, seizures, syncope, facial asymmetry, speech difficulty, light-headedness, numbness, headache, memory problem and confusion.   Psychiatric/Behavioral: Negative for agitation, behavioral problems, decreased concentration, dysphoric mood, hallucinations, self-injury, sleep disturbance, suicidal ideas, negative for hyperactivity, depressed mood and stress. The patient is not nervous/anxious.         Objective   Vital Signs:   /74   Pulse 74   Ht 188 cm (74.02\")   Wt 97.3 kg (214 lb 9.6 oz)   SpO2 99%   BMI 27.54 kg/m²       PHYSICAL EXAM:    General   Mental Status - Alert. General Appearance - Well developed, Well groomed, Oriented and Cooperative. Orientation - Oriented X3.       Head and Neck  Head - normocephalic, atraumatic with no lesions or palpable masses.  Neck    Global Assessment - " supple.       Eye   Sclera/Conjunctiva - Bilateral - Normal.    ENMT  Mouth and Throat   Oral Cavity/Oropharynx: Oropharynx - the soft palate,uvula and tongue are normal in appearance.    Chest and Lung Exam   Chest - lung clear to auscultation bilaterally.    Cardiovascular   Cardiovascular examination reveals  - normal heart sounds, regular rate and rhythm.    Neurologic   Mental Status: Speech - Normal. Cognitive function - appropriate fund of knowledge. No impairment of attention, Impairment of concentration, impairment of long term memory or impairment of short term memory.  Cranial Nerves:   II Optic: Visual acuity - Left - Normal. Right - Normal. Visual fields - Normal (to confrontation).  III Oculomotor: Pupillary constriction - Left - Normal. Right - Normal.  VII Facial: - Normal Bilaterally.   IX Glossopharyngeal / X Vagus - Normal.  XI Accessory: Trapezius - Bilateral - Normal. Sternocleidomastoid - Bilateral - Normal.  XII Hypoglossal - Bilateral - Normal.  Eye Movements: - Normal Bilaterally.  Sensory:   Light Touch: Intact - Globally.  Temperature: Reduced in lower extremities distally >proximally.  Vibration: Reduced in lower extremities distally > proximally.   Motor:   Bulk and Contour: - Reduced in lower extremities.  Tone: - Normal.  Tremor: Fast frequency action tremors.    Strength: 5/5 normal muscle strength in upper extremities and 4/5 in lower extremities and weaker in left leg with 3/5 in left dorsiflexion with reduced muscle bulk.            General Assessment of Reflexes: - deep tendon reflexes are reduced. Coordination - No Impairment of finger-to-nose or Impairment of rapid alternating movements. Gait - Wide based, hemiparetic with left sided foot drop.  Uses cane to help ambulate.       Result Review :                 Assessment and Plan    Problem List Items Addressed This Visit        Musculoskeletal and Injuries    Weakness of both lower extremities    Current Assessment & Plan      78 year old right handed man with essential tremors and weakness and follow up of his EMG/NCS and lumbar spine MRI.  He reports his tremors starting about 2 years ago.  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  He does not have much tremors in the right hand.  He has not noticed the tremors at rest and has only noticed the tremors with action.  He has noticed the tremors when he is holding something in his hands.  He has a hard time sometimes getting out of a chair.  He has had trouble with his balance.  He has had some vivid dreams a times.  His sense of smell has reduced.  He feels like his legs are weak.  He reports being pretty sedentary over the past couple of years.  He is on Coumadin for Afib.  He reports a history of TIA 10 years ago before he was on the Coumadin.  He does not drink any caffeine but tells me he used to drink a lot of coffee in the past.  He does not smoke.  He denies any family history of tremors.  He has been using cane to ambulate for the past couple years.  He had back surgery in 1974 and he has a foot drop on the left side.  He has had more recent lab work including CMP and previous TSH was normal along with previous Vit B12 level which was normal as well.  He has not noticed any changes in his voice.  He has RLS and he is currently being treated with pramipexole.  His heart rate is low.  His INR has been therapeutic.  He had a fall 6 months ago due to getting his feet tangled up but he has not had any further falls since his last visit.  He was evaluated in the ED following this fall.  His EMG/NCS was performed which was read as demonstrating abnormal study showing severe peripheral neuropathy.  The needle exam changes were consistent with the nerve conduction findings however I cannot entirely exclude a superimposed L5 or S1 radiculopathy on either side.  He is on hydroxychloroquine which can cause neuropathy.  The study results were  discussed with the patient.  He has had physical therapy for a short session in the past and he has home exercises he can do for his balance at home but would like to be involved in doing more therapy.  He denies family history of neuropathy.  He drinks some alcohol on the weekends.  Denies any exposure to heavy metals or toxins.  Lumbar spine MRI which was read as not demonstrating significant change when compared to [facility].         Relevant Orders    Ambulatory Referral to Physical Therapy Evaluate and treat       Neuro    Idiopathic progressive neuropathy - Primary    Current Assessment & Plan     He does have peripheral neuropathy.  Might be related to hydroxychloroquine use.              I spent 20 minutes caring for Demond on this date of service. This time includes time spent by me in the following activities:preparing for the visit, reviewing tests, obtaining and/or reviewing a separately obtained history, performing a medically appropriate examination and/or evaluation , counseling and educating the patient/family/caregiver, documenting information in the medical record and care coordination    Follow Up   Return if symptoms worsen or fail to improve.  Patient was given instructions and counseling regarding his condition or for health maintenance advice. Please see specific information pulled into the AVS if appropriate.

## 2023-05-20 ENCOUNTER — HOSPITAL ENCOUNTER (EMERGENCY)
Facility: HOSPITAL | Age: 78
Discharge: HOME | End: 2023-05-21
Attending: EMERGENCY MEDICINE | Admitting: EMERGENCY MEDICINE
Payer: MEDICARE

## 2023-05-20 ENCOUNTER — APPOINTMENT (OUTPATIENT)
Dept: GENERAL RADIOLOGY | Facility: HOSPITAL | Age: 78
End: 2023-05-20
Payer: MEDICARE

## 2023-05-20 DIAGNOSIS — R07.9 CHEST PAIN, UNSPECIFIED TYPE: Primary | ICD-10-CM

## 2023-05-20 LAB
ALBUMIN SERPL-MCNC: 4.6 G/DL (ref 3.5–5.2)
ALBUMIN/GLOB SERPL: 2.1 G/DL
ALP SERPL-CCNC: 86 U/L (ref 39–117)
ALT SERPL W P-5'-P-CCNC: 17 U/L (ref 1–41)
ANION GAP SERPL CALCULATED.3IONS-SCNC: 9.3 MMOL/L (ref 5–15)
AST SERPL-CCNC: 20 U/L (ref 1–40)
BILIRUB SERPL-MCNC: 0.5 MG/DL (ref 0–1.2)
BUN SERPL-MCNC: 18 MG/DL (ref 8–23)
BUN/CREAT SERPL: 17.6 (ref 7–25)
CALCIUM SPEC-SCNC: 9.1 MG/DL (ref 8.6–10.5)
CHLORIDE SERPL-SCNC: 103 MMOL/L (ref 98–107)
CO2 SERPL-SCNC: 25.7 MMOL/L (ref 22–29)
CREAT SERPL-MCNC: 1.02 MG/DL (ref 0.76–1.27)
DEPRECATED RDW RBC AUTO: 48 FL (ref 37–54)
EGFRCR SERPLBLD CKD-EPI 2021: 74.3 ML/MIN/1.73
ERYTHROCYTE [DISTWIDTH] IN BLOOD BY AUTOMATED COUNT: 13.2 % (ref 12.3–15.4)
GLOBULIN UR ELPH-MCNC: 2.2 GM/DL
GLUCOSE SERPL-MCNC: 109 MG/DL (ref 65–99)
HCT VFR BLD AUTO: 40.8 % (ref 37.5–51)
HGB BLD-MCNC: 13.5 G/DL (ref 13–17.7)
INR PPP: 2 (ref 0.9–1.1)
MCH RBC QN AUTO: 32.6 PG (ref 26.6–33)
MCHC RBC AUTO-ENTMCNC: 33.1 G/DL (ref 31.5–35.7)
MCV RBC AUTO: 98.6 FL (ref 79–97)
PLATELET # BLD AUTO: 113 10*3/MM3 (ref 140–450)
PMV BLD AUTO: 9.8 FL (ref 6–12)
POTASSIUM SERPL-SCNC: 4.3 MMOL/L (ref 3.5–5.2)
PROT SERPL-MCNC: 6.8 G/DL (ref 6–8.5)
PROTHROMBIN TIME: 24.1 SECONDS
RBC # BLD AUTO: 4.14 10*6/MM3 (ref 4.14–5.8)
SODIUM SERPL-SCNC: 138 MMOL/L (ref 136–145)
TROPONIN T SERPL HS-MCNC: 21 NG/L
WBC NRBC COR # BLD: 3.12 10*3/MM3 (ref 3.4–10.8)

## 2023-05-20 PROCEDURE — 80053 COMPREHEN METABOLIC PANEL: CPT | Performed by: EMERGENCY MEDICINE

## 2023-05-20 PROCEDURE — 71045 X-RAY EXAM CHEST 1 VIEW: CPT

## 2023-05-20 PROCEDURE — 85007 BL SMEAR W/DIFF WBC COUNT: CPT | Performed by: EMERGENCY MEDICINE

## 2023-05-20 PROCEDURE — 84484 ASSAY OF TROPONIN QUANT: CPT | Performed by: EMERGENCY MEDICINE

## 2023-05-20 PROCEDURE — 99283 EMERGENCY DEPT VISIT LOW MDM: CPT

## 2023-05-20 PROCEDURE — 85025 COMPLETE CBC W/AUTO DIFF WBC: CPT | Performed by: EMERGENCY MEDICINE

## 2023-05-21 VITALS
OXYGEN SATURATION: 100 % | TEMPERATURE: 98.8 F | BODY MASS INDEX: 28.99 KG/M2 | HEIGHT: 72 IN | RESPIRATION RATE: 16 BRPM | HEART RATE: 44 BPM | DIASTOLIC BLOOD PRESSURE: 88 MMHG | WEIGHT: 214 LBS | SYSTOLIC BLOOD PRESSURE: 170 MMHG

## 2023-05-21 LAB
EOSINOPHIL # BLD MANUAL: 0.19 10*3/MM3 (ref 0–0.4)
EOSINOPHIL NFR BLD MANUAL: 6 % (ref 0.3–6.2)
LYMPHOCYTES # BLD MANUAL: 0.31 10*3/MM3 (ref 0.7–3.1)
LYMPHOCYTES NFR BLD MANUAL: 8 % (ref 5–12)
MONOCYTES # BLD: 0.25 10*3/MM3 (ref 0.1–0.9)
NEUTROPHILS # BLD AUTO: 2.37 10*3/MM3 (ref 1.7–7)
NEUTROPHILS NFR BLD MANUAL: 76 % (ref 42.7–76)
PLAT MORPH BLD: NORMAL
RBC MORPH BLD: NORMAL
TROPONIN T SERPL HS-MCNC: 20 NG/L
VARIANT LYMPHS NFR BLD MANUAL: 10 % (ref 19.6–45.3)
WBC MORPH BLD: NORMAL

## 2023-05-21 PROCEDURE — 36415 COLL VENOUS BLD VENIPUNCTURE: CPT

## 2023-05-21 PROCEDURE — 84484 ASSAY OF TROPONIN QUANT: CPT | Performed by: EMERGENCY MEDICINE

## 2023-05-21 RX ORDER — NITROGLYCERIN 0.4 MG/1
0.4 TABLET SUBLINGUAL
Status: DISCONTINUED | OUTPATIENT
Start: 2023-05-21 | End: 2023-05-21

## 2023-05-21 RX ADMIN — NITROGLYCERIN 0.4 MG: 0.4 TABLET SUBLINGUAL at 00:22

## 2023-05-21 NOTE — FSED PROVIDER NOTE
Subjective   History of Present Illness  Patient presents with left-sided chest pressure that started right after he went to bed approximately 2 hours prior to arrival.  He states the pain does not radiate and has no associated diaphoresis, palpitations or shortness of breath.  He states it is worse when he lies down flat and better if he sits up straight.  He states it is not pleuritic in nature but is worse with movement.  He states he is never had pain like this previously which made him concerned.        Review of Systems   Constitutional: Negative for chills and fever.   HENT: Negative for sinus pain and sore throat.    Eyes: Negative for visual disturbance.   Respiratory: Negative for chest tightness and shortness of breath.    Cardiovascular: Positive for chest pain.   Gastrointestinal: Negative for abdominal pain, constipation, diarrhea, nausea and vomiting.   Genitourinary: Negative for dysuria, flank pain, hematuria and urgency.   Musculoskeletal: Negative for myalgias.   Skin: Negative for rash.   Neurological: Negative for syncope, numbness and headaches.   Psychiatric/Behavioral: Negative for confusion.       Past Medical History:   Diagnosis Date   • Allergic 2000    Grass/pollen   • LASHONDA positive 06/18/2018    2017: elevated ds LASHONDA   • Arthritis 1995    Hand, neck, back, etc.   • Atrial flutter with rapid ventricular response    • Cataract 2005   • Chronic obstructive pulmonary disease    • Colon polyp 10/07/2019    Added automatically from request for surgery [id number]   • Diverticulitis of large intestine without perforation or abscess without bleeding 03/07/2016   • Diverticulosis    • ED (erectile dysfunction)    • Elbow fracture, left    • Erectile dysfunction 2005   • History of blood transfusion    • Hypogonadism in male    • Kidney cysts    • Left femoral shaft fracture    • Lower back pain    • PAF (paroxysmal atrial fibrillation)    • Peptic ulceration    • Plantar fasciitis 12/05/2017   •  Pneumonia 1974   • Primary hypertension 12/30/2022   • Prostatic hypertrophy, benign    • 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   • Stroke     TIA   • Transient cerebral ischemia     H/O TIAs       Allergies   Allergen Reactions   • Cardizem [Diltiazem Hcl] Itching   • Grass Unknown - Low Severity       Past Surgical History:   Procedure Laterality Date   • ABLATION OF DYSRHYTHMIC FOCUS  2012, 2013   • ADENOIDECTOMY  1973   • APPENDECTOMY  1973   • CARDIAC ABLATION  2013, 2014   • CARDIAC CATHETERIZATION  2012, 2013   • CARDIAC ELECTROPHYSIOLOGY PROCEDURE N/A 04/29/2021    Procedure: Ablation atrial flutter--likely left atrial flutter, hx of PVI x 2;  Surgeon: David Hernandez MD;  Location: [facility] CATH INVASIVE LOCATION;  Service: Cardiovascular;  Laterality: N/A;   • CARDIAC ELECTROPHYSIOLOGY PROCEDURE N/A 07/02/2021    Procedure: Ablation atrial fibrillation--redo;  Surgeon: David Hernandez MD;  Location: [facility] CATH INVASIVE LOCATION;  Service: Cardiovascular;  Laterality: N/A;   • COLONOSCOPY  approx 2014    normal per pt    • COLONOSCOPY N/A 01/08/2020    Procedure: COLONOSCOPY to cecum with cold snare biopsies;  Surgeon: Henrique Rust MD;  Location: [facility] ENDOSCOPY;  Service: Gastroenterology   • COLONOSCOPY N/A 09/20/2022    Procedure: COLONOSCOPY TO CECUM WITH POLYPECTOMY  ( COLD BX);  Surgeon: Henrique Rust MD;  Location: [facility] ENDOSCOPY;  Service: Gastroenterology;  Laterality: N/A;  HX OF COLON POLYPS; FAM HX OF COLON CANCER/ POLYPS  POST: DIVERTICULOSIS; COLON POLYP; HEMOORHOIDS   • COLONOSCOPY W/ POLYPECTOMY  11/21/2014    : 1 polyp - not precancerous   • ENDOMETRIAL ABLATION  2012, 2013   • ENDOSCOPY N/A 01/08/2020    Procedure: ESOPHAGOGASTRODUODENOSCOPY;  Surgeon: Henrique Rust MD;  Location: [facility] ENDOSCOPY;  Service: Gastroenterology   • ENDOSCOPY N/A 09/20/2022    Procedure: ESOPHAGOGASTRODUODENOSCOPY WITH BIOPSY AND PRESSLEY DILATATION 52F;  Surgeon: Henrique Rust MD;  Location: [facility]  ENDOSCOPY;  Service: Gastroenterology;  Laterality: N/A;  DYSPHAGIA  POST: GASTRITIS   • FEMUR FRACTURE SURGERY Left     post fall from the ladder   • FRACTURE SURGERY      Elbow   • HAND SURGERY Left 02/10/2023   • LUMBAR LAMINECTOMY      Dr.Lester Lino   • OTHER SURGICAL HISTORY      elbow surgery   • SPINE SURGERY     • THYROID SURGERY      benign tumor removal,    • TOTAL ELBOW ARTHROPLASTY Left     L, post fall and fracture, hardware in       Family History   Problem Relation Age of Onset   • Hypertension Mother    • Osteoporosis Mother    • Hypertension Father    • Thyroid disease Sister    • Diabetes Sister    • Hypertension Sister    • Pulmonary fibrosis Sister    • Heart disease Sister    • Diabetes Sister    • Colon cancer Maternal Grandmother 60   • Heart disease Other    • Atrial fibrillation Other    • Thyroid disease Other    • Diabetes Other    • Heart disease Other    • Hypertension Other    • Malig Hyperthermia Neg Hx        Social History     Socioeconomic History   • Marital status:      Spouse name: Latisha   • Number of children: 2   • Years of education: College   • Highest education level: Not asked   Tobacco Use   • Smoking status: Former     Packs/day: 0.50     Years: 15.00     Pack years: 7.50     Types: Cigarettes     Start date: 1959     Quit date: 1974     Years since quittin.4   • Smokeless tobacco: Never   Vaping Use   • Vaping Use: Never used   Substance and Sexual Activity   • Alcohol use: Not Currently     Alcohol/week: 6.0 standard drinks     Types: 6 Glasses of wine per week     Comment: Some beer during the summer months   • Drug use: No   • Sexual activity: Not Currently     Partners: Female     Birth control/protection: None           Objective   Physical Exam  Constitutional:       Appearance: He is well-developed.   HENT:      Head: Normocephalic and atraumatic.      Nose: Nose normal.      Mouth/Throat:      Pharynx: No  oropharyngeal exudate.   Eyes:      Conjunctiva/sclera: Conjunctivae normal.      Pupils: Pupils are equal, round, and reactive to light.   Neck:      Vascular: No JVD.      Trachea: No tracheal deviation.   Cardiovascular:      Rate and Rhythm: Regular rhythm. Bradycardia present.      Heart sounds: Normal heart sounds. No murmur heard.    No friction rub. No gallop.   Pulmonary:      Effort: Pulmonary effort is normal. No respiratory distress.      Breath sounds: Normal breath sounds. No stridor. No wheezing or rales.   Abdominal:      General: Bowel sounds are normal. There is no distension.      Palpations: Abdomen is soft. There is no mass.      Tenderness: There is no abdominal tenderness. There is no guarding or rebound.      Hernia: No hernia is present.   Musculoskeletal:         General: No tenderness or deformity.      Cervical back: Normal range of motion and neck supple.   Lymphadenopathy:      Cervical: No cervical adenopathy.   Skin:     General: Skin is warm and dry.      Capillary Refill: Capillary refill takes less than 2 seconds.   Neurological:      Mental Status: He is alert and oriented to person, place, and time.      Cranial Nerves: No cranial nerve deficit.      Sensory: No sensory deficit.      Motor: No abnormal muscle tone.   Psychiatric:         Behavior: Behavior normal.         Thought Content: Thought content normal.         Judgment: Judgment normal.         Procedures           ED Course                                           Medical Decision Making  Patient states his heart rate always runs in the low 40s    Patient has a heart score of 3 and states the pain is now in his epigastric region and feels exactly like his previous GERD.  States the pain does not radiate to his back and looks very comfortable at this time.  He does not want to be admitted to the hospital and states he can follow-up with his out patient cardiologist.      Amount and/or Complexity of Data Reviewed  Labs:  ordered.  Radiology: ordered.  ECG/medicine tests: ordered and independent interpretation performed.     Details: EKG shows a sinus bradycardia with a rate of 42, prolonged TX interval, left axis deviation, no acute ST elevation.      Risk  Prescription drug management.          Final diagnoses:   Chest pain, unspecified type       ED Disposition  ED Disposition     ED Disposition   Discharge    Condition   Stable    Comment   --             Venus Walton MD  [id number] [id number]  [phone]    In 2 days           Medication List      No changes were made to your prescriptions during this visit.

## 2023-05-21 NOTE — ED NOTES
Blood pressure elevated . Reported to Dr Hess. Pt reporting indigestion. Nitroglycerin 0.4mg ordered SL.

## 2023-05-21 NOTE — DISCHARGE INSTRUCTIONS
Follow the instructions given and follow-up closely with your cardiologist this coming week.  Return immediately to the ER with any worsening pain, back pain, lightheadedness, fevers, vomiting or any other concerns.

## 2023-05-25 ENCOUNTER — ANTICOAGULATION VISIT (OUTPATIENT)
Dept: PHARMACY | Facility: HOSPITAL | Age: 78
End: 2023-05-25
Payer: MEDICARE

## 2023-05-25 DIAGNOSIS — I48.0 PAF (PAROXYSMAL ATRIAL FIBRILLATION): Primary | ICD-10-CM

## 2023-05-25 LAB — INR PPP: 2.4

## 2023-05-25 NOTE — PROGRESS NOTES
Anticoagulation Clinic Progress Note    Anticoagulation Summary  As of 2023    INR goal:  2.5-3.5   TTR:  83.3 % (4.5 y)   INR used for dosin.40 (2023)   Warfarin maintenance plan:  2.5 mg every Mon, Fri; 5 mg all other days; Starting 2023   Weekly warfarin total:  30 mg   No change documented:  Jeanette Montes De Oca RPH   Plan last modified:  Jeanette Montes De Oca RPH (2021)   Next INR check:  2023   Priority:  Maintenance   Target end date:      Indications    PAF (paroxysmal atrial fibrillation) [I48.0]  TIA (transient ischemic attack) (Resolved) [G45.9]             Anticoagulation Episode Summary     INR check location:      Preferred lab:      Send INR reminders to:   [facility]  POOL    Comments:  [place]      Anticoagulation Care Providers     Provider Role Specialty Phone number    David Hernandez MD Referring Cardiology [phone]          Clinic Interview:  Patient Findings     Negatives:  Signs/symptoms of thrombosis, Signs/symptoms of bleeding,   Laboratory test error suspected, Change in health, Change in alcohol use,   Change in activity, Upcoming invasive procedure, Emergency department   visit, Upcoming dental procedure, Missed doses, Extra doses, Change in   medications, Change in diet/appetite, Hospital admission, Bruising, Other   complaints      Clinical Outcomes     Negatives:  Major bleeding event, Thromboembolic event,   Anticoagulation-related hospital admission, Anticoagulation-related ED   visit, Anticoagulation-related fatality        INR History:      2023     1:15 PM 2023    12:00 AM 2023     3:54 PM 2023    12:00 AM 2023    11:04 AM 2023    12:00 AM 2023     2:02 PM   Anticoagulation Monitoring   INR 2.90  3.40  2.70  2.40   INR Date 2023   INR Goal 2.5-3.5  2.5-3.5  2.5-3.5  2.5-3.5   Trend Same  Same  Same  Same   Last Week Total 30 mg  30 mg  30 mg  30 mg   Next Week Total 30  mg  30 mg  30 mg  30 mg   Sun 5 mg  5 mg  5 mg  5 mg   Mon 2.5 mg  2.5 mg  2.5 mg  2.5 mg   Tue 5 mg  5 mg  5 mg  5 mg   Wed 5 mg  5 mg  5 mg  5 mg   Thu 5 mg  5 mg  5 mg  5 mg   Fri 2.5 mg  2.5 mg  2.5 mg  2.5 mg   Sat 5 mg  5 mg  5 mg  5 mg   Historical INR  3.40       2.70       2.40             This result is from an external source.       Plan:  1. INR is Subtherapeutic today- see above in Anticoagulation Summary.   Will instruct Demond Menchaca to continue their warfarin regimen as INR is very close to goal- see above in Anticoagulation Summary.  2. Follow up in 2 weeks  3.  They have been instructed to call if any changes in medications, doses, concerns, etc. Patient expresses understanding and has no further questions at this time.    Jeanette Montes De Oca [facility]

## 2023-06-08 ENCOUNTER — ANTICOAGULATION VISIT (OUTPATIENT)
Dept: PHARMACY | Facility: HOSPITAL | Age: 78
End: 2023-06-08
Payer: MEDICARE

## 2023-06-08 DIAGNOSIS — I48.0 PAF (PAROXYSMAL ATRIAL FIBRILLATION): Primary | ICD-10-CM

## 2023-06-08 LAB — INR PPP: 2.3

## 2023-06-08 NOTE — PROGRESS NOTES
Anticoagulation Clinic Progress Note    Anticoagulation Summary  As of 2023      INR goal:  2.5-3.5   TTR:  82.6 % (4.5 y)   INR used for dosin.30 (2023)   Warfarin maintenance plan:  2.5 mg every Mon, Fri; 5 mg all other days; Starting 2023   Weekly warfarin total:  30 mg   Plan last modified:  Jeanette Montes De Oca RPH (2021)   Next INR check:  2023   Priority:  Maintenance   Target end date:      Indications    PAF (paroxysmal atrial fibrillation) [I48.0]  TIA (transient ischemic attack) (Resolved) [G45.9]                 Anticoagulation Episode Summary       INR check location:      Preferred lab:      Send INR reminders to:   [facility]  POOL    Comments:  [place]          Anticoagulation Care Providers       Provider Role Specialty Phone number    David Hernandez MD Referring Cardiology [phone]            Clinic Interview:  Patient Findings     Positives:  Change in diet/appetite    Negatives:  Signs/symptoms of thrombosis, Signs/symptoms of bleeding,   Laboratory test error suspected, Change in health, Change in alcohol use,   Change in activity, Upcoming invasive procedure, Emergency department   visit, Upcoming dental procedure, Missed doses, Extra doses, Change in   medications, Hospital admission, Bruising, Other complaints    Comments:  Reports he had kale greens last night (more vit k than usual).   Reports intention to return to usual diet/vit k consumption.       Clinical Outcomes     Negatives:  Major bleeding event, Thromboembolic event,   Anticoagulation-related hospital admission, Anticoagulation-related ED   visit, Anticoagulation-related fatality    Comments:  Reports he had kale greens last night (more vit k than usual).   Reports intention to return to usual diet/vit k consumption.      INR History:      2023     3:54 PM 2023    12:00 AM 2023    11:04 AM 2023    12:00 AM 2023     2:02 PM 2023    12:00 AM 2023     9:47  AM   Anticoagulation Monitoring   INR 3.40  2.70  2.40  2.30   INR Date 4/28/2023 5/11/2023 5/25/2023 6/8/2023   INR Goal 2.5-3.5  2.5-3.5  2.5-3.5  2.5-3.5   Trend Same  Same  Same  Same   Last Week Total 30 mg  30 mg  30 mg  30 mg   Next Week Total 30 mg  30 mg  30 mg  32.5 mg   Sun 5 mg  5 mg  5 mg  5 mg   Mon 2.5 mg  2.5 mg  2.5 mg  2.5 mg   Tue 5 mg  5 mg  5 mg  5 mg   Wed 5 mg  5 mg  5 mg  5 mg   Thu 5 mg  5 mg  5 mg  7.5 mg (6/8); Otherwise 5 mg   Fri 2.5 mg  2.5 mg  2.5 mg  2.5 mg   Sat 5 mg  5 mg  5 mg  5 mg   Historical INR  2.70      2.40      2.30            This result is from an external source.       Plan:  1. INR is Subtherapeutic today- see above in Anticoagulation Summary.   Will instruct Demond Menchaca to Change their warfarin regimen (7.5 mg today, then resume 2.5 mg MF, 5 mg all other days) - see above in Anticoagulation Summary.  2. Follow up in 2 weeks. If INR trending subtherapeutic, will consider increase in total weekly dose.   3. They have been instructed to call if any changes in medications, doses, concerns, etc. Patient expresses understanding and has no further questions at this time.    Philip Carmona, PharmD

## 2023-06-12 ENCOUNTER — OFFICE VISIT (OUTPATIENT)
Dept: CARDIOLOGY | Facility: CLINIC | Age: 78
End: 2023-06-12
Payer: MEDICARE

## 2023-06-12 VITALS
SYSTOLIC BLOOD PRESSURE: 126 MMHG | HEIGHT: 72 IN | BODY MASS INDEX: 29.39 KG/M2 | WEIGHT: 217 LBS | DIASTOLIC BLOOD PRESSURE: 84 MMHG | HEART RATE: 49 BPM

## 2023-06-12 DIAGNOSIS — I10 PRIMARY HYPERTENSION: ICD-10-CM

## 2023-06-12 DIAGNOSIS — Z98.890 H/O CARDIAC RADIOFREQUENCY ABLATION: ICD-10-CM

## 2023-06-12 DIAGNOSIS — I48.0 PAF (PAROXYSMAL ATRIAL FIBRILLATION): Primary | ICD-10-CM

## 2023-06-12 PROCEDURE — 3079F DIAST BP 80-89 MM HG: CPT | Performed by: INTERNAL MEDICINE

## 2023-06-12 PROCEDURE — 93000 ELECTROCARDIOGRAM COMPLETE: CPT | Performed by: INTERNAL MEDICINE

## 2023-06-12 PROCEDURE — 3074F SYST BP LT 130 MM HG: CPT | Performed by: INTERNAL MEDICINE

## 2023-06-12 PROCEDURE — 99214 OFFICE O/P EST MOD 30 MIN: CPT | Performed by: INTERNAL MEDICINE

## 2023-06-12 RX ORDER — UBIDECARENONE 75 MG
50 CAPSULE ORAL DAILY
COMMUNITY

## 2023-06-12 NOTE — PROGRESS NOTES
Date of Office Visit: 2023  Encounter Provider: David Hernandez MD  Place of Service: [facility] CARDIOLOGY  Patient Name: Demond Menchaca  : 1945    Subjective:     Encounter Date:2023      Patient ID: Demond Menchaca is a 78 y.o. male who has a cc of  PAF and I have done multiple ablaiton the last one in  -- roof line ablation     Rare skips and jumps.     New diagnosis -- peripheral neuropathy.         The patient had a good year.   No anginal chest pain,   No sig briseno,   No soa,   No fainting,  No orthostasis.   No edema.   Exercise tolerance: good.     There have been no hospital admission since the last visit.     There have been no bleeding events.       Past Medical History:   Diagnosis Date    Allergic     Grass/pollen    LASHONDA positive 2018    2017: elevated ds LASHONDA    Arthritis     Hand, neck, back, etc.    Atrial flutter with rapid ventricular response     Cataract     Chronic obstructive pulmonary disease     Colon polyp 10/07/2019    Added automatically from request for surgery [id number]    Diverticulitis of large intestine without perforation or abscess without bleeding 2016    Diverticulosis     ED (erectile dysfunction)     Elbow fracture, left     Erectile dysfunction     History of blood transfusion     Hypogonadism in male     Kidney cysts     Left femoral shaft fracture     Lower back pain     PAF (paroxysmal atrial fibrillation)     Peptic ulceration     Plantar fasciitis 2017    Pneumonia 1974    Primary hypertension 2022    Prostatic hypertrophy, benign     Prosthetic joint infection 2012    Stroke     TIA    Transient cerebral ischemia     H/O TIAs       Social History     Socioeconomic History    Marital status:      Spouse name: Latisha    Number of children: 2    Years of education: College    Highest education level: Not asked   Tobacco Use    Smoking status: Former     Packs/day: 0.50     Years: 15.00  "    Pack years: 7.50     Types: Cigarettes     Start date: 1959     Quit date: 1974     Years since quittin.4    Smokeless tobacco: Never   Vaping Use    Vaping Use: Never used   Substance and Sexual Activity    Alcohol use: Not Currently     Alcohol/week: 6.0 standard drinks     Types: 6 Glasses of wine per week     Comment: Some beer during the summer months    Drug use: No    Sexual activity: Not Currently     Partners: Female     Birth control/protection: None       Family History   Problem Relation Age of Onset    Hypertension Mother     Osteoporosis Mother     Hypertension Father     Thyroid disease Sister     Diabetes Sister     Hypertension Sister     Pulmonary fibrosis Sister     Heart disease Sister     Diabetes Sister     Colon cancer Maternal Grandmother 60    Heart disease Other     Atrial fibrillation Other     Thyroid disease Other     Diabetes Other     Heart disease Other     Hypertension Other     Malig Hyperthermia Neg Hx        Review of Systems   Constitutional: Negative for fever and night sweats.   HENT:  Negative for ear pain and stridor.    Eyes:  Negative for discharge and visual halos.   Cardiovascular:  Negative for cyanosis.   Respiratory:  Negative for hemoptysis and sputum production.    Hematologic/Lymphatic: Negative for adenopathy.   Skin:  Negative for nail changes and unusual hair distribution.   Musculoskeletal:  Negative for gout and joint swelling.   Gastrointestinal:  Negative for bowel incontinence and flatus.   Genitourinary:  Negative for dysuria and flank pain.   Neurological:  Positive for focal weakness. Negative for seizures and tremors.   Psychiatric/Behavioral:  Negative for altered mental status. The patient is not nervous/anxious.           Objective:     Vitals:    23 1044   BP: 126/84   BP Location: Right arm   Patient Position: Sitting   Cuff Size: Adult   Pulse: (!) 49   Weight: 98.4 kg (217 lb)   Height: 182.9 cm (72\")         Eyes:      " General:         Right eye: No discharge.         Left eye: No discharge.   HENT:      Head: Normocephalic and atraumatic.   Neck:      Thyroid: No thyromegaly.      Vascular: No JVD.   Pulmonary:      Effort: Pulmonary effort is normal.      Breath sounds: Normal breath sounds. No rales.   Cardiovascular:      Normal rate. Regular rhythm.      No gallop.    Edema:     Peripheral edema absent.   Abdominal:      General: Bowel sounds are normal.      Palpations: Abdomen is soft.      Tenderness: There is no abdominal tenderness.   Musculoskeletal: Normal range of motion.         General: No deformity. Skin:     General: Skin is warm and dry.      Findings: No erythema.   Neurological:      Mental Status: Alert and oriented to person, place, and time.      Motor: Normal muscle tone.   Psychiatric:         Behavior: Behavior normal.         Thought Content: Thought content normal.         ECG 12 Lead    Date/Time: 6/12/2023 11:00 AM  Performed by: David Hernandez MD  Authorized by: David Hernandez MD   Comparison: compared with previous ECG   Similar to previous ECG  Rhythm: sinus bradycardia        Lab Review:       Assessment:          Diagnosis Plan   1. PAF (paroxysmal atrial fibrillation)        2. H/O cardiac radiofrequency ablation--x 3        3. Primary hypertension               Plan:     AF -- none since last ablation    Remain on warfarin     HTN -- controlled on ACE

## 2023-06-16 DIAGNOSIS — I10 PRIMARY HYPERTENSION: ICD-10-CM

## 2023-06-16 RX ORDER — LISINOPRIL 20 MG/1
TABLET ORAL
Qty: 90 TABLET | Refills: 1 | Status: SHIPPED | OUTPATIENT
Start: 2023-06-16

## 2023-07-27 ENCOUNTER — ANTICOAGULATION VISIT (OUTPATIENT)
Dept: PHARMACY | Facility: HOSPITAL | Age: 78
End: 2023-07-27
Payer: MEDICARE

## 2023-07-27 DIAGNOSIS — I48.0 PAF (PAROXYSMAL ATRIAL FIBRILLATION): Primary | ICD-10-CM

## 2023-07-27 LAB — INR PPP: 3.7

## 2023-07-28 NOTE — PROGRESS NOTES
Anticoagulation Clinic Progress Note    Anticoagulation Summary  As of 7/27/2023      INR goal:  2.5-3.5   TTR:  81.3 % (4.6 y)   INR used for dosing:  3.70 (7/27/2023)   Warfarin maintenance plan:  2.5 mg every Fri; 5 mg all other days   Weekly warfarin total:  32.5 mg   Plan last modified:  Jeanette Montes De Oca RPH (7/13/2023)   Next INR check:  8/10/2023   Priority:  Maintenance   Target end date:      Indications    PAF (paroxysmal atrial fibrillation) [I48.0]  TIA (transient ischemic attack) (Resolved) [G45.9]                 Anticoagulation Episode Summary       INR check location:      Preferred lab:      Send INR reminders to:  [facility]  POOL    Comments:  [place]          Anticoagulation Care Providers       Provider Role Specialty Phone number    David Hernandez MD Referring Cardiology [phone]            Clinic Interview:  Patient Findings     Negatives:  Signs/symptoms of thrombosis, Signs/symptoms of bleeding,   Laboratory test error suspected, Change in health, Change in alcohol use,   Change in activity, Upcoming invasive procedure, Emergency department   visit, Upcoming dental procedure, Missed doses, Extra doses, Change in   medications, Change in diet/appetite, Hospital admission, Bruising, Other   complaints      Clinical Outcomes     Negatives:  Major bleeding event, Thromboembolic event,   Anticoagulation-related hospital admission, Anticoagulation-related ED   visit, Anticoagulation-related fatality        INR History:      6/22/2023     3:45 PM 6/29/2023    12:00 AM 6/29/2023     2:47 PM 7/13/2023    12:00 AM 7/13/2023    10:55 AM 7/27/2023    12:00 AM 7/27/2023     1:52 PM   Anticoagulation Monitoring   INR 2.70  2.20  4.50  3.70   INR Date 6/22/2023 6/29/2023 7/13/2023 7/27/2023   INR Goal 2.5-3.5  2.5-3.5  2.5-3.5  2.5-3.5   Trend Same  Up  Same  Same   Last Week Total 30 mg  30 mg  35 mg  32.5 mg   Next Week Total 30 mg  32.5 mg  27.5 mg  30 mg   Sun 5 mg  5 mg  5 mg   5 mg   Mon 2.5 mg  2.5 mg  5 mg  5 mg   Tue 5 mg  5 mg  5 mg  5 mg   Wed 5 mg  5 mg  5 mg  5 mg   Thu 5 mg  5 mg  Hold (7/13); Otherwise 5 mg  5 mg   Fri 2.5 mg  5 mg  2.5 mg  Hold (7/28); Otherwise 2.5 mg   Sat 5 mg  5 mg  5 mg  5 mg   Historical INR  2.20      4.50      3.70            This result is from an external source.       Plan:  1. INR is Supratherapeutic today- see above in Anticoagulation Summary.   Will instruct Demond Menchaca to Change their warfarin regimen- see above in Anticoagulation Summary.  patient was running subtherapeutic on two days of 2.5 mg. Hold and then resume and rck 2 weeks   2. Follow up in 2 weeks  3. They have been instructed to call if any changes in medications, doses, concerns, etc. Patient expresses understanding and has no further questions at this time.    Jeanette Montes De Oca [facility]

## 2023-08-10 ENCOUNTER — ANTICOAGULATION VISIT (OUTPATIENT)
Dept: PHARMACY | Facility: HOSPITAL | Age: 78
End: 2023-08-10
Payer: MEDICARE

## 2023-08-10 DIAGNOSIS — I48.0 PAF (PAROXYSMAL ATRIAL FIBRILLATION): Primary | ICD-10-CM

## 2023-08-10 LAB — INR PPP: 4.5

## 2023-08-10 NOTE — PROGRESS NOTES
Anticoagulation Clinic Progress Note    Anticoagulation Summary  As of 8/10/2023      INR goal:  2.5-3.5   TTR:  80.6 % (4.7 y)   INR used for dosin.50 (8/10/2023)   Warfarin maintenance plan:  2.5 mg every Mon, Fri; 5 mg all other days   Weekly warfarin total:  30 mg   Plan last modified:  Jeanette Montes De Oca RPH (8/10/2023)   Next INR check:  2023   Priority:  Maintenance   Target end date:      Indications    PAF (paroxysmal atrial fibrillation) [I48.0]  TIA (transient ischemic attack) (Resolved) [G45.9]                 Anticoagulation Episode Summary       INR check location:      Preferred lab:      Send INR reminders to:  [facility]  POOL    Comments:  [place]          Anticoagulation Care Providers       Provider Role Specialty Phone number    David Hernandez MD Referring Cardiology [phone]            Clinic Interview:  Patient Findings     Positives:  Change in medications    Negatives:  Signs/symptoms of thrombosis, Signs/symptoms of bleeding,   Laboratory test error suspected, Change in health, Change in alcohol use,   Change in activity, Upcoming invasive procedure, Emergency department   visit, Upcoming dental procedure, Missed doses, Extra doses, Change in   diet/appetite, Hospital admission, Bruising, Other complaints    Comments:  patient is experiencing back pain and reports taking some   hydrocodone.       Clinical Outcomes     Negatives:  Major bleeding event, Thromboembolic event,   Anticoagulation-related hospital admission, Anticoagulation-related ED   visit, Anticoagulation-related fatality    Comments:  patient is experiencing back pain and reports taking some   hydrocodone.         INR History:      2023     2:47 PM 2023    12:00 AM 2023    10:55 AM 2023    12:00 AM 2023     1:52 PM 8/10/2023    12:00 AM 8/10/2023     1:39 PM   Anticoagulation Monitoring   INR 2.20  4.50  3.70  4.50   INR Date 2023  2023  2023  8/10/2023    INR Goal 2.5-3.5  2.5-3.5  2.5-3.5  2.5-3.5   Trend Up  Same  Same  Down   Last Week Total 30 mg  35 mg  32.5 mg  32.5 mg   Next Week Total 32.5 mg  27.5 mg  30 mg  25 mg   Sun 5 mg  5 mg  5 mg  5 mg   Mon 2.5 mg  5 mg  5 mg  2.5 mg   Tue 5 mg  5 mg  5 mg  5 mg   Wed 5 mg  5 mg  5 mg  5 mg   Thu 5 mg  Hold (7/13); Otherwise 5 mg  5 mg  Hold (8/10); Otherwise 5 mg   Fri 5 mg  2.5 mg  Hold (7/28); Otherwise 2.5 mg  2.5 mg   Sat 5 mg  5 mg  5 mg  5 mg   Historical INR  4.50      3.70      4.50            This result is from an external source.       Plan:  1. INR is Supratherapeutic today- see above in Anticoagulation Summary.   Will instruct Demond Menchaca to Change their warfarin regimen- see above in Anticoagulation Summary.  Hold and then trial back on 2.5 mg MF, 5 mg AOD. If pt becomes subtherapeutic again, may consider providing a 2.5 mg tablet to give 3.75 mg.   2. Follow up in 2 weeks  3.  They have been instructed to call if any changes in medications, doses, concerns, etc. Patient expresses understanding and has no further questions at this time.    Jeanette Montes De [facility]ca [facility]

## 2023-08-16 LAB — QT INTERVAL: 510 MS

## 2023-08-24 ENCOUNTER — ANTICOAGULATION VISIT (OUTPATIENT)
Dept: PHARMACY | Facility: HOSPITAL | Age: 78
End: 2023-08-24
Payer: MEDICARE

## 2023-08-24 DIAGNOSIS — I48.0 PAF (PAROXYSMAL ATRIAL FIBRILLATION): Primary | ICD-10-CM

## 2023-08-24 LAB — INR PPP: 3.8

## 2023-08-24 NOTE — PROGRESS NOTES
Anticoagulation Clinic Progress Note    Anticoagulation Summary  As of 8/24/2023      INR goal:  2.5-3.5   TTR:  80.0 % (4.7 y)   INR used for dosing:  3.80 (8/24/2023)   Warfarin maintenance plan:  2.5 mg every Mon, Fri; 5 mg all other days   Weekly warfarin total:  30 mg   Plan last modified:  Jeanette Montes De Oca RPH (8/10/2023)   Next INR check:  9/7/2023   Priority:  Maintenance   Target end date:      Indications    PAF (paroxysmal atrial fibrillation) [I48.0]  TIA (transient ischemic attack) (Resolved) [G45.9]                 Anticoagulation Episode Summary       INR check location:      Preferred lab:      Send INR reminders to:  [facility]  POOL    Comments:  [place]          Anticoagulation Care Providers       Provider Role Specialty Phone number    David Hernandez MD Referring Cardiology [phone]            Clinic Interview:  Patient Findings     Negatives:  Signs/symptoms of thrombosis, Signs/symptoms of bleeding,   Laboratory test error suspected, Change in health, Change in alcohol use,   Change in activity, Upcoming invasive procedure, Emergency department   visit, Upcoming dental procedure, Missed doses, Extra doses, Change in   medications, Change in diet/appetite, Hospital admission, Bruising, Other   complaints      Clinical Outcomes     Negatives:  Major bleeding event, Thromboembolic event,   Anticoagulation-related hospital admission, Anticoagulation-related ED   visit, Anticoagulation-related fatality        INR History:      7/13/2023    10:55 AM 7/27/2023    12:00 AM 7/27/2023     1:52 PM 8/10/2023    12:00 AM 8/10/2023     1:39 PM 8/24/2023    12:00 AM 8/24/2023     1:59 PM   Anticoagulation Monitoring   INR 4.50  3.70  4.50  3.80   INR Date 7/13/2023  7/27/2023  8/10/2023  8/24/2023   INR Goal 2.5-3.5  2.5-3.5  2.5-3.5  2.5-3.5   Trend Same  Same  Down  Same   Last Week Total 35 mg  32.5 mg  32.5 mg  30 mg   Next Week Total 27.5 mg  30 mg  25 mg  27.5 mg   Sun 5 mg  5 mg   5 mg  5 mg   Mon 5 mg  5 mg  2.5 mg  2.5 mg   Tue 5 mg  5 mg  5 mg  5 mg   Wed 5 mg  5 mg  5 mg  5 mg   Thu Hold (7/13); Otherwise 5 mg  5 mg  Hold (8/10); Otherwise 5 mg  2.5 mg (8/24); Otherwise 5 mg   Fri 2.5 mg  Hold (7/28); Otherwise 2.5 mg  2.5 mg  2.5 mg   Sat 5 mg  5 mg  5 mg  5 mg   Historical INR  3.70      4.50      3.80            This result is from an external source.       Plan:  1. INR is Supratherapeutic today- see above in Anticoagulation Summary. INR has been subtherapeutic on this same dose previuosly.   Will instruct Demond Menchaca to Change their warfarin regimen (2.5 mg today, then resume 2.5 mg MF, 5 mg all other days) - see above in Anticoagulation Summary.  2. Follow up in 2 weeks.  3. They have been instructed to call if any changes in medications, doses, concerns, etc. Patient expresses understanding and has no further questions at this time.    Philip Carmona, PharmD

## 2023-09-05 ENCOUNTER — HOSPITAL ENCOUNTER (OUTPATIENT)
Facility: HOSPITAL | Age: 78
Discharge: HOME | End: 2023-09-05
Attending: EMERGENCY MEDICINE | Admitting: EMERGENCY MEDICINE
Payer: MEDICARE

## 2023-09-05 VITALS
HEIGHT: 74 IN | TEMPERATURE: 98.2 F | RESPIRATION RATE: 16 BRPM | OXYGEN SATURATION: 100 % | HEART RATE: 57 BPM | WEIGHT: 217 LBS | DIASTOLIC BLOOD PRESSURE: 48 MMHG | SYSTOLIC BLOOD PRESSURE: 134 MMHG | BODY MASS INDEX: 27.85 KG/M2

## 2023-09-05 DIAGNOSIS — H61.23 BILATERAL IMPACTED CERUMEN: Primary | ICD-10-CM

## 2023-09-05 PROCEDURE — G0463 HOSPITAL OUTPT CLINIC VISIT: HCPCS | Performed by: NURSE PRACTITIONER

## 2023-09-05 NOTE — ED NOTES
Flushed patient's right ear again. Still unable to get last bit of wax out. Educated patient to follow-up with ENT and continue to instill the Debrox drops in the ear twice a day and to return to ED/UC for any increase in pain or change to symptoms.

## 2023-09-05 NOTE — FSED PROVIDER NOTE
Subjective   History of Present Illness  Patient is an 78-year-old male who presents complaining of bilateral ear fullness.  States he often gets cerumen impaction and has a hearing test tomorrow, and would like to be cleaned out.  He has been trying to use the drops at home without relief.  Denies any fever or chills.    Review of Systems   HENT:  Positive for ear pain.    All other systems reviewed and are negative.    Past Medical History:   Diagnosis Date    Allergic 2000    Grass/pollen    LASHONDA positive 06/18/2018    2017: elevated ds LASHONDA    Arthritis 1995    Hand, neck, back, etc.    Atrial flutter with rapid ventricular response     Cataract 2005    Chronic obstructive pulmonary disease     Colon polyp 10/07/2019    Added automatically from request for surgery [id number]    Diverticulitis of large intestine without perforation or abscess without bleeding 03/07/2016    Diverticulosis     ED (erectile dysfunction)     Elbow fracture, left     Erectile dysfunction 2005    History of blood transfusion     Hypogonadism in male     Kidney cysts     Left femoral shaft fracture     Lower back pain     PAF (paroxysmal atrial fibrillation)     Peptic ulceration     Plantar fasciitis 12/05/2017    Pneumonia 1974    Primary hypertension 12/30/2022    Prostatic hypertrophy, benign     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    Stroke     TIA    Transient cerebral ischemia     H/O TIAs       Allergies   Allergen Reactions    Cardizem [Diltiazem Hcl] Itching    Grass Unknown - Low Severity       Past Surgical History:   Procedure Laterality Date    ABLATION OF DYSRHYTHMIC FOCUS  2012, 2013    ADENOIDECTOMY  1973    APPENDECTOMY  1973    CARDIAC ABLATION  2013, 2014    CARDIAC CATHETERIZATION  2012, 2013    CARDIAC ELECTROPHYSIOLOGY PROCEDURE N/A 04/29/2021    Procedure: Ablation atrial flutter--likely left atrial flutter, hx of PVI x 2;  Surgeon: David Hernandez MD;  Location: [facility] INVASIVE LOCATION;  Service:  Cardiovascular;  Laterality: N/A;    CARDIAC ELECTROPHYSIOLOGY PROCEDURE N/A 07/02/2021    Procedure: Ablation atrial fibrillation--redo;  Surgeon: David Hernandez MD;  Location: [facility] CATH INVASIVE LOCATION;  Service: Cardiovascular;  Laterality: N/A;    COLONOSCOPY  approx 2014    normal per pt     COLONOSCOPY N/A 01/08/2020    Procedure: COLONOSCOPY to cecum with cold snare biopsies;  Surgeon: Henrique Rust MD;  Location: [facility] ENDOSCOPY;  Service: Gastroenterology    COLONOSCOPY N/A 09/20/2022    Procedure: COLONOSCOPY TO CECUM WITH POLYPECTOMY  ( COLD BX);  Surgeon: Henrique Rust MD;  Location: [facility] ENDOSCOPY;  Service: Gastroenterology;  Laterality: N/A;  HX OF COLON POLYPS; FAM HX OF COLON CANCER/ POLYPS  POST: DIVERTICULOSIS; COLON POLYP; HEMOORHOIDS    COLONOSCOPY W/ POLYPECTOMY  11/21/2014    : 1 polyp - not precancerous    ENDOMETRIAL ABLATION  2012, 2013    ENDOSCOPY N/A 01/08/2020    Procedure: ESOPHAGOGASTRODUODENOSCOPY;  Surgeon: Henrique Rust MD;  Location: [facility] ENDOSCOPY;  Service: Gastroenterology    ENDOSCOPY N/A 09/20/2022    Procedure: ESOPHAGOGASTRODUODENOSCOPY WITH BIOPSY AND PRESSLEY DILATATION 50F;  Surgeon: Henrique Rust MD;  Location: [facility] ENDOSCOPY;  Service: Gastroenterology;  Laterality: N/A;  DYSPHAGIA  POST: GASTRITIS    FEMUR FRACTURE SURGERY Left 2007    post fall from the ladder    FRACTURE SURGERY  2007    Elbow    HAND SURGERY Left 02/10/2023    LUMBAR LAMINECTOMY  1974    Dr.Lester Lino    OTHER SURGICAL HISTORY      elbow surgery    SPINE SURGERY  1974    THYROID SURGERY  1986    benign tumor removal,     TOTAL ELBOW ARTHROPLASTY Left 2007    L, post fall and fracture, hardware in       Family History   Problem Relation Age of Onset    Hypertension Mother     Osteoporosis Mother     Hypertension Father     Thyroid disease Sister     Diabetes Sister     Hypertension Sister     Pulmonary fibrosis Sister     Heart disease Sister     Diabetes Sister      Colon cancer Maternal Grandmother 60    Heart disease Other     Atrial fibrillation Other     Thyroid disease Other     Diabetes Other     Heart disease Other     Hypertension Other     Malig Hyperthermia Neg Hx        Social History     Socioeconomic History    Marital status:      Spouse name: Latisha    Number of children: 2    Years of education: College    Highest education level: Not asked   Tobacco Use    Smoking status: Former     Packs/day: 0.50     Years: 15.00     Pack years: 7.50     Types: Cigarettes     Start date: 1959     Quit date: 1974     Years since quittin.7    Smokeless tobacco: Never   Vaping Use    Vaping Use: Never used   Substance and Sexual Activity    Alcohol use: Not Currently     Alcohol/week: 6.0 standard drinks     Types: 6 Glasses of wine per week     Comment: Some beer during the summer months    Drug use: No    Sexual activity: Not Currently     Partners: Female     Birth control/protection: None           Objective   Physical Exam  Vitals and nursing note reviewed.   Constitutional:       Appearance: Normal appearance.   HENT:      Head: Normocephalic and atraumatic.      Right Ear: There is impacted cerumen.      Left Ear: There is impacted cerumen.   Pulmonary:      Effort: Pulmonary effort is normal.   Musculoskeletal:      Cervical back: Normal range of motion and neck supple.   Skin:     General: Skin is warm and dry.      Capillary Refill: Capillary refill takes less than 2 seconds.   Neurological:      General: No focal deficit present.      Mental Status: He is alert and oriented to person, place, and time.       Procedures           ED Course                                           Medical Decision Making  Patient's ears were irrigated, he is to follow-up with ENT as needed.  Given strict return precautions.    Problems Addressed:  Bilateral impacted cerumen: acute illness or injury        Final diagnoses:   Bilateral impacted cerumen       ED  Disposition  ED Disposition       ED Disposition   Discharge    Condition   Stable    Comment   --               [place]  [address] [address]  [phone]    Call   If symptoms worsen         Medication List      No changes were made to your prescriptions during this visit.

## 2023-09-05 NOTE — ED NOTES
MD Pappas attempted to remove wax and still unable to get the last little bit. Patient requesting that we try one more time. Peroxide instill in ear and will let it sit for 20 min and try again.

## 2023-09-05 NOTE — ED NOTES
Left ear cleaned out with ear curette and flushed. Attempted to clean right ear but large amount of wax still present. Ear repeatedly flushed. Peroxide instilled. Will attempt again after peroxide dwells.

## 2023-09-05 NOTE — ED NOTES
Left ear flushed repeatedly and attempted wax removal with ear curette. Most of the wax removed but small amount still remains. Alejandra GUTIERREZ notified. Patient educated to follow-up with ENT.

## 2023-09-06 ENCOUNTER — OFFICE VISIT (OUTPATIENT)
Dept: FAMILY MEDICINE CLINIC | Facility: CLINIC | Age: 78
End: 2023-09-06
Payer: MEDICARE

## 2023-09-06 VITALS
BODY MASS INDEX: 28.49 KG/M2 | SYSTOLIC BLOOD PRESSURE: 128 MMHG | OXYGEN SATURATION: 99 % | DIASTOLIC BLOOD PRESSURE: 64 MMHG | HEART RATE: 54 BPM | WEIGHT: 222 LBS | HEIGHT: 74 IN

## 2023-09-06 DIAGNOSIS — D61.818 PANCYTOPENIA: ICD-10-CM

## 2023-09-06 DIAGNOSIS — M51.36 DDD (DEGENERATIVE DISC DISEASE), LUMBAR: ICD-10-CM

## 2023-09-06 DIAGNOSIS — E53.8 VITAMIN B12 DEFICIENCY: ICD-10-CM

## 2023-09-06 DIAGNOSIS — G25.81 RESTLESS LEGS SYNDROME: ICD-10-CM

## 2023-09-06 DIAGNOSIS — M32.8 OTHER FORMS OF SYSTEMIC LUPUS ERYTHEMATOSUS, UNSPECIFIED ORGAN INVOLVEMENT STATUS: ICD-10-CM

## 2023-09-06 DIAGNOSIS — H61.23 BILATERAL HEARING LOSS DUE TO CERUMEN IMPACTION: Primary | ICD-10-CM

## 2023-09-06 DIAGNOSIS — I10 PRIMARY HYPERTENSION: ICD-10-CM

## 2023-09-06 DIAGNOSIS — N40.1 BENIGN PROSTATIC HYPERPLASIA WITH LOWER URINARY TRACT SYMPTOMS, SYMPTOM DETAILS UNSPECIFIED: ICD-10-CM

## 2023-09-06 PROBLEM — R03.0 ELEVATED BP WITHOUT DIAGNOSIS OF HYPERTENSION: Status: RESOLVED | Noted: 2022-10-24 | Resolved: 2023-09-06

## 2023-09-06 PROCEDURE — 1159F MED LIST DOCD IN RCRD: CPT | Performed by: FAMILY MEDICINE

## 2023-09-06 PROCEDURE — 99214 OFFICE O/P EST MOD 30 MIN: CPT | Performed by: FAMILY MEDICINE

## 2023-09-06 PROCEDURE — 1160F RVW MEDS BY RX/DR IN RCRD: CPT | Performed by: FAMILY MEDICINE

## 2023-09-06 PROCEDURE — 3074F SYST BP LT 130 MM HG: CPT | Performed by: FAMILY MEDICINE

## 2023-09-06 PROCEDURE — 3078F DIAST BP <80 MM HG: CPT | Performed by: FAMILY MEDICINE

## 2023-09-06 RX ORDER — HYDROCODONE BITARTRATE AND ACETAMINOPHEN 5; 325 MG/1; MG/1
1 TABLET ORAL EVERY 12 HOURS PRN
Qty: 60 TABLET | Refills: 0 | Status: SHIPPED | OUTPATIENT
Start: 2023-09-06

## 2023-09-06 NOTE — PROGRESS NOTES
Subjective   Demond Menchaca is a 78 y.o. male.     Chief Complaint   Patient presents with    Hypertension     6 mo follow up       Ear Fullness     R>L        History of Present Illness   BPH-we tried Cialis for this.  He is here to follow-up    Hypertension-well-controlled on medication.  Also following with cardiology.    Restless leg syndrome-magnesium helps some.  Hydrocodone had stopped helping. He does not want other meds. Sleep 7 hours at night.     Having some urinary fullness.  Had cerumen impaction bilaterally cleared yesterday in the ER.  It was not completely cleared asnd they gave up. He is wanting to see ent for this.     Lupus- not bothering him and he follows with rheumatology every 6-12 months.     Afib- on anticoag and goes to coumadin clinic.     Pancytopenia- has not seen hematology in years. Has known pancytopenia. He reports he just follows here as long as he is stable.     Has a flair of his back pain and wanting a few hydrocodone to have on hand.    The following portions of the patient's history were reviewed and updated as appropriate: allergies, current medications, past family history, past medical history, past social history, past surgical history and problem list.    Past Medical History:   Diagnosis Date    Allergic 2000    Grass/pollen    LASHONDA positive 06/18/2018    2017: elevated ds LASHONDA    Arthritis 1995    Hand, neck, back, etc.    Atrial flutter with rapid ventricular response     Cataract 2005    Chronic obstructive pulmonary disease     Colon polyp 10/07/2019    Added automatically from request for surgery [id number]    Diverticulitis of large intestine without perforation or abscess without bleeding 03/07/2016    Diverticulosis     ED (erectile dysfunction)     Elbow fracture, left     Erectile dysfunction 2005    History of blood transfusion     Hypogonadism in male     Kidney cysts     Left femoral shaft fracture     Lower back pain     PAF (paroxysmal atrial fibrillation)      Peptic ulceration     Plantar fasciitis 12/05/2017    Pneumonia 1974    Primary hypertension 12/30/2022    Prostatic hypertrophy, benign     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 CATH INVASIVE LOCATION;  Service: Cardiovascular;  Laterality: N/A;    CARDIAC ELECTROPHYSIOLOGY PROCEDURE N/A 07/02/2021    Procedure: Ablation atrial fibrillation--redo;  Surgeon: David Hernandez MD;  Location: [facility] CATH INVASIVE LOCATION;  Service: Cardiovascular;  Laterality: N/A;    COLONOSCOPY  approx 2014    normal per pt     COLONOSCOPY N/A 01/08/2020    Procedure: COLONOSCOPY to cecum with cold snare biopsies;  Surgeon: Henrique Rust MD;  Location: [facility] ENDOSCOPY;  Service: Gastroenterology    COLONOSCOPY N/A 09/20/2022    Procedure: COLONOSCOPY TO CECUM WITH POLYPECTOMY  ( COLD BX);  Surgeon: Henrique Rust MD;  Location: [facility] ENDOSCOPY;  Service: Gastroenterology;  Laterality: N/A;  HX OF COLON POLYPS; FAM HX OF COLON CANCER/ POLYPS  POST: DIVERTICULOSIS; COLON POLYP; HEMOORHOIDS    COLONOSCOPY W/ POLYPECTOMY  11/21/2014    : 1 polyp - not precancerous    ENDOMETRIAL ABLATION  2012, 2013    ENDOSCOPY N/A 01/08/2020    Procedure: ESOPHAGOGASTRODUODENOSCOPY;  Surgeon: Henrique Rust MD;  Location: [facility] ENDOSCOPY;  Service: Gastroenterology    ENDOSCOPY N/A 09/20/2022    Procedure: ESOPHAGOGASTRODUODENOSCOPY WITH BIOPSY AND PRESSLEY DILATATION 50F;  Surgeon: Henrique Rust MD;  Location: [facility] ENDOSCOPY;  Service: Gastroenterology;  Laterality: N/A;   "DYSPHAGIA  POST: GASTRITIS    FEMUR FRACTURE SURGERY Left 2007    post fall from the ladder    FRACTURE SURGERY  2007    Elbow    HAND SURGERY Left 02/10/2023    LUMBAR LAMINECTOMY  1974    Dr.Lester Lino    OTHER SURGICAL HISTORY      elbow surgery    SPINE SURGERY  1974    THYROID SURGERY  1986    benign tumor removal,     TOTAL ELBOW ARTHROPLASTY Left     L, post fall and fracture, hardware in       Family History   Problem Relation Age of Onset    Hypertension Mother     Osteoporosis Mother     Hypertension Father     Thyroid disease Sister     Diabetes Sister     Hypertension Sister     Pulmonary fibrosis Sister     Heart disease Sister     Diabetes Sister     Colon cancer Maternal Grandmother 60    Heart disease Other     Atrial fibrillation Other     Thyroid disease Other     Diabetes Other     Heart disease Other     Hypertension Other     Malig Hyperthermia Neg Hx        Social History     Socioeconomic History    Marital status:      Spouse name: Latisha    Number of children: 2    Years of education: College    Highest education level: Not asked   Tobacco Use    Smoking status: Former     Packs/day: 0.50     Years: 15.00     Pack years: 7.50     Types: Cigarettes     Start date: 1959     Quit date: 1974     Years since quittin.7    Smokeless tobacco: Never   Vaping Use    Vaping Use: Never used   Substance and Sexual Activity    Alcohol use: Not Currently     Alcohol/week: 6.0 standard drinks     Types: 6 Glasses of wine per week     Comment: Some beer during the summer months    Drug use: No    Sexual activity: Not Currently     Partners: Female     Birth control/protection: None       Review of Systems   Respiratory:  Negative for shortness of breath.    Cardiovascular:  Negative for chest pain.     Objective   Visit Vitals  /64 (BP Location: Left arm, Patient Position: Sitting, Cuff Size: Adult)   Pulse 54   Ht 188 cm (74\")   Wt 101 kg (222 lb)   SpO2 99%   BMI " 28.50 kg/m²     Body mass index is 28.5 kg/m².  Physical Exam  Constitutional:       Appearance: Normal appearance. He is well-developed.   Cardiovascular:      Rate and Rhythm: Normal rate and regular rhythm.      Heart sounds: Normal heart sounds.   Pulmonary:      Effort: Pulmonary effort is normal.      Breath sounds: Normal breath sounds.   Musculoskeletal:         General: No swelling. Normal range of motion.   Skin:     General: Skin is warm and dry.      Findings: No rash.   Neurological:      General: No focal deficit present.      Mental Status: He is alert and oriented to person, place, and time.   Psychiatric:         Mood and Affect: Mood normal.         Behavior: Behavior normal.         Assessment & Plan   Diagnoses and all orders for this visit:    1. Bilateral hearing loss due to cerumen impaction (Primary)  -     Ambulatory Referral to ENT (Otolaryngology)    2. Primary hypertension  -     Comprehensive Metabolic Panel  -     Lipid Panel    3. Benign prostatic hyperplasia with lower urinary tract symptoms, symptom details unspecified    4. Other forms of systemic lupus erythematosus, unspecified organ involvement status    5. Restless legs syndrome  -     HYDROcodone-acetaminophen (NORCO) 5-325 MG per tablet; Take 1 tablet by mouth Every 12 (Twelve) Hours As Needed for Severe Pain.  Dispense: 60 tablet; Refill: 0    6. Pancytopenia  -     CBC & Differential    7. DDD (degenerative disc disease), lumbar  -     HYDROcodone-acetaminophen (NORCO) 5-325 MG per tablet; Take 1 tablet by mouth Every 12 (Twelve) Hours As Needed for Severe Pain.  Dispense: 60 tablet; Refill: 0    8. Vitamin B12 deficiency  -     Vitamin B12  -     Folate        Cont meds, f/u in 6 months.

## 2023-09-07 ENCOUNTER — ANTICOAGULATION VISIT (OUTPATIENT)
Dept: PHARMACY | Facility: HOSPITAL | Age: 78
End: 2023-09-07
Payer: MEDICARE

## 2023-09-07 DIAGNOSIS — I48.0 PAF (PAROXYSMAL ATRIAL FIBRILLATION): Primary | ICD-10-CM

## 2023-09-07 LAB
ALBUMIN SERPL-MCNC: 4.3 G/DL (ref 3.5–5.2)
ALBUMIN/GLOB SERPL: 2.4 G/DL
ALP SERPL-CCNC: 89 U/L (ref 39–117)
ALT SERPL-CCNC: 15 U/L (ref 1–41)
AST SERPL-CCNC: 17 U/L (ref 1–40)
BASOPHILS # BLD AUTO: 0.02 10*3/MM3 (ref 0–0.2)
BASOPHILS NFR BLD AUTO: 0.6 % (ref 0–1.5)
BILIRUB SERPL-MCNC: 0.9 MG/DL (ref 0–1.2)
BUN SERPL-MCNC: 15 MG/DL (ref 8–23)
BUN/CREAT SERPL: 15.6 (ref 7–25)
CALCIUM SERPL-MCNC: 9.5 MG/DL (ref 8.6–10.5)
CHLORIDE SERPL-SCNC: 105 MMOL/L (ref 98–107)
CHOLEST SERPL-MCNC: 105 MG/DL (ref 0–200)
CO2 SERPL-SCNC: 26.8 MMOL/L (ref 22–29)
CREAT SERPL-MCNC: 0.96 MG/DL (ref 0.76–1.27)
EGFRCR SERPLBLD CKD-EPI 2021: 79.9 ML/MIN/1.73
EOSINOPHIL # BLD AUTO: 0.23 10*3/MM3 (ref 0–0.4)
EOSINOPHIL NFR BLD AUTO: 6.5 % (ref 0.3–6.2)
ERYTHROCYTE [DISTWIDTH] IN BLOOD BY AUTOMATED COUNT: 12.7 % (ref 12.3–15.4)
FOLATE SERPL-MCNC: 8.51 NG/ML (ref 4.78–24.2)
GLOBULIN SER CALC-MCNC: 1.8 GM/DL
GLUCOSE SERPL-MCNC: 119 MG/DL (ref 65–99)
HCT VFR BLD AUTO: 36.4 % (ref 37.5–51)
HDLC SERPL-MCNC: 44 MG/DL (ref 40–60)
HGB BLD-MCNC: 12.3 G/DL (ref 13–17.7)
IMM GRANULOCYTES # BLD AUTO: 0.01 10*3/MM3 (ref 0–0.05)
IMM GRANULOCYTES NFR BLD AUTO: 0.3 % (ref 0–0.5)
INR PPP: 4
LDLC SERPL CALC-MCNC: 46 MG/DL (ref 0–100)
LYMPHOCYTES # BLD AUTO: 0.33 10*3/MM3 (ref 0.7–3.1)
LYMPHOCYTES NFR BLD AUTO: 9.4 % (ref 19.6–45.3)
MCH RBC QN AUTO: 32.7 PG (ref 26.6–33)
MCHC RBC AUTO-ENTMCNC: 33.8 G/DL (ref 31.5–35.7)
MCV RBC AUTO: 96.8 FL (ref 79–97)
MONOCYTES # BLD AUTO: 0.25 10*3/MM3 (ref 0.1–0.9)
MONOCYTES NFR BLD AUTO: 7.1 % (ref 5–12)
NEUTROPHILS # BLD AUTO: 2.68 10*3/MM3 (ref 1.7–7)
NEUTROPHILS NFR BLD AUTO: 76.1 % (ref 42.7–76)
NRBC BLD AUTO-RTO: 0 /100 WBC (ref 0–0.2)
PLATELET # BLD AUTO: 106 10*3/MM3 (ref 140–450)
POTASSIUM SERPL-SCNC: 4.7 MMOL/L (ref 3.5–5.2)
PROT SERPL-MCNC: 6.1 G/DL (ref 6–8.5)
RBC # BLD AUTO: 3.76 10*6/MM3 (ref 4.14–5.8)
SODIUM SERPL-SCNC: 141 MMOL/L (ref 136–145)
TRIGL SERPL-MCNC: 68 MG/DL (ref 0–150)
VIT B12 SERPL-MCNC: 686 PG/ML (ref 211–946)
VLDLC SERPL CALC-MCNC: 15 MG/DL (ref 5–40)
WBC # BLD AUTO: 3.52 10*3/MM3 (ref 3.4–10.8)

## 2023-09-07 NOTE — PROGRESS NOTES
Anticoagulation Clinic Progress Note    Anticoagulation Summary  As of 2023      INR goal:  2.5-3.5   TTR:  79.3 % (4.8 y)   INR used for dosin.00 (2023)   Warfarin maintenance plan:  2.5 mg every Mon, Wed, Fri; 5 mg all other days   Weekly warfarin total:  27.5 mg   Plan last modified:  Jeanette Montes De Oca RPH (2023)   Next INR check:  2023   Priority:  Maintenance   Target end date:      Indications    PAF (paroxysmal atrial fibrillation) [I48.0]  TIA (transient ischemic attack) (Resolved) [G45.9]                 Anticoagulation Episode Summary       INR check location:      Preferred lab:      Send INR reminders to:   GAYLE PETERSON  POOL    Comments:  [place]          Anticoagulation Care Providers       Provider Role Specialty Phone number    David Hernandez MD Referring Cardiology [phone]            Clinic Interview:  Patient Findings     Negatives:  Signs/symptoms of thrombosis, Signs/symptoms of bleeding,   Laboratory test error suspected, Change in health, Change in alcohol use,   Change in activity, Upcoming invasive procedure, Emergency department   visit, Upcoming dental procedure, Missed doses, Extra doses, Change in   medications, Change in diet/appetite, Hospital admission, Bruising, Other   complaints      Clinical Outcomes     Negatives:  Major bleeding event, Thromboembolic event,   Anticoagulation-related hospital admission, Anticoagulation-related ED   visit, Anticoagulation-related fatality        INR History:      2023     1:52 PM 8/10/2023    12:00 AM 8/10/2023     1:39 PM 2023    12:00 AM 2023     1:59 PM 2023    12:00 AM 2023     2:39 PM   Anticoagulation Monitoring   INR 3.70  4.50  3.80  4.00   INR Date 2023  8/10/2023  2023  2023   INR Goal 2.5-3.5  2.5-3.5  2.5-3.5  2.5-3.5   Trend Same  Down  Same  Down   Last Week Total 32.5 mg  32.5 mg  30 mg  30 mg   Next Week Total 30 mg  25 mg  27.5 mg  25 mg   Sun 5 mg  5 mg   5 mg  5 mg   Mon 5 mg  2.5 mg  2.5 mg  2.5 mg   Tue 5 mg  5 mg  5 mg  5 mg   Wed 5 mg  5 mg  5 mg  2.5 mg   Thu 5 mg  Hold (8/10); Otherwise 5 mg  2.5 mg (8/24); Otherwise 5 mg  2.5 mg (9/7); Otherwise 5 mg   Fri Hold (7/28); Otherwise 2.5 mg  2.5 mg  2.5 mg  2.5 mg   Sat 5 mg  5 mg  5 mg  5 mg   Historical INR  4.50      3.80      4.00            This result is from an external source.       Plan:  1. INR is Supratherapeutic today- see above in Anticoagulation Summary.   Will instruct Demond Menchaca to Change their warfarin regimen- see above in Anticoagulation Summary.  partial to 2.5 mg today then trial 2.5 mg MWF, 5 mg AOD, rck 2 weeks   2. Follow up in 2 weeks  3. They have been instructed to call if any changes in medications, doses, concerns, etc. Patient expresses understanding and has no further questions at this time.    Jeanette Montes De Oca [facility]

## 2023-09-11 ENCOUNTER — TELEPHONE (OUTPATIENT)
Dept: FAMILY MEDICINE CLINIC | Facility: CLINIC | Age: 78
End: 2023-09-11
Payer: MEDICARE

## 2023-09-11 NOTE — TELEPHONE ENCOUNTER
PATIENT CALLED AND STATES HIS PHARMACY IS NEEDING PRIOR AUTHORIZATION FOR MEDICATION REFILL OF  HYDROcodone-acetaminophen (NORCO) 5-325 MG per tablet     HE WILL SOON BE OUT OF MEDICATION      [id number] - [place], [place] - [address] AT [facility] - [phone]  - [phone]  [phone]     CALL BACK NUMBER [phone]

## 2023-09-13 NOTE — TELEPHONE ENCOUNTER
P.A. for hydrocodone was APPROVED, info faxed to pharmacy and [employer] message sent to patient today.

## 2023-09-21 ENCOUNTER — ANTICOAGULATION VISIT (OUTPATIENT)
Dept: PHARMACY | Facility: HOSPITAL | Age: 78
End: 2023-09-21
Payer: MEDICARE

## 2023-09-21 DIAGNOSIS — I48.0 PAF (PAROXYSMAL ATRIAL FIBRILLATION): Primary | ICD-10-CM

## 2023-09-21 LAB — INR PPP: 4.3

## 2023-09-21 NOTE — PROGRESS NOTES
Anticoagulation Clinic Progress Note    Anticoagulation Summary  As of 2023      INR goal:  2.5-3.5   TTR:  78.7 % (4.8 y)   INR used for dosin.30 (2023)   Warfarin maintenance plan:  5 mg every Sun, Tue, Thu; 2.5 mg all other days   Weekly warfarin total:  25 mg   Plan last modified:  Jeanette Montes De Oca RPH (2023)   Next INR check:  10/5/2023   Priority:  Maintenance   Target end date:      Indications    PAF (paroxysmal atrial fibrillation) [I48.0]  TIA (transient ischemic attack) (Resolved) [G45.9]                 Anticoagulation Episode Summary       INR check location:      Preferred lab:      Send INR reminders to:   GAYLE PETERSON  POOL    Comments:  [place]          Anticoagulation Care Providers       Provider Role Specialty Phone number    David Hernandez MD Referring Cardiology [phone]            Clinic Interview:  Patient Findings     Positives:  Change in diet/appetite    Negatives:  Signs/symptoms of thrombosis, Signs/symptoms of bleeding,   Laboratory test error suspected, Change in health, Change in alcohol use,   Change in activity, Upcoming invasive procedure, Emergency department   visit, Upcoming dental procedure, Missed doses, Extra doses, Change in   medications, Hospital admission, Bruising, Other complaints    Comments:  Decrease in salads       Clinical Outcomes     Negatives:  Major bleeding event, Thromboembolic event,   Anticoagulation-related hospital admission, Anticoagulation-related ED   visit, Anticoagulation-related fatality    Comments:  Decrease in salads         INR History:      8/10/2023     1:39 PM 2023    12:00 AM 2023     1:59 PM 2023    12:00 AM 2023     2:39 PM 2023    12:00 AM 2023    11:19 AM   Anticoagulation Monitoring   INR 4.50  3.80  4.00  4.30   INR Date 8/10/2023  2023  2023  2023   INR Goal 2.5-3.5  2.5-3.5  2.5-3.5  2.5-3.5   Trend Down  Same  Down  Down   Last Week Total 32.5 mg  30 mg   30 mg  27.5 mg   Next Week Total 25 mg  27.5 mg  25 mg  22.5 mg   Sun 5 mg  5 mg  5 mg  5 mg   Mon 2.5 mg  2.5 mg  2.5 mg  2.5 mg   Tue 5 mg  5 mg  5 mg  5 mg   Wed 5 mg  5 mg  2.5 mg  2.5 mg   Thu Hold (8/10); Otherwise 5 mg  2.5 mg (8/24); Otherwise 5 mg  2.5 mg (9/7); Otherwise 5 mg  2.5 mg (9/21); Otherwise 5 mg   Fri 2.5 mg  2.5 mg  2.5 mg  2.5 mg   Sat 5 mg  5 mg  5 mg  2.5 mg   Historical INR  3.80      4.00      4.30            This result is from an external source.       Plan:  1. INR is Supratherapeutic today- see above in Anticoagulation Summary.   Will instruct Demond Menchaca to Change their warfarin regimen- see above in Anticoagulation Summary.  partial to 2.5 mg today then trial on 5 mg on sun, tues, thurs, 2.5 mg AOD  2. Follow up in 2 weeks  3. Pt has agreed to only be called if INR out of range. They have been instructed to call if any changes in medications, doses, concerns, etc. Patient expresses understanding and has no further questions at this time.    Jeanette Montes De Oca [facility]

## 2023-09-28 ENCOUNTER — TELEPHONE (OUTPATIENT)
Dept: NEUROLOGY | Facility: CLINIC | Age: 78
End: 2023-09-28
Payer: MEDICARE

## 2023-09-28 DIAGNOSIS — R29.898 WEAKNESS OF BOTH LOWER EXTREMITIES: Primary | ICD-10-CM

## 2023-09-28 NOTE — TELEPHONE ENCOUNTER
New referral put in, lm for patient letting him know and that someone from PT dept will call to schedule

## 2023-09-28 NOTE — TELEPHONE ENCOUNTER
Caller: Demond Menchaca [place]    Relationship: Self    Best call back number: [phone]    What is the medical concern/diagnosis: WEAKNESS OF BOTH LOWER EXTREMITIES    What specialty or service is being requested: PHYSICAL THERAPY    What is the provider, practice or medical service name: [facility] PHYSICAL THERAPY    Any additional details: AFTER FURTHER CONSIDERATION, PT HAS DECIDED HE WOULD LIKE TO PROCEED WITH A REFERRAL TO PHYSICAL THERAPY. PT CALLING TO REQUEST NEW REFERRAL.    PLEASE REVIEW AND ADVISE.

## 2023-10-02 ENCOUNTER — TELEPHONE (OUTPATIENT)
Dept: NEUROLOGY | Facility: CLINIC | Age: 78
End: 2023-10-02
Payer: MEDICARE

## 2023-10-02 DIAGNOSIS — R29.898 WEAKNESS OF BOTH LOWER EXTREMITIES: Primary | ICD-10-CM

## 2023-10-02 NOTE — TELEPHONE ENCOUNTER
Provider: DON    Caller: LISHA    Phone Number: [phone]     Reason for Call: PT CALLED AND IS WANTING TO KNOW IF PROVIDER CAN SEND PHYSICAL THERAPY REFERRAL TO [facility] FOR DR.DALTON BLANKENSHIP. PT STATES THAT  IS UNABLE TO GET PT SCHEDULED.    PLEASE REVEIW AND ADVISE.THANK YOU

## 2023-10-05 ENCOUNTER — ANTICOAGULATION VISIT (OUTPATIENT)
Dept: PHARMACY | Facility: HOSPITAL | Age: 78
End: 2023-10-05
Payer: MEDICARE

## 2023-10-05 DIAGNOSIS — I48.0 PAF (PAROXYSMAL ATRIAL FIBRILLATION): Primary | ICD-10-CM

## 2023-10-05 LAB — INR PPP: 3.6

## 2023-10-05 NOTE — PROGRESS NOTES
Anticoagulation Clinic Progress Note    Anticoagulation Summary  As of 10/5/2023      INR goal:  2.5-3.5   TTR:  78.1 % (4.8 y)   INR used for dosing:  3.60 (10/5/2023)   Warfarin maintenance plan:  5 mg every Sun, Tue, Thu; 2.5 mg all other days   Weekly warfarin total:  25 mg   No change documented:  Chapis Jaimes, PharmD   Plan last modified:  Jeanette Montes De Oca RPH (9/21/2023)   Next INR check:  10/19/2023   Priority:  Maintenance   Target end date:      Indications    PAF (paroxysmal atrial fibrillation) [I48.0]  TIA (transient ischemic attack) (Resolved) [G45.9]                 Anticoagulation Episode Summary       INR check location:      Preferred lab:      Send INR reminders to:  [facility] [place]  POOL    Comments:  [place]          Anticoagulation Care Providers       Provider Role Specialty Phone number    David Hernandez MD Referring Cardiology [phone]            Clinic Interview:  Patient Findings     Negatives:  Signs/symptoms of thrombosis, Signs/symptoms of bleeding,   Laboratory test error suspected, Change in health, Change in alcohol use,   Change in activity, Upcoming invasive procedure, Emergency department   visit, Upcoming dental procedure, Missed doses, Extra doses, Change in   medications, Change in diet/appetite, Hospital admission, Bruising, Other   complaints      Clinical Outcomes     Negatives:  Major bleeding event, Thromboembolic event,   Anticoagulation-related hospital admission, Anticoagulation-related ED   visit, Anticoagulation-related fatality        INR History:      8/24/2023     1:59 PM 9/7/2023    12:00 AM 9/7/2023     2:39 PM 9/21/2023    12:00 AM 9/21/2023    11:19 AM 10/5/2023    12:00 AM 10/5/2023     1:52 PM   Anticoagulation Monitoring   INR 3.80  4.00  4.30  3.60   INR Date 8/24/2023  9/7/2023  9/21/2023  10/5/2023   INR Goal 2.5-3.5  2.5-3.5  2.5-3.5  2.5-3.5   Trend Same  Down  Down  Same   Last Week Total 30 mg  30 mg  27.5 mg  27.5 mg   Next Week  Total 27.5 mg  25 mg  22.5 mg  25 mg   Sun 5 mg  5 mg  5 mg  5 mg   Mon 2.5 mg  2.5 mg  2.5 mg  2.5 mg   Tue 5 mg  5 mg  5 mg  5 mg   Wed 5 mg  2.5 mg  2.5 mg  2.5 mg   Thu 2.5 mg (8/24); Otherwise 5 mg  2.5 mg (9/7); Otherwise 5 mg  2.5 mg (9/21); Otherwise 5 mg  5 mg   Fri 2.5 mg  2.5 mg  2.5 mg  2.5 mg   Sat 5 mg  5 mg  2.5 mg  2.5 mg   Historical INR  4.00      4.30      3.60            This result is from an external source.       Plan:  1. INR is Supratherapeutic today- see above in Anticoagulation Summary.   Will instruct Demond CROSS Bernarda to Change their warfarin regimen- see above in Anticoagulation Summary.  Recommend to continue the prescribed dose from last week:  5 mg Sunday/Tuesay/Thursday and 2.5 mg on all other days.    2. Follow up in 2 weeks  3. They have been instructed to call if any changes in medications, doses, concerns, etc. Patient expresses understanding and has no further questions at this time.    Chapis Jaimes, PharmD

## 2023-10-19 LAB — INR PPP: 3.3

## 2023-10-20 ENCOUNTER — ANTICOAGULATION VISIT (OUTPATIENT)
Dept: PHARMACY | Facility: HOSPITAL | Age: 78
End: 2023-10-20
Payer: MEDICARE

## 2023-10-20 DIAGNOSIS — I48.0 PAF (PAROXYSMAL ATRIAL FIBRILLATION): Primary | ICD-10-CM

## 2023-10-20 NOTE — PROGRESS NOTES
Anticoagulation Clinic Progress Note    Anticoagulation Summary  As of 10/20/2023      INR goal:  2.5-3.5   TTR:  78.0% (4.9 y)   INR used for dosing:  3.30 (10/19/2023)   Warfarin maintenance plan:  5 mg every Sun, Tue, Thu; 2.5 mg all other days   Weekly warfarin total:  25 mg   No change documented:  Jeanette Montes De Oca RPH   Plan last modified:  Jeanette Montes De Oca RPH (9/21/2023)   Next INR check:  11/2/2023   Priority:  Maintenance   Target end date:      Indications    PAF (paroxysmal atrial fibrillation) [I48.0]  TIA (transient ischemic attack) (Resolved) [G45.9]                 Anticoagulation Episode Summary       INR check location:      Preferred lab:      Send INR reminders to:  [facility] [place]  POOL    Comments:  [place]          Anticoagulation Care Providers       Provider Role Specialty Phone number    David Hernandez MD Referring Cardiology [phone]            Clinic Interview:  No pertinent clinical findings have been reported.    INR History:      9/7/2023     2:39 PM 9/21/2023    12:00 AM 9/21/2023    11:19 AM 10/5/2023    12:00 AM 10/5/2023     1:52 PM 10/19/2023    12:00 AM 10/20/2023     8:27 AM   Anticoagulation Monitoring   INR 4.00  4.30  3.60  3.30   INR Date 9/7/2023  9/21/2023  10/5/2023  10/19/2023   INR Goal 2.5-3.5  2.5-3.5  2.5-3.5  2.5-3.5   Trend Down  Down  Same  Same   Last Week Total 30 mg  27.5 mg  27.5 mg  25 mg   Next Week Total 25 mg  22.5 mg  25 mg  25 mg   Sun 5 mg  5 mg  5 mg  5 mg   Mon 2.5 mg  2.5 mg  2.5 mg  2.5 mg   Tue 5 mg  5 mg  5 mg  5 mg   Wed 2.5 mg  2.5 mg  2.5 mg  2.5 mg   Thu 2.5 mg (9/7); Otherwise 5 mg  2.5 mg (9/21); Otherwise 5 mg  5 mg  5 mg   Fri 2.5 mg  2.5 mg  2.5 mg  2.5 mg   Sat 5 mg  2.5 mg  2.5 mg  2.5 mg   Historical INR  4.30      3.60      3.30            This result is from an external source.       Plan:  1. INR is therapeutic today- see above in Anticoagulation Summary.    Demond CROSS Bernarda to continue their warfarin regimen- see  above in Anticoagulation Summary.  2. Follow up in 2 weeks  3. Pt has agreed to only be called if INR out of range. They have been instructed to call if any changes in medications, doses, concerns, etc. Patient expresses understanding and has no further questions at this time.    Jeanette Montes De Oca, [facility]

## 2023-11-09 ENCOUNTER — ANTICOAGULATION VISIT (OUTPATIENT)
Dept: PHARMACY | Facility: HOSPITAL | Age: 78
End: 2023-11-09
Payer: MEDICARE

## 2023-11-09 DIAGNOSIS — I48.0 PAF (PAROXYSMAL ATRIAL FIBRILLATION): Primary | ICD-10-CM

## 2023-11-09 LAB — INR PPP: 2.6

## 2023-11-09 NOTE — PROGRESS NOTES
Anticoagulation Clinic Progress Note    Anticoagulation Summary  As of 2023      INR goal:  2.5-3.5   TTR:  78.2% (4.9 y)   INR used for dosin.60 (2023)   Warfarin maintenance plan:  5 mg every Sun, Tue, Thu; 2.5 mg all other days   Weekly warfarin total:  25 mg   No change documented:  Jeanette Montes De Oca RPH   Plan last modified:  Jeanette Montes De Oca RPH (2023)   Next INR check:  2023   Priority:  Maintenance   Target end date:      Indications    PAF (paroxysmal atrial fibrillation) [I48.0]  TIA (transient ischemic attack) (Resolved) [G45.9]                 Anticoagulation Episode Summary       INR check location:      Preferred lab:      Send INR reminders to:  [facility]  POOL    Comments:  [place]          Anticoagulation Care Providers       Provider Role Specialty Phone number    David Hernandez MD Referring Cardiology [phone]            Clinic Interview:  No pertinent clinical findings have been reported.    INR History:      2023    11:19 AM 10/5/2023    12:00 AM 10/5/2023     1:52 PM 10/19/2023    12:00 AM 10/20/2023     8:27 AM 2023    12:00 AM 2023    11:29 AM   Anticoagulation Monitoring   INR 4.30  3.60  3.30  2.60   INR Date 2023  10/5/2023  10/19/2023  2023   INR Goal 2.5-3.5  2.5-3.5  2.5-3.5  2.5-3.5   Trend Down  Same  Same  Same   Last Week Total 27.5 mg  27.5 mg  25 mg  25 mg   Next Week Total 22.5 mg  25 mg  25 mg  25 mg   Sun 5 mg  5 mg  5 mg  5 mg   Mon 2.5 mg  2.5 mg  2.5 mg  2.5 mg   Tue 5 mg  5 mg  5 mg  5 mg   Wed 2.5 mg  2.5 mg  2.5 mg  2.5 mg   Thu 2.5 mg (); Otherwise 5 mg  5 mg  5 mg  5 mg   Fri 2.5 mg  2.5 mg  2.5 mg  2.5 mg   Sat 2.5 mg  2.5 mg  2.5 mg  2.5 mg   Historical INR  3.60      3.30      2.60            This result is from an external source.       Plan:  1. INR is therapeutic today- see above in Anticoagulation Summary.    Demond CROSS Bernarda to continue their warfarin regimen- see above in  Anticoagulation Summary.  2. Follow up in 2 weeks  3. Pt has agreed to only be called if INR out of range. They have been instructed to call if any changes in medications, doses, concerns, etc. Patient expresses understanding and has no further questions at this time.    Jeanette Montes De Oca, [facility]

## 2023-11-23 LAB — INR PPP: 3

## 2023-11-27 ENCOUNTER — ANTICOAGULATION VISIT (OUTPATIENT)
Dept: PHARMACY | Facility: HOSPITAL | Age: 78
End: 2023-11-27
Payer: MEDICARE

## 2023-11-27 DIAGNOSIS — I48.0 PAF (PAROXYSMAL ATRIAL FIBRILLATION): Primary | ICD-10-CM

## 2023-11-27 NOTE — PROGRESS NOTES
Anticoagulation Clinic Progress Note    Anticoagulation Summary  As of 11/27/2023      INR goal:  2.5-3.5   TTR:  78.4% (5 y)   INR used for dosing:  3.00 (11/23/2023)   Warfarin maintenance plan:  5 mg every Sun, Tue, Thu; 2.5 mg all other days   Weekly warfarin total:  25 mg   No change documented:  Cinthya Krueger, Pharmacy Technician   Plan last modified:  Jeanette Montes De Oca RPH (9/21/2023)   Next INR check:  12/7/2023   Priority:  Maintenance   Target end date:      Indications    PAF (paroxysmal atrial fibrillation) [I48.0]  TIA (transient ischemic attack) (Resolved) [G45.9]                 Anticoagulation Episode Summary       INR check location:      Preferred lab:      Send INR reminders to:  [facility]  POOL    Comments:  [place]          Anticoagulation Care Providers       Provider Role Specialty Phone number    David Hernandez MD Referring Cardiology [phone]            Clinic Interview:  No pertinent clinical findings have been reported.    INR History:      10/5/2023     1:52 PM 10/19/2023    12:00 AM 10/20/2023     8:27 AM 11/9/2023    12:00 AM 11/9/2023    11:29 AM 11/23/2023    12:00 AM 11/27/2023    10:02 AM   Anticoagulation Monitoring   INR 3.60  3.30  2.60  3.00   INR Date 10/5/2023  10/19/2023  11/9/2023  11/23/2023   INR Goal 2.5-3.5  2.5-3.5  2.5-3.5  2.5-3.5   Trend Same  Same  Same  Same   Last Week Total 27.5 mg  25 mg  25 mg  25 mg   Next Week Total 25 mg  25 mg  25 mg  25 mg   Sun 5 mg  5 mg  5 mg  5 mg   Mon 2.5 mg  2.5 mg  2.5 mg  2.5 mg   Tue 5 mg  5 mg  5 mg  5 mg   Wed 2.5 mg  2.5 mg  2.5 mg  2.5 mg   Thu 5 mg  5 mg  5 mg  5 mg   Fri 2.5 mg  2.5 mg  2.5 mg  2.5 mg   Sat 2.5 mg  2.5 mg  2.5 mg  2.5 mg   Historical INR  3.30      2.60      3.00            This result is from an external source.       Plan:  1. INR is therapeutic today- see above in Anticoagulation Summary.    Demond CROSS Bernarda to continue their warfarin regimen- see above in Anticoagulation  Summary.  2. Follow up in 2 weeks  3. Pt has agreed to only be called if INR out of range. They have been instructed to call if any changes in medications, doses, concerns, etc. Patient expresses understanding and has no further questions at this time.    Cinthya Krueger, Pharmacy Technician

## 2023-12-05 DIAGNOSIS — I10 PRIMARY HYPERTENSION: ICD-10-CM

## 2023-12-05 RX ORDER — LISINOPRIL 20 MG/1
TABLET ORAL
Qty: 90 TABLET | Refills: 1 | Status: SHIPPED | OUTPATIENT
Start: 2023-12-05

## 2023-12-07 ENCOUNTER — ANTICOAGULATION VISIT (OUTPATIENT)
Dept: PHARMACY | Facility: HOSPITAL | Age: 78
End: 2023-12-07
Payer: MEDICARE

## 2023-12-07 DIAGNOSIS — I48.0 PAF (PAROXYSMAL ATRIAL FIBRILLATION): Primary | ICD-10-CM

## 2023-12-07 LAB — INR PPP: 3

## 2023-12-07 NOTE — PROGRESS NOTES
Anticoagulation Clinic Progress Note    Anticoagulation Summary  As of 12/7/2023      INR goal:  2.5-3.5   TTR:  78.6% (5 y)   INR used for dosing:  3.00 (12/7/2023)   Warfarin maintenance plan:  5 mg every Sun, Tue, Thu; 2.5 mg all other days   Weekly warfarin total:  25 mg   No change documented:  Silvia Joseph   Plan last modified:  Jeanette Montes De Oca RPH (9/21/2023)   Next INR check:  12/21/2023   Priority:  Maintenance   Target end date:      Indications    PAF (paroxysmal atrial fibrillation) [I48.0]  TIA (transient ischemic attack) (Resolved) [G45.9]                 Anticoagulation Episode Summary       INR check location:      Preferred lab:      Send INR reminders to:  [facility]  POOL    Comments:  [place]          Anticoagulation Care Providers       Provider Role Specialty Phone number    David Hernandez MD Referring Cardiology [phone]            Clinic Interview:  No pertinent clinical findings have been reported.    INR History:      10/20/2023     8:27 AM 11/9/2023    12:00 AM 11/9/2023    11:29 AM 11/23/2023    12:00 AM 11/27/2023    10:02 AM 12/7/2023    12:00 AM 12/7/2023     9:17 AM   Anticoagulation Monitoring   INR 3.30  2.60  3.00  3.00   INR Date 10/19/2023  11/9/2023  11/23/2023  12/7/2023   INR Goal 2.5-3.5  2.5-3.5  2.5-3.5  2.5-3.5   Trend Same  Same  Same  Same   Last Week Total 25 mg  25 mg  25 mg  25 mg   Next Week Total 25 mg  25 mg  25 mg  25 mg   Sun 5 mg  5 mg  5 mg  5 mg   Mon 2.5 mg  2.5 mg  2.5 mg  2.5 mg   Tue 5 mg  5 mg  5 mg  5 mg   Wed 2.5 mg  2.5 mg  2.5 mg  2.5 mg   Thu 5 mg  5 mg  5 mg  5 mg   Fri 2.5 mg  2.5 mg  2.5 mg  2.5 mg   Sat 2.5 mg  2.5 mg  2.5 mg  2.5 mg   Historical INR  2.60      3.00      3.00            This result is from an external source.       Plan:  1. INR is therapeutic today- see above in Anticoagulation Summary.    Demond CROSS Bernarda to continue their warfarin regimen- see above in Anticoagulation Summary.  2. Follow up in  2 weeks  3. Pt has agreed to only be called if INR out of range. They have been instructed to call if any changes in medications, doses, concerns, etc. Patient expresses understanding and has no further questions at this time.    Silvia Joseph

## 2023-12-22 ENCOUNTER — ANTICOAGULATION VISIT (OUTPATIENT)
Dept: PHARMACY | Facility: HOSPITAL | Age: 78
End: 2023-12-22
Payer: MEDICARE

## 2023-12-22 DIAGNOSIS — I48.0 PAF (PAROXYSMAL ATRIAL FIBRILLATION): Primary | ICD-10-CM

## 2023-12-22 LAB — INR PPP: 2.5

## 2023-12-22 NOTE — PROGRESS NOTES
Anticoagulation Clinic Progress Note    Anticoagulation Summary  As of 2023      INR goal:  2.5-3.5   TTR:  78.8% (5 y)   INR used for dosin.50 (2023)   Warfarin maintenance plan:  5 mg every Sun, Tue, Thu; 2.5 mg all other days   Weekly warfarin total:  25 mg   No change documented:  Cinthya Krueger, Pharmacy Technician   Plan last modified:  Jeanette Montes De Oca RPH (2023)   Next INR check:  2024   Priority:  Maintenance   Target end date:      Indications    PAF (paroxysmal atrial fibrillation) [I48.0]  TIA (transient ischemic attack) (Resolved) [G45.9]                 Anticoagulation Episode Summary       INR check location:      Preferred lab:      Send INR reminders to:  [facility]  POOL    Comments:  [place]          Anticoagulation Care Providers       Provider Role Specialty Phone number    David Hernandez MD Referring Cardiology [phone]            Clinic Interview:  No pertinent clinical findings have been reported.    INR History:      2023    11:29 AM 2023    12:00 AM 2023    10:02 AM 2023    12:00 AM 2023     9:17 AM 2023    12:00 AM 2023     9:14 AM   Anticoagulation Monitoring   INR 2.60  3.00  3.00  2.50   INR Date 2023   INR Goal 2.5-3.5  2.5-3.5  2.5-3.5  2.5-3.5   Trend Same  Same  Same  Same   Last Week Total 25 mg  25 mg  25 mg  25 mg   Next Week Total 25 mg  25 mg  25 mg  25 mg   Sun 5 mg  5 mg  5 mg  5 mg   Mon 2.5 mg  2.5 mg  2.5 mg  2.5 mg   Tue 5 mg  5 mg  5 mg  5 mg   Wed 2.5 mg  2.5 mg  2.5 mg  2.5 mg   Thu 5 mg  5 mg  5 mg  5 mg   Fri 2.5 mg  2.5 mg  2.5 mg  2.5 mg   Sat 2.5 mg  2.5 mg  2.5 mg  2.5 mg   Historical INR  3.00      3.00      2.50            This result is from an external source.       Plan:  1. INR is therapeutic today- see above in Anticoagulation Summary.    Demond AMERICA Bernarda to continue their warfarin regimen- see above in Anticoagulation Summary.  2.  Follow up in 2 weeks  3. Pt has agreed to only be called if INR out of range. They have been instructed to call if any changes in medications, doses, concerns, etc. Patient expresses understanding and has no further questions at this time.    Cinthya Krueger, Pharmacy Technician

## 2024-01-01 ENCOUNTER — TELEPHONE (OUTPATIENT)
Dept: CASE MANAGEMENT | Facility: OTHER | Age: 79
End: 2024-01-01
Payer: MEDICARE

## 2024-01-04 ENCOUNTER — ANTICOAGULATION VISIT (OUTPATIENT)
Dept: PHARMACY | Facility: HOSPITAL | Age: 79
End: 2024-01-04
Payer: MEDICARE

## 2024-01-04 DIAGNOSIS — I48.0 PAF (PAROXYSMAL ATRIAL FIBRILLATION): Primary | ICD-10-CM

## 2024-01-04 LAB — INR PPP: 2.3

## 2024-01-18 ENCOUNTER — ANTICOAGULATION VISIT (OUTPATIENT)
Dept: PHARMACY | Facility: HOSPITAL | Age: 79
End: 2024-01-18
Payer: MEDICARE

## 2024-01-18 DIAGNOSIS — I48.0 PAF (PAROXYSMAL ATRIAL FIBRILLATION): Primary | ICD-10-CM

## 2024-01-18 LAB — INR PPP: 2.2

## 2024-01-18 NOTE — PROGRESS NOTES
Anticoagulation Clinic Progress Note    Anticoagulation Summary  As of 2024      INR goal:  2.5-3.5   TTR:  77.6% (5.1 y)   INR used for dosin.20 (2024)   Warfarin maintenance plan:  2.5 mg every Mon, Wed, Fri; 5 mg all other days   Weekly warfarin total:  27.5 mg   Plan last modified:  Chapis Jaimes, PharmD (2024)   Next INR check:  2024   Priority:  Maintenance   Target end date:      Indications    PAF (paroxysmal atrial fibrillation) [I48.0]  TIA (transient ischemic attack) (Resolved) [G45.9]                 Anticoagulation Episode Summary       INR check location:      Preferred lab:      Send INR reminders to:   [facility]  POOL    Comments:  [place]          Anticoagulation Care Providers       Provider Role Specialty Phone number    David Hernandez MD Referring Cardiology [phone]            Clinic Interview:  Patient Findings     Negatives:  Signs/symptoms of thrombosis, Signs/symptoms of bleeding,   Laboratory test error suspected, Change in health, Change in alcohol use,   Change in activity, Upcoming invasive procedure, Emergency department   visit, Upcoming dental procedure, Missed doses, Extra doses, Change in   medications, Change in diet/appetite, Hospital admission, Bruising, Other   complaints      Clinical Outcomes     Negatives:  Major bleeding event, Thromboembolic event,   Anticoagulation-related hospital admission, Anticoagulation-related ED   visit, Anticoagulation-related fatality        INR History:      2023     9:17 AM 2023    12:00 AM 2023     9:14 AM 2024    12:00 AM 2024     3:27 PM 2024    12:00 AM 2024     1:19 PM   Anticoagulation Monitoring   INR 3.00  2.50  2.30  2.20   INR Date 2023   INR Goal 2.5-3.5  2.5-3.5  2.5-3.5  2.5-3.5   Trend Same  Same  Same  Up   Last Week Total 25 mg  25 mg  25 mg  25 mg   Next Week Total 25 mg  25 mg  27.5 mg  27.5 mg   Sun 5 mg  5  mg  5 mg  5 mg   Mon 2.5 mg  2.5 mg  2.5 mg  2.5 mg   Tue 5 mg  5 mg  5 mg  5 mg   Wed 2.5 mg  2.5 mg  2.5 mg  2.5 mg   Thu 5 mg  5 mg  5 mg  5 mg   Fri 2.5 mg  2.5 mg  5 mg (1/5); Otherwise 2.5 mg  2.5 mg   Sat 2.5 mg  2.5 mg  2.5 mg  5 mg (1/20, 1/27)   Historical INR  2.50      2.30      2.20            This result is from an external source.       Plan:  1. INR is Subtherapeutic today- see above in Anticoagulation Summary.   Will instruct Demond Menchaca to Change their warfarin regimen- see above in Anticoagulation Summary.  Take 2.5 mg on Monday Wednesday Friday and 5 mg on all other days.  2. Follow up in 2 weeks  3. They have been instructed to call if any changes in medications, doses, concerns, etc. Patient expresses understanding and has no further questions at this time.    Chapis Jaimes, PharmD

## 2024-01-22 ENCOUNTER — OFFICE VISIT (OUTPATIENT)
Dept: FAMILY MEDICINE CLINIC | Facility: CLINIC | Age: 79
End: 2024-01-22
Payer: MEDICARE

## 2024-01-22 VITALS
WEIGHT: 218.6 LBS | OXYGEN SATURATION: 99 % | SYSTOLIC BLOOD PRESSURE: 138 MMHG | HEART RATE: 64 BPM | HEIGHT: 74 IN | DIASTOLIC BLOOD PRESSURE: 64 MMHG | BODY MASS INDEX: 28.06 KG/M2 | TEMPERATURE: 97.3 F

## 2024-01-22 DIAGNOSIS — N40.1 BENIGN PROSTATIC HYPERPLASIA WITH LOWER URINARY TRACT SYMPTOMS, SYMPTOM DETAILS UNSPECIFIED: ICD-10-CM

## 2024-01-22 DIAGNOSIS — G25.81 RESTLESS LEGS SYNDROME: Primary | ICD-10-CM

## 2024-01-22 DIAGNOSIS — R79.9 ABNORMAL FINDING OF BLOOD CHEMISTRY, UNSPECIFIED: ICD-10-CM

## 2024-01-22 DIAGNOSIS — K21.9 GASTROESOPHAGEAL REFLUX DISEASE WITHOUT ESOPHAGITIS: ICD-10-CM

## 2024-01-22 PROCEDURE — 3075F SYST BP GE 130 - 139MM HG: CPT | Performed by: FAMILY MEDICINE

## 2024-01-22 PROCEDURE — 1159F MED LIST DOCD IN RCRD: CPT | Performed by: FAMILY MEDICINE

## 2024-01-22 PROCEDURE — G2211 COMPLEX E/M VISIT ADD ON: HCPCS | Performed by: FAMILY MEDICINE

## 2024-01-22 PROCEDURE — 99214 OFFICE O/P EST MOD 30 MIN: CPT | Performed by: FAMILY MEDICINE

## 2024-01-22 PROCEDURE — 1160F RVW MEDS BY RX/DR IN RCRD: CPT | Performed by: FAMILY MEDICINE

## 2024-01-22 PROCEDURE — 3078F DIAST BP <80 MM HG: CPT | Performed by: FAMILY MEDICINE

## 2024-01-22 RX ORDER — RABEPRAZOLE SODIUM 20 MG/1
20 TABLET, DELAYED RELEASE ORAL DAILY
Qty: 90 TABLET | Refills: 3 | Status: SHIPPED | OUTPATIENT
Start: 2024-01-22

## 2024-01-22 RX ORDER — ROPINIROLE 0.25 MG/1
0.25 TABLET, FILM COATED ORAL NIGHTLY
Qty: 90 TABLET | Refills: 1 | Status: SHIPPED | OUTPATIENT
Start: 2024-01-22

## 2024-01-22 RX ORDER — ALFUZOSIN HYDROCHLORIDE 10 MG/1
10 TABLET, EXTENDED RELEASE ORAL
COMMUNITY
Start: 2024-01-10

## 2024-01-22 RX ORDER — FINASTERIDE 5 MG/1
5 TABLET, FILM COATED ORAL DAILY
Qty: 90 TABLET | Refills: 1 | Status: SHIPPED | OUTPATIENT
Start: 2024-01-22

## 2024-01-22 NOTE — PROGRESS NOTES
Subjective   Demond Menchaca is a 79 y.o. male.     Chief Complaint   Patient presents with    Restless Legs Syndrome    Heartburn       Heartburn  He reports no chest pain.      Gerd for years and worse for a few weeks. Taking tums prn. Asking for aciphex. Burning mild epigastric pain. No black or bloody stools. Pt has been trying not to eat late at night. Eating more spicy food lately.     Rls- cannot remember if he has tried anything but wants to try something now. Starts at 7pm.     Bph- cialis not helping, wanting to go back to finasteride. Follows with urology.     The following portions of the patient's history were reviewed and updated as appropriate: allergies, current medications, past family history, past medical history, past social history, past surgical history and problem list.    Past Medical History:   Diagnosis Date    Allergic 2000    Grass/pollen    LASHONDA positive 06/18/2018    2017: elevated ds LASHONDA    Arthritis 1995    Hand, neck, back, etc.    Atrial flutter with rapid ventricular response     Cataract 2005    Chronic obstructive pulmonary disease     Colon polyp 10/07/2019    Added automatically from request for surgery [id number]    Diverticulitis of large intestine without perforation or abscess without bleeding 03/07/2016    Diverticulosis     ED (erectile dysfunction)     Elbow fracture, left     Erectile dysfunction 2005    History of blood transfusion     Hypogonadism in male     Kidney cysts     Left femoral shaft fracture     Lower back pain     PAF (paroxysmal atrial fibrillation)     Peptic ulceration     Plantar fasciitis 12/05/2017    Pneumonia 1974    Primary hypertension 12/30/2022    Prostatic hypertrophy, benign     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    Stroke     TIA    Transient cerebral ischemia     H/O TIAs       Past Surgical History:   Procedure Laterality Date    ABLATION OF DYSRHYTHMIC FOCUS  2012, 2013    ADENOIDECTOMY  1973    APPENDECTOMY  1973    CARDIAC ABLATION   2013, 2014    CARDIAC CATHETERIZATION  2012, 2013    CARDIAC ELECTROPHYSIOLOGY PROCEDURE N/A 04/29/2021    Procedure: Ablation atrial flutter--likely left atrial flutter, hx of PVI x 2;  Surgeon: David Hernandez MD;  Location: [facility] CATH INVASIVE LOCATION;  Service: Cardiovascular;  Laterality: N/A;    CARDIAC ELECTROPHYSIOLOGY PROCEDURE N/A 07/02/2021    Procedure: Ablation atrial fibrillation--redo;  Surgeon: David Hernandez MD;  Location:  [facility] CATH INVASIVE LOCATION;  Service: Cardiovascular;  Laterality: N/A;    COLONOSCOPY  approx 2014    normal per pt     COLONOSCOPY N/A 01/08/2020    Procedure: COLONOSCOPY to cecum with cold snare biopsies;  Surgeon: Henrique Rust MD;  Location:  [facility] ENDOSCOPY;  Service: Gastroenterology    COLONOSCOPY N/A 09/20/2022    Procedure: COLONOSCOPY TO CECUM WITH POLYPECTOMY  ( COLD BX);  Surgeon: Henrique Rust MD;  Location: [facility] ENDOSCOPY;  Service: Gastroenterology;  Laterality: N/A;  HX OF COLON POLYPS; FAM HX OF COLON CANCER/ POLYPS  POST: DIVERTICULOSIS; COLON POLYP; HEMOORHOIDS    COLONOSCOPY W/ POLYPECTOMY  11/21/2014    : 1 polyp - not precancerous    ENDOMETRIAL ABLATION  2012, 2013    ENDOSCOPY N/A 01/08/2020    Procedure: ESOPHAGOGASTRODUODENOSCOPY;  Surgeon: Henrique Rust MD;  Location: [facility] ENDOSCOPY;  Service: Gastroenterology    ENDOSCOPY N/A 09/20/2022    Procedure: ESOPHAGOGASTRODUODENOSCOPY WITH BIOPSY AND PRESSLEY DILATATION 52F;  Surgeon: Henrique Rust MD;  Location: [facility] ENDOSCOPY;  Service: Gastroenterology;  Laterality: N/A;  DYSPHAGIA  POST: GASTRITIS    FEMUR FRACTURE SURGERY Left 2007    post fall from the ladder    FRACTURE SURGERY  2007    Elbow    HAND SURGERY Left 02/10/2023    LUMBAR LAMINECTOMY  1974    Dr.Lester Lino    OTHER SURGICAL HISTORY      elbow surgery    SPINE SURGERY  1974    THYROID SURGERY  1986    benign tumor removal,     TOTAL ELBOW ARTHROPLASTY Left 2007    L, post fall and fracture, hardware in  "      Family History   Problem Relation Age of Onset    Hypertension Mother     Osteoporosis Mother     Hypertension Father     Thyroid disease Sister     Diabetes Sister     Hypertension Sister     Pulmonary fibrosis Sister     Heart disease Sister     Diabetes Sister     Colon cancer Maternal Grandmother 60    Heart disease Other     Atrial fibrillation Other     Thyroid disease Other     Diabetes Other     Heart disease Other     Hypertension Other     Malig Hyperthermia Neg Hx        Social History     Socioeconomic History    Marital status:      Spouse name: Latisha    Number of children: 2    Years of education: College    Highest education level: Not asked   Tobacco Use    Smoking status: Former     Packs/day: 0.50     Years: 15.00     Additional pack years: 0.00     Total pack years: 7.50     Types: Cigarettes     Start date: 1959     Quit date: 1974     Years since quittin.0    Smokeless tobacco: Never   Vaping Use    Vaping Use: Never used   Substance and Sexual Activity    Alcohol use: Not Currently     Alcohol/week: 6.0 standard drinks of alcohol     Types: 6 Glasses of wine per week     Comment: Some beer during the summer months    Drug use: No    Sexual activity: Not Currently     Partners: Female     Birth control/protection: None       Review of Systems   Respiratory:  Negative for shortness of breath.    Cardiovascular:  Negative for chest pain.       Objective   Visit Vitals  /64 (BP Location: Left arm, Patient Position: Sitting, Cuff Size: Large Adult)   Pulse 64   Temp 97.3 °F (36.3 °C)   Ht 188 cm (74.02\")   Wt 99.2 kg (218 lb 9.6 oz)   SpO2 99%   BMI 28.05 kg/m²     Body mass index is 28.05 kg/m².  Physical Exam  Constitutional:       Appearance: Normal appearance. He is well-developed.   Cardiovascular:      Rate and Rhythm: Normal rate and regular rhythm.      Heart sounds: Normal heart sounds.   Pulmonary:      Effort: Pulmonary effort is normal.      Breath sounds: " Normal breath sounds.   Musculoskeletal:         General: No swelling. Normal range of motion.   Skin:     General: Skin is warm and dry.      Findings: No rash.   Neurological:      General: No focal deficit present.      Mental Status: He is alert and oriented to person, place, and time.   Psychiatric:         Mood and Affect: Mood normal.         Behavior: Behavior normal.           Assessment & Plan   Diagnoses and all orders for this visit:    1. Restless legs syndrome (Primary)  -     rOPINIRole (REQUIP) 0.25 MG tablet; Take 1 tablet by mouth Every Night. Take 1 hour before bedtime.  Dispense: 90 tablet; Refill: 1  -     CBC & Differential  -     Iron Profile    2. Gastroesophageal reflux disease without esophagitis  -     RABEprazole (ACIPHEX) 20 MG EC tablet; Take 1 tablet by mouth Daily.  Dispense: 90 tablet; Refill: 3    3. Abnormal finding of blood chemistry, unspecified  -     Iron Profile    4. Benign prostatic hyperplasia with lower urinary tract symptoms, symptom details unspecified  -     finasteride (PROSCAR) 5 MG tablet; Take 1 tablet by mouth Daily.  Dispense: 90 tablet; Refill: 1          Discussed risk factors and f/u if worse or no better and in 1-2 months for physical.       Female Pregnancy Counseling Text: Female patients should also be on two forms of birth control while taking this medication and for one month after their last dose.

## 2024-01-23 LAB
BASOPHILS # BLD AUTO: 0.02 10*3/MM3 (ref 0–0.2)
BASOPHILS NFR BLD AUTO: 0.6 % (ref 0–1.5)
EOSINOPHIL # BLD AUTO: 0.15 10*3/MM3 (ref 0–0.4)
EOSINOPHIL NFR BLD AUTO: 4.8 % (ref 0.3–6.2)
ERYTHROCYTE [DISTWIDTH] IN BLOOD BY AUTOMATED COUNT: 13.3 % (ref 12.3–15.4)
HCT VFR BLD AUTO: 41.8 % (ref 37.5–51)
HGB BLD-MCNC: 13.9 G/DL (ref 13–17.7)
IMM GRANULOCYTES # BLD AUTO: 0.01 10*3/MM3 (ref 0–0.05)
IMM GRANULOCYTES NFR BLD AUTO: 0.3 % (ref 0–0.5)
IRON SATN MFR SERPL: 22 % (ref 20–50)
IRON SERPL-MCNC: 81 MCG/DL (ref 59–158)
LYMPHOCYTES # BLD AUTO: 0.59 10*3/MM3 (ref 0.7–3.1)
LYMPHOCYTES NFR BLD AUTO: 19 % (ref 19.6–45.3)
MCH RBC QN AUTO: 32 PG (ref 26.6–33)
MCHC RBC AUTO-ENTMCNC: 33.3 G/DL (ref 31.5–35.7)
MCV RBC AUTO: 96.3 FL (ref 79–97)
MONOCYTES # BLD AUTO: 0.26 10*3/MM3 (ref 0.1–0.9)
MONOCYTES NFR BLD AUTO: 8.4 % (ref 5–12)
NEUTROPHILS # BLD AUTO: 2.08 10*3/MM3 (ref 1.7–7)
NEUTROPHILS NFR BLD AUTO: 66.9 % (ref 42.7–76)
NRBC BLD AUTO-RTO: 0 /100 WBC (ref 0–0.2)
PLATELET # BLD AUTO: 112 10*3/MM3 (ref 140–450)
RBC # BLD AUTO: 4.34 10*6/MM3 (ref 4.14–5.8)
TIBC SERPL-MCNC: 365 MCG/DL
UIBC SERPL-MCNC: 284 MCG/DL (ref 112–346)
WBC # BLD AUTO: 3.11 10*3/MM3 (ref 3.4–10.8)

## 2024-02-01 ENCOUNTER — ANTICOAGULATION VISIT (OUTPATIENT)
Dept: PHARMACY | Facility: HOSPITAL | Age: 79
End: 2024-02-01
Payer: MEDICARE

## 2024-02-01 DIAGNOSIS — I48.0 PAF (PAROXYSMAL ATRIAL FIBRILLATION): Primary | ICD-10-CM

## 2024-02-01 LAB — INR PPP: 3.3

## 2024-02-01 NOTE — PROGRESS NOTES
Anticoagulation Clinic Progress Note    Anticoagulation Summary  As of 2/1/2024      INR goal:  2.5-3.5   TTR:  77.6% (5.2 y)   INR used for dosing:  3.30 (2/1/2024)   Warfarin maintenance plan:  2.5 mg every Mon, Wed, Fri; 5 mg all other days   Weekly warfarin total:  27.5 mg   No change documented:  Philip Carmona, PharmD   Plan last modified:  Chapis Jaimes, PharmD (1/18/2024)   Next INR check:  2/15/2024   Priority:  Maintenance   Target end date:      Indications    PAF (paroxysmal atrial fibrillation) [I48.0]  TIA (transient ischemic attack) (Resolved) [G45.9]                 Anticoagulation Episode Summary       INR check location:      Preferred lab:      Send INR reminders to:  [facility]  POOL    Comments:  [place]          Anticoagulation Care Providers       Provider Role Specialty Phone number    David Hernandez MD Referring Cardiology [phone]            Clinic Interview:  Patient Findings     Positives:  Change in medications    Negatives:  Signs/symptoms of thrombosis, Signs/symptoms of bleeding,   Laboratory test error suspected, Change in health, Change in alcohol use,   Change in activity, Upcoming invasive procedure, Emergency department   visit, Upcoming dental procedure, Missed doses, Extra doses, Change in   diet/appetite, Hospital admission, Bruising, Other complaints    Comments:  Reports he was prescribed rabeprazole and ropinirole on   1/22/24, both of which may increase the INR through interaction with   warfarin.      Clinical Outcomes     Negatives:  Major bleeding event, Thromboembolic event,   Anticoagulation-related hospital admission, Anticoagulation-related ED   visit, Anticoagulation-related fatality    Comments:  Reports he was prescribed rabeprazole and ropinirole on   1/22/24, both of which may increase the INR through interaction with   warfarin.        INR History:      12/22/2023     9:14 AM 1/4/2024    12:00 AM 1/4/2024     3:27 PM 1/18/2024    12:00 AM  1/18/2024     1:19 PM 2/1/2024    12:00 AM 2/1/2024     2:49 PM   Anticoagulation Monitoring   INR 2.50  2.30  2.20  3.30   INR Date 12/22/2023 1/4/2024 1/18/2024 2/1/2024   INR Goal 2.5-3.5  2.5-3.5  2.5-3.5  2.5-3.5   Trend Same  Same  Up  Same   Last Week Total 25 mg  25 mg  25 mg  27.5 mg   Next Week Total 25 mg  27.5 mg  27.5 mg  27.5 mg   Sun 5 mg  5 mg  5 mg  5 mg   Mon 2.5 mg  2.5 mg  2.5 mg  2.5 mg   Tue 5 mg  5 mg  5 mg  5 mg   Wed 2.5 mg  2.5 mg  2.5 mg  2.5 mg   Thu 5 mg  5 mg  5 mg  5 mg   Fri 2.5 mg  5 mg (1/5); Otherwise 2.5 mg  2.5 mg  2.5 mg   Sat 2.5 mg  2.5 mg  5 mg (1/20, 1/27)  5 mg   Historical INR  2.30      2.20      3.30            This result is from an external source.       Plan:  1. INR is Therapeutic today- see above in Anticoagulation Summary.   Will instruct Demond Menchaca to Continue their warfarin regimen- see above in Anticoagulation Summary.  2. Follow up in 2 weeks.  3. They have been instructed to call if any changes in medications, doses, concerns, etc. Patient expresses understanding and has no further questions at this time.    Philip Carmona, PharmD

## 2024-02-15 ENCOUNTER — ANTICOAGULATION VISIT (OUTPATIENT)
Dept: PHARMACY | Facility: HOSPITAL | Age: 79
End: 2024-02-15
Payer: MEDICARE

## 2024-02-15 DIAGNOSIS — I48.0 PAF (PAROXYSMAL ATRIAL FIBRILLATION): Primary | ICD-10-CM

## 2024-02-15 LAB — INR PPP: 2.9

## 2024-02-24 DIAGNOSIS — N40.1 BENIGN PROSTATIC HYPERPLASIA WITH LOWER URINARY TRACT SYMPTOMS, SYMPTOM DETAILS UNSPECIFIED: ICD-10-CM

## 2024-02-26 RX ORDER — WARFARIN SODIUM 5 MG/1
TABLET ORAL
Qty: 90 TABLET | Refills: 1 | Status: SHIPPED | OUTPATIENT
Start: 2024-02-26

## 2024-02-27 RX ORDER — TADALAFIL 5 MG/1
TABLET ORAL
Qty: 90 TABLET | Refills: 3 | OUTPATIENT
Start: 2024-02-27

## 2024-02-29 ENCOUNTER — ANTICOAGULATION VISIT (OUTPATIENT)
Dept: PHARMACY | Facility: HOSPITAL | Age: 79
End: 2024-02-29
Payer: MEDICARE

## 2024-02-29 DIAGNOSIS — I48.0 PAF (PAROXYSMAL ATRIAL FIBRILLATION): Primary | ICD-10-CM

## 2024-02-29 LAB — INR PPP: 2.4

## 2024-02-29 NOTE — PROGRESS NOTES
Anticoagulation Clinic Progress Note    Anticoagulation Summary  As of 2024      INR goal:  2.5-3.5   TTR:  77.8% (5.2 y)   INR used for dosin.40 (2024)   Warfarin maintenance plan:  2.5 mg every Mon, Wed, Fri; 5 mg all other days   Weekly warfarin total:  27.5 mg   No change documented:  Chapis Jaimes PharmD   Plan last modified:  Chapis Jaimes PharmD (2024)   Next INR check:  3/14/2024   Priority:  Maintenance   Target end date:      Indications    PAF (paroxysmal atrial fibrillation) [I48.0]  TIA (transient ischemic attack) (Resolved) [G45.9]                 Anticoagulation Episode Summary       INR check location:      Preferred lab:      Send INR reminders to:  [facility]  POOL    Comments:  [place]          Anticoagulation Care Providers       Provider Role Specialty Phone number    David Hernandez MD Referring Cardiology [phone]            Clinic Interview:  Patient Findings     Negatives:  Signs/symptoms of thrombosis, Signs/symptoms of bleeding,   Laboratory test error suspected, Change in health, Change in alcohol use,   Change in activity, Upcoming invasive procedure, Emergency department   visit, Upcoming dental procedure, Missed doses, Extra doses, Change in   medications, Change in diet/appetite, Hospital admission, Bruising, Other   complaints      Clinical Outcomes     Negatives:  Major bleeding event, Thromboembolic event,   Anticoagulation-related hospital admission, Anticoagulation-related ED   visit, Anticoagulation-related fatality        INR History:      2024     1:19 PM 2024    12:00 AM 2024     2:49 PM 2/15/2024    12:00 AM 2/15/2024     1:13 PM 2024    12:00 AM 2024     1:44 PM   Anticoagulation Monitoring   INR 2.20  3.30  2.90  2.40   INR Date 2024  2024  2/15/2024  2024   INR Goal 2.5-3.5  2.5-3.5  2.5-3.5  2.5-3.5   Trend Up  Same  Same  Same   Last Week Total 25 mg  27.5 mg  27.5 mg  27.5 mg   Next  Week Total 27.5 mg  27.5 mg  27.5 mg  27.5 mg   Sun 5 mg  5 mg  5 mg  5 mg   Mon 2.5 mg  2.5 mg  2.5 mg  2.5 mg   Tue 5 mg  5 mg  5 mg  5 mg   Wed 2.5 mg  2.5 mg  2.5 mg  2.5 mg   Thu 5 mg  5 mg  5 mg  5 mg   Fri 2.5 mg  2.5 mg  2.5 mg  2.5 mg   Sat 5 mg (1/20, 1/27)  5 mg  5 mg  5 mg   Historical INR  3.30      2.90      2.40            This result is from an external source.       Plan:  1. INR is Subtherapeutic today- see above in Anticoagulation Summary.   Will instruct Demond Menchaca to Continue their warfarin regimen- see above in Anticoagulation Summary.  2. Follow up in 2 weeks  3. They have been instructed to call if any changes in medications, doses, concerns, etc. Patient expresses understanding and has no further questions at this time.    Chapis Jaimes, PharmD

## 2024-03-06 ENCOUNTER — OFFICE VISIT (OUTPATIENT)
Dept: FAMILY MEDICINE CLINIC | Facility: CLINIC | Age: 79
End: 2024-03-06
Payer: MEDICARE

## 2024-03-06 VITALS
DIASTOLIC BLOOD PRESSURE: 82 MMHG | OXYGEN SATURATION: 99 % | BODY MASS INDEX: 28.18 KG/M2 | HEIGHT: 74 IN | TEMPERATURE: 97.8 F | SYSTOLIC BLOOD PRESSURE: 193 MMHG | WEIGHT: 219.6 LBS | HEART RATE: 47 BPM

## 2024-03-06 DIAGNOSIS — K21.9 GASTROESOPHAGEAL REFLUX DISEASE WITHOUT ESOPHAGITIS: Primary | ICD-10-CM

## 2024-03-06 DIAGNOSIS — I10 PRIMARY HYPERTENSION: ICD-10-CM

## 2024-03-06 DIAGNOSIS — G25.81 RESTLESS LEGS SYNDROME: ICD-10-CM

## 2024-03-06 PROCEDURE — 1160F RVW MEDS BY RX/DR IN RCRD: CPT | Performed by: FAMILY MEDICINE

## 2024-03-06 PROCEDURE — 1159F MED LIST DOCD IN RCRD: CPT | Performed by: FAMILY MEDICINE

## 2024-03-06 PROCEDURE — 1170F FXNL STATUS ASSESSED: CPT | Performed by: FAMILY MEDICINE

## 2024-03-06 PROCEDURE — 99214 OFFICE O/P EST MOD 30 MIN: CPT | Performed by: FAMILY MEDICINE

## 2024-03-06 PROCEDURE — G0439 PPPS, SUBSEQ VISIT: HCPCS | Performed by: FAMILY MEDICINE

## 2024-03-06 PROCEDURE — 3079F DIAST BP 80-89 MM HG: CPT | Performed by: FAMILY MEDICINE

## 2024-03-06 PROCEDURE — 3077F SYST BP >= 140 MM HG: CPT | Performed by: FAMILY MEDICINE

## 2024-03-06 RX ORDER — ROPINIROLE 0.5 MG/1
0.5 TABLET, FILM COATED ORAL NIGHTLY
Qty: 90 TABLET | Refills: 1 | Status: SHIPPED | OUTPATIENT
Start: 2024-03-06

## 2024-03-06 RX ORDER — LANSOPRAZOLE 30 MG/1
30 CAPSULE, DELAYED RELEASE ORAL DAILY
Qty: 90 CAPSULE | Refills: 3 | Status: SHIPPED | OUTPATIENT
Start: 2024-03-06

## 2024-03-06 RX ORDER — LISINOPRIL 30 MG/1
30 TABLET ORAL DAILY
Qty: 90 TABLET | Refills: 1 | Status: SHIPPED | OUTPATIENT
Start: 2024-03-06

## 2024-03-06 NOTE — PROGRESS NOTES
The ABCs of the Annual Wellness Visit  Subsequent Medicare Wellness Visit    Subjective    Demond Menchaca is a 79 y.o. male who presents for a Subsequent Medicare Wellness Visit.    The following portions of the patient's history were reviewed and   updated as appropriate: allergies, current medications, past family history, past medical history, past social history, past surgical history, and problem list.    Compared to one year ago, the patient feels his physical   health is the same.    Compared to one year ago, the patient feels his mental   health is the same.    Recent Hospitalizations:  This patient has had a [facility] admission record on file within the last 365 days.    Current Medical Providers:  Patient Care Team:  Venus Walton MD as PCP - General (Family Medicine)  Dudley Acosta MD as Consulting Physician (Hematology and Oncology)  Damaris Shell APRN as Nurse Practitioner (Cardiology)  Della Stephenson MD as Referring Physician (Internal Medicine)  Dick Medina MD as Referring Physician (Family Medicine)  Henrique Rust MD as Consulting Physician (Gastroenterology)    Outpatient Medications Prior to Visit   Medication Sig Dispense Refill    alfuzosin (UROXATRAL) 10 MG 24 hr tablet Take 1 tablet by mouth every night at bedtime.      Cetirizine HCl 10 MG capsule Take 1 capsule by mouth daily.      finasteride (PROSCAR) 5 MG tablet Take 1 tablet by mouth Daily. 90 tablet 1    HYDROcodone-acetaminophen (NORCO) 5-325 MG per tablet Take 1 tablet by mouth Every 12 (Twelve) Hours As Needed for Severe Pain. 60 tablet 0    Magnesium 100 MG tablet Take  by mouth.      Psyllium (METAMUCIL PO) Take  by mouth Daily.      vitamin B-12 (CYANOCOBALAMIN) 100 MCG tablet Take 0.5 tablets by mouth Daily.      warfarin (COUMADIN) 5 MG tablet TAKE ONE-HALF  TABLET (2.5 MG)  BY MOUTH ON MONDAY, WEDNESDAY,FRIDAY AND TAKE ONE TABLET (5 MG) ON ALL OTHER DAYS 90 tablet 1    lisinopril (PRINIVIL,ZESTRIL)  20 MG tablet TAKE 1 TABLET BY MOUTH EVERY DAY 90 tablet 1    RABEprazole (ACIPHEX) 20 MG EC tablet Take 1 tablet by mouth Daily. 90 tablet 3    rOPINIRole (REQUIP) 0.25 MG tablet Take 1 tablet by mouth Every Night. Take 1 hour before bedtime. 90 tablet 1     No facility-administered medications prior to visit.       Opioid medication/s are on active medication list.  and I have evaluated his active treatment plan and pain score trends (see table).  Vitals:    03/06/24 1056   PainSc: 0-No pain     I have reviewed the chart for potential of high risk medication and harmful drug interactions in the elderly.          Aspirin is not on active medication list.  Aspirin use is contraindicated for this patient due to: current use of warfarin.  .    Patient Active Problem List   Diagnosis    PAF (paroxysmal atrial fibrillation)    Benign prostatic hyperplasia    Health care maintenance    DDD (degenerative disc disease), lumbar    Seasonal allergic rhinitis    Chronic laryngitis    Restless legs syndrome    Family history of premature coronary artery disease    Pancytopenia    Slow transit constipation    Other forms of systemic lupus erythematosus    Foot drop, left    History of colon polyps    FH: colon polyps    Dysphasia    Family history of colon cancer    Venous stasis dermatitis of both lower extremities    Abnormal barium enema    Candida esophagitis    Atrial flutter with rapid ventricular response    H/O cardiac radiofrequency ablation--x 3    Medicare annual wellness visit, subsequent    Rib pain on right side    Colon polyp    Dysphagia    Weakness of both lower extremities    Tremor    Idiopathic progressive neuropathy    Primary hypertension    Bilateral hearing loss due to cerumen impaction    Gastroesophageal reflux disease without esophagitis     Advance Care Planning   Advance Care Planning     Advance Directive is on file.  ACP discussion was held with the patient during this visit. Patient has an advance  "directive in EMR which is still valid.      Objective    Vitals:    24 1056   BP: (!) 193/82   BP Location: Left arm   Patient Position: Sitting   Cuff Size: Large Adult   Pulse: (!) 47   Temp: 97.8 °F (36.6 °C)   SpO2: 99%   Weight: 99.6 kg (219 lb 9.6 oz)   Height: 188 cm (74.02\")   PainSc: 0-No pain     Estimated body mass index is 28.18 kg/m² as calculated from the following:    Height as of this encounter: 188 cm (74.02\").    Weight as of this encounter: 99.6 kg (219 lb 9.6 oz).           Does the patient have evidence of cognitive impairment? No          HEALTH RISK ASSESSMENT    Smoking Status:  Social History     Tobacco Use   Smoking Status Former    Current packs/day: 0.00    Average packs/day: 0.5 packs/day for 15.0 years (7.5 ttl pk-yrs)    Types: Cigarettes    Start date: 1959    Quit date: 1974    Years since quittin.2   Smokeless Tobacco Never     Alcohol Consumption:  Social History     Substance and Sexual Activity   Alcohol Use Yes    Alcohol/week: 7.0 standard drinks of alcohol    Types: 5 Glasses of wine, 2 Shots of liquor per week    Comment: Some beer during the summer months     Fall Risk Screen:    CHRISTOS Fall Risk Assessment was completed, and patient is at MODERATE risk for falls. Assessment completed on:3/6/2024    Depression Screening:      3/6/2024    10:00 AM   PHQ-2/PHQ-9 Depression Screening   Little Interest or Pleasure in Doing Things 0-->not at all   Feeling Down, Depressed or Hopeless 0-->not at all   PHQ-9: Brief Depression Severity Measure Score 0       Health Habits and Functional and Cognitive Screening:      3/6/2024    10:00 AM   Functional & Cognitive Status   Do you have difficulty preparing food and eating? No   Do you have difficulty bathing yourself, getting dressed or grooming yourself? No   Do you have difficulty using the toilet? No   Do you have difficulty moving around from place to place? No   Do you have trouble with steps or getting out of a " bed or a chair? No   Current Diet Well Balanced Diet   Dental Exam Up to date   Eye Exam Up to date   Exercise (times per week) 2 times per week   Do you need help using the phone?  No   Are you deaf or do you have serious difficulty hearing?  No   Do you need help to go to places out of walking distance? No   Do you need help shopping? No   Do you need help preparing meals?  No   Do you need help with housework?  No   Do you need help with laundry? No   Do you need help taking your medications? No   Do you need help managing money? No   Do you ever drive or ride in a car without wearing a seat belt? No   Have you felt unusual stress, anger or loneliness in the last month? No   Who do you live with? Spouse   If you need help, do you have trouble finding someone available to you? No   Have you been bothered in the last four weeks by sexual problems? No   Do you have difficulty concentrating, remembering or making decisions? No       Age-appropriate Screening Schedule:  Refer to the list below for future screening recommendations based on patient's age, sex and/or medical conditions. Orders for these recommended tests are listed in the plan section. The patient has been provided with a written plan.    Health Maintenance   Topic Date Due    TDAP/TD VACCINES (2 - Tdap) 06/01/2022    BMI FOLLOWUP  10/03/2024    ANNUAL WELLNESS VISIT  03/06/2025    COLORECTAL CANCER SCREENING  09/20/2027    COVID-19 Vaccine  Completed    RSV Vaccine - Adults  Completed    INFLUENZA VACCINE  Completed    Pneumococcal Vaccine 65+  Completed    ZOSTER VACCINE  Completed                  CMS Preventative Services Quick Reference  Risk Factors Identified During Encounter  None Identified  The above risks/problems have been discussed with the patient.  Pertinent information has been shared with the patient in the After Visit Summary.  An After Visit Summary and PPPS were made available to the patient.    Follow Up:   Next Medicare Wellness  "visit to be scheduled in 1 year.       Additional E&M Note during same encounter follows:  Patient has multiple medical problems which are significant and separately identifiable that require additional work above and beyond the Medicare Wellness Visit.      Chief Complaint  Medicare Wellness-subsequent    Subjective        HPI  Demond Menchaca is also being seen today for     Htn- has a high bp today. Recheck 180/85. Pt reports bp at home 150/80.     Pt asking to change gerd medications 2/2 price.     Rls- meds help but could help more.     Review of Systems   Respiratory:  Negative for shortness of breath.    Cardiovascular:  Negative for chest pain.       Objective   Vital Signs:  BP (!) 193/82 (BP Location: Left arm, Patient Position: Sitting, Cuff Size: Large Adult)   Pulse (!) 47   Temp 97.8 °F (36.6 °C)   Ht 188 cm (74.02\")   Wt 99.6 kg (219 lb 9.6 oz)   SpO2 99%   BMI 28.18 kg/m²     Physical Exam  Constitutional:       Appearance: Normal appearance. He is well-developed.   Cardiovascular:      Rate and Rhythm: Normal rate and regular rhythm.      Heart sounds: Normal heart sounds.   Pulmonary:      Effort: Pulmonary effort is normal.      Breath sounds: Normal breath sounds.   Musculoskeletal:         General: No swelling. Normal range of motion.   Skin:     General: Skin is warm and dry.      Findings: No rash.   Neurological:      General: No focal deficit present.      Mental Status: He is alert and oriented to person, place, and time.   Psychiatric:         Mood and Affect: Mood normal.         Behavior: Behavior normal.                         Assessment and Plan   Diagnoses and all orders for this visit:    1. Gastroesophageal reflux disease without esophagitis (Primary)  -     lansoprazole (Prevacid) 30 MG capsule; Take 1 capsule by mouth Daily.  Dispense: 90 capsule; Refill: 3    2. Primary hypertension  -     lisinopril (PRINIVIL,ZESTRIL) 30 MG tablet; Take 1 tablet by mouth Daily.  Dispense: " 90 tablet; Refill: 1    3. Restless legs syndrome  -     rOPINIRole (REQUIP) 0.5 MG tablet; Take 1 tablet by mouth Every Night. Take 1 hour before bedtime.  Dispense: 90 tablet; Refill: 1             Follow Up   Return in about 6 months (around 9/6/2024) for Recheck.  Patient was given instructions and counseling regarding his condition or for health maintenance advice. Please see specific information pulled into the AVS if appropriate.       Has had some balance issues and has fallen once this year. Has gotten into PT and doing well with this. Will check bp at home and call me if not controlled in 2 weeks. Discussed risk factors. Increased lisinopril. Increased ropinirole.

## 2024-03-07 ENCOUNTER — TELEPHONE (OUTPATIENT)
Dept: FAMILY MEDICINE CLINIC | Facility: CLINIC | Age: 79
End: 2024-03-07

## 2024-03-07 NOTE — TELEPHONE ENCOUNTER
Caller: Demond Menchaca    Relationship to patient: Self    Best call back number:     Patient is needing: PATIENT STATES THAT DR. OLIVER WROTE ON A PIECE OF PAPER THAT PATIENT WAS TO TAKE LISINOPRIL 40 MG, BUT THE PHARMACY IS WANTING TO FILL THAT PRESCRIPTION FOR 30 MG SO PATIENT WANTS A CALLBACK TO CLARIFY WHAT DOSAGE HE SHOULD TAKE.

## 2024-03-08 NOTE — TELEPHONE ENCOUNTER
Spoke to patient he understands, but states that he has some 20mg left and has been taking 2 to equal 40mg and he will continue to do so until he runs out of those pills. He states that taking the 40mg his blood pressure has been coming down, but will go with what you decide.

## 2024-03-18 NOTE — TELEPHONE ENCOUNTER
Caller: Demond Menchaca    Relationship to patient: Self    Best call back number: [phone]    Patient is needing: HIS BLOOD PRESSURE READINGS:  MARCH 7 - 40MG 2 PILLS IN MORNING 177/77, 3/8 -152/77, 3/9 - 181/76, 3/10- 177/76, 3/11- 165/71 , 3/12 - 173/77, 3/13/ - 158/75, THEN HE STOPPED TAKING THE HEARTBURN MEDICATION 3/14 - 145/69, 3/15 - 170/74, 3/16 - 173/79, 3/17/24 - 191/80, TODAY 194/80.

## 2024-03-28 ENCOUNTER — ANTICOAGULATION VISIT (OUTPATIENT)
Dept: PHARMACY | Facility: HOSPITAL | Age: 79
End: 2024-03-28
Payer: MEDICARE

## 2024-03-28 DIAGNOSIS — I48.0 PAF (PAROXYSMAL ATRIAL FIBRILLATION): Primary | ICD-10-CM

## 2024-03-28 LAB — INR PPP: 2.3

## 2024-03-28 NOTE — PROGRESS NOTES
Anticoagulation Clinic Progress Note    Anticoagulation Summary  As of 3/28/2024      INR goal:  2.5-3.5   TTR:  76.6% (5.3 y)   INR used for dosin.30 (3/28/2024)   Warfarin maintenance plan:  2.5 mg every Mon, Wed, Fri; 5 mg all other days   Weekly warfarin total:  27.5 mg   Plan last modified:  Chapis Jaimes, PharmD (2024)   Next INR check:  2024   Priority:  Maintenance   Target end date:      Indications    PAF (paroxysmal atrial fibrillation) [I48.0]  TIA (transient ischemic attack) (Resolved) [G45.9]                 Anticoagulation Episode Summary       INR check location:      Preferred lab:      Send INR reminders to:   [facility]  POOL    Comments:  [place]          Anticoagulation Care Providers       Provider Role Specialty Phone number    David Hernandez MD Referring Cardiology [phone]            Clinic Interview:  Patient Findings     Positives:  Change in diet/appetite    Negatives:  Signs/symptoms of thrombosis, Signs/symptoms of bleeding,   Laboratory test error suspected, Change in health, Change in alcohol use,   Change in activity, Upcoming invasive procedure, Emergency department   visit, Upcoming dental procedure, Missed doses, Extra doses, Change in   medications, Hospital admission, Bruising, Other complaints    Comments:  Reports he had a large serving of cooked kale greens yesterday   (more vit k than usual).       Clinical Outcomes     Negatives:  Major bleeding event, Thromboembolic event,   Anticoagulation-related hospital admission, Anticoagulation-related ED   visit, Anticoagulation-related fatality    Comments:  Reports he had a large serving of cooked kale greens yesterday   (more vit k than usual).         INR History:      2024     2:49 PM 2/15/2024    12:00 AM 2/15/2024     1:13 PM 2024    12:00 AM 2024     1:44 PM 3/28/2024    12:00 AM 3/28/2024     2:55 PM   Anticoagulation Monitoring   INR 3.30  2.90  2.40  2.30   INR Date  C/O left flank pain since 04/27. Completed ax On Friday. Although symptoms have improved they continue. Denies N/V. Stable gait, A&Ox3.    2/1/2024  2/15/2024  2/29/2024  3/28/2024   INR Goal 2.5-3.5  2.5-3.5  2.5-3.5  2.5-3.5   Trend Same  Same  Same  Same   Last Week Total 27.5 mg  27.5 mg  27.5 mg  27.5 mg   Next Week Total 27.5 mg  27.5 mg  27.5 mg  30 mg   Sun 5 mg  5 mg  5 mg  5 mg   Mon 2.5 mg  2.5 mg  2.5 mg  2.5 mg   Tue 5 mg  5 mg  5 mg  5 mg   Wed 2.5 mg  2.5 mg  2.5 mg  2.5 mg   Thu 5 mg  5 mg  5 mg  7.5 mg (3/28); Otherwise 5 mg   Fri 2.5 mg  2.5 mg  2.5 mg  2.5 mg   Sat 5 mg  5 mg  5 mg  5 mg   Historical INR  2.90      2.40      2.30            This result is from an external source.       Plan:  1. INR is Subtherapeutic today- see above in Anticoagulation Summary.   Will instruct Demond Menchaca to Change their warfarin regimen (7.5 mg today, then resume 2.5 mg MWF, 5 mg all other days) - see above in Anticoagulation Summary.  2. Follow up in 2 weeks.  3. They have been instructed to call if any changes in medications, doses, concerns, etc. Patient expresses understanding and has no further questions at this time.    Philip Carmona, PharmD

## 2024-04-04 ENCOUNTER — PATIENT OUTREACH (OUTPATIENT)
Dept: CASE MANAGEMENT | Facility: OTHER | Age: 79
End: 2024-04-04
Payer: MEDICARE

## 2024-04-04 NOTE — OUTREACH NOTE
AMBULATORY CASE MANAGEMENT NOTE    Name and Relationship of Patient/Support Person: Demond Menchaca F - Self    CCM Interim Update    Called and spoke to patient for CCM services. Introduced self and role. Will follow patient under HRCM to assist with BP monitoring and health education short term.     Education Documentation  No documentation found.        Allison BLOCK  Ambulatory Case Management    4/4/2024, 15:03 EDT

## 2024-04-11 ENCOUNTER — ANTICOAGULATION VISIT (OUTPATIENT)
Dept: PHARMACY | Facility: HOSPITAL | Age: 79
End: 2024-04-11
Payer: MEDICARE

## 2024-04-11 DIAGNOSIS — I48.0 PAF (PAROXYSMAL ATRIAL FIBRILLATION): Primary | ICD-10-CM

## 2024-04-11 LAB — INR PPP: 2.9

## 2024-04-11 NOTE — PROGRESS NOTES
Anticoagulation Clinic Progress Note    Anticoagulation Summary  As of 2024      INR goal:  2.5-3.5   TTR:  76.6% (5.3 y)   INR used for dosin.90 (2024)   Warfarin maintenance plan:  2.5 mg every Mon, Wed, Fri; 5 mg all other days   Weekly warfarin total:  27.5 mg   No change documented:  Silvia Joseph   Plan last modified:  Chapis Jaimes, PharmD (2024)   Next INR check:  2024   Priority:  Maintenance   Target end date:      Indications    PAF (paroxysmal atrial fibrillation) [I48.0]  TIA (transient ischemic attack) (Resolved) [G45.9]                 Anticoagulation Episode Summary       INR check location:      Preferred lab:      Send INR reminders to:  [facility]  POOL    Comments:  [place]          Anticoagulation Care Providers       Provider Role Specialty Phone number    David Hernandez MD Referring Cardiology [phone]            Clinic Interview:  No pertinent clinical findings have been reported.    INR History:      2/15/2024     1:13 PM 2024    12:00 AM 2024     1:44 PM 3/28/2024    12:00 AM 3/28/2024     2:55 PM 2024    12:00 AM 2024    10:51 AM   Anticoagulation Monitoring   INR 2.90  2.40  2.30  2.90   INR Date 2/15/2024  2024  3/28/2024  2024   INR Goal 2.5-3.5  2.5-3.5  2.5-3.5  2.5-3.5   Trend Same  Same  Same  Same   Last Week Total 27.5 mg  27.5 mg  27.5 mg  27.5 mg   Next Week Total 27.5 mg  27.5 mg  30 mg  27.5 mg   Sun 5 mg  5 mg  5 mg  5 mg   Mon 2.5 mg  2.5 mg  2.5 mg  2.5 mg   Tue 5 mg  5 mg  5 mg  5 mg   Wed 2.5 mg  2.5 mg  2.5 mg  2.5 mg   Thu 5 mg  5 mg  7.5 mg (3/28); Otherwise 5 mg  5 mg   Fri 2.5 mg  2.5 mg  2.5 mg  2.5 mg   Sat 5 mg  5 mg  5 mg  5 mg   Historical INR  2.40      2.30      2.90            This result is from an external source.       Plan:  1. INR is therapeutic today- see above in Anticoagulation Summary.    Demond CROSS Bernarda to continue their warfarin regimen- see above in  Anticoagulation Summary.  2. Follow up in 2 weeks  3. Pt has agreed to only be called if INR out of range. They have been instructed to call if any changes in medications, doses, concerns, etc. Patient expresses understanding and has no further questions at this time.    Silvia Joseph

## 2024-04-15 ENCOUNTER — PATIENT OUTREACH (OUTPATIENT)
Dept: CASE MANAGEMENT | Facility: OTHER | Age: 79
End: 2024-04-15
Payer: MEDICARE

## 2024-04-15 NOTE — OUTREACH NOTE
AMBULATORY CASE MANAGEMENT NOTE    Name and Relationship of Patient/Support Person: Demond Menchaca F - Self    Patient Outreach    Called and spoke to patient for follow up especially with his BP log. Patient currently in his home garage at the time of call and does not have his BP log close by. Requested to call back tomorrow around 8-9 AM to go over his BP log.     Education Documentation  No documentation found.        Allison BLOCK  Ambulatory Case Management    4/15/2024, 15:23 EDT

## 2024-04-16 ENCOUNTER — PATIENT OUTREACH (OUTPATIENT)
Dept: CASE MANAGEMENT | Facility: OTHER | Age: 79
End: 2024-04-16
Payer: MEDICARE

## 2024-04-16 NOTE — OUTREACH NOTE
AMBULATORY CASE MANAGEMENT NOTE    Names and Relationships of Patient/Support Persons: Caller: Demond Menchaca AMERICA; Relationship: Self -     Patient Outreach    Called and spoke to patient to follow up on his BP readings.     3/17 191/80   3/18 194/80   3/19 187/78   3/20 157/72   3/22 136/64   3/23 162/73   3/24 158/72   3/25 150/71   3/26 169/75   3/27 154/68   3/28 147/71   3/29 123/62   3/30 129/61   4/4 150/62   4/12 166/68     Reminded patient to avoid salt and briefly mentioned DASH diet. Patient states he's been eating less lately and that he lost 14 lbs recently. His wife cooks healthy meals for them. He is agreeable to receive DASH diet suggestions for future reference.     Care Coordination    Sending PCP BP log.     Education Documentation  Medication Management, taught by Allison Choi, RN at 4/16/2024  8:57 AM.  Learner: Patient  Readiness: Acceptance  Method: Explanation  Response: Verbalizes Understanding    Blood Pressure Monitoring, taught by Allison Choi, RN at 4/16/2024  8:57 AM.  Learner: Patient  Readiness: Acceptance  Method: Explanation  Response: Verbalizes Understanding          Allison BLOCK  Ambulatory Case Management    4/16/2024, 08:57 EDT

## 2024-04-17 DIAGNOSIS — I10 PRIMARY HYPERTENSION: ICD-10-CM

## 2024-04-17 RX ORDER — LISINOPRIL 40 MG/1
40 TABLET ORAL DAILY
Qty: 90 TABLET | Refills: 1 | Status: SHIPPED | OUTPATIENT
Start: 2024-04-17

## 2024-04-25 ENCOUNTER — ANTICOAGULATION VISIT (OUTPATIENT)
Dept: PHARMACY | Facility: HOSPITAL | Age: 79
End: 2024-04-25
Payer: MEDICARE

## 2024-04-25 DIAGNOSIS — I48.0 PAF (PAROXYSMAL ATRIAL FIBRILLATION): Primary | ICD-10-CM

## 2024-04-25 LAB — INR PPP: 2.2

## 2024-04-25 NOTE — PROGRESS NOTES
Anticoagulation Clinic Progress Note    Anticoagulation Summary  As of 2024      INR goal:  2.5-3.5   TTR:  76.4% (5.4 y)   INR used for dosin.20 (2024)   Warfarin maintenance plan:  2.5 mg every Mon, Wed, Fri; 5 mg all other days   Weekly warfarin total:  27.5 mg   Plan last modified:  Chapis Jaimes, PharmD (2024)   Next INR check:  2024   Priority:  Maintenance   Target end date:      Indications    PAF (paroxysmal atrial fibrillation) [I48.0]  TIA (transient ischemic attack) (Resolved) [G45.9]                 Anticoagulation Episode Summary       INR check location:      Preferred lab:      Send INR reminders to:   [facility]  POOL    Comments:  [place]          Anticoagulation Care Providers       Provider Role Specialty Phone number    David Hernandez MD Referring Cardiology [phone]            Clinic Interview:  Patient Findings     Negatives:  Signs/symptoms of thrombosis, Signs/symptoms of bleeding,   Laboratory test error suspected, Change in health, Change in alcohol use,   Change in activity, Upcoming invasive procedure, Emergency department   visit, Upcoming dental procedure, Missed doses, Extra doses, Change in   medications, Change in diet/appetite, Hospital admission, Bruising, Other   complaints      Clinical Outcomes     Negatives:  Major bleeding event, Thromboembolic event,   Anticoagulation-related hospital admission, Anticoagulation-related ED   visit, Anticoagulation-related fatality        INR History:      2024     1:44 PM 3/28/2024    12:00 AM 3/28/2024     2:55 PM 2024    12:00 AM 2024    10:51 AM 2024    12:00 AM 2024    11:52 AM   Anticoagulation Monitoring   INR 2.40  2.30  2.90  2.20   INR Date 2024  3/28/2024  2024  2024   INR Goal 2.5-3.5  2.5-3.5  2.5-3.5  2.5-3.5   Trend Same  Same  Same  Same   Last Week Total 27.5 mg  27.5 mg  27.5 mg  27.5 mg   Next Week Total 27.5 mg  30 mg  27.5 mg  30 mg    Sun 5 mg  5 mg  5 mg  5 mg   Mon 2.5 mg  2.5 mg  2.5 mg  2.5 mg   Tue 5 mg  5 mg  5 mg  5 mg   Wed 2.5 mg  2.5 mg  2.5 mg  2.5 mg   Thu 5 mg  7.5 mg (3/28); Otherwise 5 mg  5 mg  7.5 mg (4/25); Otherwise 5 mg   Fri 2.5 mg  2.5 mg  2.5 mg  2.5 mg   Sat 5 mg  5 mg  5 mg  5 mg   Historical INR  2.30      2.90      2.20            This result is from an external source.       Plan:  1. INR is Subtherapeutic today- see above in Anticoagulation Summary.   Will instruct Demond Menchaca to Increase their warfarin regimen- see above in Anticoagulation Summary.  INR fluctuating recently. Reports he stopped drinking as much wine awhile ago, boost to 7.5 mg then resume, rck 2 weeks   2. Follow up in 2 weeks  3. They have been instructed to call if any changes in medications, doses, concerns, etc. Patient expresses understanding and has no further questions at this time.    Jeanette Montes De Oca [facility]

## 2024-05-03 ENCOUNTER — PATIENT OUTREACH (OUTPATIENT)
Dept: CASE MANAGEMENT | Facility: OTHER | Age: 79
End: 2024-05-03
Payer: MEDICARE
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
## 2024-10-22 NOTE — PROGRESS NOTES
Anticoagulation Clinic Progress Note    Anticoagulation Summary  As of 2020    INR goal:   2.0-3.0   TTR:   87.5 % (1.2 y)   INR used for dosin.80 (2020)   Warfarin maintenance plan:   2.5 mg every Mon, Fri; 5 mg all other days   Weekly warfarin total:   30 mg   No change documented:   Sheri Aviles   Plan last modified:   [facility] (2018)   Next INR check:   3/12/2020   Priority:   Maintenance   Target end date:       Indications    PAF (paroxysmal atrial fibrillation) (CMS/HCC) [I48.0]             Anticoagulation Episode Summary     INR check location:       Preferred lab:       [facility]    Comments:   [place]      Anticoagulation Care Providers     Provider Role Specialty Phone number    David Hernandez MD Referring Cardiology [phone]          Clinic Interview:  No pertinent clinical findings have been reported.    INR History:  Anticoagulation Monitoring 2020   INR 3.00 3.00 2.80   INR Date 2020   INR Goal 2.0-3.0 2.0-3.0 2.0-3.0   Trend Same Same Same   Last Week Total 30 mg 30 mg 30 mg   Next Week Total 30 mg 30 mg 30 mg   Sun 5 mg 5 mg 5 mg   Mon 2.5 mg 2.5 mg 2.5 mg   Tue 5 mg 5 mg 5 mg   Wed 5 mg 5 mg 5 mg   Thu 5 mg 5 mg 5 mg   Fri 2.5 mg 2.5 mg 2.5 mg   Sat 5 mg 5 mg 5 mg   Visit Report - - -   Some recent data might be hidden       Plan:  1. INR is therapeutic today- see above in Anticoagulation Summary.    Demond CROSS Bernarda to continue their warfarin regimen- see above in Anticoagulation Summary.  2. Follow up in 2 weeks  3. Pt has agreed to only be called if INR out of range. They have been instructed to call if any changes in medications, doses, concerns, etc. Patient expresses understanding and has no further questions at this time.    Sheri Aviles  
Opt out

## 2024-11-02 NOTE — TELEPHONE ENCOUNTER
New PIV started on pt, removed prior. Urinal emptied. Pt to xray with transport. Monitor room notified.    They need to call me on my cell

## (undated) DEVICE — SYS TRNSEP ACC ACROSS ADLT BRK1 71CM

## (undated) DEVICE — KT ORCA ORCAPOD DISP STRL

## (undated) DEVICE — FRCP BX RADJAW4 NDL 2.8 240CM LG OG BX40

## (undated) DEVICE — Device: Brand: PENTARAY NAV

## (undated) DEVICE — BLANKT WARM UNDER/BDY FUL/ACC A/ 90X206CM

## (undated) DEVICE — THE SINGLE USE ETRAP – POLYP TRAP IS USED FOR SUCTION RETRIEVAL OF ENDOSCOPICALLY REMOVED POLYPS.: Brand: ETRAP

## (undated) DEVICE — SINGLE-USE BIOPSY FORCEPS: Brand: RADIAL JAW 4

## (undated) DEVICE — Device: Brand: ISMUS

## (undated) DEVICE — TUBING, SUCTION, 1/4" X 10', STRAIGHT: Brand: MEDLINE

## (undated) DEVICE — Device: Brand: WEBSTER CS

## (undated) DEVICE — SENSR O2 OXIMAX FNGR A/ 18IN NONSTR

## (undated) DEVICE — Device: Brand: REFERENCE PATCH CARTO 3

## (undated) DEVICE — CANN NASL CO2 TRULINK W/O2 A/

## (undated) DEVICE — Device: Brand: SOUNDSTAR

## (undated) DEVICE — LN SMPL CO2 SHTRM SD STREAM W/M LUER

## (undated) DEVICE — Device

## (undated) DEVICE — Device: Brand: SMARTABLATE

## (undated) DEVICE — PREF.GUIDING SHEATH W/MULT.CRV: Brand: PREFACE

## (undated) DEVICE — SNAR POLYP SENSATION STDOVL 27 240 BX40

## (undated) DEVICE — PINNACLE INTRODUCER SHEATH: Brand: PINNACLE

## (undated) DEVICE — THE TORRENT IRRIGATION SCOPE CONNECTOR IS USED WITH THE TORRENT IRRIGATION TUBING TO PROVIDE IRRIGATION FLUIDS SUCH AS STERILE WATER DURING GASTROINTESTINAL ENDOSCOPIC PROCEDURES WHEN USED IN CONJUNCTION WITH AN IRRIGATION PUMP (OR ELECTROSURGICAL UNIT).: Brand: TORRENT

## (undated) DEVICE — LOU EP: Brand: MEDLINE INDUSTRIES, INC.

## (undated) DEVICE — ADAPT CLN BIOGUARD AIR/H2O DISP

## (undated) DEVICE — BITEBLOCK OMNI BLOC

## (undated) DEVICE — CLEARSIGHT FINGER CUFF MEDIUM MULTI PACK: Brand: CLEARSIGHT

## (undated) DEVICE — Device: Brand: THERMOCOOL SMARTTOUCH SF

## (undated) DEVICE — CLEARSIGHT FINGER CUFF LARGE MULTI PACK: Brand: CLEARSIGHT

## (undated) DEVICE — MSK PROC CURAPLEX O2 2/ADAPT 7FT